# Patient Record
Sex: FEMALE | Race: WHITE | Employment: OTHER | URBAN - METROPOLITAN AREA
[De-identification: names, ages, dates, MRNs, and addresses within clinical notes are randomized per-mention and may not be internally consistent; named-entity substitution may affect disease eponyms.]

---

## 2017-01-11 ENCOUNTER — OFFICE VISIT (OUTPATIENT)
Dept: PULMONOLOGY | Facility: CLINIC | Age: 52
End: 2017-01-11
Attending: INTERNAL MEDICINE
Payer: MEDICARE

## 2017-01-11 VITALS
DIASTOLIC BLOOD PRESSURE: 99 MMHG | HEIGHT: 61 IN | RESPIRATION RATE: 16 BRPM | OXYGEN SATURATION: 96 % | HEART RATE: 92 BPM | SYSTOLIC BLOOD PRESSURE: 153 MMHG

## 2017-01-11 DIAGNOSIS — R06.02 SOB (SHORTNESS OF BREATH): ICD-10-CM

## 2017-01-11 DIAGNOSIS — R09.A2 GLOBUS SENSATION: ICD-10-CM

## 2017-01-11 DIAGNOSIS — K21.9 GASTROESOPHAGEAL REFLUX DISEASE WITHOUT ESOPHAGITIS: Primary | ICD-10-CM

## 2017-01-11 DIAGNOSIS — J45.40 MODERATE PERSISTENT ASTHMA WITHOUT COMPLICATION: Primary | ICD-10-CM

## 2017-01-11 DIAGNOSIS — K44.9 HIATAL HERNIA: ICD-10-CM

## 2017-01-11 DIAGNOSIS — R05.3 CHRONIC COUGH: ICD-10-CM

## 2017-01-11 PROCEDURE — 99212 OFFICE O/P EST SF 10 MIN: CPT | Mod: ZF

## 2017-01-11 ASSESSMENT — ENCOUNTER SYMPTOMS
WEAKNESS: 1
HYPOTENSION: 0
PALPITATIONS: 0
HEADACHES: 0
LOSS OF CONSCIOUSNESS: 0
HYPERTENSION: 0
MUSCLE CRAMPS: 1
BACK PAIN: 1
DIZZINESS: 1
TACHYCARDIA: 0
EXERCISE INTOLERANCE: 0
MEMORY LOSS: 0
NUMBNESS: 1
SLEEP DISTURBANCES DUE TO BREATHING: 0
JOINT SWELLING: 0
ARTHRALGIAS: 1
LIGHT-HEADEDNESS: 0
ORTHOPNEA: 0
LEG SWELLING: 1
MUSCLE WEAKNESS: 0
POOR WOUND HEALING: 0
TREMORS: 0
DISTURBANCES IN COORDINATION: 1
TINGLING: 1
SEIZURES: 0
CLAUDICATION: 0
PARALYSIS: 0
SYNCOPE: 0
STIFFNESS: 1
SPEECH CHANGE: 0
NAIL CHANGES: 0
SKIN CHANGES: 0
NECK PAIN: 1
MYALGIAS: 1
LEG PAIN: 1

## 2017-01-11 ASSESSMENT — PAIN SCALES - GENERAL: PAINLEVEL: NO PAIN (0)

## 2017-01-11 NOTE — PROGRESS NOTES
Pulmonary Clinic    We have been asked by Dr. Castillo to evaluate this patient in regards to   Chief Complaint   Patient presents with     Consult     Patient is being seen for consultation of Shortness of breath.  Pt previously seen in Nodule Clinic         HPI:     This is a 51-year-old female who was previously evaluated in the pulmonary nodule clinic secondary to small incidental pulmonary nodules and nonspecific lymphadenopathy.  On last visit we discussed that she does not need any further follow-up for the nonspecific lymphadenopathy which is thought to be reactive or the pulmonary nodules.  She did have ongoing symptoms of chronic cough which we felt was most likely multifactorial related to postnasal drip, GERD and possible asthma.  It was recommended she continue her Advair and p.r.n. Albuterol.  On last visit we did increase her omeprazole to twice a day and recommended a swallow evaluation.  Her swallow evaluation did demonstrate flash penetration without aspiration.  There was also a sliding hiatal hernia.  Speech pathology did recommend consideration of a GI referral for reflux management.    The patient reports that her cough is improved but there is still some mild intermittent cough.  No other new complaints.  She continues to have very significant and frequent reflux despite twice a treatment with PPI.         Review of Systems:   10 point ROS performed with pertinent +/- noted in the HPI.  The remainder of the ROS was otherwise negative.        Pertinent Medications     Current Outpatient Prescriptions   Medication     albuterol (PROAIR HFA/PROVENTIL HFA/VENTOLIN HFA) 108 (90 BASE) MCG/ACT Inhaler     multivitamin, therapeutic with minerals (MULTI-VITAMIN) TABS     omeprazole (PRILOSEC) 20 MG capsule     fluticasone (FLONASE) 50 MCG/ACT nasal spray     cetirizine (ZYRTEC) 10 MG tablet     fluticasone-salmeterol (ADVAIR) 250-50 MCG/DOSE diskus inhaler     No current facility-administered medications  "for this visit.          Allergies:      Allergies   Allergen Reactions     Amoxicillin      Lip swelling      Aspirin      GI upset     Zithromax [Azithromycin]      RASH        Past Medical Hx:       Abnormal CT of the chest        Hypothyroidism      History of herniated intervertebral disc      Hypothyroidism, unspecified hypothyroidism type      CARDIOVASCULAR SCREENING; LDL GOAL LESS THAN 160      Scoliosis and arthritis      No tobacco use history       Family Hx:     Family History   Problem Relation Age of Onset     HEART DISEASE Father      triple bypass     Unknown/Adopted Father      Unknown/Adopted Mother      DIABETES Maternal Grandfather         Social Hx:   The patient has a 0 pk yr tobacco hx.  She has no active use.  Alcohol use is 0 alcoholic drinks per week.  She denies use of recreational drugs.    She is currently on disability for scoliosis.      The patient is .  Has 2 children.         Objective   Vitals:  /99 mmHg  Pulse 92  Resp 16  Ht 1.549 m (5' 1\")  SpO2 96%  LMP 12/06/2016 (Exact Date)    General:  Adult female;appears stated age; mildly distressed; the patient is a good historian  HEENT:  NCAT; EOMI; No icterus; no injection; MMM  Neck: Supple, full range of motion, no lymphadenopathy  Pulm: No wheezes   CV: RRR, no murmurs, rubs or gallops  Extremities:  No cyanosis or clubbing, no edema  Neuro: Alert and oriented x 3, moves all extremities no obvious weakness        Labs / Imaging/Studies     Pulmonary Function Tests:  Nonspecific pattern, did not meet ATS criteria, no total lung capacity.         Assessment and Plan:   This is a 51-year-old female  With nonspecific lymphadenopathy and pulmonary nodules.  The pulmonary nodules do not need any further follow-up. Today she is being evaluated for her chronic cough which is improved with twice a day antireflux medication..  We discussed avoiding foods that cause worsening of her reflux.  In regards to her underlying " possible asthma we would recommend continuation of Advair and albuterol. She should continue to treat postnasal drip with Flonase.  Given the persistence of her reflux as well as a hiatal hernia noted on the swallow evaluation I would recommend the patient be further evaluated by gastroenterology.    1. Possible Moderate persistent asthma: On advair and albuterol PRN.  No change in treatment.     2. Chronic cough: Likely multifactorial but would control her known gerd before further interventions. Plan to continue flonase and ppi.  Referral to GI for persistent GERD and hiatal hernia.   -cont omeprazole BID  - GI referral    3. Pulmonary nodules: No further recommended follow up    I spent 25 minutes with direct face to face interaction with this patient and provided at least 50% of this time counseling and coordinating care for chronic cough as noted above in the assessment and plan.        Kehinde Boyer MD  Pulmonary and Critical Care  [facility]  Pager:  [phone]

## 2017-01-11 NOTE — PATIENT INSTRUCTIONS
1.  Continue taking twice a day reflux medications  2.  Avoid any foods that worsen your reflux  3.  Continue advair an albuterol  4.  Continue flonase  5.  Referral to talk with GI about reflux and sliding hernia.

## 2017-01-11 NOTE — MR AVS SNAPSHOT
After Visit Summary   1/11/2017    Brooke John    MRN: [id number]           Patient Information     Date Of Birth          1965        Visit Information        Provider Department      1/11/2017 3:40 PM Kehinde Boyer MD M [facility]        Today's Diagnoses     Gastroesophageal reflux disease without esophagitis    -  1     Chronic cough         Hiatal hernia         Globus sensation           Care Instructions    1.  Continue taking twice a day reflux medications  2.  Avoid any foods that worsen your reflux  3.  Continue advair an albuterol  4.  Continue flonase  5.  Referral to talk with GI about reflux and sliding hernia.           Follow-ups after your visit        Additional Services     GASTROENTEROLOGY ADULT REFERRAL +/- PROCEDURE       Your provider has referred you to Gastroenterology Services.    English    Procedure/Referral: REFERRAL ONLY - [facility]: Gastroenterology Clinic [facility] [phone]   [email or web address]    Please be aware that coverage of these services is subject to the terms and limitations of your health insurance plan.  Call member services at your health plan with any benefit or coverage questions.  Any procedures must be performed at a [facility] facility OR coordinated by your clinic's referral office.    Please bring the following with you to your appointment:    (1) Any X-Rays, CTs or MRIs which have been performed.  Contact the facility where they were done to arrange for  prior to your scheduled appointment.    (2) List of current medications   (3) This referral request   (4) Any documents/labs given to you for this referral                  Follow-up notes from your care team     Return in about 3 months (around 4/11/2017).      Your next 10 appointments already scheduled     Apr 05, 2017  2:40 PM   (Arrive by 2:25 PM)   Return Visit with Kehinde Boyer MD   M  "[facility] ([facility])    [address]  3rd Floor  [place] [facility] [address] [phone]            May 12, 2017  2:00 PM   (Arrive by 1:45 PM)   New Patient Visit with BRIAN Petit CNP   Gastroenterology and IBD ([facility])    [address]  4th Floor  [facility] [address] [phone]              Who to contact     If you have questions or need follow up information about today's clinic visit or your schedule please contact [facility] directly at [phone].  Normal or non-critical lab and imaging results will be communicated to you by NoRedIn[place]art, letter or phone within 4 business days after the clinic has received the results. If you do not hear from us within 7 days, please contact the clinic through NoRedIn[place]art or phone. If you have a critical or abnormal lab result, we will notify you by phone as soon as possible.  Submit refill requests through Moven or call your pharmacy and they will forward the refill request to us. Please allow 3 business days for your refill to be completed.          Additional Information About Your Visit        MyChart Information     Moven lets you send messages to your doctor, view your test results, renew your prescriptions, schedule appointments and more. To sign up, go to [email or web address] . Click on \"Log in\" on the left side of the screen, which will take you to the Welcome page. Then click on \"Sign up Now\" on the right side of the page.     You will be asked to enter the access code listed below, as well as some personal information. Please follow the directions to create your username and password.     Your access code is: [email or web address]  Expires: 3/12/2017 10:05 AM     Your access code will  in 90 days. If you need help or a new code, please call your [place] clinic or [phone].        Care EveryWhere ID     This " "is your Care EveryWhere ID. This could be used by other organizations to access your [facility] medical records  [id number]        Your Vitals Were     Pulse Respirations Height Pulse Oximetry Last Period       92 16 1.549 m (5' 1\") 96% 12/06/2016 (Exact Date)        Blood Pressure from Last 3 Encounters:   01/11/17 153/99   12/12/16 118/80   11/08/16 140/85    Weight from Last 3 Encounters:   12/12/16 95.255 kg (210 lb)   11/08/16 96.344 kg (212 lb 6.4 oz)   10/21/16 94.802 kg (209 lb)              We Performed the Following     GASTROENTEROLOGY ADULT REFERRAL +/- PROCEDURE        Primary Care Provider    None Specified       No primary provider on file.        Thank you!     Thank you for choosing [facility]  for your care. Our goal is always to provide you with excellent care. Hearing back from our patients is one way we can continue to improve our services. Please take a few minutes to complete the written survey that you may receive in the mail after your visit with us. Thank you!             Your Updated Medication List - Protect others around you: Learn how to safely use, store and throw away your medicines at www.disposemymeds.org.          This list is accurate as of: 1/11/17  4:24 PM.  Always use your most recent med list.                   Brand Name Dispense Instructions for use    albuterol 108 (90 BASE) MCG/ACT Inhaler    PROAIR HFA/PROVENTIL HFA/VENTOLIN HFA    1 Inhaler    Inhale 2 puffs into the lungs every 4 hours as needed for shortness of breath / dyspnea or wheezing       cetirizine 10 MG tablet    zyrTEC    30 tablet    Take 1 tablet (10 mg) by mouth every morning       fluticasone 50 MCG/ACT spray    FLONASE    16 g    USE TWO SPRAYS IN EACH NOSTRIL EVERY DAY       fluticasone-salmeterol 250-50 MCG/DOSE diskus inhaler    ADVAIR    1 Inhaler    Inhale 1 puff into the lungs 2 times daily       Multi-vitamin Tabs tablet      Take 1 tablet by mouth daily       " omeprazole 20 MG CR capsule    priLOSEC    180 capsule    Take 1 capsule (20 mg) by mouth 2 times daily

## 2017-01-11 NOTE — Clinical Note
Date:January 20, 2017      Patient was self referred, no letter generated. Do not send.        [facility] Information

## 2017-01-11 NOTE — Clinical Note
1/11/2017       RE: Brooke John  [address]    [facility] [address]     Dear Colleague,    Thank you for referring your patient, Brooke John, to the [facility] at [facility]. Please see a copy of my visit note below.    Pulmonary Clinic    We have been asked by Dr. Castillo to evaluate this patient in regards to   Chief Complaint   Patient presents with     Consult     Patient is being seen for consultation of Shortness of breath.  Pt previously seen in Nodule Clinic         HPI:     This is a 51-year-old female who was previously evaluated in the pulmonary nodule clinic secondary to small incidental pulmonary nodules and nonspecific lymphadenopathy.  On last visit we discussed that she does not need any further follow-up for the nonspecific lymphadenopathy which is thought to be reactive or the pulmonary nodules.  She did have ongoing symptoms of chronic cough which we felt was most likely multifactorial related to postnasal drip, GERD and possible asthma.  It was recommended she continue her Advair and p.r.n. Albuterol.  On last visit we did increase her omeprazole to twice a day and recommended a swallow evaluation.  Her swallow evaluation did demonstrate flash penetration without aspiration.  There was also a sliding hiatal hernia.  Speech pathology did recommend consideration of a GI referral for reflux management.    ***         Review of Systems:   10 point ROS performed with pertinent +/- noted in the HPI.  The remainder of the ROS was otherwise negative.        Pertinent Medications     Current Outpatient Prescriptions   Medication     albuterol (PROAIR HFA/PROVENTIL HFA/VENTOLIN HFA) 108 (90 BASE) MCG/ACT Inhaler     multivitamin, therapeutic with minerals (MULTI-VITAMIN) TABS     omeprazole (PRILOSEC) 20 MG capsule     fluticasone (FLONASE) 50 MCG/ACT nasal spray     cetirizine (ZYRTEC) 10 MG tablet      "fluticasone-salmeterol (ADVAIR) 250-50 MCG/DOSE diskus inhaler     No current facility-administered medications for this visit.          Allergies:      Allergies   Allergen Reactions     Amoxicillin      Lip swelling      Aspirin      GI upset     Zithromax [Azithromycin]      RASH        Past Medical Hx:       Abnormal CT of the chest        Hypothyroidism      History of herniated intervertebral disc      Hypothyroidism, unspecified hypothyroidism type      CARDIOVASCULAR SCREENING; LDL GOAL LESS THAN 160      Scoliosis and arthritis      No tobacco use history       Family Hx:     Family History   Problem Relation Age of Onset     HEART DISEASE Father      triple bypass     Unknown/Adopted Father      Unknown/Adopted Mother      DIABETES Maternal Grandfather         Social Hx:   The patient has a 0 pk yr tobacco hx.  She has no active use.  Alcohol use is 0 alcoholic drinks per week.  She denies use of recreational drugs.    She is currently on disability for scoliosis.      The patient is .  Has 2 children.         Objective   Vitals:  /99 mmHg  Pulse 92  Resp 16  Ht 1.549 m (5' 1\")  SpO2 96%  LMP 12/06/2016 (Exact Date)    General:  Adult female;appears stated age; mildly distressed; the patient is a good historian  HEENT:  NCAT; EOMI; No icterus; no injection; MMM  Neck: Supple, full range of motion, no lymphadenopathy  Pulm: No wheezes   CV: RRR, no murmurs, rubs or gallops  Extremities:  No cyanosis or clubbing, no edema  Neuro: Alert and oriented x 3, moves all extremities no obvious weakness        Labs / Imaging/Studies     Imaging:   CT chest: Done 6/2016  1. Decreased size of numerous mediastinal lymph nodes since the prior  exam. Redemonstration of several bilateral benign-appearing enlarged  axillary lymph nodes.  2. Moderate sliding hiatal hernia.  3. Stable bilateral 4 mm pulmonary nodules. If patient is considered  low risk for lung cancer, no further followup is needed. If " patient is  considered high risk, recommend followup CT in 12 months to ensure  stability.  4. Subacute right-sided rib fractures.  5. Diffuse hepatic steatosis.    Pulmonary Function Tests:  Nonspecific pattern, did not meet ATS criteria, no total lung capacity.         Assessment and Plan:         This is a 51-year-old female with improving nonspecific lymphadenopathy and 4mm pulmonary nodules which were stable and thought to be related to a recent illness in the setting of a possible asthma exacerbation.  Her PFT's were poor technique and appeared more consistent with restriction if anything.  She is on therapy for post nasal drip which seems well controlled, however her GERD does not seem well controlled.   Will start on BID PPI, also will get swallow study.      1. Possible Moderate persistent asthma: On advair and albuterol PRN, has had pneumovax and flu shot.  Unable to perform PFT's satisfactorily in the past.  No change in therapy at this point    2. Chronic cough: Likely multifactorial but main complaint today seems to be GERD related, with symptoms while laying flat, trouble with pop/soda and many other foods and sensation of food getting stuck while swallowing.   -Increase omeprazole to BID  -Swallow study  -F/U in 2months in general pulmonary clinic    3. Pulmonary nodules: No further recommended follow up      Herb Lynch  Pulmonary/Critical Care Fellow      Seen and discussed with Dr Boyer    The patient was independently seen by me.  I have reviewed the patient's presentation, pertinent labs, pertinent imaging, and history.  I have discussed the case in detail with the resident / fellow and agree with the assessment and plan as documented.  Any necessary changes to the above documentation have been made prior to signing this clinic note.     Kehinde Boyer MD  Pulmonary and Critical Care  [facility]  Pager:  [phone]        Again, thank you for allowing me to participate in  the care of your patient.      Sincerely,    Kehinde Boyer MD

## 2017-01-24 LAB
DLCOUNC-%PRED-PRE: 80 %
DLCOUNC-PRE: 16.92 ML/MIN/MMHG
DLCOUNC-PRED: 21.14 ML/MIN/MMHG
ERV-%PRED-PRE: 81 %
ERV-PRE: 0.22 L
ERV-PRED: 0.27 L
EXPTIME-PRE: 6.46 SEC
FEF2575-%PRED-PRE: 68 %
FEF2575-PRE: 1.65 L/SEC
FEF2575-PRED: 2.4 L/SEC
FEFMAX-%PRED-PRE: 61 %
FEFMAX-PRE: 3.79 L/SEC
FEFMAX-PRED: 6.16 L/SEC
FEV1-%PRED-PRE: 60 %
FEV1-PRE: 1.47 L
FEV1FEV6-PRE: 85 %
FEV1FEV6-PRED: 82 %
FEV1FVC-PRE: 85 %
FEV1FVC-PRED: 81 %
FEV1SVC-PRE: 78 %
FEV1SVC-PRED: 80 %
FIFMAX-PRE: 3.07 L/SEC
FRCPLETH-%PRED-PRE: 67 %
FRCPLETH-PRE: 1.69 L
FRCPLETH-PRED: 2.52 L
FVC-%PRED-PRE: 57 %
FVC-PRE: 1.73 L
FVC-PRED: 3.02 L
IC-%PRED-PRE: 60 %
IC-PRE: 1.67 L
IC-PRED: 2.76 L
RVPLETH-%PRED-PRE: 88 %
RVPLETH-PRE: 1.47 L
RVPLETH-PRED: 1.66 L
TLCPLETH-%PRED-PRE: 75 %
TLCPLETH-PRE: 3.36 L
TLCPLETH-PRED: 4.44 L
VA-%PRED-PRE: 61 %
VA-PRE: 2.8 L
VC-%PRED-PRE: 62 %
VC-PRE: 1.89 L
VC-PRED: 3.03 L

## 2017-02-17 ENCOUNTER — PRE VISIT (OUTPATIENT)
Dept: GASTROENTEROLOGY | Facility: CLINIC | Age: 52
End: 2017-02-17

## 2017-02-17 NOTE — TELEPHONE ENCOUNTER
1.  Date/reason for appt: 5/12/17 2PM dx Gastroesophageal reflux disease without esophagitis Chronic cough Hiatal hernia  2.  Referring provider: Dr. Boyer   3.  Call to patient (Yes / No - short description): Yes, LM to see if there are any outside recs.   4.  Previous care at / records requested from:  Ov notes w/ Merlin 1/11/17   PACS-  CT Chest 6/24/16  & XR Swallow Video

## 2017-03-01 NOTE — TELEPHONE ENCOUNTER
Called and spoke w/ pt, per pt there are no outside recs only recs are with Dr. Boyer. Pt has not had an upper EGD as of yet but she is not sure.

## 2017-03-06 DIAGNOSIS — J45.40 MODERATE PERSISTENT ASTHMA WITHOUT COMPLICATION: ICD-10-CM

## 2017-03-09 RX ORDER — FLUTICASONE PROPIONATE 50 MCG
SPRAY, SUSPENSION (ML) NASAL
Qty: 16 G | Refills: 3 | Status: SHIPPED | OUTPATIENT
Start: 2017-03-09 | End: 2018-06-11

## 2017-03-09 NOTE — TELEPHONE ENCOUNTER
fluticasone (FLONASE) 50 MCG/ACT nasal spray 16 g 1 10/26/2016  No   Sig: USE TWO SPRAYS IN EACH NOSTRIL EVERY DAY         Last Written Prescription Date: 10/26/16  Last Fill Quantity: 16,  # refills: 1   Last Office Visit with G, [facility] or [facility] prescribing provider: 12/12/16         Prescription approved per [facility] Refill Protocol.    Debbie Bingham RN, BSN   [facility]

## 2017-04-05 ENCOUNTER — OFFICE VISIT (OUTPATIENT)
Dept: PULMONOLOGY | Facility: CLINIC | Age: 52
End: 2017-04-05
Attending: INTERNAL MEDICINE
Payer: MEDICARE

## 2017-04-05 VITALS
RESPIRATION RATE: 16 BRPM | DIASTOLIC BLOOD PRESSURE: 90 MMHG | OXYGEN SATURATION: 97 % | HEART RATE: 91 BPM | SYSTOLIC BLOOD PRESSURE: 140 MMHG

## 2017-04-05 DIAGNOSIS — J45.40 MODERATE PERSISTENT ASTHMA WITHOUT COMPLICATION: Primary | ICD-10-CM

## 2017-04-05 DIAGNOSIS — K21.00 GASTROESOPHAGEAL REFLUX DISEASE WITH ESOPHAGITIS: ICD-10-CM

## 2017-04-05 DIAGNOSIS — R91.8 PULMONARY NODULES: ICD-10-CM

## 2017-04-05 PROCEDURE — 99212 OFFICE O/P EST SF 10 MIN: CPT | Mod: ZF

## 2017-04-05 ASSESSMENT — PAIN SCALES - GENERAL: PAINLEVEL: MODERATE PAIN (5)

## 2017-04-05 NOTE — NURSING NOTE
Chief Complaint   Patient presents with     Breathing Problem     Patient is being seen for follow up of breathing issues      Funmilayo Jaramillo CMA at 2:21 PM on 4/5/2017

## 2017-04-05 NOTE — LETTER
4/5/2017       RE: Brooke John  [address]    [place] [facility] [address]     Dear Colleague,    Thank you for referring your patient, Brooke John, to the [facility] Please see a copy of my visit note below.    Pulmonary Clinic    We have been asked by Dr. Castillo to evaluate this patient in regards to   Chief Complaint   Patient presents with     Breathing Problem     Patient is being seen for follow up of breathing issues         HPI:     This is a 51-year-old female who I had previously seen secondary to incidental pulmonary nodules and nonspecific lymphadenopathy.  She was last seen in January 2017.  It was no further recommended follow-up for the incidental pulmonary nodules which continue to have chronic cough.  The cough had improved after initiation of a proton pump inhibitor and some nonpharmacologic interventions including reduction of food prior to bedtime.  There was some concern for possible underlying asthma and she was continued on Advair and p.r.n. albuterol.  She was also to have postnasal drip for which she's been treated with Flonase.  Again she notes that her cough has been improved after initiation of a proton pump inhibitor and she continues her Advair.  Her triggers for dyspnea and cough tend to be smoke exposure.  She does have an upcoming appointment with gastroenterology on May 12, 2017.       Review of Systems:   10 point ROS performed with pertinent +/- noted in the HPI.  The remainder of the ROS was otherwise negative.        Pertinent Medications     Current Outpatient Prescriptions   Medication     fluticasone (FLONASE) 50 MCG/ACT spray     albuterol (PROAIR HFA/PROVENTIL HFA/VENTOLIN HFA) 108 (90 BASE) MCG/ACT Inhaler     multivitamin, therapeutic with minerals (MULTI-VITAMIN) TABS     omeprazole (PRILOSEC) 20 MG capsule     cetirizine (ZYRTEC) 10 MG tablet      fluticasone-salmeterol (ADVAIR) 250-50 MCG/DOSE diskus inhaler     No current facility-administered medications for this visit.           Allergies:      Allergies   Allergen Reactions     Amoxicillin      Lip swelling      Aspirin      GI upset     Zithromax [Azithromycin]      RASH        Past Medical Hx:       Abnormal CT of the chest - pulmonary nodules, no further follow up required        Hypothyroidism      History of herniated intervertebral disc      Hypothyroidism, unspecified hypothyroidism type      CARDIOVASCULAR SCREENING; LDL GOAL LESS THAN 160      Scoliosis and arthritis      No tobacco use history      Chronic cough       Family Hx:     Family History   Problem Relation Age of Onset     HEART DISEASE Father      triple bypass     Unknown/Adopted Father      Unknown/Adopted Mother      DIABETES Maternal Grandfather         Social Hx:   The patient has a 0 pk yr tobacco hx.  She has no active use.  Alcohol use is 0 alcoholic drinks per week.  She denies use of recreational drugs.    She is currently on disability for scoliosis.      The patient is .  Has 2 children.         Objective   Vitals:  /90 (BP Location: Right arm, Patient Position: Chair, Cuff Size: Adult Large)  Pulse 91  Resp 16  SpO2 97%    General:  Adult female;appears stated age; no distress, good historian  HEENT:  NCAT; EOMI; No icterus; no injection; MMM  Neck: Supple, full range of motion, no lymphadenopathy  Pulm: No wheezes, CTA  CV: RRR, no murmurs, rubs or gallops  Extremities:  No cyanosis or clubbing, no edema  Neuro: Alert and oriented x 3, moves all extremities no obvious weakness        Labs / Imaging/Studies     None         Assessment and Plan:   This is a 71-year-old female with nonspecific lymphadenopathy and no further required follow-up for incidental pulmonary nodules.  Her chronic cough is likely multifactorial including postnasal drip, reflux, asthma.  She does have a sliding hiatal hernia and  significant GERD therefore she will follow-up with gastroenterology.  We will continue her current medications including Advair, PPI, Flonase, p.r.n. albuterol and she will follow-up in six months at [facility].    1. Moderate persistent asthma without complication  Continue Advair and p.r.n. albuterol.  Continue to control aggravating factors including postnasal drip and reflux.    2. Pulmonary nodules  No further recommended follow-up.    3. Gastroesophageal reflux disease with esophagitis  Continue PPI and follow up with gastroenterology    I spent 25 minutes with direct face to face interaction with this patient and provided at least 50% of this time counseling and coordinating care for chronic cough, post nasal drip, and refulx as noted above in the assessment and plan.        Kehinde Boyer MD  Pulmonary and Critical Care  [facility]  Pager:  [phone]

## 2017-04-05 NOTE — MR AVS SNAPSHOT
"              After Visit Summary   4/5/2017    Brooke John    MRN: [id number]           Patient Information     Date Of Birth          1965        Visit Information        Provider Department      4/5/2017 2:40 PM Kehinde Boyer MD [facility]        Care Instructions    1.  [phone] call after you see GI.   2.  Follow up in 6 months ([place])        Follow-ups after your visit        Follow-up notes from your care team     Return in about 6 months (around 10/5/2017).      Your next 10 appointments already scheduled     May 12, 2017  2:00 PM CDT   (Arrive by 1:45 PM)   New Patient Visit with BRIAN Petit [facility] ([facility])    [address] [place] [id number] [phone]              Who to contact     If you have questions or need follow up information about today's clinic visit or your schedule please contact [facility] directly at [phone].  Normal or non-critical lab and imaging results will be communicated to you by Synt[employer]ityhart, letter or phone within 4 business days after the clinic has received the results. If you do not hear from us within 7 days, please contact the clinic through Syntricityhart or phone. If you have a critical or abnormal lab result, we will notify you by phone as soon as possible.  Submit refill requests through [employer] or call your pharmacy and they will forward the refill request to us. Please allow 3 business days for your refill to be completed.          Additional Information About Your Visit        Syntricityhart Information     [employer] lets you send messages to your doctor, view your test results, renew your prescriptions, schedule appointments and more. To sign up, go to [email or web address] . Click on \"Log in\" on the left side of the screen, which will take you to the Welcome page. Then click on \"Sign up " "Now\" on the right side of the page.     You will be asked to enter the access code listed below, as well as some personal information. Please follow the directions to create your username and password.     Your access code is: [email or web address]  Expires: 2017  6:30 AM     Your access code will  in 90 days. If you need help or a new code, please call your [facility] or [phone].        Care EveryWhere ID     This is your Care EveryWhere ID. This could be used by other organizations to access your [place] medical records  [id number]        Your Vitals Were     Pulse Respirations Pulse Oximetry             91 16 97%          Blood Pressure from Last 3 Encounters:   17 140/90   17 (!) 153/99   16 118/80    Weight from Last 3 Encounters:   16 95.3 kg (210 lb)   16 96.3 kg (212 lb 6.4 oz)   10/21/16 94.8 kg (209 lb)              Today, you had the following     No orders found for display       Primary Care Provider    None Specified       No primary provider on file.        Thank you!     Thank you for choosing [facility]  for your care. Our goal is always to provide you with excellent care. Hearing back from our patients is one way we can continue to improve our services. Please take a few minutes to complete the written survey that you may receive in the mail after your visit with us. Thank you!             Your Updated Medication List - Protect others around you: Learn how to safely use, store and throw away your medicines at www.disposemymeds.org.          This list is accurate as of: 17  3:41 PM.  Always use your most recent med list.                   Brand Name Dispense Instructions for use    albuterol 108 (90 BASE) MCG/ACT Inhaler    PROAIR HFA/PROVENTIL HFA/VENTOLIN HFA    1 Inhaler    Inhale 2 puffs into the lungs every 4 hours as needed for shortness of breath / dyspnea or wheezing       cetirizine 10 MG tablet    zyrTEC    30 " tablet    Take 1 tablet (10 mg) by mouth every morning       fluticasone 50 MCG/ACT spray    FLONASE    16 g    USE TWO SPRAYS IN EACH NOSTRIL EVERY DAY       fluticasone-salmeterol 250-50 MCG/DOSE diskus inhaler    ADVAIR    1 Inhaler    Inhale 1 puff into the lungs 2 times daily       Multi-vitamin Tabs tablet      Take 1 tablet by mouth daily       omeprazole 20 MG CR capsule    priLOSEC    180 capsule    Take 1 capsule (20 mg) by mouth 2 times daily

## 2017-04-05 NOTE — PROGRESS NOTES
Pulmonary Clinic    We have been asked by Dr. Castillo to evaluate this patient in regards to   Chief Complaint   Patient presents with     Breathing Problem     Patient is being seen for follow up of breathing issues         HPI:     This is a 51-year-old female who I had previously seen secondary to incidental pulmonary nodules and nonspecific lymphadenopathy.  She was last seen in January 2017.  It was no further recommended follow-up for the incidental pulmonary nodules which continue to have chronic cough.  The cough had improved after initiation of a proton pump inhibitor and some nonpharmacologic interventions including reduction of food prior to bedtime.  There was some concern for possible underlying asthma and she was continued on Advair and p.r.n. albuterol.  She was also to have postnasal drip for which she's been treated with Flonase.  Again she notes that her cough has been improved after initiation of a proton pump inhibitor and she continues her Advair.  Her triggers for dyspnea and cough tend to be smoke exposure.  She does have an upcoming appointment with gastroenterology on May 12, 2017.       Review of Systems:   10 point ROS performed with pertinent +/- noted in the HPI.  The remainder of the ROS was otherwise negative.        Pertinent Medications     Current Outpatient Prescriptions   Medication     fluticasone (FLONASE) 50 MCG/ACT spray     albuterol (PROAIR HFA/PROVENTIL HFA/VENTOLIN HFA) 108 (90 BASE) MCG/ACT Inhaler     multivitamin, therapeutic with minerals (MULTI-VITAMIN) TABS     omeprazole (PRILOSEC) 20 MG capsule     cetirizine (ZYRTEC) 10 MG tablet     fluticasone-salmeterol (ADVAIR) 250-50 MCG/DOSE diskus inhaler     No current facility-administered medications for this visit.           Allergies:      Allergies   Allergen Reactions     Amoxicillin      Lip swelling      Aspirin      GI upset     Zithromax [Azithromycin]      RASH        Past Medical Hx:       Abnormal CT of the  chest - pulmonary nodules, no further follow up required        Hypothyroidism      History of herniated intervertebral disc      Hypothyroidism, unspecified hypothyroidism type      CARDIOVASCULAR SCREENING; LDL GOAL LESS THAN 160      Scoliosis and arthritis      No tobacco use history      Chronic cough       Family Hx:     Family History   Problem Relation Age of Onset     HEART DISEASE Father      triple bypass     Unknown/Adopted Father      Unknown/Adopted Mother      DIABETES Maternal Grandfather         Social Hx:   The patient has a 0 pk yr tobacco hx.  She has no active use.  Alcohol use is 0 alcoholic drinks per week.  She denies use of recreational drugs.    She is currently on disability for scoliosis.      The patient is .  Has 2 children.         Objective   Vitals:  /90 (BP Location: Right arm, Patient Position: Chair, Cuff Size: Adult Large)  Pulse 91  Resp 16  SpO2 97%    General:  Adult female;appears stated age; no distress, good historian  HEENT:  NCAT; EOMI; No icterus; no injection; MMM  Neck: Supple, full range of motion, no lymphadenopathy  Pulm: No wheezes, CTA  CV: RRR, no murmurs, rubs or gallops  Extremities:  No cyanosis or clubbing, no edema  Neuro: Alert and oriented x 3, moves all extremities no obvious weakness        Labs / Imaging/Studies     None         Assessment and Plan:   This is a 71-year-old female with nonspecific lymphadenopathy and no further required follow-up for incidental pulmonary nodules.  Her chronic cough is likely multifactorial including postnasal drip, reflux, asthma.  She does have a sliding hiatal hernia and significant GERD therefore she will follow-up with gastroenterology.  We will continue her current medications including Advair, PPI, Flonase, p.r.n. albuterol and she will follow-up in six months at [facility].    1. Moderate persistent asthma without complication  Continue Advair and p.r.n. albuterol.  Continue to control  aggravating factors including postnasal drip and reflux.    2. Pulmonary nodules  No further recommended follow-up.    3. Gastroesophageal reflux disease with esophagitis  Continue PPI and follow up with gastroenterology    I spent 25 minutes with direct face to face interaction with this patient and provided at least 50% of this time counseling and coordinating care for chronic cough, post nasal drip, and refulx as noted above in the assessment and plan.        Kehinde Boyer MD  Pulmonary and Critical Care  [facility]  Pager:  [phone]

## 2017-05-12 ENCOUNTER — OFFICE VISIT (OUTPATIENT)
Dept: GASTROENTEROLOGY | Facility: CLINIC | Age: 52
End: 2017-05-12

## 2017-05-12 VITALS
HEIGHT: 61 IN | OXYGEN SATURATION: 95 % | WEIGHT: 214 LBS | DIASTOLIC BLOOD PRESSURE: 82 MMHG | BODY MASS INDEX: 40.4 KG/M2 | HEART RATE: 79 BPM | SYSTOLIC BLOOD PRESSURE: 128 MMHG

## 2017-05-12 DIAGNOSIS — K44.9 HIATAL HERNIA: ICD-10-CM

## 2017-05-12 DIAGNOSIS — E73.9 LACTOSE INTOLERANCE: ICD-10-CM

## 2017-05-12 DIAGNOSIS — K59.00 CONSTIPATION, UNSPECIFIED CONSTIPATION TYPE: ICD-10-CM

## 2017-05-12 DIAGNOSIS — K21.9 GASTROESOPHAGEAL REFLUX DISEASE, ESOPHAGITIS PRESENCE NOT SPECIFIED: ICD-10-CM

## 2017-05-12 DIAGNOSIS — R13.19 ESOPHAGEAL DYSPHAGIA: Primary | ICD-10-CM

## 2017-05-12 RX ORDER — POLYETHYLENE GLYCOL 3350 17 G/17G
1 POWDER, FOR SOLUTION ORAL DAILY PRN
Qty: 510 G | Refills: 11 | Status: SHIPPED | OUTPATIENT
Start: 2017-05-12 | End: 2019-10-17

## 2017-05-12 ASSESSMENT — ENCOUNTER SYMPTOMS
SHORTNESS OF BREATH: 0
HEMOPTYSIS: 0
RESPIRATORY PAIN: 0
LEG SWELLING: 1
DIZZINESS: 1
DISTURBANCES IN COORDINATION: 0
SNORES LOUDLY: 1
TACHYCARDIA: 0
DIFFICULTY URINATING: 0
HYPOTENSION: 0
MEMORY LOSS: 0
ARTHRALGIAS: 1
MUSCLE CRAMPS: 1
PARALYSIS: 0
POOR WOUND HEALING: 1
CLAUDICATION: 1
WEAKNESS: 1
SYNCOPE: 0
POSTURAL DYSPNEA: 1
EXERCISE INTOLERANCE: 0
DYSPNEA ON EXERTION: 0
BACK PAIN: 1
LOSS OF CONSCIOUSNESS: 0
SPEECH CHANGE: 1
SLEEP DISTURBANCES DUE TO BREATHING: 0
DYSURIA: 0
HYPERTENSION: 0
FLANK PAIN: 0
COUGH: 0
TREMORS: 0
LIGHT-HEADEDNESS: 1
WHEEZING: 1
NECK PAIN: 1
ORTHOPNEA: 1
HEMATURIA: 0
SKIN CHANGES: 0
COUGH DISTURBING SLEEP: 0
SEIZURES: 0
NAIL CHANGES: 0
JOINT SWELLING: 1
LEG PAIN: 1
HEADACHES: 0
SPUTUM PRODUCTION: 0
NUMBNESS: 1
STIFFNESS: 1
MYALGIAS: 1
TINGLING: 1
PALPITATIONS: 0
MUSCLE WEAKNESS: 1

## 2017-05-12 ASSESSMENT — PAIN SCALES - GENERAL: PAINLEVEL: NO PAIN (0)

## 2017-05-12 NOTE — PATIENT INSTRUCTIONS
"You have learned how to swallow slowly   Swallowing precautions for esophageal dysmotility and for after anti-reflux surgery.    (poor muscle action in the esophagus)   Take small bites.   Chew well   Moisten solid or dry food with applesauce, yogurt or other sauces.   Take sips of water between bites.   \"Swallow\" twice for each bite of food.   Wait for each bite to go down before taking the next bite.   Drink liquids slowly, a swallow at a time, rather than in continuous gulps.      Continue the omeprazole (Prilosec) two times daily as you are using it.  Best is to take it 30-60 minutes before a meal.    Follow lifestyle precautions against acid reflux (GERD)    Do not overeat.   Avoid meals and snacks within three or four hours of lying down.   Avoid alcohol and mint. Decrease or quit smoking.   Do not drink carbonation - it IS acid. Decrease or quit caffeine.   If you have nighttime symptoms, elevate the head of the bed    first 3 inches, then 6 to 8 inches.    A foam wedge from the hip may be helpful, if a person lies on their back.    Pillows do NOT work. The entire back must be straight.   Lose weight if you are overweight.    Even ten pounds weight loss can make a difference.  Some people also have specific triggers: tomato, spice, chocolate, citrus/orange juice.      Against constipation, use polyethylene glycol (Miralax) . You may mix this for four days and chill it.  Try using one dose daily. Then adjust dose. ?1/2 dose daily.  This is a softener that does not stimulate a BM.    Blood tests were ordered by Dr. Bhatia to check on your thyroid.  You may do these now and see her soon.    You will need a pre-op physical for the upper GI endoscopy (EGD) to be with monitored anesthesia care (MAC).    UPPER ENDOSCOPY (also known as EGD- esophageogastroduodenoscopy)    To schedule, please call [phone], or you can schedule at the  after your appointment.    You will need a  to bring you " home from the exam.  You may not take a cab home unless you have an adult with you.    If you have diabetes or are on blood thinners: talk with your doctor at least one week before the exam.  They may want you to change your medicine before the test.    This exam looks at the lining of your esophagus, stomach and duodenum (first part of the small intestine).    Do not eat of drink anything for 6-8 hours before your exam.    During this procedure, the doctor will help you to swallow a flexible tube called an endoscope. This endoscope has a small camera that lets the doctor see inside your body. The exam last from 10-20 minutes.    A small IV will be placed in your hand or arm, and medicine will be given to you through this IV to help you relax and reduce pain. They will spray your throat with numbing medicine and ask you to swallow to assist the doctor in passing the tube into your stomach. Also, biopsies may be taken to test in the lab and pictures may be taken of your esophagus, stomach, and duodenum.    You will rest in the recovery room for 30-60 minutes following the exam. Your throat may be numb for a short while and may be sore for a few days.     If surgery is at all considered   we will also order an esophageal motility study to check on the muscle actions  And stomach emptying study.    The gastric emptying study can be done in [facility].  The esophagus motility is only done at the [facility].    Sometime, if you consider colonoscopy, ask for monitored anesthesia care (MAC).   If you do not do that, then the stool test for hidden blood is done every year.    Return to GI Clinic 3 months      CANDIDA Petit [facility]   For appointments call Lis, [phone]  Coordinator  [phone] [place] or call -  GI Nurse Triage  [phone] for Medical Questions, # 3  Fax results to

## 2017-05-12 NOTE — LETTER
5/12/2017       RE: Brooke John  [address]    [place] [address]     Dear Colleague,    Thank you for referring your patient, Brooke John, to the [facility] at [facility]. Please see a copy of my visit note below.    CHIEF COMPLAINT:  GERD.      REFERRING PHYSICIAN:  Kehinde Boyer MD, Pulmonology.      HISTORY OF PRESENT ILLNESS:  Brooke is a 51-year-old woman with breathing difficulty much decreased over the past year during which time she has had increase PPI to twice daily at 20 mg, pulmonary rehabilitation, inhalers and nebulizers.  She no longer senses shortness of breath nearly so frequently.  She has significantly less cough also, primarily since increase of omeprazole to twice daily last November.      Brooke has esophageal dysphagia for as long as she can remember, though she believes that it worse over the last year or 2.  She always had to eat slowly in childhood where she had 5 brothers and a sister and would not get seconds.  Her dysphagia has never been primarily oropharyngeal with cough but only if she continued to drink too rapidly or drink too much before a solid bit of food went down.  She has learned how to take small bites, swallow frequently, take sips of water between.  Her sense of food stalling is primarily with solid dry foods as is classic.      Brooke has also has had significant acid reflux and cannot drink pop due to increased sense of GERD.  She will have a sense of liquid coming up into the esophagus when she lies down, even now at times.  She is propping herself upright, and on worst nights she has slept sitting up.  Many years ago, she had an adjustable hospital type bed, but it was ruined over the years.  She is not able to get insurance coverage for it at this time.  She believes that would help her sleep tremendously.   Brooke has scoliosis which was treated with first 1 brace and then a  more advanced brace in her youth.  She is able to walk nowadays fairly well.  She does not sleep well due to discomfort on either side and also canceled her colonoscopy for that reason a couple years ago.      Brooke has not had upper GI endoscopy for at least 10, more likely 20 or more years.  She had not had any other esophageal studies for 20 years or more.      MEDICAL HISTORY, MEDICATIONS, ADVERSE DRUG REACTIONS:  Reviewed.      SOCIAL HISTORY:  Fourth child of 7, not in contact with her family.  She has 2 children.  The first of which is well and independent.  The second of which has a learning disability and lives in a shelter and also had scoliosis with first a brace and then surgery.      Brooke is a never smoker with rare alcohol at holiday.  She attends an [employer] because they are supportive and have community activities she can participate in.  She was raised in a different Judaism which did not give her  tremendous financial support when she was in urgent need years ago.  She was born in [place] and all 7 siblings' names start with W.      REVIEW OF SYSTEMS:  Denies current symptoms of acute illness or localized infection.  She does not have odynophagia, nausea or outright vomiting.  She has constipation more often than not.  Drinking water alone does not seem to fix that.  She can resolve it with large portions of watermelon, though acknowledges she cannot always purchase it.  Regarding treatments, she has too many tablets that she has difficulty with already.      Brooke has never had visible bloody stool or black stool.  FIT test was negative a year ago.      She believes her energy is acceptable considering poor sleep.  She has not had fluid collections, chills, unexpected weight changes.  She is disappointed to be about 200 pounds, though her weight changed gradually.      OBJECTIVE:   VITAL SIGNS:  Reviewed.  Blood pressure and heart rate normal.  Weight 214 pounds for a height of 61 inches.    GENERAL:  Clean, casually dressed and groomed woman who sits calmly and breathes calmly.  Her speech is fluent and logical.   HEENT:  Eyes are clear.     RESPIRATIONS:  Breathing is grossly normal.   Not otherwise examined.      RESULTS:     1.  Overall normal blood counts and basic metabolic panel.   2.  Video swallow 12/01/2016 with relatively normal oropharyngeal swallowing.  There is passage easily down the esophagus demonstrating sliding hiatal hernia.  On direct review, it appears that there is a paraesophageal component to her hernia.  Ideal images would have lasted a few more seconds, perhaps.      ASSESSMENT:  A 51-year-old woman with longstanding dysphagia requiring her to swallow slowly who did not have grossly abnormal esophagus on swallow study who did have sliding hiatal hernia with a change in the swallow configuration significantly more than expected, suggesting a paraesophageal component to her hernia.  It appears smaller on the video swallow done on CT chest, perhaps.  She has significant benefit from the omeprazole 20 mg twice daily prescribed at last November as increased above once daily dose.      PLAN:   1.  Continue the omeprazole twice daily.   2.  Follow GERD precautions.   3.  Upper GI endoscopy with monitored anesthesia care in the Endoscopy unit.   4.  I did discuss with her the possibility of a surgical consultation for which we will also have her have esophageal manometry and gastric emptying study.   5.  Continue to follow the swallowing precautions.   6.  Return to GI Clinic in about 3 months.   7.  We discussed the options of upper GI endoscopy, and she does not know whether she can tolerate being on the left side for 15-minutes or in any particular position.  She readily accepts monitored anesthesia care understanding that she will need preop physical.  She believes she will have no difficulty finding a  who is familiar with her to accompany her home   8.  MiraLax 1/2-1  dose daily.   9.  Continue good water intake, fiber in the diet as tolerated.   10.  Return to GI clinic in a couple months.        We discussed the assessment and plan.  We reviewed her past results, viewed the images of her swallowing study directly, used a diagram of the normal esophagus and also vlad what we believed to see at her hiatal hernia.  We reviewed GERD precautions and swallowing precautions and provided written notes.      I note that Darci did not follow through with additional thyroid testing last year, and I advised her to do so soon and to see her new primary provider whom she has met a couple of times.  She has chosen to stay with this new woman, as she has felt put down by men far more often than with women.      There were no barriers found to learning.      Total visit 35 minutes with counseling time 20 minutes.        BRIAN FROST CNP       D: 2017 21:41   T: 05/15/2017 11:35   MT: JACQUELIN      Name:     DARCI DUPONT   MRN:      [id number]        Account:      [id number]   :      1965           Service Date: 2017   Document: [id number]      BRIAN Petit CNP

## 2017-05-12 NOTE — NURSING NOTE
"Chief Complaint   Patient presents with     RECHECK     gerd       Vitals:    05/12/17 1244   Pulse: 79   SpO2: 95%   Weight: 97.1 kg (214 lb)   Height: 1.549 m (5' 1\")       Body mass index is 40.43 kg/(m^2).                            "

## 2017-05-12 NOTE — MR AVS SNAPSHOT
"              After Visit Summary   5/12/2017    Brooke John    MRN: [id number]           Patient Information     Date Of Birth          1965        Visit Information        Provider Department      5/12/2017 2:00 PM Baylee Vergara, APRN CNP [facility]        Today's Diagnoses     Esophageal dysphagia    -  1    Hiatal hernia        Lactose intolerance        Constipation, unspecified constipation type        Gastroesophageal reflux disease, esophagitis presence not specified          Care Instructions    You have learned how to swallow slowly   Swallowing precautions for esophageal dysmotility and for after anti-reflux surgery.    (poor muscle action in the esophagus)   Take small bites.   Chew well   Moisten solid or dry food with applesauce, yogurt or other sauces.   Take sips of water between bites.   \"Swallow\" twice for each bite of food.   Wait for each bite to go down before taking the next bite.   Drink liquids slowly, a swallow at a time, rather than in continuous gulps.      Continue the omeprazole (Prilosec) two times daily as you are using it.  Best is to take it 30-60 minutes before a meal.    Follow lifestyle precautions against acid reflux (GERD)    Do not overeat.   Avoid meals and snacks within three or four hours of lying down.   Avoid alcohol and mint. Decrease or quit smoking.   Do not drink carbonation - it IS acid. Decrease or quit caffeine.   If you have nighttime symptoms, elevate the head of the bed    first 3 inches, then 6 to 8 inches.    A foam wedge from the hip may be helpful, if a person lies on their back.    Pillows do NOT work. The entire back must be straight.   Lose weight if you are overweight.    Even ten pounds weight loss can make a difference.  Some people also have specific triggers: tomato, spice, chocolate, citrus/orange juice.      Against constipation, use polyethylene glycol (Miralax) . You may mix this for four days and chill " it.  Try using one dose daily. Then adjust dose. ?1/2 dose daily.  This is a softener that does not stimulate a BM.    Blood tests were ordered by Dr. Bhatia to check on your thyroid.  You may do these now and see her soon.    You will need a pre-op physical for the upper GI endoscopy (EGD) to be with monitored anesthesia care (MAC).    UPPER ENDOSCOPY (also known as EGD- esophageogastroduodenoscopy)    To schedule, please call [phone], or you can schedule at the  after your appointment.    You will need a  to bring you home from the exam.  You may not take a cab home unless you have an adult with you.    If you have diabetes or are on blood thinners: talk with your doctor at least one week before the exam.  They may want you to change your medicine before the test.    This exam looks at the lining of your esophagus, stomach and duodenum (first part of the small intestine).    Do not eat of drink anything for 6-8 hours before your exam.    During this procedure, the doctor will help you to swallow a flexible tube called an endoscope. This endoscope has a small camera that lets the doctor see inside your body. The exam last from 10-20 minutes.    A small IV will be placed in your hand or arm, and medicine will be given to you through this IV to help you relax and reduce pain. They will spray your throat with numbing medicine and ask you to swallow to assist the doctor in passing the tube into your stomach. Also, biopsies may be taken to test in the lab and pictures may be taken of your esophagus, stomach, and duodenum.    You will rest in the recovery room for 30-60 minutes following the exam. Your throat may be numb for a short while and may be sore for a few days.     If surgery is at all considered   we will also order an esophageal motility study to check on the muscle actions  And stomach emptying study.    The gastric emptying study can be done in [place].  The esophagus motility is  only done at the [facility].    Sometime, if you consider colonoscopy, ask for monitored anesthesia care (MAC).   If you do not do that, then the stool test for hidden blood is done every year.    Return to GI Clinic 3 months      [facility]   For appointments call Lis, [phone]  Coordinator  [phone] Lolita or call -  GI Nurse Triage  [phone] for Medical Questions, # 3  Fax results to         Follow-ups after your visit        Additional Services     GASTROENTEROLOGY ADULT REF PROCEDURE ONLY       Last Lab Result: Creatinine (mg/dL)       Date                     Value                 03/14/2016               0.71             ----------  Body mass index is 40.43 kg/(m^2).     Needed:  No  Language:  English    Patient will be contacted to schedule procedure.     Please be aware that coverage of these services is subject to the terms and limitations of your health insurance plan.  Call member services at your health plan with any benefit or coverage questions.  Any procedures must be performed at a [facility] facility OR coordinated by your clinic's referral office.    Please bring the following with you to your appointment:    (1) Any X-Rays, CTs or MRIs which have been performed.  Contact the facility where they were done to arrange for  prior to your scheduled appointment.    (2) List of current medications   (3) This referral request   (4) Any documents/labs given to you for this referral                  Follow-up notes from your care team     Return in about 3 months (around 8/12/2017).      Your next 10 appointments already scheduled     May 15, 2017  5:10 PM CDT   MR CERVICAL SPINE W/O CONTRAST with [id number]   [facility] ([facility])    [address]  [facility] [address] [phone]           Take your medicines as usual, unless your doctor tells you not to.  Bring a list of your current medicines to your exam (including vitamins, minerals and over-the-counter drugs). Also bring the results of similar scans you may have had.  Please remove any body piercings and hair extensions before you arrive.  Follow your doctor s orders. If you do not, we may have to postpone your exam.  You will not have contrast for this exam. You do not need to do anything special to prepare.  The MRI machine uses a strong magnet. Please wear clothes without metal (snaps, zippers). A sweatsuit works well, or we may give you a hospital gown.   **IMPORTANT** THE INSTRUCTIONS BELOW ARE ONLY FOR THOSE PATIENTS WHO HAVE BEEN TOLD THEY WILL RECEIVE SEDATION OR GENERAL ANESTHESIA DURING THEIR MRI PROCEDURE:  IF YOU WILL RECEIVE SEDATION (take medicine to help you relax during your exam):   You must get the medicine from your doctor before you arrive. Bring the medicine to the exam. Do not take it at home.   Arrive one hour early. Bring someone who can take you home after the test. Your medicine will make you sleepy. After the exam, you may not drive, take a bus or take a taxi by yourself.   No eating 8 hours before your exam. You may have clear liquids up until 4 hours before your exam. (Clear liquids include water, clear tea, black coffee and fruit juice without pulp.)  IF YOU WILL RECEIVE ANESTHESIA (be asleep for your exam):   Arrive 1 1/2 hours early. Bring someone who can take you home after the test. You may not drive, take a bus or take a taxi by yourself.   No eating 8 hours before your exam. You may have clear liquids up until 4 hours before your exam. (Clear liquids include water, clear tea, black coffee and fruit juice without pulp.)   You will spend four to five hours in the recovery room.  Please call the Imaging Department at your exam site with any questions.            May 15, 2017  6:00 PM CDT   MR LUMBAR SPINE W/O CONTRAST with RSCCMR1   [facility] ([facility])    [address]  [facility] [address] [phone]           Take your medicines as usual, unless your doctor tells you not to. Bring a list of your current medicines to your exam (including vitamins, minerals and over-the-counter drugs). Also bring the results of similar scans you may have had.  Please remove any body piercings and hair extensions before you arrive.  Follow your doctor s orders. If you do not, we may have to postpone your exam.  You will not have contrast for this exam. You do not need to do anything special to prepare.  The MRI machine uses a strong magnet. Please wear clothes without metal (snaps, zippers). A sweatsuit works well, or we may give you a hospital gown.   **IMPORTANT** THE INSTRUCTIONS BELOW ARE ONLY FOR THOSE PATIENTS WHO HAVE BEEN TOLD THEY WILL RECEIVE SEDATION OR GENERAL ANESTHESIA DURING THEIR MRI PROCEDURE:  IF YOU WILL RECEIVE SEDATION (take medicine to help you relax during your exam):   You must get the medicine from your doctor before you arrive. Bring the medicine to the exam. Do not take it at home.   Arrive one hour early. Bring someone who can take you home after the test. Your medicine will make you sleepy. After the exam, you may not drive, take a bus or take a taxi by yourself.   No eating 8 hours before your exam. You may have clear liquids up until 4 hours before your exam. (Clear liquids include water, clear tea, black coffee and fruit juice without pulp.)  IF YOU WILL RECEIVE ANESTHESIA (be asleep for your exam):   Arrive 1 1/2 hours early. Bring someone who can take you home after the test. You may not drive, take a bus or take a taxi by yourself.   No eating 8 hours before your exam. You may have clear liquids up until 4 hours before your exam. (Clear liquids include water, clear tea, black coffee and fruit juice without pulp.)   You will spend four to five hours in the recovery room.  Please call the Imaging  "Department at your exam site with any questions.            Aug 09, 2017  1:00 PM CDT   (Arrive by 12:45 PM)   Return Visit with BRIAN Petit Replaced by [facility] ([facility] [place])    [address] [id number] [phone]              Who to contact     Please call your clinic at [phone] to:    Ask questions about your health    Make or cancel appointments    Discuss your medicines    Learn about your test results    Speak to your doctor   If you have compliments or concerns about an experience at your clinic, or if you wish to file a complaint, please contact [facility] Patient Relations at [phone] or email us at [email or web address]         Additional Information About Your Visit        [employer] Information     [employer] is an electronic gateway that provides easy, online access to your medical records. With [employer], you can request a clinic appointment, read your test results, renew a prescription or communicate with your care team.     To sign up for [employer] visit the website at [email or web address]   You will be asked to enter the access code listed below, as well as some personal information. Please follow the directions to create your username and password.     Your access code is: [email or web address]  Expires: 2017  6:30 AM     Your access code will  in 90 days. If you need help or a new code, please contact your [facility] or call [phone] for assistance.        Care EveryWhere ID     This is your Care EveryWhere ID. This could be used by other organizations to access your [facility] medical records  [id number]        Your Vitals Were     Pulse Height Pulse Oximetry BMI (Body Mass Index)          79 1.549 m (5' 1\") 95% 40.43 kg/m2         Blood Pressure from Last 3 Encounters:   17 128/82   17 140/90   17 (!) 153/99 "    Weight from Last 3 Encounters:   05/12/17 97.1 kg (214 lb)   12/12/16 95.3 kg (210 lb)   11/08/16 96.3 kg (212 lb 6.4 oz)              We Performed the Following     GASTROENTEROLOGY ADULT REF PROCEDURE ONLY          Today's Medication Changes          These changes are accurate as of: 5/12/17  3:06 PM.  If you have any questions, ask your nurse or doctor.               Start taking these medicines.        Dose/Directions    polyethylene glycol powder   Commonly known as:  MIRALAX   Used for:  Constipation, unspecified constipation type   Started by:  Baylee Vergara APRN CNP        Dose:  1 capful   Take 17 g (1 capful) by mouth daily as needed for constipation   Quantity:  510 g   Refills:  11            Where to get your medicines      These medications were sent to [facility] [place] [facility] [place] [address]     Phone:  [phone]     polyethylene glycol powder                Primary Care Provider Office Phone # Fax #    Mandy Bhatia [phone]       [address] [address]        Thank you!     Thank you for choosing [facility]  for your care. Our goal is always to provide you with excellent care. Hearing back from our patients is one way we can continue to improve our services. Please take a few minutes to complete the written survey that you may receive in the mail after your visit with us. Thank you!             Your Updated Medication List - Protect others around you: Learn how to safely use, store and throw away your medicines at www.disposemymeds.org.          This list is accurate as of: 5/12/17  3:06 PM.  Always use your most recent med list.                   Brand Name Dispense Instructions for use    albuterol 108 (90 BASE) MCG/ACT Inhaler    PROAIR HFA/PROVENTIL HFA/VENTOLIN HFA    1 Inhaler    Inhale 2 puffs into the  lungs every 4 hours as needed for shortness of breath / dyspnea or wheezing       cetirizine 10 MG tablet    zyrTEC    30 tablet    Take 1 tablet (10 mg) by mouth every morning       fluticasone 50 MCG/ACT spray    FLONASE    16 g    USE TWO SPRAYS IN EACH NOSTRIL EVERY DAY       fluticasone-salmeterol 250-50 MCG/DOSE diskus inhaler    ADVAIR    1 Inhaler    Inhale 1 puff into the lungs 2 times daily       Multi-vitamin Tabs tablet      Take 1 tablet by mouth daily       omeprazole 20 MG CR capsule    priLOSEC    180 capsule    Take 1 capsule (20 mg) by mouth 2 times daily       polyethylene glycol powder    MIRALAX    510 g    Take 17 g (1 capful) by mouth daily as needed for constipation

## 2017-05-15 ENCOUNTER — HOSPITAL ENCOUNTER (OUTPATIENT)
Dept: MRI IMAGING | Facility: CLINIC | Age: 52
End: 2017-05-15
Attending: PHYSICIAN ASSISTANT
Payer: MEDICARE

## 2017-05-15 ENCOUNTER — HOSPITAL ENCOUNTER (OUTPATIENT)
Dept: MRI IMAGING | Facility: CLINIC | Age: 52
Discharge: HOME | End: 2017-05-15
Attending: PHYSICIAN ASSISTANT | Admitting: PHYSICIAN ASSISTANT
Payer: MEDICARE

## 2017-05-15 DIAGNOSIS — M48.02 SPINAL STENOSIS OF CERVICAL REGION: ICD-10-CM

## 2017-05-15 DIAGNOSIS — M41.9 ACQUIRED SCOLIOSIS: ICD-10-CM

## 2017-05-15 PROCEDURE — 72141 MRI NECK SPINE W/O DYE: CPT

## 2017-05-15 PROCEDURE — 72148 MRI LUMBAR SPINE W/O DYE: CPT

## 2017-05-15 NOTE — PROGRESS NOTES
CHIEF COMPLAINT:  GERD.      REFERRING PHYSICIAN:  Kehinde Boyer MD, Pulmonology.      HISTORY OF PRESENT ILLNESS:  Brooke is a 51-year-old woman with breathing difficulty much decreased over the past year during which time she has had increase PPI to twice daily at 20 mg, pulmonary rehabilitation, inhalers and nebulizers.  She no longer senses shortness of breath nearly so frequently.  She has significantly less cough also, primarily since increase of omeprazole to twice daily last November.      Brooke has esophageal dysphagia for as long as she can remember, though she believes that it worse over the last year or 2.  She always had to eat slowly in childhood where she had 5 brothers and a sister and would not get seconds.  Her dysphagia has never been primarily oropharyngeal with cough but only if she continued to drink too rapidly or drink too much before a solid bit of food went down.  She has learned how to take small bites, swallow frequently, take sips of water between.  Her sense of food stalling is primarily with solid dry foods as is classic.      Brooke has also has had significant acid reflux and cannot drink pop due to increased sense of GERD.  She will have a sense of liquid coming up into the esophagus when she lies down, even now at times.  She is propping herself upright, and on worst nights she has slept sitting up.  Many years ago, she had an adjustable hospital type bed, but it was ruined over the years.  She is not able to get insurance coverage for it at this time.  She believes that would help her sleep tremendously.      Brooke has scoliosis which was treated with first 1 brace and then a more advanced brace in her youth.  She is able to walk nowadays fairly well.  She does not sleep well due to discomfort on either side and also canceled her colonoscopy for that reason a couple years ago.      Brooke has not had upper GI endoscopy for at least 10, more likely 20 or more years.  She had not  had any other esophageal studies for 20 years or more.      MEDICAL HISTORY, MEDICATIONS, ADVERSE DRUG REACTIONS:  Reviewed.      SOCIAL HISTORY:  Fourth child of 7, not in contact with her family.  She has 2 children.  The first of which is well and independent.  The second of which has a learning disability and lives in a shelter and also had scoliosis with first a brace and then surgery.      Brooke is a never smoker with rare alcohol at holiday.  She attends an [employer] because they are supportive and have community activities she can participate in.  She was raised in a different [employer] which did not give her  tremendous financial support when she was in urgent need years ago.  She was born in [place] and all 7 siblings' names start with W.      REVIEW OF SYSTEMS:  Denies current symptoms of acute illness or localized infection.  She does not have odynophagia, nausea or outright vomiting.  She has constipation more often than not.  Drinking water alone does not seem to fix that.  She can resolve it with large portions of watermelon, though acknowledges she cannot always purchase it.  Regarding treatments, she has too many tablets that she has difficulty with already.      Brooke has never had visible bloody stool or black stool.  FIT test was negative a year ago.      She believes her energy is acceptable considering poor sleep.  She has not had fluid collections, chills, unexpected weight changes.  She is disappointed to be about 200 pounds, though her weight changed gradually.      OBJECTIVE:   VITAL SIGNS:  Reviewed.  Blood pressure and heart rate normal.  Weight 214 pounds for a height of 61 inches.   GENERAL:  Clean, casually dressed and groomed woman who sits calmly and breathes calmly.  Her speech is fluent and logical.   HEENT:  Eyes are clear.     RESPIRATIONS:  Breathing is grossly normal.   Not otherwise examined.      RESULTS:     1.  Overall normal blood counts and basic metabolic panel.   2.   Video swallow 12/01/2016 with relatively normal oropharyngeal swallowing.  There is passage easily down the esophagus demonstrating sliding hiatal hernia.  On direct review, it appears that there is a paraesophageal component to her hernia.  Ideal images would have lasted a few more seconds, perhaps.      ASSESSMENT:  A 51-year-old woman with longstanding dysphagia requiring her to swallow slowly who did not have grossly abnormal esophagus on swallow study who did have sliding hiatal hernia with a change in the swallow configuration significantly more than expected, suggesting a paraesophageal component to her hernia.  It appears smaller on the video swallow done on CT chest, perhaps.  She has significant benefit from the omeprazole 20 mg twice daily prescribed at last November as increased above once daily dose.      PLAN:   1.  Continue the omeprazole twice daily.   2.  Follow GERD precautions.   3.  Upper GI endoscopy with monitored anesthesia care in the Endoscopy unit.   4.  I did discuss with her the possibility of a surgical consultation for which we will also have her have esophageal manometry and gastric emptying study.   5.  Continue to follow the swallowing precautions.   6.  Return to GI Clinic in about 3 months.   7.  We discussed the options of upper GI endoscopy, and she does not know whether she can tolerate being on the left side for 15-minutes or in any particular position.  She readily accepts monitored anesthesia care understanding that she will need preop physical.  She believes she will have no difficulty finding a  who is familiar with her to accompany her home   8.  MiraLax 1/2-1 dose daily.   9.  Continue good water intake, fiber in the diet as tolerated.   10.  Return to GI clinic in a couple months.        We discussed the assessment and plan.  We reviewed her past results, viewed the images of her swallowing study directly, used a diagram of the normal esophagus and also vlad what  we believed to see at her hiatal hernia.  We reviewed GERD precautions and swallowing precautions and provided written notes.      I note that Darci did not follow through with additional thyroid testing last year, and I advised her to do so soon and to see her new primary provider whom she has met a couple of times.  She has chosen to stay with this new woman, as she has felt put down by men far more often than with women.      There were no barriers found to learning.      Total visit 35 minutes with counseling time 20 minutes.         BRIAN FROST CNP             D: 2017 21:41   T: 05/15/2017 11:35   MT: JACQUELIN      Name:     DARCI DUPONT   MRN:      [id number]        Account:      [id number]   :      1965           Service Date: 2017      Document: [id number]

## 2017-05-16 DIAGNOSIS — E03.9 HYPOTHYROIDISM, UNSPECIFIED TYPE: ICD-10-CM

## 2017-05-16 LAB
T3 SERPL-MCNC: 108 NG/DL (ref 60–181)
T4 FREE SERPL-MCNC: 0.83 NG/DL (ref 0.76–1.46)
TSH SERPL DL<=0.05 MIU/L-ACNC: 4.99 MU/L (ref 0.4–4)

## 2017-05-16 PROCEDURE — 84480 ASSAY TRIIODOTHYRONINE (T3): CPT | Performed by: FAMILY MEDICINE

## 2017-05-16 PROCEDURE — 36415 COLL VENOUS BLD VENIPUNCTURE: CPT | Performed by: FAMILY MEDICINE

## 2017-05-16 PROCEDURE — 84439 ASSAY OF FREE THYROXINE: CPT | Performed by: FAMILY MEDICINE

## 2017-05-16 PROCEDURE — 84443 ASSAY THYROID STIM HORMONE: CPT | Performed by: FAMILY MEDICINE

## 2017-05-22 DIAGNOSIS — R26.89 BALANCE PROBLEMS: ICD-10-CM

## 2017-05-22 DIAGNOSIS — Z91.81 RISK FOR FALLS: Primary | ICD-10-CM

## 2017-05-22 DIAGNOSIS — N39.46 MIXED INCONTINENCE: ICD-10-CM

## 2017-05-22 NOTE — TELEPHONE ENCOUNTER
Care Coordinator Masoud Myra [phone], ext [phone]    DME-     1. walking cane to help with mobility.  With assessment, concerns with balance, and increased risk of falls.      2. Incontinence supplies.  Unsure of what [employer] covers.  Disposable underwear, liners, absorbency pads.      [employer] would be fine.     Pended orders, please add to and print/sign and fax as appropriate.    Routing to PCP for further review/recommendations/orders.  Debbie Diaz RN

## 2017-05-24 NOTE — PROGRESS NOTES
Please call pt with results below:     Still a bit hypothyroid, but improved from previous.  I don't see that pt is on any thyroid medication.  With this being subclinical hypothyroidism, if pt is asymptomatic - not losing hair, good energy level, sleeping well, not gaining weight unexplained, then ok to continue to monitor.  If pt desires to start medication, then recommend office visit to discuss.

## 2017-05-25 ENCOUNTER — TELEPHONE (OUTPATIENT)
Dept: FAMILY MEDICINE | Facility: CLINIC | Age: 52
End: 2017-05-25

## 2017-05-25 NOTE — TELEPHONE ENCOUNTER
Date Forms was received: May 25, 2017    Forms received by: Fax    Last office visit: 05/2017    Purpose of Form:  [employer] supply for Cane, underwear    When the form is due:  ASAP    How the form needs to be returned for patient:  Fax [phone]    Form currently placed  [place]

## 2017-05-25 NOTE — TELEPHONE ENCOUNTER
Completed forms faxed back to [facility] at [phone].   Originals sent to be scanned.     Claudia Parker

## 2017-05-30 ENCOUNTER — OFFICE VISIT (OUTPATIENT)
Dept: FAMILY MEDICINE | Facility: CLINIC | Age: 52
End: 2017-05-30
Payer: MEDICARE

## 2017-05-30 VITALS
OXYGEN SATURATION: 98 % | TEMPERATURE: 98.3 F | HEART RATE: 82 BPM | WEIGHT: 210 LBS | SYSTOLIC BLOOD PRESSURE: 124 MMHG | BODY MASS INDEX: 39.65 KG/M2 | DIASTOLIC BLOOD PRESSURE: 76 MMHG | HEIGHT: 61 IN

## 2017-05-30 DIAGNOSIS — E03.8 OTHER SPECIFIED HYPOTHYROIDISM: ICD-10-CM

## 2017-05-30 DIAGNOSIS — M41.9 SCOLIOSIS, UNSPECIFIED SCOLIOSIS TYPE, UNSPECIFIED SPINAL REGION: ICD-10-CM

## 2017-05-30 DIAGNOSIS — Z01.818 PREOP GENERAL PHYSICAL EXAM: Primary | ICD-10-CM

## 2017-05-30 DIAGNOSIS — K21.9 GASTROESOPHAGEAL REFLUX DISEASE, ESOPHAGITIS PRESENCE NOT SPECIFIED: ICD-10-CM

## 2017-05-30 DIAGNOSIS — R91.8 PULMONARY NODULES: ICD-10-CM

## 2017-05-30 DIAGNOSIS — J45.40 MODERATE PERSISTENT ASTHMA WITHOUT COMPLICATION: ICD-10-CM

## 2017-05-30 DIAGNOSIS — R06.83 SNORING: ICD-10-CM

## 2017-05-30 LAB
ALBUMIN SERPL-MCNC: 3.4 G/DL (ref 3.4–5)
ALP SERPL-CCNC: 96 U/L (ref 40–150)
ALT SERPL W P-5'-P-CCNC: 25 U/L (ref 0–50)
ANION GAP SERPL CALCULATED.3IONS-SCNC: 11 MMOL/L (ref 3–14)
AST SERPL W P-5'-P-CCNC: 24 U/L (ref 0–45)
BASOPHILS # BLD AUTO: 0 10E9/L (ref 0–0.2)
BASOPHILS NFR BLD AUTO: 0.5 %
BILIRUB SERPL-MCNC: 0.5 MG/DL (ref 0.2–1.3)
BUN SERPL-MCNC: 8 MG/DL (ref 7–30)
CALCIUM SERPL-MCNC: 8.5 MG/DL (ref 8.5–10.1)
CHLORIDE SERPL-SCNC: 109 MMOL/L (ref 94–109)
CO2 SERPL-SCNC: 23 MMOL/L (ref 20–32)
CREAT SERPL-MCNC: 0.77 MG/DL (ref 0.52–1.04)
DIFFERENTIAL METHOD BLD: NORMAL
EOSINOPHIL # BLD AUTO: 0.3 10E9/L (ref 0–0.7)
EOSINOPHIL NFR BLD AUTO: 4.1 %
ERYTHROCYTE [DISTWIDTH] IN BLOOD BY AUTOMATED COUNT: 14.1 % (ref 10–15)
GFR SERPL CREATININE-BSD FRML MDRD: 78 ML/MIN/1.7M2
GLUCOSE SERPL-MCNC: 105 MG/DL (ref 70–99)
HCT VFR BLD AUTO: 37.8 % (ref 35–47)
HGB BLD-MCNC: 12.8 G/DL (ref 11.7–15.7)
LYMPHOCYTES # BLD AUTO: 2.3 10E9/L (ref 0.8–5.3)
LYMPHOCYTES NFR BLD AUTO: 37.8 %
MCH RBC QN AUTO: 29.2 PG (ref 26.5–33)
MCHC RBC AUTO-ENTMCNC: 33.9 G/DL (ref 31.5–36.5)
MCV RBC AUTO: 86 FL (ref 78–100)
MONOCYTES # BLD AUTO: 0.7 10E9/L (ref 0–1.3)
MONOCYTES NFR BLD AUTO: 10.7 %
NEUTROPHILS # BLD AUTO: 2.9 10E9/L (ref 1.6–8.3)
NEUTROPHILS NFR BLD AUTO: 46.9 %
PLATELET # BLD AUTO: 223 10E9/L (ref 150–450)
POTASSIUM SERPL-SCNC: 3.9 MMOL/L (ref 3.4–5.3)
PROT SERPL-MCNC: 7.4 G/DL (ref 6.8–8.8)
RBC # BLD AUTO: 4.38 10E12/L (ref 3.8–5.2)
SODIUM SERPL-SCNC: 143 MMOL/L (ref 133–144)
WBC # BLD AUTO: 6.2 10E9/L (ref 4–11)

## 2017-05-30 PROCEDURE — 36415 COLL VENOUS BLD VENIPUNCTURE: CPT | Performed by: PHYSICIAN ASSISTANT

## 2017-05-30 PROCEDURE — 80053 COMPREHEN METABOLIC PANEL: CPT | Performed by: PHYSICIAN ASSISTANT

## 2017-05-30 PROCEDURE — 99215 OFFICE O/P EST HI 40 MIN: CPT | Performed by: PHYSICIAN ASSISTANT

## 2017-05-30 PROCEDURE — 85025 COMPLETE CBC W/AUTO DIFF WBC: CPT | Performed by: PHYSICIAN ASSISTANT

## 2017-05-30 PROCEDURE — 93000 ELECTROCARDIOGRAM COMPLETE: CPT | Performed by: PHYSICIAN ASSISTANT

## 2017-05-30 RX ORDER — LEVOTHYROXINE SODIUM 25 UG/1
25 TABLET ORAL DAILY
Qty: 30 TABLET | Refills: 1 | Status: SHIPPED | OUTPATIENT
Start: 2017-05-30 | End: 2017-07-07

## 2017-05-30 NOTE — NURSING NOTE
"Chief Complaint   Patient presents with     Pre-Op Exam       Initial /76 (BP Location: Left arm, Patient Position: Chair, Cuff Size: Adult Large)  Pulse 82  Temp 98.3  F (36.8  C) (Oral)  Ht 5' 1\" (1.549 m)  Wt 210 lb (95.3 kg)  LMP 05/20/2017 (Exact Date)  SpO2 98%  Breastfeeding? No  BMI 39.68 kg/m2 Estimated body mass index is 39.68 kg/(m^2) as calculated from the following:    Height as of this encounter: 5' 1\" (1.549 m).    Weight as of this encounter: 210 lb (95.3 kg).  Medication Reconciliation: complete   Csaba Mlnarik CMA    "

## 2017-05-30 NOTE — PATIENT INSTRUCTIONS
- Please start the Levothyroxine for the hypothyroid.  You will need to have follow-up labs done in 6 weeks to re-check the thyroid.  Please be sure to schedule a lab only appointment to have this done.     - Sleep medicine referral has been ordered for the sleep symptoms.  Please have this done when you are able.      - Please follow-up as needed.       Before Your Surgery      Call your surgeon if there is any change in your health. This includes signs of a cold or flu (such as a sore throat, runny nose, cough, rash or fever).    Do not smoke, drink alcohol or take over the counter medicine (unless your surgeon or primary care doctor tells you to) for the 24 hours before and after surgery.    If you take prescribed drugs: Follow your doctor s orders about which medicines to take and which to stop until after surgery.    Eating and drinking prior to surgery: follow the instructions from your surgeon    Take a shower or bath the night before surgery. Use the soap your surgeon gave you to gently clean your skin. If you do not have soap from your surgeon, use your regular soap. Do not shave or scrub the surgery site.  Wear clean pajamas and have clean sheets on your bed.

## 2017-05-30 NOTE — LETTER
[facility]  [address] [place] [id number]                  [phone]   May 31, 2017    Brooke John  [address]    [facility] [address]      Dear Brooke,    Here is a summary of your recent test results:    The results from your recent lab work are within normal limits.     -Liver and gallbladder tests (ALT,AST, Alk phos,bilirubin) are normal.   -Kidney function (GFR) is normal.   -Sodium is normal.   -Potassium is normal.   -Glucose is mildly elevated, but you were not fasting.  Therefore this may be within normal limits.     -Normal red blood cell (hgb) levels, normal white blood cell count and normal platelet levels    Your test results are enclosed.      Please contact me if you have any questions.    In addition, here is a list of due or overdue Health Maintenance reminders.    Health Maintenance Due   Topic Date Due     Asthma Action Plan - yearly  08/21/1968     Colonoscopy - ever 10 years  08/21/1973     MIGRAINE ACTION PLAN  08/21/1981     Hepatitis C Screening  08/21/1981     Asthma Control Test - every 6 months  04/21/2017     Wellness Visit with your Primary Provider - yearly  04/25/2017       Please call us at [phone] (or use [employer]) to address the above recommendations.            Thank you very much for trusting [facility]..     Healthy regards,      Alondra Michele PA-C        Results for orders placed or performed in visit on 05/30/17   CBC with platelets and differential   Result Value Ref Range    WBC 6.2 4.0 - 11.0 10e9/L    RBC Count 4.38 3.8 - 5.2 10e12/L    Hemoglobin 12.8 11.7 - 15.7 g/dL    Hematocrit 37.8 35.0 - 47.0 %    MCV 86 78 - 100 fl    MCH 29.2 26.5 - 33.0 pg    MCHC 33.9 31.5 - 36.5 g/dL    RDW 14.1 10.0 - 15.0 %    Platelet Count 223 150 - 450 10e9/L    Diff Method Automated Method     % Neutrophils 46.9 %    % Lymphocytes 37.8 %    % Monocytes 10.7 %    % Eosinophils 4.1 %    %  Basophils 0.5 %    Absolute Neutrophil 2.9 1.6 - 8.3 10e9/L    Absolute Lymphocytes 2.3 0.8 - 5.3 10e9/L    Absolute Monocytes 0.7 0.0 - 1.3 10e9/L    Absolute Eosinophils 0.3 0.0 - 0.7 10e9/L    Absolute Basophils 0.0 0.0 - 0.2 10e9/L   Comprehensive metabolic panel (BMP + Alb, Alk Phos, ALT, AST, Total. Bili, TP)   Result Value Ref Range    Sodium 143 133 - 144 mmol/L    Potassium 3.9 3.4 - 5.3 mmol/L    Chloride 109 94 - 109 mmol/L    Carbon Dioxide 23 20 - 32 mmol/L    Anion Gap 11 3 - 14 mmol/L    Glucose 105 (H) 70 - 99 mg/dL    Urea Nitrogen 8 7 - 30 mg/dL    Creatinine 0.77 0.52 - 1.04 mg/dL    GFR Estimate 78 >60 mL/min/1.7m2    GFR Estimate If Black >90   GFR Calc   >60 mL/min/1.7m2    Calcium 8.5 8.5 - 10.1 mg/dL    Bilirubin Total 0.5 0.2 - 1.3 mg/dL    Albumin 3.4 3.4 - 5.0 g/dL    Protein Total 7.4 6.8 - 8.8 g/dL    Alkaline Phosphatase 96 40 - 150 U/L    ALT 25 0 - 50 U/L    AST 24 0 - 45 U/L

## 2017-05-30 NOTE — MR AVS SNAPSHOT
After Visit Summary   5/30/2017    Brooke John    MRN: [id number]           Patient Information     Date Of Birth          1965        Visit Information        Provider Department      5/30/2017 7:40 AM Alondra Michele PA-C [facility] [place]        Today's Diagnoses     Preop general physical exam    -  1    Gastroesophageal reflux disease, esophagitis presence not specified        Moderate persistent asthma without complication        Pulmonary nodules        Snoring        Scoliosis, unspecified scoliosis type, unspecified spinal region        Other specified hypothyroidism          Care Instructions        - Please start the Levothyroxine for the hypothyroid.  You will need to have follow-up labs done in 6 weeks to re-check the thyroid.  Please be sure to schedule a lab only appointment to have this done.     - Sleep medicine referral has been ordered for the sleep symptoms.  Please have this done when you are able.      - Please follow-up as needed.       Before Your Surgery      Call your surgeon if there is any change in your health. This includes signs of a cold or flu (such as a sore throat, runny nose, cough, rash or fever).    Do not smoke, drink alcohol or take over the counter medicine (unless your surgeon or primary care doctor tells you to) for the 24 hours before and after surgery.    If you take prescribed drugs: Follow your doctor s orders about which medicines to take and which to stop until after surgery.    Eating and drinking prior to surgery: follow the instructions from your surgeon    Take a shower or bath the night before surgery. Use the soap your surgeon gave you to gently clean your skin. If you do not have soap from your surgeon, use your regular soap. Do not shave or scrub the surgery site.  Wear clean pajamas and have clean sheets on your bed.           Follow-ups after your visit        Additional Services     SLEEP EVALUATION & MANAGEMENT  REFERRAL - ADULT       Please be aware that coverage of these services is subject to the terms and limitations of your health insurance plan.  Call member services at your health plan with any benefit or coverage questions.      Please bring the following to your appointment:    >>   List of current medications   >>   This referral request   >>   Any documents/labs given to you for this referral    [facility] [phone] (Age 18 and up)                  Your next 10 appointments already scheduled     Jun 08, 2017   Procedure with Moshe Ortiz MD   [facility] ([facility])    [address]  [facility] [address] [phone]           The [facility] is located on the corner of [facility] [address] and [address] on the [facility]. It is easily accessible from virtually any point [place], via [address] and [address].            Aug 09, 2017  1:00 PM CDT   (Arrive by 12:45 PM)   Return Visit with BRIAN Petit [facility] ([facility])    [address]  [address] [facility] [id number] [phone]              Future tests that were ordered for you today     Open Future Orders        Priority Expected Expires Ordered    TSH Routine  5/30/2018 5/30/2017    T4, free Routine  5/30/2018 5/30/2017    SLEEP EVALUATION & MANAGEMENT REFERRAL - ADULT Routine  5/30/2018 5/30/2017            Who to contact     If you have questions or need follow up information about today's clinic visit or your schedule please contact [facility] directly at [phone].  Normal or non-critical lab and imaging results will be communicated to you by MyChart, letter or phone within 4 business days after the clinic has received the results. If you do not hear from us within 7 days, please contact the clinic through  "[email or web address] or phone. If you have a critical or abnormal lab result, we will notify you by phone as soon as possible.  Submit refill requests through "[employer] or call your pharmacy and they will forward the refill request to us. Please allow 3 business days for your refill to be completed.          Additional Information About Your Visit        [employer] Mapping Information     "[employer] lets you send messages to your doctor, view your test results, renew your prescriptions, schedule appointments and more. To sign up, go to [email or web address] . Click on \"Log in\" on the left side of the screen, which will take you to the Welcome page. Then click on \"Sign up Now\" on the right side of the page.     You will be asked to enter the access code listed below, as well as some personal information. Please follow the directions to create your username and password.     Your access code is: [email or web address]  Expires: 2017  6:30 AM     Your access code will  in 90 days. If you need help or a new code, please call your [facility] clinic or [phone].        Care EveryWhere ID     This is your Care EveryWhere ID. This could be used by other organizations to access your [facility] medical records  [id number]        Your Vitals Were     Pulse Temperature Height Last Period Pulse Oximetry Breastfeeding?    82 98.3  F (36.8  C) (Oral) 5' 1\" (1.549 m) 2017 (Exact Date) 98% No    BMI (Body Mass Index)                   39.68 kg/m2            Blood Pressure from Last 3 Encounters:   17 124/76   17 128/82   17 140/90    Weight from Last 3 Encounters:   17 210 lb (95.3 kg)   17 214 lb (97.1 kg)   16 210 lb (95.3 kg)              We Performed the Following     CBC with platelets and differential     Comprehensive metabolic panel (BMP + Alb, Alk Phos, ALT, AST, Total. Bili, TP)     EKG 12-lead complete w/read - Clinics          Today's Medication Changes          These changes are accurate as of: 17 "  8:28 AM.  If you have any questions, ask your nurse or doctor.               Start taking these medicines.        Dose/Directions    levothyroxine 25 MCG tablet   Commonly known as:  SYNTHROID/LEVOTHROID   Used for:  Other specified hypothyroidism   Started by:  Alondra Michele PA-C        Dose:  25 mcg   Take 1 tablet (25 mcg) by mouth daily   Quantity:  30 tablet   Refills:  1            Where to get your medicines      These medications were sent to [facility] [place] - [address] [address]     Phone:  [phone]     levothyroxine 25 MCG tablet                Primary Care Provider Office Phone # Fax #    Mandy Bhatia [phone]       [address] [address]        Thank you!     Thank you for choosing [facility]  for your care. Our goal is always to provide you with excellent care. Hearing back from our patients is one way we can continue to improve our services. Please take a few minutes to complete the written survey that you may receive in the mail after your visit with us. Thank you!             Your Updated Medication List - Protect others around you: Learn how to safely use, store and throw away your medicines at www.disposemymeds.org.          This list is accurate as of: 5/30/17  8:28 AM.  Always use your most recent med list.                   Brand Name Dispense Instructions for use    albuterol 108 (90 BASE) MCG/ACT Inhaler    PROAIR HFA/PROVENTIL HFA/VENTOLIN HFA    1 Inhaler    Inhale 2 puffs into the lungs every 4 hours as needed for shortness of breath / dyspnea or wheezing       cetirizine 10 MG tablet    zyrTEC    30 tablet    Take 1 tablet (10 mg) by mouth every morning       fluticasone 50 MCG/ACT spray    FLONASE    16 g    USE TWO SPRAYS IN EACH NOSTRIL EVERY DAY       fluticasone-salmeterol 250-50 MCG/DOSE diskus  inhaler    ADVAIR    1 Inhaler    Inhale 1 puff into the lungs 2 times daily       levothyroxine 25 MCG tablet    SYNTHROID/LEVOTHROID    30 tablet    Take 1 tablet (25 mcg) by mouth daily       Multi-vitamin Tabs tablet      Take 1 tablet by mouth daily       omeprazole 20 MG CR capsule    priLOSEC    180 capsule    Take 1 capsule (20 mg) by mouth 2 times daily       * order for DME     1 Device    Walking cane for balance and mobility       * order for DME     80 Units    Incontinence items, disposable underwear, absorbency pads, liners.       polyethylene glycol powder    MIRALAX    510 g    Take 17 g (1 capful) by mouth daily as needed for constipation       * Notice:  This list has 2 medication(s) that are the same as other medications prescribed for you. Read the directions carefully, and ask your doctor or other care provider to review them with you.

## 2017-05-30 NOTE — PROGRESS NOTES
[address] [facility] [address]  [phone]  Dept: [phone]    PRE-OP EVALUATION:  Today's date: 2017    Brooke John (: 1965) presents for pre-operative evaluation assessment as requested by Dr. Moshe Ortiz.  She requires evaluation and anesthesia risk assessment prior to undergoing surgery/procedure for treatment of Hernia repair .  Proposed procedure: EGD w MAC:DX:Esophageal dysphagia,Hiatal hernia    Date of Surgery/ Procedure: 2017  Time of Surgery/ Procedure: 9:40am  Hospital/Surgical Facility: [facility]  Primary Physician: Mandy Bhatia  Type of Anesthesia Anticipated: MAC    Patient has a Health Care Directive or Living Will:  NO    1. NO - Do you have a history of heart attack, stroke, stent, bypass or surgery on an artery in the head, neck, heart or legs?  2. NO - Do you ever have any pain or discomfort in your chest?  3. NO - Do you have a history of  Heart Failure?  4. YES - ARE YOUR TROUBLED BY SHORTNESS OF BREATH WHEN WALKING ON THE LEVEL, UP A SLIGHT HILL OR AT NIGHT? Patient gets some shortness of breath with walking up hills.    5. NO - Do you currently have a cold, bronchitis or other respiratory infection?  6. NO - Do you have a cough, shortness of breath or wheezing?  7. NO - Do you sometimes get pains in the calves of your legs when you walk?  8. NO - Do you or anyone in your family have previous history of blood clots?  9. NO - Do you or does anyone in your family have a serious bleeding problem such as prolonged bleeding following surgeries or cuts?  10. NO - Have you ever had problems with anemia or been told to take iron pills?  11. NO - Have you had any abnormal blood loss such as black, tarry or bloody stools, or abnormal vaginal bleeding?  12. NO - Have you ever had a blood transfusion?  13. NO - Have you or any of your relatives ever had problems with anesthesia?  14. YES - DO YOU  HAVE SLEEP APNEA, EXCESSIVE SNORING OR DAYTIME DROWSINESS? Patient reports to snoring at night and does have some drowsiness  15. NO - Do you have any prosthetic heart valves?  16. NO - Do you have prosthetic joints?  17. NO - Is there any chance that you may be pregnant?      HPI:                                                      Brief HPI related to upcoming procedure: Patient having procedure done due to symptoms of acid reflux and dysphagia.        See problem list for active medical problems.  Problems all longstanding and stable, except as noted/documented.  See ROS for pertinent symptoms related to these conditions.                                                                                                  .    MEDICAL HISTORY:                                                      Patient Active Problem List    Diagnosis Date Noted     Allergic reaction to drug - amoxicillin - Hives - proven 8/27/2016 08/29/2016     Priority: Medium     Moderate persistent asthma      Priority: Medium     Lung nodule      Priority: Medium     w/u neg       Pulmonary nodules 06/01/2016     Priority: Medium     Seen by Kehinde Boyer MD       Abnormal CT of the chest - needs repeat in 3 to 6 months.  04/17/2016     Priority: Medium     IMPRESSION:  1. Moderate hiatal hernia.  2. No acute airspace disease.  3. A few indeterminant and prominent mediastinal, bilateral hilar, and  axillary lymph nodes. Any older CT examinations would be useful for  comparison purposes. A neoplastic cause is not excluded. Consider  further workup. Recommend CT chest surveillance in 3-6 months.  4. Bilateral prominent renal parapelvic cysts versus hydronephrosis.  Correlate with renal function testing.  5. A few indeterminant small groundglass pulmonary nodules. These are  indeterminate       Other specified hypothyroidism 03/14/2016     Priority: Medium     CARDIOVASCULAR SCREENING; LDL GOAL LESS THAN 130      Priority: Medium      History of herniated intervertebral disc 2014     Priority: Medium     Hypothyroidism, unspecified hypothyroidism type 2014     Priority: Medium     Scoliosis      Priority: Medium     Arthritis      Priority: Medium      Past Medical History:   Diagnosis Date     Arthritis      CARDIOVASCULAR SCREENING; LDL GOAL LESS THAN 130      Depressive disorder      Lung nodule     w/u neg - Dr Boyer     Moderate persistent asthma      Other specified hypothyroidism 3/14/2016     Scoliosis      Past Surgical History:   Procedure Laterality Date     ARTHROSCOPY KNEE  2007    left      BACK SURGERY        SECTION       COSMETIC SURGERY       EYE SURGERY       ORTHOPEDIC SURGERY  2003    lumbar discectomy     Current Outpatient Prescriptions   Medication Sig Dispense Refill     order for DME Walking cane for balance and mobility 1 Device 0     order for DME Incontinence items, disposable underwear, absorbency pads, liners. 80 Units 11     polyethylene glycol (MIRALAX) powder Take 17 g (1 capful) by mouth daily as needed for constipation 510 g 11     fluticasone (FLONASE) 50 MCG/ACT spray USE TWO SPRAYS IN EACH NOSTRIL EVERY DAY 16 g 3     albuterol (PROAIR HFA/PROVENTIL HFA/VENTOLIN HFA) 108 (90 BASE) MCG/ACT Inhaler Inhale 2 puffs into the lungs every 4 hours as needed for shortness of breath / dyspnea or wheezing 1 Inhaler 3     multivitamin, therapeutic with minerals (MULTI-VITAMIN) TABS Take 1 tablet by mouth daily       omeprazole (PRILOSEC) 20 MG capsule Take 1 capsule (20 mg) by mouth 2 times daily 180 capsule 3     cetirizine (ZYRTEC) 10 MG tablet Take 1 tablet (10 mg) by mouth every morning 30 tablet 3     fluticasone-salmeterol (ADVAIR) 250-50 MCG/DOSE diskus inhaler Inhale 1 puff into the lungs 2 times daily 1 Inhaler 11     OTC products: and Melatonin PRN    Allergies   Allergen Reactions     Amoxicillin      Lip swelling      Aspirin      GI upset     Zithromax [Azithromycin]      RASH     "  Latex Allergy: NO    Social History   Substance Use Topics     Smoking status: Never Smoker     Smokeless tobacco: Never Used     Alcohol use 0.0 oz/week     0 Standard drinks or equivalent per week      Comment: rare     History   Drug Use No       REVIEW OF SYSTEMS:                                                    Constitutional, neuro, ENT, endocrine, pulmonary, cardiac, gastrointestinal, genitourinary, musculoskeletal, integument and psychiatric systems are negative, except as otherwise noted.    EXAM:                                                    /76 (BP Location: Left arm, Patient Position: Chair, Cuff Size: Adult Large)  Pulse 82  Temp 98.3  F (36.8  C) (Oral)  Ht 5' 1\" (1.549 m)  Wt 210 lb (95.3 kg)  LMP 05/20/2017 (Exact Date)  SpO2 98%  Breastfeeding? No  BMI 39.68 kg/m2    GENERAL APPEARANCE: healthy, alert and no distress     EYES: EOMI, PERRL     HENT: ear canals and TM's normal and nose and mouth without ulcers or lesions     NECK: no adenopathy, no asymmetry, masses, or scars and thyroid normal to palpation     RESP: lungs clear to auscultation - no rales, rhonchi or wheezes     CV: regular rates and rhythm, normal S1 S2, no S3 or S4 and no murmur, click or rub     ABDOMEN:  soft, nontender, no HSM or masses and bowel sounds normal     MS: extremities normal- no gross deformities noted, no evidence of inflammation in joints, FROM in all extremities.     SKIN: no suspicious lesions or rashes     NEURO: Normal strength and tone, sensory exam grossly normal, mentation intact and speech normal     PSYCH: mentation appears normal. and affect normal/bright     LYMPHATICS: No axillary, cervical, or supraclavicular nodes    DIAGNOSTICS:                                                      EKG: appears normal, NSR, normal axis, normal intervals, no acute ST/T changes c/w ischemia, no LVH by voltage criteria, Right Bundle Branch Block, there are no prior tracings available  Labs Drawn and " in Process:   Unresulted Labs Ordered in the Past 30 Days of this Admission     Date and Time Order Name Status Description    5/30/2017 0800 COMPREHENSIVE METABOLIC PANEL In process           Recent Labs   Lab Test  03/14/16   0822  09/27/13   1133   HGB  13.9  16.1*   PLT  210  187   NA  140  136   POTASSIUM  4.1  3.9   CR  0.71  0.64        IMPRESSION:                                                    Reason for surgery/procedure: Symptoms if Acid Reflux and Dysphagia  Diagnosis/reason for consult: Pre-op for EGD    The proposed surgical procedure is considered LOW risk.    REVISED CARDIAC RISK INDEX  The patient has the following serious cardiovascular risks for perioperative complications such as (MI, PE, VFib and 3  AV Block):  No serious cardiac risks  INTERPRETATION: 0 risks: Class I (very low risk - 0.4% complication rate)    The patient has the following additional risks for perioperative complications:  - Patient has symptoms of sleep apnea:  Snoring, day time fatigue.        ICD-10-CM    1. Preop general physical exam Z01.818 CBC with platelets and differential     Comprehensive metabolic panel (BMP + Alb, Alk Phos, ALT, AST, Total. Bili, TP)     EKG 12-lead complete w/read - Clinics   2. Gastroesophageal reflux disease, esophagitis presence not specified K21.9    3. Moderate persistent asthma without complication J45.40    4. Pulmonary nodules R91.8    5. Snoring R06.83 SLEEP EVALUATION & MANAGEMENT REFERRAL - ADULT   6. Scoliosis, unspecified scoliosis type, unspecified spinal region M41.9    7. Other specified hypothyroidism E03.8 levothyroxine (SYNTHROID/LEVOTHROID) 25 MCG tablet     TSH     T4, free       RECOMMENDATIONS:                                                        Obstructive Sleep Apnea (or suspected sleep apnea)  Hospital staff are advised to monitor for sleep related oxygen desaturations due to suspicion of HEVER      --Patient is to take all scheduled medications on the day of surgery  EXCEPT for modifications listed below.    APPROVAL GIVEN to proceed with proposed procedure, without further diagnostic evaluation       Signed Electronically by: Alondra Michele PA-C    Copy of this evaluation report is provided to requesting physician.    [place] Preop Guidelines    Note:  Levothyroxine started for patient's mildly elevated thyroid.  She was on levothyroxine in the past and tolerated fine.    She was instructed to follow-up in 6 weeks for lab appointment to have the TSH and T4 checked after starting the medication.    She should be seen sooner if needed.      Patient plan:  - Please start the Levothyroxine for the hypothyroid.  You will need to have follow-up labs done in 6 weeks to re-check the thyroid.  Please be sure to schedule a lab only appointment to have this done.     - Sleep medicine referral has been ordered for the sleep symptoms.  Please have this done when you are able.      - Please follow-up as needed.     - Patient understood the plan today.      I have reviewed this H&P and any associated studies and agree with Alondra Michele PA-C's assessment and plan.        Glenn Ludwig MD

## 2017-05-31 NOTE — PROGRESS NOTES
Note to staff: Please send a result letter    The results from your recent lab work are within normal limits.    -Liver and gallbladder tests (ALT,AST, Alk phos,bilirubin) are normal.  -Kidney function (GFR) is normal.  -Sodium is normal.  -Potassium is normal.  -Glucose is mildly elevated, but you were not fasting.  Therefore this may be within normal limits.    -Normal red blood cell (hgb) levels, normal white blood cell count and normal platelet levels.      Thank you for choosing [facility] for your health care needs,      Alondra Michele PA-C

## 2017-06-01 ENCOUNTER — TELEPHONE (OUTPATIENT)
Dept: FAMILY MEDICINE | Facility: CLINIC | Age: 52
End: 2017-06-01

## 2017-06-01 ENCOUNTER — TELEPHONE (OUTPATIENT)
Dept: GASTROENTEROLOGY | Facility: CLINIC | Age: 52
End: 2017-06-01

## 2017-06-01 NOTE — TELEPHONE ENCOUNTER
Patient scheduled for EGD    Indication for procedure. Hiatal hernia , Esophageal dysphagia    Referring Provider. Baylee Vergara APRN CNP    ? Not Needed    Arrival time verified? Yes, 0810    Facility location verified? Yes, [address]    Instructions given regarding prep and procedure    Prep Type NPO    Are you taking any anticoagulants or blood thinners? No    Instructions given? N/A    Electronic implanted devices? No    Pre procedure teaching completed? Yes    Transportation from procedure? Friend    H&P / Pre op physical completed? Completed 05/30/17

## 2017-06-08 ENCOUNTER — ANESTHESIA EVENT (OUTPATIENT)
Dept: GASTROENTEROLOGY | Facility: CLINIC | Age: 52
End: 2017-06-08
Payer: MEDICARE

## 2017-06-08 ENCOUNTER — ANESTHESIA (OUTPATIENT)
Dept: GASTROENTEROLOGY | Facility: CLINIC | Age: 52
End: 2017-06-08
Payer: MEDICARE

## 2017-06-08 ENCOUNTER — SURGERY (OUTPATIENT)
Age: 52
End: 2017-06-08

## 2017-06-08 ENCOUNTER — HOSPITAL ENCOUNTER (OUTPATIENT)
Facility: CLINIC | Age: 52
Discharge: HOME | End: 2017-06-08
Attending: INTERNAL MEDICINE | Admitting: INTERNAL MEDICINE
Payer: MEDICARE

## 2017-06-08 VITALS
RESPIRATION RATE: 27 BRPM | WEIGHT: 210 LBS | OXYGEN SATURATION: 93 % | SYSTOLIC BLOOD PRESSURE: 131 MMHG | BODY MASS INDEX: 39.65 KG/M2 | DIASTOLIC BLOOD PRESSURE: 86 MMHG | HEIGHT: 61 IN | TEMPERATURE: 97.6 F

## 2017-06-08 LAB — UPPER GI ENDOSCOPY: NORMAL

## 2017-06-08 PROCEDURE — 37000008 ZZH ANESTHESIA TECHNICAL FEE, 1ST 30 MIN: Performed by: INTERNAL MEDICINE

## 2017-06-08 PROCEDURE — 25000128 H RX IP 250 OP 636: Performed by: NURSE ANESTHETIST, CERTIFIED REGISTERED

## 2017-06-08 PROCEDURE — 25000125 ZZHC RX 250: Performed by: NURSE ANESTHETIST, CERTIFIED REGISTERED

## 2017-06-08 PROCEDURE — 43235 EGD DIAGNOSTIC BRUSH WASH: CPT | Performed by: INTERNAL MEDICINE

## 2017-06-08 RX ORDER — SODIUM CHLORIDE, SODIUM LACTATE, POTASSIUM CHLORIDE, CALCIUM CHLORIDE 600; 310; 30; 20 MG/100ML; MG/100ML; MG/100ML; MG/100ML
INJECTION, SOLUTION INTRAVENOUS CONTINUOUS
Status: CANCELLED | OUTPATIENT
Start: 2017-06-08

## 2017-06-08 RX ORDER — LIDOCAINE 40 MG/G
CREAM TOPICAL
Status: DISCONTINUED | OUTPATIENT
Start: 2017-06-08 | End: 2017-06-08 | Stop reason: HOSPADM

## 2017-06-08 RX ORDER — ONDANSETRON 4 MG/1
4 TABLET, ORALLY DISINTEGRATING ORAL EVERY 30 MIN PRN
Status: CANCELLED | OUTPATIENT
Start: 2017-06-08

## 2017-06-08 RX ORDER — SODIUM CHLORIDE, SODIUM LACTATE, POTASSIUM CHLORIDE, CALCIUM CHLORIDE 600; 310; 30; 20 MG/100ML; MG/100ML; MG/100ML; MG/100ML
INJECTION, SOLUTION INTRAVENOUS CONTINUOUS PRN
Status: DISCONTINUED | OUTPATIENT
Start: 2017-06-08 | End: 2017-06-08

## 2017-06-08 RX ORDER — PROPOFOL 10 MG/ML
INJECTION, EMULSION INTRAVENOUS CONTINUOUS PRN
Status: DISCONTINUED | OUTPATIENT
Start: 2017-06-08 | End: 2017-06-08

## 2017-06-08 RX ORDER — ONDANSETRON 2 MG/ML
4 INJECTION INTRAMUSCULAR; INTRAVENOUS
Status: DISCONTINUED | OUTPATIENT
Start: 2017-06-08 | End: 2017-06-08 | Stop reason: HOSPADM

## 2017-06-08 RX ORDER — ONDANSETRON 2 MG/ML
4 INJECTION INTRAMUSCULAR; INTRAVENOUS EVERY 30 MIN PRN
Status: CANCELLED | OUTPATIENT
Start: 2017-06-08

## 2017-06-08 RX ORDER — LIDOCAINE HYDROCHLORIDE 20 MG/ML
INJECTION, SOLUTION INFILTRATION; PERINEURAL PRN
Status: DISCONTINUED | OUTPATIENT
Start: 2017-06-08 | End: 2017-06-08

## 2017-06-08 RX ORDER — PROPOFOL 10 MG/ML
INJECTION, EMULSION INTRAVENOUS PRN
Status: DISCONTINUED | OUTPATIENT
Start: 2017-06-08 | End: 2017-06-08

## 2017-06-08 RX ORDER — NALOXONE HYDROCHLORIDE 0.4 MG/ML
.1-.4 INJECTION, SOLUTION INTRAMUSCULAR; INTRAVENOUS; SUBCUTANEOUS
Status: CANCELLED | OUTPATIENT
Start: 2017-06-08 | End: 2017-06-09

## 2017-06-08 RX ADMIN — PROPOFOL 100 MCG/KG/MIN: 10 INJECTION, EMULSION INTRAVENOUS at 09:31

## 2017-06-08 RX ADMIN — PROPOFOL 20 MG: 10 INJECTION, EMULSION INTRAVENOUS at 09:33

## 2017-06-08 RX ADMIN — PROPOFOL 50 MG: 10 INJECTION, EMULSION INTRAVENOUS at 09:31

## 2017-06-08 RX ADMIN — SODIUM CHLORIDE, POTASSIUM CHLORIDE, SODIUM LACTATE AND CALCIUM CHLORIDE: 600; 310; 30; 20 INJECTION, SOLUTION INTRAVENOUS at 09:29

## 2017-06-08 RX ADMIN — LIDOCAINE HYDROCHLORIDE 100 MG: 20 INJECTION, SOLUTION INFILTRATION; PERINEURAL at 09:31

## 2017-06-08 NOTE — ANESTHESIA POSTPROCEDURE EVALUATION
Patient: Brooke John    Procedure(s):  EGD w MAC:DX:Esophageal dysphagia,Hiatal hernia/prep & pre-op info mailed - Wound Class: II-Clean Contaminated    Diagnosis:EGD w MAC:DX:Esophageal dysphagia,Hiatal hernia/prep & pre-op info mailed  Diagnosis Additional Information: No value filed.    Anesthesia Type:  MAC    Note:  Anesthesia Post Evaluation    Patient location during evaluation: Endoscopy Recovery  Patient participation: Able to fully participate in evaluation  Level of consciousness: awake  Pain management: adequate  Airway patency: patent  Cardiovascular status: acceptable  Respiratory status: acceptable  Hydration status: acceptable  PONV: none     Anesthetic complications: None          Last vitals:  Vitals:    06/08/17 0940 06/08/17 0955 06/08/17 1000   BP: 131/82 129/78 131/86   Resp: 15 19 27   Temp: 36.4  C (97.6  F)     SpO2: (P) 94% 94% 93%         Electronically Signed By: Mery Martinez MD  June 8, 2017  11:17 AM

## 2017-06-08 NOTE — ANESTHESIA PREPROCEDURE EVALUATION
Anesthesia Evaluation     . Pt has had prior anesthetic. Type: MAC    No history of anesthetic complications          ROS/MED HX    ENT/Pulmonary:     (+)HEVER risk factors snores loudly, obese, Intermittent asthma Last exacerbation: patient can't recall,Treatment: Inhaler prn,  , . .    Neurologic:  - neg neurologic ROS     Cardiovascular:  - neg cardiovascular ROS       METS/Exercise Tolerance: Comment: Limited by back pain/scoliosis 3 - Able to walk 1-2 blocks without stopping   Hematologic:  - neg hematologic  ROS       Musculoskeletal:   (+) , , other musculoskeletal (scoliosis; walks with cane)-       GI/Hepatic: Comment: Persistent GERD sx; for EGD. No recent N/V    (+) GERD hiatal hernia,       Renal/Genitourinary:         Endo: Comment: Started thyroid replacement ~ 1 week ago    (+) thyroid problem hypothyroidism, Obesity, .      Psychiatric:         Infectious Disease:  - neg infectious disease ROS       Malignancy:         Other:                     Physical Exam  Normal systems: dental    Airway   Mallampati: II  TM distance: >3 FB  Neck ROM: full    Dental     Cardiovascular   Rhythm and rate: regular and normal      Pulmonary    breath sounds clear to auscultation    Other findings: LABS:    BMP:  Lab Test        05/30/17                       0759          NA           143           POTASSIUM    3.9           CHLORIDE     109           CO2          23            BUN          8             CR           0.77          GLC          105*          LUIS          8.5             CBC:   Lab Test        05/30/17                       0759          WBC          6.2           RBC          4.38          HGB          12.8          HCT          37.8          MCV          86            MCH          29.2          MCHC         33.9          RDW          14.1          PLT          223                           Anesthesia Plan      History & Physical Review  History and physical reviewed and following examination; no  interval change.    ASA Status:  3 .    NPO Status:  > 8 hours    Plan for MAC with Intravenous and Propofol induction. Maintenance will be TIVA.  Reason for MAC:  Other - see comments (Obese)  PONV prophylaxis:  Ondansetron (or other 5HT-3)  Mery Martinez MD  6/8/2017        Postoperative Care  Postoperative pain management:  Oral pain medications.      Consents  Anesthetic plan, risks, benefits and alternatives discussed with:  Patient.  Use of blood products discussed: No .   .

## 2017-06-08 NOTE — ANESTHESIA CARE TRANSFER NOTE
Patient: Brooke John    Procedure(s):  EGD w MAC:DX:Esophageal dysphagia,Hiatal hernia/prep & pre-op info mailed - Wound Class: II-Clean Contaminated    Diagnosis: EGD w MAC:DX:Esophageal dysphagia,Hiatal hernia/prep & pre-op info mailed  Diagnosis Additional Information: No value filed.    Anesthesia Type:   MAC     Note:  Airway :Nasal Cannula  Patient transferred to:Phase II  Comments: Pt. Pink and breathing spontaneously.  Vitals stable.  Report given to oncoming nurse.  Transfer of care occurred.      Vitals: (Last set prior to Anesthesia Care Transfer)    CRNA VITALS  6/8/2017 0916 - 6/8/2017 0956      6/8/2017             Pulse: 97    Ht Rate: 97    SpO2: 92 %    Resp Rate (set): 10                Electronically Signed By: BRIAN Johnson CRNA  June 8, 2017  9:56 AM

## 2017-06-08 NOTE — OR NURSING
Pt had egd. Trying to take scope out for much of procedure. No interventions. VSS. Pt done under MAC.

## 2017-06-23 DIAGNOSIS — R05.9 COUGH: ICD-10-CM

## 2017-06-23 RX ORDER — CETIRIZINE HYDROCHLORIDE 10 MG/1
TABLET ORAL
Qty: 30 TABLET | Refills: 10 | Status: SHIPPED | OUTPATIENT
Start: 2017-06-23 | End: 2018-06-04

## 2017-06-23 NOTE — TELEPHONE ENCOUNTER
Zyrtec      Last Written Prescription Date: 10/21/2106  Last Fill Quantity: 30,  # refills: 3   Last Office Visit with G, P or [facility] prescribing provider: 05/30/207

## 2017-06-23 NOTE — TELEPHONE ENCOUNTER
Prescription approved per [facility] Refill Protocol.    Elaina King, CRUZITO, RN, PHN  [facility]

## 2017-07-07 DIAGNOSIS — E03.8 OTHER SPECIFIED HYPOTHYROIDISM: ICD-10-CM

## 2017-07-07 RX ORDER — LEVOTHYROXINE SODIUM 25 UG/1
TABLET ORAL
Qty: 30 TABLET | Refills: 0 | Status: SHIPPED | OUTPATIENT
Start: 2017-07-07 | End: 2017-08-02 | Stop reason: DRUGHIGH

## 2017-07-07 NOTE — TELEPHONE ENCOUNTER
Medication is being filled for 1 time refill only due to:  Due for thyroid lab recheck.   Pharmacy advised.    Elaina King, CRUZITO, RN, N  [facility]) [phone]

## 2017-07-07 NOTE — TELEPHONE ENCOUNTER
levothyroxine (SYNTHROID/LEVOTHROID) 25 MCG tablet     Last Written Prescription Date: 5/30/2017  Last Quantity: 30 tablet, # refills: 1  Last Office Visit with [facility], [facility] or [facility] prescribing provider: 5/30/2017        TSH   Date Value Ref Range Status   05/16/2017 4.99 (H) 0.40 - 4.00 mU/L Final

## 2017-07-19 ENCOUNTER — OFFICE VISIT (OUTPATIENT)
Dept: SLEEP MEDICINE | Facility: CLINIC | Age: 52
End: 2017-07-19
Attending: INTERNAL MEDICINE
Payer: MEDICARE

## 2017-07-19 VITALS
HEIGHT: 61 IN | RESPIRATION RATE: 18 BRPM | BODY MASS INDEX: 39.65 KG/M2 | HEART RATE: 76 BPM | WEIGHT: 210 LBS | DIASTOLIC BLOOD PRESSURE: 73 MMHG | SYSTOLIC BLOOD PRESSURE: 141 MMHG

## 2017-07-19 DIAGNOSIS — G25.81 RESTLESS LEGS SYNDROME (RLS): ICD-10-CM

## 2017-07-19 DIAGNOSIS — R53.82 CHRONIC FATIGUE: ICD-10-CM

## 2017-07-19 DIAGNOSIS — R06.83 SNORING: ICD-10-CM

## 2017-07-19 PROCEDURE — 99211 OFF/OP EST MAY X REQ PHY/QHP: CPT | Mod: ZF

## 2017-07-19 NOTE — PATIENT INSTRUCTIONS
Your BMI is Body mass index is 39.68 kg/(m^2).  Weight management is a personal decision.  If you are interested in exploring weight loss strategies, the following discussion covers the approaches that may be successful. Body mass index (BMI) is one way to tell whether you are at a healthy weight, overweight, or obese. It measures your weight in relation to your height.  A BMI of 18.5 to 24.9 is in the healthy range. A person with a BMI of 25 to 29.9 is considered overweight, and someone with a BMI of 30 or greater is considered obese. More than two-thirds of American adults are considered overweight or obese.  Being overweight or obese increases the risk for further weight gain. Excess weight may lead to heart disease and diabetes.  Creating and following plans for healthy eating and physical activity may help you improve your health.  Weight control is part of healthy lifestyle and includes exercise, emotional health, and healthy eating habits. Careful eating habits lifelong are the mainstay of weight control. Though there are significant health benefits from weight loss, long-term weight loss with diet alone may be very difficult to achieve- studies show long-term success with dietary management in less than 10% of people. Attaining a healthy weight may be especially difficult to achieve in those with severe obesity. In some cases, medications, devices and surgical management might be considered.  What can you do?  If you are overweight or obese and are interested in methods for weight loss, you should discuss this with your provider.     Consider reducing daily calorie intake by 500 calories.     Keep a food journal.     Avoiding skipping meals, consider cutting portions instead.    Diet combined with exercise helps maintain muscle while optimizing fat loss. Strength training is particularly important for building and maintaining muscle mass. Exercise helps reduce stress, increase energy, and improves fitness.  Increasing exercise without diet control, however, may not burn enough calories to loose weight.       Start walking three days a week 10-20 minutes at a time    Work towards walking thirty minutes five days a week     Eventually, increase the speed of your walking for 1-2 minutes at time    In addition, we recommend that you review healthy lifestyles and methods for weight loss available through the National Institutes of Health patient information sites:  http://win.niddk.nih.gov/publications/index.htm    And look into health and wellness programs that may be available through your health insurance provider, employer, local community center, or loren club.    Weight management plan: Patient was referred to their PCP to discuss a diet and exercise plan.     Your blood pressure was checked while you were in clinic today.  Please read the guidelines below about what these numbers mean and what you should do about them.  Your systolic blood pressure is the top number.  This is the pressure when the heart is pumping.  Your diastolic blood pressure is the bottom number.  This is the pressure in between beats.  If your systolic blood pressure is less than 120 and your diastolic blood pressure is less than 80, then your blood pressure is normal. There is nothing more that you need to do about it  If your systolic blood pressure is 120-139 or your diastolic blood pressure is 80-89, your blood pressure may be higher than it should be.  You should have your blood pressure re-checked within a year by a primary care provider.  If your systolic blood pressure is 140 or greater or your diastolic blood pressure is 90 or greater, you may have high blood pressure.  High blood pressure is treatable, but if left untreated over time it can put you at risk for heart attack, stroke, or kidney failure.  You should have your blood pressure re-checked by a primary care provider within the next four weeks.      MY TREATMENT INFORMATION FOR  "SLEEP APNEA-  Brooke John    DOCTOR : Doris Byrne [facility]  SLEEP CENTER :      MY CONTACT NUMBER:     Am I having a sleep study at a sleep center?  Make sure you have an appointment for the study before you leave!    Am I having a home sleep study?  Watch this video:  [email or web address].com/watch?v=[email or web address]_[email or web address]    Frequently asked questions:  1. What is Obstructive Sleep Apnea (HEVER)? HEVER is the most common type of sleep apnea. Apnea means, \"without breath.\"  Apnea is most often caused by narrowing or collapse of the upper airway as muscles relax during sleep.   Almost everyone has occasional apneas. Most people with sleep apnea have had brief interruptions at night frequently for many years.  The severity of sleep apnea is related to how frequent and severe the events are.   2. What are the consequences of HEVER? Symptoms include: feeling sleepy during the day, snoring loudly, gasping or stopping of breathing, trouble sleeping, and occasionally morning headaches or heartburn at night.  Sleepiness can be serious and even increase the risk of falling asleep while driving. Other health consequences may include development of high blood pressure and other cardiovascular disease in persons who are susceptible. Untreated HEVER  can contribute to heart disease, stroke and diabetes.   3. What are the treatment options? In most situations, sleep apnea is a lifelong disease that must be managed with daily therapy. Medications are not effective for sleep apnea and surgery is generally not considered until other therapies have been tried. Your treatment is your choice . Continuous Positive Airway (CPAP) works right away and is the therapy that is effective in nearly everyone. An oral device to hold your jaw forward is usually the next most reliable option. Other options include postioning devices (to keep you off your back), weight loss, and surgery including a tongue " pacing device. There is more detail about some of these options below.    Important tips for using CPAP and similar devices   Know your equipment:  CPAP is continuous positive airway pressure that prevents obstructive sleep apnea by keeping the throat from collapsing while you are sleeping. In most cases, the device is  smart  and can slowly self-adjusts if your throat collapses and keeps a record every day of how well you are treated-this information is available to you and your care team.  BPAP is bilevel positive airway pressure that keeps your throat open and also assists each breath with a pressure boost to maintain adequate breathing.  Special kinds of BPAP are used in patients who have inadequate breathing from lung or heart disease. In most cases, the device is  smart  and can slowly self-adjusts to assist breathing. Like CPAP, the device keeps a record of how well you are treated.  Your mask is your connection to the device. You get to choose what feels most comfortable and the staff will help to make sure if fits. Here: are some examples of the different masks that are available:       Key points to remember on your journey with sleep apnea:  1. Sleep study.  PAP devices often need to be adjusted during a sleep study to show that they are effective and adjusted right.  2. Good tips to remember: Try wearing just the mask during a quiet time during the day so your body adapts to wearing it. A humidifier is recommended for comfort in most cases to prevent drying of your nose and throat. Allergy medication from your provider may help you if you are having nasal congestion.  3. Getting settled-in. It takes more than one night for most of us to get used to wearing a mask. Try wearing just the mask during a quiet time during the day so your body adapts to wearing it. A humidifier is recommended for comfort in most cases. Our team will work with you carefully on the first day and will be in contact within 4 days  and again at 2 and 4 weeks for advice and remote device adjustments. Your therapy is evaluated by the device each day.   4. Use it every night. The more you are able to sleep naturally for 7-8 hours, the more likely you will have good sleep and to prevent health risks or symptoms from sleep apnea. Even if you use it 4 hours it helps. Occasionally all of us are unable to use a medical therapy, in sleep apnea, it is not dangerous to miss one night.   5. Communicate. Call our skilled team on the number provided on the first day if your visit for problems that make it difficult to wear the device. Over 2 out of 3 patients can learn to wear the device long-term with help from our team. Remember to call our team or your sleep providers if you are unable to wear the device as we may have other solutions for those who cannot adapt to mask CPAP therapy. It is recommended that you sleep your sleep provider within the first 3 months and yearly after that if you are not having problems.   Take care of your equipment. Make sure you clean your mask and tubing using directions every day and that your filter and mask are replaced as recommended or if they are not working.     BESIDES CPAP, WHAT OTHER THERAPIES ARE THERE?    Positioning Device  Positioning devices are generally used when sleep apnea is mild and only occurs on your back.This example shows a pillow that straps around the waist. It may be appropriate for those whose sleep study shows milder sleep apnea that occurs primarily when lying flat on one's back. Preliminary studies have shown benefit but effectiveness at home may need to be verified by a home sleep test. These devices are generally not covered by medical insurance.    Oral Appliance  What is oral appliance therapy?  An oral appliance device fits on your teeth at night like a retainer used after having braces. The device is made by a specialized dentist and requires several visits over 1-2 months before a  manufactured device is made to fit your teeth and is adjusted to prevent your sleep apnea. Once an oral device is working properly, snoring should be improved. A home sleep test may be recommended at that time if to determine whether the sleep apnea is adequately treated.       Some things to remember:  -Oral devices are often, but not always, covered by your medical insurance. Be sure to check with your insurance provider.   -If you are referred for oral therapy, you will be given a list of specialized dentists to consider or you may choose to visit the Web site of the American Academy of Dental Sleep Medicine  -Oral devices are less likely to work if you have severe sleep apnea or are extremely overweight.     More detailed information  An oral appliance is a small acrylic device that fits over the upper and lower teeth  (similar to a retainer or a mouth guard). This device slightly moves jaw forward, which moves the base of the tongue forward, opens the airway, improves breathing for effective treat snoring and obstructive sleep apnea in perhaps 7 out of 10 people .  The best working devices are custom-made by a dental device  after a mold is made of the teeth 1, 2, 3.  When is an oral appliance indicated?  Oral appliance therapy is recommended as a first-line treatment for patients with primary snoring, mild sleep apnea, and for patients with moderate sleep apnea who prefer appliance therapy to use of CPAP4, 5. Severity of sleep apnea is determined by sleep testing and is based on the number of respiratory events per hour of sleep.   How successful is oral appliance therapy?  The success rate of oral appliance therapy in patients with mild sleep apnea is 75-80% while in patients with moderate sleep apnea it is 50-70%. The chance of success in patients with severe sleep apnea is 40-50%. The research also shows that oral appliances have a beneficial effect on the cardiovascular health of HEVER patients  at the same magnitude as CPAP therapy7.  Oral appliances should be a second-line treatment in cases of severe sleep apnea, but if not completely successful then a combination therapy utilizing CPAP plus oral appliance therapy may be effective. Oral appliances tend to be effective in a broad range of patients although studies show that the patients who have the highest success are females, younger patients, those with milder disease, and less severe obesity. 3, 6.   Finding a dentist that practices dental sleep medicine  Specific training is available through the American Academy of Dental Sleep Medicine for dentists interested in working in the field of sleep. To find a dentist who is educated in the field of sleep and the use of oral appliances, near you, visit the Web site of the American Academy of Dental Sleep Medicine.    References  1. Jaime et al. Objectively measured vs self-reported compliance during oral appliance therapy for sleep-disordered breathing. Chest 2013; 144(5): 6106-7458.  2. Michael et al. Objective measurement of compliance during oral appliance therapy for sleep-disordered breathing. Thorax 2013; 68(1): 91-96.  3. Rabia, et al. Mandibular advancement devices in 620 men and women with HEVER and snoring: tolerability and predictors of treatment success. Chest 2004; 125: 1678-7308.  4. Keys, et al. Oral appliances for snoring and HEVER: a review. Sleep 2006; 29: 244-262.  5. Dolores et al. Oral appliance treatment for HEVER: an update. J Clin Sleep Med 2014; 10(2): 215-227.  6. Ghanshyam et al. Predictors of OSAH treatment outcome. J Dent Res 2007; 86: 4142-1497.      Weight Loss:    Weight loss is a long-term strategy that may improve sleep apnea in some patients.    Weight management is a personal decision.  If you are interested in exploring weight loss strategies, the following discussion covers the impact on weight loss on sleep apnea and the approaches that may be  successful.    Weight loss decreases severity of sleep apnea in most people with obesity. For those with mild obesity who have developed snoring with weight gain, even 15-30 pound weight loss can improve and occasionally eliminate sleep apnea.  Structured and life-long dietary and health habits are necessary to lose weight and keep healthier weight levels.     Though there may be significant health benefits from weight loss, long-term weight loss is very difficult to achieve- studies show success with dietary management in less than 10% of people. In addition, substantial weight loss may require years of dietary control and may be difficult if patients have severe obesity. In these cases, surgical management may be considered.  Finally, older individuals who have tolerated obesity without health complications may be less likely to benefit from weight loss strategies.        Your BMI is Body mass index is 39.68 kg/(m^2).  Weight management is a personal decision.  If you are interested in exploring weight loss strategies, the following discussion covers the approaches that may be successful. Body mass index (BMI) is one way to tell whether you are at a healthy weight, overweight, or obese. It measures your weight in relation to your height.  A BMI of 18.5 to 24.9 is in the healthy range. A person with a BMI of 25 to 29.9 is considered overweight, and someone with a BMI of 30 or greater is considered obese. More than two-thirds of American adults are considered overweight or obese.  Being overweight or obese increases the risk for further weight gain. Excess weight may lead to heart disease and diabetes.  Creating and following plans for healthy eating and physical activity may help you improve your health.  Weight control is part of healthy lifestyle and includes exercise, emotional health, and healthy eating habits. Careful eating habits lifelong are the mainstay of weight control. Though there are significant  health benefits from weight loss, long-term weight loss with diet alone may be very difficult to achieve- studies show long-term success with dietary management in less than 10% of people. Attaining a healthy weight may be especially difficult to achieve in those with severe obesity. In some cases, medications, devices and surgical management might be considered.  What can you do?  If you are overweight or obese and are interested in methods for weight loss, you should discuss this with your provider.     Consider reducing daily calorie intake by 500 calories.     Keep a food journal.     Avoiding skipping meals, consider cutting portions instead.    Diet combined with exercise helps maintain muscle while optimizing fat loss. Strength training is particularly important for building and maintaining muscle mass. Exercise helps reduce stress, increase energy, and improves fitness. Increasing exercise without diet control, however, may not burn enough calories to loose weight.       Start walking three days a week 10-20 minutes at a time    Work towards walking thirty minutes five days a week     Eventually, increase the speed of your walking for 1-2 minutes at time    In addition, we recommend that you review healthy lifestyles and methods for weight loss available through the National Institutes of Health patient information sites:  http://win.niddk.nih.gov/publications/index.htm    And look into health and wellness programs that may be available through your health insurance provider, employer, local community center, or loren club.          Surgery:    Surgery for obstructive sleep apnea is considered generally only when other therapies fail to work. Surgery may be discussed with you if you are having a difficult time tolerating CPAP and or when there is an abnormal structure that requires surgical correction.  Nose and throat surgeries often enlarge the airway to prevent collapse.  Most of these surgeries create  pain for 1-2 weeks and up to half of the most common surgeries are not effective throughout life.  You should carefully discuss the benefits and drawbacks to surgery with your sleep provider and surgeon to determine if it is the best solution for you.   More information  Surgery for HEVER is directed at areas that are responsible for narrowing or complete obstruction of the airway during sleep.  There are a wide range of procedures available to enlarge and/or stabilize the airway to prevent blockage of breathing in the three major areas where it can occur: the palate, tongue, and nasal regions.  Successful surgical treatment depends on the accurate identification of the factors responsible for obstructive sleep apnea in each person.  A personalized approach is required because there is no single treatment that works well for everyone.  Because of anatomic variation, consultation with an examination by a sleep surgeon is a critical first step in determining what surgical options are best for each patient.  In some cases, examination during sedation may be recommended in order to guide the selection of procedures.  Patients will be counseled about risks and benefits as well as the typical recovery course after surgery. Surgery is typically not a cure for a person s HEVER.  However, surgery will often significantly improve one s HEVER severity (termed  success rate ).  Even in the absence of a cure, surgery will decrease the cardiovascular risk associated with OSA7; improve overall quality of life8 (sleepiness, functionality, sleep quality, etc).      Palate Procedures:  Patients with HEVER often have narrowing of their airway in the region of their tonsils and uvula.  The goals of palate procedures are to widen the airway in this region as well as to help the tissues resist collapse.  Modern palate procedure techniques focus on tissue conservation and soft tissue rearrangement, rather than tissue removal.  Often the uvula is  preserved in this procedure. Residual sleep apnea is common in patient after pharyngoplasty with an average reduction in sleep apnea events of 33%2.      Tongue Procedures:  ExamWhile patients are awake, the muscles that surround the throat are active and keep this region open for breathing. These muscles relax during sleep, allowing the tongue and other structures to collapse and block breathing.  There are several different tongue procedures available.  Selection of a tongue base procedure depends on characteristics seen on physical exam.  Generally, procedures are aimed at removing bulky tissues in this area or preventing the back of the tongue from falling back during sleep.  Success rates for tongue surgery range from 50-62%3.    Hypoglossal Nerve Stimulation:  Hypoglossal nerve stimulation has recently received approval from the [place] Food and Drug Administration for the treatment of obstructive sleep apnea.  This is based on research showing that the system was safe and effective in treating sleep apnea6.  Results showed that the median AHI score decreased 68%, from 29.3 to 9.0. This therapy uses an implant system that senses breathing patterns and delivers mild stimulation to airway muscles, which keeps the airway open during sleep.  The system consists of three fully implanted components: a small generator (similar in size to a pacemaker), a breathing sensor, and a stimulation lead.  Using a small handheld remote, a patient turns the therapy on before bed and off upon awakening.    Candidates for this device must be greater than 22 years of age, have moderate to severe HEVER (AHI between 20-65), BMI less than 32, have tried CPAP/oral appliance without success, and have appropriate upper airway anatomy (determined by a sleep endoscopy performed by Dr. Boyd).    Hypoglossal Nerve Stimulation Pathway:    The sleep surgeon s office will work with the patient through the insurance prior-authorization  process (including communications and appeals).    Nasal Procedures:  Nasal obstruction can interfere with nasal breathing during the day and night.  Studies have shown that relief of nasal obstruction can improve the ability of some patients to tolerate positive airway pressure therapy for obstructive sleep apnea1.  Treatment options include medications such as nasal saline, topical corticosteroid and antihistamine sprays, and oral medications such as antihistamines or decongestants. Non-surgical treatments can include external nasal dilators for selected patients. If these are not successful by themselves, surgery can improve the nasal airway either alone or in combination with these other options.      Combination Procedures:  Combination of surgical procedures and other treatments may be recommended, particularly if patients have more than one area of narrowing or persistent positional disease.  The success rate of combination surgery ranges from 66-80%2,3.    References  1. Elvia SAENZ. The Role of the Nose in Snoring and Obstructive Sleep Apnoea: An Update.  Eur Arch Otorhinolaryngol. 2011; 268: 1365-73.  2.  Nick SM; Camille JA; Harrison JR; Pallanch JF; Kevyn MB; Ninfa SG; Sandra SHAW. Surgical modifications of the upper airway for obstructive sleep apnea in adults: a systematic review and meta-analysis. SLEEP 2010;33(10):4642-9479. Garret WILKINS. Hypopharyngeal surgery in obstructive sleep apnea: an evidence-based medicine review.  Arch Otolaryngol Head Neck Surg. 2006 Feb;132(2):206-13.  3. Salvador YH1, Alexia Y, Ross LIZZETH. The efficacy of anatomically based multilevel surgery for obstructive sleep apnea. Otolaryngol Head Neck Surg. 2003 Oct;129(4):327-35.  4. Kezirian E, Goldberg A. Hypopharyngeal Surgery in Obstructive Sleep Apnea: An Evidence-Based Medicine Review. Arch Otolaryngol Head Neck Surg. 2006 Feb;132(2):206-13.  5. Magdaleno VILLEGAS et al. Upper-Airway Stimulation for Obstructive Sleep Apnea.  N Engl J Med.  2014 Jan 9;370(2):139-49.  6. Sharee Y et al. Increased Incidence of Cardiovascular Disease in Middle-aged Men with Obstructive Sleep Apnea. Am J Respir Crit Care Med; 2002 166: 159-165  7. Patricio SIDHU et al. Studying Life Effects and Effectiveness of Palatopharyngoplasty (SLEEP) study: Subjective Outcomes of Isolated Uvulopalatopharyngoplasty. Otolaryngol Head Neck Surg. 2011; 144: 623-631.    Please do not drive if drowsy or sleepy;  pull over if drowsy.

## 2017-07-19 NOTE — NURSING NOTE
"Chief Complaint   Patient presents with     Consult     Discuss only getting 2-3 hour of sleep and will be up for 1 hour before being able to fall back to sleep       Initial /73  Pulse 76  Resp 18  Ht 1.549 m (5' 1\")  Wt 95.3 kg (210 lb)  BMI 39.68 kg/m2 Estimated body mass index is 39.68 kg/(m^2) as calculated from the following:    Height as of this encounter: 1.549 m (5' 1\").    Weight as of this encounter: 95.3 kg (210 lb).  Medication Reconciliation: complete     TIMOTHY Gardner        "

## 2017-07-19 NOTE — MR AVS SNAPSHOT
After Visit Summary   7/19/2017    Brooke John    MRN: [id number]           Patient Information     Date Of Birth          1965        Visit Information        Provider Department      7/19/2017 8:00 AM Doris Rodrigez MD [facility], [place], Sleep Study        Today's Diagnoses     Snoring        Restless legs syndrome (RLS)        Chronic fatigue          Care Instructions      Your BMI is Body mass index is 39.68 kg/(m^2).  Weight management is a personal decision.  If you are interested in exploring weight loss strategies, the following discussion covers the approaches that may be successful. Body mass index (BMI) is one way to tell whether you are at a healthy weight, overweight, or obese. It measures your weight in relation to your height.  A BMI of 18.5 to 24.9 is in the healthy range. A person with a BMI of 25 to 29.9 is considered overweight, and someone with a BMI of 30 or greater is considered obese. More than two-thirds of American adults are considered overweight or obese.  Being overweight or obese increases the risk for further weight gain. Excess weight may lead to heart disease and diabetes.  Creating and following plans for healthy eating and physical activity may help you improve your health.  Weight control is part of healthy lifestyle and includes exercise, emotional health, and healthy eating habits. Careful eating habits lifelong are the mainstay of weight control. Though there are significant health benefits from weight loss, long-term weight loss with diet alone may be very difficult to achieve- studies show long-term success with dietary management in less than 10% of people. Attaining a healthy weight may be especially difficult to achieve in those with severe obesity. In some cases, medications, devices and surgical management might be considered.  What can you do?  If you are overweight or obese and are interested in methods for weight loss,  you should discuss this with your provider.     Consider reducing daily calorie intake by 500 calories.     Keep a food journal.     Avoiding skipping meals, consider cutting portions instead.    Diet combined with exercise helps maintain muscle while optimizing fat loss. Strength training is particularly important for building and maintaining muscle mass. Exercise helps reduce stress, increase energy, and improves fitness. Increasing exercise without diet control, however, may not burn enough calories to loose weight.       Start walking three days a week 10-20 minutes at a time    Work towards walking thirty minutes five days a week     Eventually, increase the speed of your walking for 1-2 minutes at time    In addition, we recommend that you review healthy lifestyles and methods for weight loss available through the National Institutes of Health patient information sites:  http://win.niddk.nih.gov/publications/index.htm    And look into health and wellness programs that may be available through your health insurance provider, employer, local community center, or [employer].    Weight management plan: Patient was referred to their PCP to discuss a diet and exercise plan.     Your blood pressure was checked while you were in clinic today.  Please read the guidelines below about what these numbers mean and what you should do about them.  Your systolic blood pressure is the top number.  This is the pressure when the heart is pumping.  Your diastolic blood pressure is the bottom number.  This is the pressure in between beats.  If your systolic blood pressure is less than 120 and your diastolic blood pressure is less than 80, then your blood pressure is normal. There is nothing more that you need to do about it  If your systolic blood pressure is 120-139 or your diastolic blood pressure is 80-89, your blood pressure may be higher than it should be.  You should have your blood pressure re-checked within a year by a  "primary care provider.  If your systolic blood pressure is 140 or greater or your diastolic blood pressure is 90 or greater, you may have high blood pressure.  High blood pressure is treatable, but if left untreated over time it can put you at risk for heart attack, stroke, or kidney failure.  You should have your blood pressure re-checked by a primary care provider within the next four weeks.      MY TREATMENT INFORMATION FOR SLEEP APNEA-  Brooke John    [facility]  SLEEP CENTER :      MY CONTACT NUMBER:     Am I having a sleep study at a sleep center?  Make sure you have an appointment for the study before you leave!    Am I having a home sleep study?  Watch this video:  https://www.Triporati.com/watch?v=CteI_GhyP9g&[email or web address]    Frequently asked questions:  1. What is Obstructive Sleep Apnea (HEVER)? HEVER is the most common type of sleep apnea. Apnea means, \"without breath.\"  Apnea is most often caused by narrowing or collapse of the upper airway as muscles relax during sleep.   Almost everyone has occasional apneas. Most people with sleep apnea have had brief interruptions at night frequently for many years.  The severity of sleep apnea is related to how frequent and severe the events are.   2. What are the consequences of HEVER? Symptoms include: feeling sleepy during the day, snoring loudly, gasping or stopping of breathing, trouble sleeping, and occasionally morning headaches or heartburn at night.  Sleepiness can be serious and even increase the risk of falling asleep while driving. Other health consequences may include development of high blood pressure and other cardiovascular disease in persons who are susceptible. Untreated HEVER  can contribute to heart disease, stroke and diabetes.   3. What are the treatment options? In most situations, sleep apnea is a lifelong disease that must be managed with daily therapy. Medications are not effective " for sleep apnea and surgery is generally not considered until other therapies have been tried. Your treatment is your choice . Continuous Positive Airway (CPAP) works right away and is the therapy that is effective in nearly everyone. An oral device to hold your jaw forward is usually the next most reliable option. Other options include postioning devices (to keep you off your back), weight loss, and surgery including a tongue pacing device. There is more detail about some of these options below.    Important tips for using CPAP and similar devices   Know your equipment:  CPAP is continuous positive airway pressure that prevents obstructive sleep apnea by keeping the throat from collapsing while you are sleeping. In most cases, the device is  smart  and can slowly self-adjusts if your throat collapses and keeps a record every day of how well you are treated-this information is available to you and your care team.  BPAP is bilevel positive airway pressure that keeps your throat open and also assists each breath with a pressure boost to maintain adequate breathing.  Special kinds of BPAP are used in patients who have inadequate breathing from lung or heart disease. In most cases, the device is  smart  and can slowly self-adjusts to assist breathing. Like CPAP, the device keeps a record of how well you are treated.  Your mask is your connection to the device. You get to choose what feels most comfortable and the staff will help to make sure if fits. Here: are some examples of the different masks that are available:       Key points to remember on your journey with sleep apnea:  1. Sleep study.  PAP devices often need to be adjusted during a sleep study to show that they are effective and adjusted right.  2. Good tips to remember: Try wearing just the mask during a quiet time during the day so your body adapts to wearing it. A humidifier is recommended for comfort in most cases to prevent drying of your nose and throat.  Allergy medication from your provider may help you if you are having nasal congestion.  3. Getting settled-in. It takes more than one night for most of us to get used to wearing a mask. Try wearing just the mask during a quiet time during the day so your body adapts to wearing it. A humidifier is recommended for comfort in most cases. Our team will work with you carefully on the first day and will be in contact within 4 days and again at 2 and 4 weeks for advice and remote device adjustments. Your therapy is evaluated by the device each day.   4. Use it every night. The more you are able to sleep naturally for 7-8 hours, the more likely you will have good sleep and to prevent health risks or symptoms from sleep apnea. Even if you use it 4 hours it helps. Occasionally all of us are unable to use a medical therapy, in sleep apnea, it is not dangerous to miss one night.   5. Communicate. Call our skilled team on the number provided on the first day if your visit for problems that make it difficult to wear the device. Over 2 out of 3 patients can learn to wear the device long-term with help from our team. Remember to call our team or your sleep providers if you are unable to wear the device as we may have other solutions for those who cannot adapt to mask CPAP therapy. It is recommended that you sleep your sleep provider within the first 3 months and yearly after that if you are not having problems.   Take care of your equipment. Make sure you clean your mask and tubing using directions every day and that your filter and mask are replaced as recommended or if they are not working.     BESIDES CPAP, WHAT OTHER THERAPIES ARE THERE?    Positioning Device  Positioning devices are generally used when sleep apnea is mild and only occurs on your back.This example shows a pillow that straps around the waist. It may be appropriate for those whose sleep study shows milder sleep apnea that occurs primarily when lying flat on one's  back. Preliminary studies have shown benefit but effectiveness at home may need to be verified by a home sleep test. These devices are generally not covered by medical insurance.    Oral Appliance  What is oral appliance therapy?  An oral appliance device fits on your teeth at night like a retainer used after having braces. The device is made by a specialized dentist and requires several visits over 1-2 months before a manufactured device is made to fit your teeth and is adjusted to prevent your sleep apnea. Once an oral device is working properly, snoring should be improved. A home sleep test may be recommended at that time if to determine whether the sleep apnea is adequately treated.       Some things to remember:  -Oral devices are often, but not always, covered by your medical insurance. Be sure to check with your insurance provider.   -If you are referred for oral therapy, you will be given a list of specialized dentists to consider or you may choose to visit the Web site of the American Academy of Dental Sleep Medicine  -Oral devices are less likely to work if you have severe sleep apnea or are extremely overweight.     More detailed information  An oral appliance is a small acrylic device that fits over the upper and lower teeth  (similar to a retainer or a mouth guard). This device slightly moves jaw forward, which moves the base of the tongue forward, opens the airway, improves breathing for effective treat snoring and obstructive sleep apnea in perhaps 7 out of 10 people .  The best working devices are custom-made by a dental device  after a mold is made of the teeth 1, 2, 3.  When is an oral appliance indicated?  Oral appliance therapy is recommended as a first-line treatment for patients with primary snoring, mild sleep apnea, and for patients with moderate sleep apnea who prefer appliance therapy to use of CPAP4, 5. Severity of sleep apnea is determined by sleep testing and is based on the  number of respiratory events per hour of sleep.   How successful is oral appliance therapy?  The success rate of oral appliance therapy in patients with mild sleep apnea is 75-80% while in patients with moderate sleep apnea it is 50-70%. The chance of success in patients with severe sleep apnea is 40-50%. The research also shows that oral appliances have a beneficial effect on the cardiovascular health of HEVER patients at the same magnitude as CPAP therapy7.  Oral appliances should be a second-line treatment in cases of severe sleep apnea, but if not completely successful then a combination therapy utilizing CPAP plus oral appliance therapy may be effective. Oral appliances tend to be effective in a broad range of patients although studies show that the patients who have the highest success are females, younger patients, those with milder disease, and less severe obesity. 3, 6.   Finding a dentist that practices dental sleep medicine  Specific training is available through the American Academy of Dental Sleep Medicine for dentists interested in working in the field of sleep. To find a dentist who is educated in the field of sleep and the use of oral appliances, near you, visit the Web site of the American Academy of Dental Sleep Medicine.    References  1. Jaime et al. Objectively measured vs self-reported compliance during oral appliance therapy for sleep-disordered breathing. Chest 2013; 144(5): 0387-0719.  2. Michael et al. Objective measurement of compliance during oral appliance therapy for sleep-disordered breathing. Thorax 2013; 68(1): 91-96.  3. Rabia, et al. Mandibular advancement devices in 620 men and women with HEVER and snoring: tolerability and predictors of treatment success. Chest 2004; 125: 5996-0502.  4. Massimo, et al. Oral appliances for snoring and HEVER: a review. Sleep 2006; 29: 244-262.  5. Dolores et al. Oral appliance treatment for HEVER: an update. J Clin Sleep Med 2014; 10(2):  215-227.  6. khris Morrissey al. Predictors of OSAH treatment outcome. J Dent Res 2007; 86: 6050-5149.      Weight Loss:    Weight loss is a long-term strategy that may improve sleep apnea in some patients.    Weight management is a personal decision.  If you are interested in exploring weight loss strategies, the following discussion covers the impact on weight loss on sleep apnea and the approaches that may be successful.    Weight loss decreases severity of sleep apnea in most people with obesity. For those with mild obesity who have developed snoring with weight gain, even 15-30 pound weight loss can improve and occasionally eliminate sleep apnea.  Structured and life-long dietary and health habits are necessary to lose weight and keep healthier weight levels.     Though there may be significant health benefits from weight loss, long-term weight loss is very difficult to achieve- studies show success with dietary management in less than 10% of people. In addition, substantial weight loss may require years of dietary control and may be difficult if patients have severe obesity. In these cases, surgical management may be considered.  Finally, older individuals who have tolerated obesity without health complications may be less likely to benefit from weight loss strategies.        Your BMI is Body mass index is 39.68 kg/(m^2).  Weight management is a personal decision.  If you are interested in exploring weight loss strategies, the following discussion covers the approaches that may be successful. Body mass index (BMI) is one way to tell whether you are at a healthy weight, overweight, or obese. It measures your weight in relation to your height.  A BMI of 18.5 to 24.9 is in the healthy range. A person with a BMI of 25 to 29.9 is considered overweight, and someone with a BMI of 30 or greater is considered obese. More than two-thirds of American adults are considered overweight or obese.  Being overweight or obese  increases the risk for further weight gain. Excess weight may lead to heart disease and diabetes.  Creating and following plans for healthy eating and physical activity may help you improve your health.  Weight control is part of healthy lifestyle and includes exercise, emotional health, and healthy eating habits. Careful eating habits lifelong are the mainstay of weight control. Though there are significant health benefits from weight loss, long-term weight loss with diet alone may be very difficult to achieve- studies show long-term success with dietary management in less than 10% of people. Attaining a healthy weight may be especially difficult to achieve in those with severe obesity. In some cases, medications, devices and surgical management might be considered.  What can you do?  If you are overweight or obese and are interested in methods for weight loss, you should discuss this with your provider.     Consider reducing daily calorie intake by 500 calories.     Keep a food journal.     Avoiding skipping meals, consider cutting portions instead.    Diet combined with exercise helps maintain muscle while optimizing fat loss. Strength training is particularly important for building and maintaining muscle mass. Exercise helps reduce stress, increase energy, and improves fitness. Increasing exercise without diet control, however, may not burn enough calories to loose weight.       Start walking three days a week 10-20 minutes at a time    Work towards walking thirty minutes five days a week     Eventually, increase the speed of your walking for 1-2 minutes at time    In addition, we recommend that you review healthy lifestyles and methods for weight loss available through the National Institutes of Health patient information sites:  http://win.niddk.nih.gov/publications/index.htm    And look into health and wellness programs that may be available through your health insurance provider, employer, local community  [place], or [facility].          Surgery:    Surgery for obstructive sleep apnea is considered generally only when other therapies fail to work. Surgery may be discussed with you if you are having a difficult time tolerating CPAP and or when there is an abnormal structure that requires surgical correction.  Nose and throat surgeries often enlarge the airway to prevent collapse.  Most of these surgeries create pain for 1-2 weeks and up to half of the most common surgeries are not effective throughout life.  You should carefully discuss the benefits and drawbacks to surgery with your sleep provider and surgeon to determine if it is the best solution for you.   More information  Surgery for HEVER is directed at areas that are responsible for narrowing or complete obstruction of the airway during sleep.  There are a wide range of procedures available to enlarge and/or stabilize the airway to prevent blockage of breathing in the three major areas where it can occur: the palate, tongue, and nasal regions.  Successful surgical treatment depends on the accurate identification of the factors responsible for obstructive sleep apnea in each person.  A personalized approach is required because there is no single treatment that works well for everyone.  Because of anatomic variation, consultation with an examination by a sleep surgeon is a critical first step in determining what surgical options are best for each patient.  In some cases, examination during sedation may be recommended in order to guide the selection of procedures.  Patients will be counseled about risks and benefits as well as the typical recovery course after surgery. Surgery is typically not a cure for a person s HEVER.  However, surgery will often significantly improve one s HEVER severity (termed  success rate ).  Even in the absence of a cure, surgery will decrease the cardiovascular risk associated with OSA7; improve overall quality of life8 (sleepiness,  functionality, sleep quality, etc).      Palate Procedures:  Patients with HEVER often have narrowing of their airway in the region of their tonsils and uvula.  The goals of palate procedures are to widen the airway in this region as well as to help the tissues resist collapse.  Modern palate procedure techniques focus on tissue conservation and soft tissue rearrangement, rather than tissue removal.  Often the uvula is preserved in this procedure. Residual sleep apnea is common in patient after pharyngoplasty with an average reduction in sleep apnea events of 33%2.      Tongue Procedures:  ExamWhile patients are awake, the muscles that surround the throat are active and keep this region open for breathing. These muscles relax during sleep, allowing the tongue and other structures to collapse and block breathing.  There are several different tongue procedures available.  Selection of a tongue base procedure depends on characteristics seen on physical exam.  Generally, procedures are aimed at removing bulky tissues in this area or preventing the back of the tongue from falling back during sleep.  Success rates for tongue surgery range from 50-62%3.    Hypoglossal Nerve Stimulation:  Hypoglossal nerve stimulation has recently received approval from the [place] Food and Drug Administration for the treatment of obstructive sleep apnea.  This is based on research showing that the system was safe and effective in treating sleep apnea6.  Results showed that the median AHI score decreased 68%, from 29.3 to 9.0. This therapy uses an implant system that senses breathing patterns and delivers mild stimulation to airway muscles, which keeps the airway open during sleep.  The system consists of three fully implanted components: a small generator (similar in size to a pacemaker), a breathing sensor, and a stimulation lead.  Using a small handheld remote, a patient turns the therapy on before bed and off upon awakening.     Candidates for this device must be greater than 22 years of age, have moderate to severe HEVER (AHI between 20-65), BMI less than 32, have tried CPAP/oral appliance without success, and have appropriate upper airway anatomy (determined by a sleep endoscopy performed by Dr. Boyd).    Hypoglossal Nerve Stimulation Pathway:    The sleep surgeon s office will work with the patient through the insurance prior-authorization process (including communications and appeals).    Nasal Procedures:  Nasal obstruction can interfere with nasal breathing during the day and night.  Studies have shown that relief of nasal obstruction can improve the ability of some patients to tolerate positive airway pressure therapy for obstructive sleep apnea1.  Treatment options include medications such as nasal saline, topical corticosteroid and antihistamine sprays, and oral medications such as antihistamines or decongestants. Non-surgical treatments can include external nasal dilators for selected patients. If these are not successful by themselves, surgery can improve the nasal airway either alone or in combination with these other options.      Combination Procedures:  Combination of surgical procedures and other treatments may be recommended, particularly if patients have more than one area of narrowing or persistent positional disease.  The success rate of combination surgery ranges from 66-80%2,3.    References  1. Elvia SAENZ. The Role of the Nose in Snoring and Obstructive Sleep Apnoea: An Update.  Eur Arch Otorhinolaryngol. 2011; 268: 1365-73.  2.  Nick SM; Camille JA; Harrison JR; Pallanch JF; Kevyn MB; Ninfa SG; Sandra SHAW. Surgical modifications of the upper airway for obstructive sleep apnea in adults: a systematic review and meta-analysis. SLEEP 2010;33(10):0601-6635. Garret WILKINS. Hypopharyngeal surgery in obstructive sleep apnea: an evidence-based medicine review.  Arch Otolaryngol Head Neck Surg. 2006 Feb;132(2):206-13.  3. Salvador  YH1, Alexia Y, Ross LIZZETH. The efficacy of anatomically based multilevel surgery for obstructive sleep apnea. Otolaryngol Head Neck Surg. 2003 Oct;129(4):327-35.  4. Garret WILKINS, Goldberg A. Hypopharyngeal Surgery in Obstructive Sleep Apnea: An Evidence-Based Medicine Review. Arch Otolaryngol Head Neck Surg. 2006 Feb;132(2):206-13.  5. Magdaleno PJ et al. Upper-Airway Stimulation for Obstructive Sleep Apnea.  N Engl J Med. 2014 Jan 9;370(2):139-49.  6. Sharee Y et al. Increased Incidence of Cardiovascular Disease in Middle-aged Men with Obstructive Sleep Apnea. Am J Respir Crit Care Med; 2002 166: 159-165  7. Patricio SIDHU et al. Studying Life Effects and Effectiveness of Palatopharyngoplasty (SLEEP) study: Subjective Outcomes of Isolated Uvulopalatopharyngoplasty. Otolaryngol Head Neck Surg. 2011; 144: 623-631.    Please do not drive if drowsy or sleepy;  pull over if drowsy.                  Follow-ups after your visit        Follow-up notes from your care team     Return in about 2 weeks (around 8/2/2017).      Your next 10 appointments already scheduled     Aug 09, 2017  1:00 PM CDT   (Arrive by 12:45 PM)   Return Visit with BRIAN Petit [facility] ([facility])    [address] [address]   [phone]            Aug 13, 2017  8:00 PM CDT   Psg Split W/Tcm with SLEEP STUDY  4   [facility] [place], Sleep Study ([facility])    [address] [place] [id number] [phone]            Aug 28, 2017  1:30 PM CDT   Return Sleep Patient with Doris Rodrigez MD   [facility] [place], Sleep Study ([facility])    [address] [place] [id number] [phone]              Future tests that were ordered for you today     Open Future Orders        Priority Expected Expires Ordered    Blood gas venous Routine   "2018    Comprehensive Sleep Study Routine  1/15/2018 2017            Who to contact     If you have questions or need follow up information about today's clinic visit or your schedule please [facility] directly at [phone].  Normal or non-critical lab and imaging results will be communicated to you by MyChart, letter or phone within 4 business days after the clinic has received the results. If you do not hear from us within 7 days, please contact the clinic through YouFetchhart or phone. If you have a critical or abnormal lab result, we will notify you by phone as soon as possible.  Submit refill requests through GITR or call your pharmacy and they will forward the refill request to us. Please allow 3 business days for your refill to be completed.          Additional Information About Your Visit        MyChart Information     GITR lets you send messages to your doctor, view your test results, renew your prescriptions, schedule appointments and more. To sign up, go to [email or web address] . Click on \"Log in\" on the left side of the screen, which will take you to the Welcome page. Then click on \"Sign up Now\" on the right side of the page.     You will be asked to enter the access code listed below, as well as some personal information. Please follow the directions to create your username and password.     Your access code is: [email or web address]  Expires: 10/17/2017  4:47 PM     Your access code will  in 90 days. If you need help or a new code, please call your [facility] clinic or [phone].        Care EveryWhere ID     This is your Care EveryWhere ID. This could be used by other organizations to access your [facility] medical records  [id number]        Your Vitals Were     Pulse Respirations Height BMI (Body Mass Index)          76 18 1.549 m (5' 1\") 39.68 kg/m2         Blood Pressure from Last 3 Encounters:   17 141/73   17 131/86   17 124/76 "    Weight from Last 3 Encounters:   07/19/17 95.3 kg (210 lb)   06/08/17 95.3 kg (210 lb)   05/30/17 95.3 kg (210 lb)              We Performed the Following     SLEEP EVALUATION & MANAGEMENT REFERRAL - ADULT        Primary Care Provider Office Phone # Fax #    Mandy Bhatia [phone]       [facility] [address] [place] [id number]        Equal Access to Services     RICO CARLOS : Hadii aad ku hadasho Soomaali, waaxda luqadaha, qaybta kaalmada adeegyada, waxay idiin hayaan adeeg kharash la'cassi . So [facility] [phone].    ATENCIÓN: Si habla español, tiene a elizabeth disposición servicios gratuitos de asistencia lingüística. [facility] [phone].    We comply with applicable federal civil rights laws and [place] laws. We do not discriminate on the basis of race, color, national origin, age, disability sex, sexual orientation or gender identity.            Thank you!     Thank you for choosing [facility], SLEEP STUDY  for your care. Our goal is always to provide you with excellent care. Hearing back from our patients is one way we can continue to improve our services. Please take a few minutes to complete the written survey that you may receive in the mail after your visit with us. Thank you!             Your Updated Medication List - Protect others around you: Learn how to safely use, store and throw away your medicines at www.disposemymeds.org.          This list is accurate as of: 7/19/17  4:47 PM.  Always use your most recent med list.                   Brand Name Dispense Instructions for use Diagnosis    albuterol 108 (90 BASE) MCG/ACT Inhaler    PROAIR HFA/PROVENTIL HFA/VENTOLIN HFA    1 Inhaler    Inhale 2 puffs into the lungs every 4 hours as needed for shortness of breath / dyspnea or wheezing    Shortness of breath       cetirizine 10 MG tablet    zyrTEC    30 tablet    TAKE ONE TABLET BY MOUTH EVERY MORNING    Cough       fluticasone 50 MCG/ACT spray     FLONASE    16 g    USE TWO SPRAYS IN EACH NOSTRIL EVERY DAY    Moderate persistent asthma without complication       fluticasone-salmeterol 250-50 MCG/DOSE diskus inhaler    ADVAIR    1 Inhaler    Inhale 1 puff into the lungs 2 times daily    Moderate persistent asthma without complication       levothyroxine 25 MCG tablet    SYNTHROID/LEVOTHROID    30 tablet    TAKE ONE TABLET BY MOUTH EVERY DAY    Other specified hypothyroidism       MELATONIN PO           Multi-vitamin Tabs tablet      Take 1 tablet by mouth daily        omeprazole 20 MG CR capsule    priLOSEC    180 capsule    Take 1 capsule (20 mg) by mouth 2 times daily    Cough       * order for DME     1 Device    Walking cane for balance and mobility    Risk for falls, Balance problems       * order for DME     80 Units    Incontinence items, disposable underwear, absorbency pads, liners.    Risk for falls, Balance problems, Mixed incontinence       polyethylene glycol powder    MIRALAX    510 g    Take 17 g (1 capful) by mouth daily as needed for constipation    Constipation, unspecified constipation type       * Notice:  This list has 2 medication(s) that are the same as other medications prescribed for you. Read the directions carefully, and ask your doctor or other care provider to review them with you.

## 2017-07-19 NOTE — PROGRESS NOTES
Sleep Consultation Note:    Date on this visit: 7/19/2017    Brooke John is sent by Alondra Michele for a sleep consultation regarding sleep apnea.    Primary Physician: Mandy Bhatia     CC:  Daytime sleepiness, fatigue, snoring    Brooke John 51 year old with PMH of hypothyroidism, asthma, GERD, and disk hermniation,  who complains of day time sleepiness, fatigue and snoring. The patient is unable to recall when her symptoms started but was referred to sleep medicine after a pre-op visit prior to endoscopy.  She has not had a previous sleep study.    Sleep Disordered Breathing  Brooke does complains of snoring with episodes of choking and gasping for air which cause her to awake in the middle of the night. She endorses waking up foggy and non-refreshed with a dry mouth and headache. She has daytime sleepiness that frequently melanie her from daily activities.    Sleep Schedule/Sleep Complaints  She does complain of difficulty with falling asleep. Brooke goes to bed at 10 PM, and it usually takes 30 minutes to a few hours to fall asleep. If she is unable to fall asleep within 30 minutes she will usually leave her room to watch TV and return to bed later when she is feeling sleepy.    Brooke does complain of numbness and tingling sensation in the legs. She does have a previous history of lumbar disk herniation for which she has undergone surgery. Her sensation occurs while both standing and sitting and does not  worsen with rest or at night.    Patient does ruminating thoughts, stress/anxiety which affects the onset of sleep and also daytime nap if longer in duration. She denies depression.     Patient does not use electronics in bed.  Patient does not have a regular bed partner.  Patient sleeps upright at approximately 30 degree angle with multiple pillow.  Patient has a cat in the bedroom at night during sleep which periodically bites her feet .  She does not use a sleep aid.    She does  complain of difficulty with staying asleep.   She wakes up 2-3 times throughout the night.  Night time awakenings occurs due to shortness of breath or having to go to the bathroom.    She would naturally to go to sleep at 10 PM and wake up at 6 AM.    Sleep Behaviors  She denies any cataplexy, sleep paralysis, sleep hallucinations.    She reports rare nightmares but denies sleep walking, sleep talking, sleep eating.    She does not complain acting out dreams. She reports bruxism and does not currently use a mouthguard.    Daytime Functioning  Brooke naps 1-3 times per day for 15 minutes to 3 hours, feels refreshed after naps.    She does doze off during the day during resting activities.    She does not drive.    Patient's Magness Sleepiness score 13/24 consistent with moderate daytime sleepiness.      Social History  Brooke does not currently work but volunteers daily at the [employer] or her local [employer].   She does not drink caffeine.  She does not drinks alcohol..  Patient is a never smoker.  Denies any secondhand exposure.  Patient does not use drugs.  No future surgeries planned.    Allergies:    Allergies   Allergen Reactions     Amoxicillin      Lip swelling      Aspirin      GI upset     Zithromax [Azithromycin]      RASH       Medications:    Current Outpatient Prescriptions   Medication Sig Dispense Refill     MELATONIN PO        levothyroxine (SYNTHROID/LEVOTHROID) 25 MCG tablet TAKE ONE TABLET BY MOUTH EVERY DAY 30 tablet 0     cetirizine (ZYRTEC) 10 MG tablet TAKE ONE TABLET BY MOUTH EVERY MORNING 30 tablet 10     order for DME Walking cane for balance and mobility 1 Device 0     order for DME Incontinence items, disposable underwear, absorbency pads, liners. 80 Units 11     polyethylene glycol (MIRALAX) powder Take 17 g (1 capful) by mouth daily as needed for constipation 510 g 11     fluticasone (FLONASE) 50 MCG/ACT spray USE TWO SPRAYS IN EACH NOSTRIL EVERY DAY 16 g 3     albuterol (PROAIR  HFA/PROVENTIL HFA/VENTOLIN HFA) 108 (90 BASE) MCG/ACT Inhaler Inhale 2 puffs into the lungs every 4 hours as needed for shortness of breath / dyspnea or wheezing 1 Inhaler 3     multivitamin, therapeutic with minerals (MULTI-VITAMIN) TABS Take 1 tablet by mouth daily       omeprazole (PRILOSEC) 20 MG capsule Take 1 capsule (20 mg) by mouth 2 times daily 180 capsule 3     fluticasone-salmeterol (ADVAIR) 250-50 MCG/DOSE diskus inhaler Inhale 1 puff into the lungs 2 times daily 1 Inhaler 11       Problem List:  Patient Active Problem List    Diagnosis Date Noted     Allergic reaction to drug - amoxicillin - Hives - proven 8/27/2016 08/29/2016     Priority: Medium     Moderate persistent asthma      Priority: Medium     Lung nodule      Priority: Medium     w/u neg       Pulmonary nodules 06/01/2016     Priority: Medium     Seen by Kehinde Boyer MD       Abnormal CT of the chest - needs repeat in 3 to 6 months.  04/17/2016     Priority: Medium     IMPRESSION:  1. Moderate hiatal hernia.  2. No acute airspace disease.  3. A few indeterminant and prominent mediastinal, bilateral hilar, and  axillary lymph nodes. Any older CT examinations would be useful for  comparison purposes. A neoplastic cause is not excluded. Consider  further workup. Recommend CT chest surveillance in 3-6 months.  4. Bilateral prominent renal parapelvic cysts versus hydronephrosis.  Correlate with renal function testing.  5. A few indeterminant small groundglass pulmonary nodules. These are  indeterminate       Other specified hypothyroidism 03/14/2016     Priority: Medium     CARDIOVASCULAR SCREENING; LDL GOAL LESS THAN 130      Priority: Medium     History of herniated intervertebral disc 09/18/2014     Priority: Medium     Hypothyroidism, unspecified hypothyroidism type 01/28/2014     Priority: Medium     Scoliosis      Priority: Medium     Arthritis      Priority: Medium        Past Medical/Surgical History:  Past Medical History:    Diagnosis Date     Arthritis      CARDIOVASCULAR SCREENING; LDL GOAL LESS THAN 130      Depressive disorder      Lung nodule     w/u neg - Dr Boyer     Moderate persistent asthma      Other specified hypothyroidism 3/14/2016     Scoliosis      Past Surgical History:   Procedure Laterality Date     ARTHROSCOPY KNEE  2007    left      BACK SURGERY        SECTION       COSMETIC SURGERY       ESOPHAGOSCOPY, GASTROSCOPY, DUODENOSCOPY (EGD), COMBINED N/A 2017    Procedure: COMBINED ESOPHAGOSCOPY, GASTROSCOPY, DUODENOSCOPY (EGD);  EGD w MAC:DX:Esophageal dysphagia,Hiatal hernia/prep & pre-op info mailed;  Surgeon: Moshe Caro MD;  Location:  GI     EYE SURGERY       ORTHOPEDIC SURGERY      lumbar discectomy       Social History:  Social History     Social History     Marital status: Single     Spouse name: N/A     Number of children: N/A     Years of education: N/A     Occupational History     disabled secondary to scoliosis and arthritis Unemployed     Social History Main Topics     Smoking status: Never Smoker     Smokeless tobacco: Never Used     Alcohol use No     Drug use: No     Sexual activity: No     Other Topics Concern     Parent/Sibling W/ Cabg, Mi Or Angioplasty Before 65f 55m? No     Caffeine Concern No     Sleep Concern No     -Cough keeping up at night, before cough was sleeping okay.     Self-Exams Yes     Social History Narrative       Family History:  Family History   Problem Relation Age of Onset     Scoliosis Son      Learning Disorder Son      lives in a protected place     HEART DISEASE Father      triple bypass     Unknown/Adopted Father      Unknown/Adopted Mother      Type 2 Diabetes Maternal Grandfather      Other - See Comments Brother      5 brothers, 1 sister.      Colon Cancer No family hx of        Review of Systems:  A complete review of systems reviewed by me is negative with the exeption of what has been mentioned in the history of present  "illness.  CONSTITUTIONAL: POSITIVE weight gain/loss NEGATIVE for  fever, chills, sweats or night sweats, drug allergies.  EYES: NEGATIVE for changes in vision, blind spots, double vision.  ENT:  POSITIVE for sore throat, sinus painNEGATIVE for ear pain, , post-nasal drip, runny nose, bloody nose  CARDIAC: POSITIVE for swollen legs or swollen feet. NEGATIVE for fast heartbeats or fluttering in chest, chest pain or pressure, breathlessness when lying flat,   NEUROLOGIC:   POSITIVE for headaches, weakness or numbness in the arms or legs.  DERMATOLOGIC: NEGATIVE for rashes, new moles or change in mole(s)  PULMONARY:  POSITIVE wheezing when breathing , SOB with activity, dry cough, productive cough. NEGATIVE SOB at rest, coughing up blood,   GASTROINTESTINAL:  POSITIVE for nausea or vomiting. NEGATIVE for  loose or watery stools, fat or grease in stools, constipation, abdominal pain, bowel movements black in color or blood noted.  GENITOURINARY:  POSITIVE for urinating more frequently than usual. NEGATIVE for pain during urination, blood in urine,   MUSCULOSKELETAL: NEGATIVE for muscle pain, bone or joint pain, swollen joints.  ENDOCRINE: NEGATIVE for increased thirst or urination, diabetes.  LYMPHATIC: NEGATIVE for swollen lymph nodes, lumps or bumps in the breasts or nipple discharge.  PSYCHE: NEGATIVE for depression, anxiety    Physical Examination:  Vitals: /73  Pulse 76  Resp 18  Ht 1.549 m (5' 1\")  Wt 95.3 kg (210 lb)  BMI 39.68 kg/m2  BMI= Body mass index is 39.68 kg/(m^2).    Neck Cir (cm): 37 cm    [place]ress Total Score 7/19/2017   Total score - [place] 13       General: No apparent distress, appropriately groomed  Head: Normocephalic, atraumatic  Eyes: Xanthelasmas are noted bilaterally on eye lids. no icterus, PERRL  Nose: Nares patent. No exudate/erythema. No septal deviation noted.  Mouth: op pink and moist, teeth: most teeth show wearing suggestive of bruxism  Orophraynx: Opening is narrowed, " uvula: thick with low draping soft palate.   Mallampati Class: 4.   Tonsillar Stage: 1.  Neck: Supple, Circumference: 15 inches  Cardiac: Regular rate and rhythm, loud P2 heart sound  Chest: Symmetric air movement, lungs clear to auscultation bilaterally  GI: Abdomen soft, non-tender, non-distended  Musculoskeletal: mild leg edema noted  Skin: Warm, dry, intact  Psych: Mood pleasant, affect congruent  Neuro: No focal neurolgic deficit  Mental status: Awake, alert, attentive, oriented.    Impression/Plan:  Snoring, episodes of choking and gasping for air with fatigue and EDS. Possible Obstructive sleep apnea  STOP BANG score is 5. Patient likely has sleep apnea based on clinical history, body habitus and  physical exam significant for crowded oropharynx.    Recommend in-lab sleep study , with TCM and pre study VBG to validate TCM measurement since patient does not want ABG. due to possible coexistent hypoventilation based on the BMI and history of asthma.If insurance will not cover an in-lab study, home study testing will be performed. Patient has agreed to this plan.    Diagnosis and treatment for HEVER have been discussed. Complications of untreated HEVER have also been discussed.    Bruxism. She was instructed to discuss with her dentist if she would be a candidate for mouth guard. We also discussed the option of oral appliance to address sleep apnea and bruxism if the sleep study is suggestive of mild to moderate HEVER.    Obesity:Encourage weight loss, healthy diet, and exercise.    Patient was strongly advised to avoid driving, operating any heavy machinery or other hazardous situations while drowsy or sleepy.  Patient was counseled on the importance of driving while alert, to pull over if drowsy, or nap before getting into the vehicle if sleepy.      She will follow up with me in approximately two weeks after her sleep study has been competed to review the results and discuss plan of care.      Seen and examined  "with Dr. Chavez Porter  Medical Student, MS3    Scribe Disclosure:   I, Moshe Porter, Medical Student, MS3 am serving as a scribe; to document services personally performed by Doris Rodrigez,-based on data collection and the provider's statements to me.     Provider Disclosure:  I Doris Rodrigez, agree with above History, Review of Systems, Physical exam and Plan.  I have reviewed the content of the documentation and have edited it as needed. I have personally performed the services documented here and the documentation accurately represents those services and the decisions I have made.      \"I spent a total of 60 minutes  face to face with Brooke John during today's office visit. Over 50% of this time was spent counseling the patient and  coordinating care regarding sleep apnea.        Electronically signed by:  Doris Rodrigez MD   of Medicine,  Division of Pulmonary/Sleep Medicine  [facility].          "

## 2017-07-28 ENCOUNTER — OFFICE VISIT (OUTPATIENT)
Dept: FAMILY MEDICINE | Facility: CLINIC | Age: 52
End: 2017-07-28
Payer: MEDICARE

## 2017-07-28 VITALS
WEIGHT: 210 LBS | OXYGEN SATURATION: 96 % | SYSTOLIC BLOOD PRESSURE: 132 MMHG | HEART RATE: 87 BPM | BODY MASS INDEX: 39.65 KG/M2 | HEIGHT: 61 IN | DIASTOLIC BLOOD PRESSURE: 78 MMHG | TEMPERATURE: 98.1 F

## 2017-07-28 DIAGNOSIS — J45.40 MODERATE PERSISTENT ASTHMA WITHOUT COMPLICATION: ICD-10-CM

## 2017-07-28 DIAGNOSIS — E03.8 OTHER SPECIFIED HYPOTHYROIDISM: Primary | ICD-10-CM

## 2017-07-28 LAB
T4 FREE SERPL-MCNC: 0.85 NG/DL (ref 0.76–1.46)
TSH SERPL DL<=0.05 MIU/L-ACNC: 4.19 MU/L (ref 0.4–4)

## 2017-07-28 PROCEDURE — 99213 OFFICE O/P EST LOW 20 MIN: CPT | Performed by: PHYSICIAN ASSISTANT

## 2017-07-28 PROCEDURE — 36415 COLL VENOUS BLD VENIPUNCTURE: CPT | Performed by: PHYSICIAN ASSISTANT

## 2017-07-28 PROCEDURE — 84443 ASSAY THYROID STIM HORMONE: CPT | Performed by: PHYSICIAN ASSISTANT

## 2017-07-28 PROCEDURE — 84439 ASSAY OF FREE THYROXINE: CPT | Performed by: PHYSICIAN ASSISTANT

## 2017-07-28 ASSESSMENT — ANXIETY QUESTIONNAIRES
5. BEING SO RESTLESS THAT IT IS HARD TO SIT STILL: SEVERAL DAYS
2. NOT BEING ABLE TO STOP OR CONTROL WORRYING: NOT AT ALL
IF YOU CHECKED OFF ANY PROBLEMS ON THIS QUESTIONNAIRE, HOW DIFFICULT HAVE THESE PROBLEMS MADE IT FOR YOU TO DO YOUR WORK, TAKE CARE OF THINGS AT HOME, OR GET ALONG WITH OTHER PEOPLE: SOMEWHAT DIFFICULT
6. BECOMING EASILY ANNOYED OR IRRITABLE: NOT AT ALL
GAD7 TOTAL SCORE: 1
3. WORRYING TOO MUCH ABOUT DIFFERENT THINGS: NOT AT ALL
7. FEELING AFRAID AS IF SOMETHING AWFUL MIGHT HAPPEN: NOT AT ALL
1. FEELING NERVOUS, ANXIOUS, OR ON EDGE: NOT AT ALL

## 2017-07-28 ASSESSMENT — PATIENT HEALTH QUESTIONNAIRE - PHQ9: 5. POOR APPETITE OR OVEREATING: NOT AT ALL

## 2017-07-28 NOTE — PATIENT INSTRUCTIONS
Please stop at the pharmacy and see if your Rx is there.  It looks like the Rx was sent to your pharmacy on 07.07.2017.  This means you should be good for at least another 1.5 weeks.      We will not send another month Rx through until you have labs done incase the dose needs to be adjusted.    Please stop at the lab today to have the blood work done.

## 2017-07-28 NOTE — NURSING NOTE
"Chief Complaint   Patient presents with     Recheck Medication       Initial /78 (BP Location: Left arm, Patient Position: Chair, Cuff Size: Adult Regular)  Pulse 87  Temp 98.1  F (36.7  C) (Oral)  Ht 5' 1\" (1.549 m)  Wt 210 lb (95.3 kg)  SpO2 96%  Breastfeeding? No  BMI 39.68 kg/m2 Estimated body mass index is 39.68 kg/(m^2) as calculated from the following:    Height as of this encounter: 5' 1\" (1.549 m).    Weight as of this encounter: 210 lb (95.3 kg).  Medication Reconciliation: complete   Csaba Mlnarik CMA    "

## 2017-07-28 NOTE — MR AVS SNAPSHOT
After Visit Summary   7/28/2017    Brooke John    MRN: [id number]           Patient Information     Date Of Birth          1965        Visit Information        Provider Department      7/28/2017 8:40 AM Alondra Michele PA-C [facility]        Today's Diagnoses     Hypothyroidism    -  1    Other specified hypothyroidism          Care Instructions    Please stop at the pharmacy and see if your Rx is there.  It looks like the Rx was sent to your pharmacy on 07.07.2017.  This means you should be good for at least another 1.5 weeks.      We will not send another month Rx through until you have labs done incase the dose needs to be adjusted.    Please stop at the lab today to have the blood work done.              Follow-ups after your visit        Your next 10 appointments already scheduled     Aug 09, 2017  1:00 PM CDT   (Arrive by 12:45 PM)   Return Visit with BRIAN Petit [facility]ology [facility] IBD ([facility])    [address] [place] [id number] [phone]            Aug 13, 2017  8:00 PM CDT   Psg Split W/Tcm with SLEEP STUDY  4   [facility], Sleep Study ([facility])    [address] [place] [id number] [phone]            Aug 28, 2017  1:30 PM CDT   Return Sleep Patient with Doris Rodrigez MD   [facility] Sleep Study ([facility])    [address] [place] [address] [id number] [phone]              Who to contact     If you have questions or need follow up information about today's clinic visit or your schedule please contact [facility] directly at [phone].  Normal or non-critical lab and imaging results will be communicated to you by MyChart, letter or phone within 4 business days after the clinic has received the results. If you  "do not hear from us within 7 days, please contact the clinic through Loogla or phone. If you have a critical or abnormal lab result, we will notify you by phone as soon as possible.  Submit refill requests through Loogla or call your pharmacy and they will forward the refill request to us. Please allow 3 business days for your refill to be completed.          Additional Information About Your Visit        UbersnapharGigSocial Information     Loogla lets you send messages to your doctor, view your test results, renew your prescriptions, schedule appointments and more. To sign up, go to [email or web address] . Click on \"Log in\" on the left side of the screen, which will take you to the Welcome page. Then click on \"Sign up Now\" on the right side of the page.     You will be asked to enter the access code listed below, as well as some personal information. Please follow the directions to create your username and password.     Your access code is: [email or web address]  Expires: 10/17/2017  4:47 PM     Your access code will  in 90 days. If you need help or a new code, please call your [place] clinic or [phone].        Care EveryWhere ID     This is your Care EveryWhere ID. This could be used by other organizations to access your [place] medical records  [id number]        Your Vitals Were     Pulse Temperature Height Pulse Oximetry Breastfeeding? BMI (Body Mass Index)    87 98.1  F (36.7  C) (Oral) 5' 1\" (1.549 m) 96% No 39.68 kg/m2       Blood Pressure from Last 3 Encounters:   17 132/78   17 141/73   17 131/86    Weight from Last 3 Encounters:   17 210 lb (95.3 kg)   17 210 lb (95.3 kg)   17 210 lb (95.3 kg)              We Performed the Following     T4, free     TSH        Primary Care Provider Office Phone # Fax #    Mandy Bhatia [phone]       [address] [address]        Equal Access to Services     [facility] " GAAR : Hadii aad ku haddee Leesali, waaxda luqadaha, qaybta kaalmada adecorey, antony shantain hayaamarisa barberchelsie henderson luis . So [facility] [place] [facility] [phone].    ATENCIÓN: Si habla español, tiene a elizabeth disposición servicios gratuitos de asistencia lingüística. Llame al [phone].    We comply with applicable federal civil rights laws and [place] laws. We do not discriminate on the basis of race, color, national origin, age, disability sex, sexual orientation or gender identity.            Thank you!     Thank you for choosing [facility]  for your care. Our goal is always to provide you with excellent care. Hearing back from our patients is one way we can continue to improve our services. Please take a few minutes to complete the written survey that you may receive in the mail after your visit with us. Thank you!             Your Updated Medication List - Protect others around you: Learn how to safely use, store and throw away your medicines at www.disposemymeds.org.          This list is accurate as of: 7/28/17  9:16 AM.  Always use your most recent med list.                   Brand Name Dispense Instructions for use Diagnosis    albuterol 108 (90 BASE) MCG/ACT Inhaler    PROAIR HFA/PROVENTIL HFA/VENTOLIN HFA    1 Inhaler    Inhale 2 puffs into the lungs every 4 hours as needed for shortness of breath / dyspnea or wheezing    Shortness of breath       cetirizine 10 MG tablet    zyrTEC    30 tablet    TAKE ONE TABLET BY MOUTH EVERY MORNING    Cough       fluticasone 50 MCG/ACT spray    FLONASE    16 g    USE TWO SPRAYS IN EACH NOSTRIL EVERY DAY    Moderate persistent asthma without complication       fluticasone-salmeterol 250-50 MCG/DOSE diskus inhaler    ADVAIR    1 Inhaler    Inhale 1 puff into the lungs 2 times daily    Moderate persistent asthma without complication       levothyroxine 25 MCG tablet    SYNTHROID/LEVOTHROID    30 tablet    TAKE ONE TABLET BY MOUTH EVERY DAY    Other specified  hypothyroidism       MELATONIN PO           Multi-vitamin Tabs tablet      Take 1 tablet by mouth daily        omeprazole 20 MG CR capsule    priLOSEC    180 capsule    Take 1 capsule (20 mg) by mouth 2 times daily    Cough       * order for DME     1 Device    Walking cane for balance and mobility    Risk for falls, Balance problems       * order for DME     80 Units    Incontinence items, disposable underwear, absorbency pads, liners.    Risk for falls, Balance problems, Mixed incontinence       polyethylene glycol powder    MIRALAX    510 g    Take 17 g (1 capful) by mouth daily as needed for constipation    Constipation, unspecified constipation type       * Notice:  This list has 2 medication(s) that are the same as other medications prescribed for you. Read the directions carefully, and ask your doctor or other care provider to review them with you.

## 2017-07-28 NOTE — PROGRESS NOTES
"  SUBJECTIVE:                                                    Brooke John is a 51 year old female who presents to clinic today for the following health issues:      Hypothyroidism Follow-up      Since last visit, patient describes the following symptoms: Weight stable, no hair loss, no skin changes, no constipation, no loose stools      Amount of exercise or physical activity: Stretching    Problems taking medications regularly: No    Medication side effects: none    Diet: regular (no restrictions)      Patient does not have concerning symptoms today.  She is taking the medication and tolerating it well.    Patient does have a sleep study scheduled for August 13th.      Problem list and histories reviewed & adjusted, as indicated.  Additional history: as documented      ROS:  Constitutional, HEENT, cardiovascular, pulmonary, GI, , musculoskeletal, neuro, skin, endocrine and psych systems are negative, except as otherwise noted.    OBJECTIVE:                                                    /78 (BP Location: Left arm, Patient Position: Chair, Cuff Size: Adult Regular)  Pulse 87  Temp 98.1  F (36.7  C) (Oral)  Ht 5' 1\" (1.549 m)  Wt 210 lb (95.3 kg)  SpO2 96%  Breastfeeding? No  BMI 39.68 kg/m2  Body mass index is 39.68 kg/(m^2).  GENERAL: healthy, alert and no distress  EYES: Eyes grossly normal to inspection, PERRL and conjunctivae and sclerae normal  RESP: lungs clear to auscultation - no rales, rhonchi or wheezes  CV: regular rate and rhythm, normal S1 S2, no S3 or S4, no murmur, click or rub, no peripheral edema and peripheral pulses strong  MS: no gross musculoskeletal defects noted, no edema  NEURO: Normal strength and tone, mentation intact and speech normal  PSYCH: mentation appears normal, affect normal/bright    Diagnostic Test Results:  Thyroid labs - pending     ASSESSMENT/PLAN:                                                      Brooke was seen today for recheck " medication.    Diagnoses and all orders for this visit:    Other specified hypothyroidism  -     TSH  -     T4, free    Moderate persistent asthma without complication    Other orders  -     Cancel: TSH  -     Cancel: T4 FREE      - Patients ACT is fine today.  Patient reports to doing well with asthma control and does not have concerns.  No change in asthma medications or treatment plan today.      - Patient should have enough medication for the next 1.5 weeks.  Will renew the medication after labs are done and back.  Need followup labs to verify she is on effective dose.      See Patient Instructions:  Please stop at the pharmacy and see if your Rx is there.  It looks like the Rx was sent to your pharmacy on 07.07.2017.  This means you should be good for at least another 1.5 weeks.      We will not send another month Rx through until you have labs done incase the dose needs to be adjusted.    Please stop at the lab today to have the blood work done.        -- Patient agreed with and understood the plan today.          Alondra Michele PA-C    [facility]

## 2017-07-28 NOTE — LETTER
My Asthma Action Plan  Name: Brooke John   YOB: 1965  Date: 7/28/2017   My doctor: Alondra Michele PA-C   My clinic: [facility]        My Control Medicine: Fluticasone + salmeterol (Advair) -  Diskus 250/50 mcg daily  My Rescue Medicine: Albuterol (Proair/Ventolin/Proventil) inhaler PRN   My Asthma Severity: moderate persistent  Avoid your asthma triggers: Pt aware of symptoms               GREEN ZONE   Good Control    I feel good    No cough or wheeze    Can work, sleep and play without asthma symptoms       Take your asthma control medicine every day.     1. If exercise triggers your asthma, take your rescue medication    15 minutes before exercise or sports, and    During exercise if you have asthma symptoms  2. Spacer to use with inhaler: If you have a spacer, make sure to use it with your inhaler             YELLOW ZONE Getting Worse  I have ANY of these:    I do not feel good    Cough or wheeze    Chest feels tight    Wake up at night   1. Keep taking your Green Zone medications  2. Start taking your rescue medicine:    every 20 minutes for up to 1 hour. Then every 4 hours for 24-48 hours.  3. If you stay in the Yellow Zone for more than 12-24 hours, contact your doctor.  4. If you do not return to the Green Zone in 12-24 hours or you get worse, start taking your oral steroid medicine if prescribed by your provider.           RED ZONE Medical Alert - Get Help  I have ANY of these:    I feel awful    Medicine is not helping    Breathing getting harder    Trouble walking or talking    Nose opens wide to breathe       1. Take your rescue medicine NOW  2. If your provider has prescribed an oral steroid medicine, start taking it NOW  3. Call your doctor NOW  4. If you are still in the Red Zone after 20 minutes and you have not reached your doctor:    Take your rescue medicine again and    Call 911 or go to the emergency room right away    See your regular doctor within 2  weeks of an Emergency Room or Urgent Care visit for follow-up treatment.        Electronically signed by: Kusum Skaggs, July 28, 2017    Annual Reminders:  Meet with Asthma Educator,  Flu Shot in the Fall, consider Pneumonia Vaccination for patients with asthma (aged 19 and older).    Pharmacy: [facility] - [place], [place] - [address]                    Asthma Triggers  How To Control Things That Make Your Asthma Worse    Triggers are things that make your asthma worse.  Look at the list below to help you find your triggers and what you can do about them.  You can help prevent asthma flare-ups by staying away from your triggers.      Trigger                                                          What you can do   Cigarette Smoke  Tobacco smoke can make asthma worse. Do not allow smoking in your home, car or around you.  Be sure no one smokes at a child s day care or school.  If you smoke, ask your health care provider for ways to help you quit.  Ask family members to quit too.  Ask your health care provider for a referral to Quit Plan to help you quit smoking, or call 3-153-723-PLAN.     Colds, Flu, Bronchitis  These are common triggers of asthma. Wash your hands often.  Don t touch your eyes, nose or mouth.  Get a flu shot every year.     Dust Mites  These are tiny bugs that live in cloth or carpet. They are too small to see. Wash sheets and blankets in hot water every week.   Encase pillows and mattress in dust mite proof covers.  Avoid having carpet if you can. If you have carpet, vacuum weekly.   Use a dust mask and HEPA vacuum.   Pollen and Outdoor Mold  Some people are allergic to trees, grass, or weed pollen, or molds. Try to keep your windows closed.  Limit time out doors when pollen count is high.   Ask you health care provider about taking medicine during allergy season.     Animal Dander  Some people are allergic to skin flakes, urine or saliva from pets with fur or  feathers. Keep pets with fur or feathers out of your home.    If you can t keep the pet outdoors, then keep the pet out of your bedroom.  Keep the bedroom door closed.  Keep pets off cloth furniture and away from stuffed toys.     Mice, Rats, and Cockroaches  Some people are allergic to the waste from these pests.   Cover food and garbage.  Clean up spills and food crumbs.  Store grease in the refrigerator.   Keep food out of the bedroom.   Indoor Mold  This can be a trigger if your home has high moisture. Fix leaking faucets, pipes, or other sources of water.   Clean moldy surfaces.  Dehumidify basement if it is damp and smelly.   Smoke, Strong Odors, and Sprays  These can reduce air quality. Stay away from strong odors and sprays, such as perfume, powder, hair spray, paints, smoke incense, paint, cleaning products, candles and new carpet.   Exercise or Sports  Some people with asthma have this trigger. Be active!  Ask your doctor about taking medicine before sports or exercise to prevent symptoms.    Warm up for 5-10 minutes before and after sports or exercise.     Other Triggers of Asthma  Cold air:  Cover your nose and mouth with a scarf.  Sometimes laughing or crying can be a trigger.  Some medicines and food can trigger asthma.

## 2017-07-29 ASSESSMENT — PATIENT HEALTH QUESTIONNAIRE - PHQ9: SUM OF ALL RESPONSES TO PHQ QUESTIONS 1-9: 4

## 2017-07-29 ASSESSMENT — ANXIETY QUESTIONNAIRES: GAD7 TOTAL SCORE: 1

## 2017-07-29 ASSESSMENT — ASTHMA QUESTIONNAIRES: ACT_TOTALSCORE: 24

## 2017-08-02 RX ORDER — LEVOTHYROXINE SODIUM 50 UG/1
50 TABLET ORAL DAILY
Qty: 30 TABLET | Refills: 1 | Status: SHIPPED | OUTPATIENT
Start: 2017-08-02 | End: 2017-10-06 | Stop reason: DRUGHIGH

## 2017-08-02 NOTE — PROGRESS NOTES
Note to staff: Please call the patient to explain results.  RN:  We will be increasing patient levothyroxine.  Please send Levothyroxine 50 mcg, take one tab daily, #30 tabs, refills 1, to patient's pharmacy of choice.  Also, please order the TSH and T4 lab tests and future them to be done in about 6 weeks.  Thank you.        The results from your recent lab work show that the TSH is still mildly elevated.  Therefore I advise that we increase the dose of the levothyroxine to 50 mcg and have you followup in about 6 weeks for a lab only appointment to have this level re-checked.      Please let us know if you have any questions.        Thank you for choosing [facility] for your health care needs,      Alondra Michele PA-C

## 2017-08-07 DIAGNOSIS — J45.40 MODERATE PERSISTENT ASTHMA WITHOUT COMPLICATION: ICD-10-CM

## 2017-08-09 ENCOUNTER — OFFICE VISIT (OUTPATIENT)
Dept: GASTROENTEROLOGY | Facility: CLINIC | Age: 52
End: 2017-08-09

## 2017-08-09 DIAGNOSIS — K21.9 GASTROESOPHAGEAL REFLUX DISEASE, ESOPHAGITIS PRESENCE NOT SPECIFIED: Primary | ICD-10-CM

## 2017-08-09 NOTE — LETTER
"8/9/2017       RE: Brooke John  [address]    [facility] [place] [address]     Dear Colleague,    Thank you for referring your patient, Brooke John, to the [facility]STROENTEROLOGY [facility] IBD CLINIC at [facility]. Please see a copy of my visit note below.    appointment now says \"patient called to reschedule\" but left on schedule.  Did not come.  Not seen.    Again, thank you for allowing me to participate in the care of your patient.      Sincerely,    BRIAN Petit CNP      "

## 2017-08-09 NOTE — MR AVS SNAPSHOT
After Visit Summary   8/9/2017    Brooke John    MRN: [id number]           Patient Information     Date Of Birth          1965        Visit Information        Provider Department      8/9/2017 1:00 PM Baylee Vergara, APRN CNP [facility]        Today's Diagnoses     Gastroesophageal reflux disease, esophagitis presence not specified    -  1       Follow-ups after your visit        Your next 10 appointments already scheduled     Mar 07, 2018 10:45 AM CST   [facility], [facility] ([facility])    [address]  [place] [address] [phone]           For nerve root injection, please send or bring copies of any MRIs or other scans you have had.  Bring a list of your current medicines to your exam. (Include vitamins, minerals and over-the-counter medicines.) Leave your valuables at home.  Plan to have someone drive you home afterward.  Stop taking the following medicines (but talk to your doctor first):   If you take blood thinners, you may need to stop taking them a few days before treatment. Talk to your doctor before stopping these medicines.Stop taking Coumadin (warfarin) 3 days before treatment. Restart the day after treatment.   If you take Plavix, Ticlid, Pletal or Persantine, please ask your doctor if you should stop these medicines. You may need extra tests on the morning of your scan.   If you take Xarelto (Rivaroxaban), you may need to stop taking it 24 hours before treatment. Talk to your doctor before stopping this medicine. Restart the day after treatment.  You may take your other medicines as normal.  Stop all food and drink (including water) 3 hours before your test or treatment.  Please tell the doctor:   If you are allergic to X-ray dye (contrast fluid).   If you may be pregnant.  Injections take about 30 to  45 minutes. Most people spend up to 2 hours in the clinic or hospital.  Please call the Imaging Department at your exam site with any questions.              Who to contact     Please call your clinic at [phone] to:    Ask questions about your health    Make or cancel appointments    Discuss your medicines    Learn about your test results    Speak to your doctor            Additional Information About Your Visit        MyChart Information     Mobibeamt is an electronic gateway that provides easy, online access to your medical records. With Neighbor.ly, you can request a clinic appointment, read your test results, renew a prescription or communicate with your care team.     To sign up for Mobibeamt visit the website at [email or web address]   You will be asked to enter the access code listed below, as well as some personal information. Please follow the directions to create your username and password.     Your access code is: [email or web address]  Expires: 2018  1:26 PM     Your access code will  in 90 days. If you need help or a new code, please contact your [facility] or call [phone] for assistance.        Care EveryWhere ID     This is your Care EveryWhere ID. This could be used by other organizations to access your [place] medical records  [id number]        Your Vitals Were     Last Period                   2017            Blood Pressure from Last 3 Encounters:   18 153/85   17 146/81   17 151/90    Weight from Last 3 Encounters:   18 95.3 kg (210 lb)   17 94.9 kg (209 lb 4.8 oz)   17 94.3 kg (208 lb)              Today, you had the following     No orders found for display       Primary Care Provider Office Phone # Fax #    Mandy Bhatia [phone]       [address] [place] [address]        Equal Access to Services     RICO CARLOS : yovana Whiting qaybta  antony jarachelsie smith ahSue De Los Santos [facility] [phone].    ATENCIÓN: Si giovanny lugo, tiene a elizabeth disposición servicios gratuitos de asistencia lingüística. Jimmy al [phone].    We comply with applicable federal civil rights laws and [place] laws. We do not discriminate on the basis of race, color, national origin, age, disability, sex, sexual orientation, or gender identity.            Thank you!     Thank you for choosing [facility]STROENTEROLOG[facility] CLINIC  for your care. Our goal is always to provide you with excellent care. Hearing back from our patients is one way we can continue to improve our services. Please take a few minutes to complete the written survey that you may receive in the mail after your visit with us. Thank you!             Your Updated Medication List - Protect others around you: Learn how to safely use, store and throw away your medicines at www.disposemymeds.org.          This list is accurate as of 8/9/17 11:59 PM.  Always use your most recent med list.                   Brand Name Dispense Instructions for use Diagnosis    albuterol 108 (90 BASE) MCG/ACT Inhaler    PROAIR HFA/PROVENTIL HFA/VENTOLIN HFA    1 Inhaler    Inhale 2 puffs into the lungs every 4 hours as needed for shortness of breath / dyspnea or wheezing    Shortness of breath       cetirizine 10 MG tablet    zyrTEC    30 tablet    TAKE ONE TABLET BY MOUTH EVERY MORNING    Cough       fluticasone 50 MCG/ACT spray    FLONASE    16 g    USE TWO SPRAYS IN EACH NOSTRIL EVERY DAY    Moderate persistent asthma without complication       fluticasone-salmeterol 250-50 MCG/DOSE diskus inhaler    ADVAIR    1 Inhaler    Inhale 1 puff into the lungs 2 times daily    Moderate persistent asthma without complication       levothyroxine 50 MCG tablet    SYNTHROID/LEVOTHROID    30 tablet    Take 1 tablet (50 mcg) by mouth daily    Other specified hypothyroidism       MELATONIN PO           Multi-vitamin  Tabs tablet      Take 1 tablet by mouth daily        * order for DME     1 Device    Walking cane for balance and mobility    Risk for falls, Balance problems       * order for DME     80 Units    Incontinence items, disposable underwear, absorbency pads, liners.    Risk for falls, Balance problems, Mixed incontinence       polyethylene glycol powder    MIRALAX    510 g    Take 17 g (1 capful) by mouth daily as needed for constipation    Constipation, unspecified constipation type       * Notice:  This list has 2 medication(s) that are the same as other medications prescribed for you. Read the directions carefully, and ask your doctor or other care provider to review them with you.

## 2017-08-13 ENCOUNTER — THERAPY VISIT (OUTPATIENT)
Dept: SLEEP MEDICINE | Facility: CLINIC | Age: 52
End: 2017-08-13
Attending: INTERNAL MEDICINE
Payer: MEDICARE

## 2017-08-13 DIAGNOSIS — R06.83 SNORING: ICD-10-CM

## 2017-08-13 PROCEDURE — 95810 POLYSOM 6/> YRS 4/> PARAM: CPT | Mod: ZF | Performed by: INTERNAL MEDICINE

## 2017-08-13 PROCEDURE — 82803 BLOOD GASES ANY COMBINATION: CPT | Performed by: INTERNAL MEDICINE

## 2017-08-13 PROCEDURE — 36415 COLL VENOUS BLD VENIPUNCTURE: CPT | Performed by: INTERNAL MEDICINE

## 2017-08-13 NOTE — MR AVS SNAPSHOT
After Visit Summary   8/13/2017    Brooke John    MRN: [id number]           Patient Information     Date Of Birth          1965        Visit Information        Provider Department      8/13/2017 8:00 PM SLEEP STUDY RM [facility]        Today's Diagnoses     Snoring          Care Instructions    [place] [facility]    1. Your sleep study will be reviewed by a sleep physician within the next few days.     2. Please follow up in the sleep clinic as scheduled, or, make an appointment with your sleep provider to be seen within two weeks to discuss the results of the sleep study.    3. If you have any questions or problems with your treatment plan, please contact your sleep clinic provider at [phone] to further manage your condition.    4. Please review your attached medication list, and, at your follow-up appointment advise your sleep clinic provider about any changes.    5. Go to [email or web address] for more information about your sleep problems.    ARIEL Leyva  August 14, 2017                Follow-ups after your visit        Your next 10 appointments already scheduled     Aug 28, 2017  1:30 PM CDT   Return Sleep Patient with Doris Rodrigez MD   [facility], Sleep Study ([facility])    [address] [place] [address] [phone]            Sep 28, 2017 11:30 AM CDT   (Arrive by 11:15 AM)   Return Visit with BRIAN Petit [facility] Gastroenterology and IBD ([facility])    [address] [address] [phone]              Who to contact     If you have questions or need follow up information about today's clinic visit or your schedule please contact [facility] directly at [phone].  Normal or non-critical lab and imaging results will be communicated to you by  "MyChart, letter or phone within 4 business days after the clinic has received the results. If you do not hear from us within 7 days, please contact the clinic through [facility] or phone. If you have a critical or abnormal lab result, we will notify you by phone as soon as possible.  Submit refill requests through [employer] or call your pharmacy and they will forward the refill request to us. Please allow 3 business days for your refill to be completed.          Additional Information About Your Visit        [facility] Information     [employer] lets you send messages to your doctor, view your test results, renew your prescriptions, schedule appointments and more. To sign up, go to [email or web address] . Click on \"Log in\" on the left side of the screen, which will take you to the Welcome page. Then click on \"Sign up Now\" on the right side of the page.     You will be asked to enter the access code listed below, as well as some personal information. Please follow the directions to create your username and password.     Your access code is: [email or web address]  Expires: 10/17/2017  4:47 PM     Your access code will  in 90 days. If you need help or a new code, please call your [place] clinic or [phone].        Care EveryWhere ID     This is your Care EveryWhere ID. This could be used by other organizations to access your [place] medical records  [id number]         Blood Pressure from Last 3 Encounters:   17 132/78   17 141/73   17 131/86    Weight from Last 3 Encounters:   17 95.3 kg (210 lb)   17 95.3 kg (210 lb)   17 95.3 kg (210 lb)              We Performed the Following     Blood gas venous     Comprehensive Sleep Study        Primary Care Provider Office Phone # Fax #    Mandy Bhatia [phone]       [address] [place] [id number]        Equal Access to Services     RICO CARLOS AH: Hadii yovana Galloway, qaybta " antony jara bijanmarisa barberchelsie De Los Santos [facility] [phone].    ATENCIÓN: Si giovanny lugo, tiene a elizabeth disposición servicios gratuitos de asistencia lingüística. Jimmy al [phone].    We comply with applicable federal civil rights laws and [place] laws. We do not discriminate on the basis of race, color, national origin, age, disability sex, sexual orientation or gender identity.            Thank you!     Thank you for choosing [facility] [place], SLEEP STUDY  for your care. Our goal is always to provide you with excellent care. Hearing back from our patients is one way we can continue to improve our services. Please take a few minutes to complete the written survey that you may receive in the mail after your visit with us. Thank you!             Your Updated Medication List - Protect others around you: Learn how to safely use, store and throw away your medicines at www.disposemymeds.org.          This list is accurate as of: 8/13/17 11:59 PM.  Always use your most recent med list.                   Brand Name Dispense Instructions for use Diagnosis    albuterol 108 (90 BASE) MCG/ACT Inhaler    PROAIR HFA/PROVENTIL HFA/VENTOLIN HFA    1 Inhaler    Inhale 2 puffs into the lungs every 4 hours as needed for shortness of breath / dyspnea or wheezing    Shortness of breath       cetirizine 10 MG tablet    zyrTEC    30 tablet    TAKE ONE TABLET BY MOUTH EVERY MORNING    Cough       fluticasone 50 MCG/ACT spray    FLONASE    16 g    USE TWO SPRAYS IN EACH NOSTRIL EVERY DAY    Moderate persistent asthma without complication       fluticasone-salmeterol 250-50 MCG/DOSE diskus inhaler    ADVAIR    1 Inhaler    Inhale 1 puff into the lungs 2 times daily    Moderate persistent asthma without complication       levothyroxine 50 MCG tablet    SYNTHROID/LEVOTHROID    30 tablet    Take 1 tablet (50 mcg) by mouth daily    Other specified hypothyroidism       MELATONIN PO           Multi-vitamin Tabs tablet       Take 1 tablet by mouth daily        omeprazole 20 MG CR capsule    priLOSEC    180 capsule    Take 1 capsule (20 mg) by mouth 2 times daily    Cough       * order for DME     1 Device    Walking cane for balance and mobility    Risk for falls, Balance problems       * order for DME     80 Units    Incontinence items, disposable underwear, absorbency pads, liners.    Risk for falls, Balance problems, Mixed incontinence       polyethylene glycol powder    MIRALAX    510 g    Take 17 g (1 capful) by mouth daily as needed for constipation    Constipation, unspecified constipation type       * Notice:  This list has 2 medication(s) that are the same as other medications prescribed for you. Read the directions carefully, and ask your doctor or other care provider to review them with you.

## 2017-08-14 LAB
BASE DEFICIT BLDV-SCNC: 1.2 MMOL/L
HCO3 BLDV-SCNC: 25 MMOL/L (ref 21–28)
O2/TOTAL GAS SETTING VFR VENT: 42.2 %
PCO2 BLDV: 47 MM HG (ref 40–50)
PH BLDV: 7.33 PH (ref 7.32–7.43)
PO2 BLDV: 26 MM HG (ref 25–47)

## 2017-08-14 NOTE — PATIENT INSTRUCTIONS
[place] SLE[facility]    1. Your sleep study will be reviewed by a sleep physician within the next few days.     2. Please follow up in the sleep clinic as scheduled, or, make an appointment with your sleep provider to be seen within two weeks to discuss the results of the sleep study.    3. If you have any questions or problems with your treatment plan, please contact your sleep clinic provider at [phone] to further manage your condition.    4. Please review your attached medication list, and, at your follow-up appointment advise your sleep clinic provider about any changes.    5. Go to [email or web address] for more information about your sleep problems.    Sandi Zapata, RPSGT  August 14, 2017

## 2017-08-27 NOTE — PROGRESS NOTES
The evaluation for seizures should not be based upon EEG findings from a PSG alone.  This study was not a full 32 lead seizure montage and thus it is not appropriate to attempt to determine whether a particular waveform is epileptic or not.    Saying yes or no that a particular waveform is epileptic can be misleading as PSG EEG leads are set up to identify sleep stage not indentify or localize seizure activity.  If there is any question about possible seizures they should be referred to the [facility] epilepsy program for video EEG monitoring.

## 2017-08-28 ENCOUNTER — OFFICE VISIT (OUTPATIENT)
Dept: SLEEP MEDICINE | Facility: CLINIC | Age: 52
End: 2017-08-28
Attending: INTERNAL MEDICINE
Payer: MEDICARE

## 2017-08-28 VITALS
SYSTOLIC BLOOD PRESSURE: 122 MMHG | OXYGEN SATURATION: 98 % | RESPIRATION RATE: 16 BRPM | WEIGHT: 209 LBS | DIASTOLIC BLOOD PRESSURE: 73 MMHG | BODY MASS INDEX: 39.46 KG/M2 | HEART RATE: 77 BPM | HEIGHT: 61 IN

## 2017-08-28 DIAGNOSIS — G47.33 OSA (OBSTRUCTIVE SLEEP APNEA): Primary | ICD-10-CM

## 2017-08-28 PROCEDURE — 99211 OFF/OP EST MAY X REQ PHY/QHP: CPT | Mod: ZF

## 2017-08-28 NOTE — NURSING NOTE
"Chief Complaint   Patient presents with     Results     Follow up to discuss PSG results       Initial /73  Pulse 77  Resp 16  Ht 1.549 m (5' 1\")  Wt 94.8 kg (209 lb)  SpO2 98%  BMI 39.49 kg/m2 Estimated body mass index is 39.49 kg/(m^2) as calculated from the following:    Height as of this encounter: 1.549 m (5' 1\").    Weight as of this encounter: 94.8 kg (209 lb).  Medication Reconciliation: complete   Honey Hinkle CMA       "

## 2017-08-28 NOTE — PATIENT INSTRUCTIONS
Your BMI is Body mass index is 39.49 kg/(m^2).    What is BMI?  Body mass index (BMI) is one way to tell whether you are at a healthy weight, overweight, or obese. It measures your weight in relation to your height.  A BMI of 18.5 to 24.9 is in the healthy range. A person with a BMI of 25 to 29.9 is considered overweight, and someone with a BMI of 30 or greater is considered obese.  Another way to find out if you are at risk for health problems caused by overweight and obesity is to measure your waist. If you are a woman and your waist is more than 35 inches, or if you are a man and your waist is more than 40 inches, your risk of disease may be higher.  More than two-thirds of American adults are considered overweight or obese. Being overweight or obese increases the risk for further weight gain.  Excess weight may lead to heart disease and diabetes. Creating and following plans for healthy eating and physical activity may help you improve your health.    Methods for maintaining or losing weight.  Weight control is part of healthy lifestyle and includes exercise, emotional health, and healthy eating habits.  Careful eating habits lifelong is the mainstay of weight control.  Though there are significant health benefits from weight loss, long-term weight loss with diet alone may be very difficult to achieve- studies show long-term success with dietary management in less than 10% of people. Attaining a healthy weight may be especially difficult to achieve in those with severe obesity. In some cases, medications, devices and surgical management might be considered.    What can you do?  If you are overweight or obese and are interested in methods for weight loss, you should discuss this with your provider. In addition, we recommend that you review healthy life styles and methods for weight loss available through the National Institutes of Health patient information sites:    http://win.niddk.nih.gov/publications/index.htm

## 2017-08-28 NOTE — MR AVS SNAPSHOT
After Visit Summary   8/28/2017    Brooke John    MRN: [id number]           Patient Information     Date Of Birth          1965        Visit Information        Provider Department      8/28/2017 1:30 PM Doris Rodrigez MD [facility] [place], Sleep Study        Today's Diagnoses     HEVER (obstructive sleep apnea)    -  1      Care Instructions    Your BMI is Body mass index is 39.49 kg/(m^2).    What is BMI?  Body mass index (BMI) is one way to tell whether you are at a healthy weight, overweight, or obese. It measures your weight in relation to your height.  A BMI of 18.5 to 24.9 is in the healthy range. A person with a BMI of 25 to 29.9 is considered overweight, and someone with a BMI of 30 or greater is considered obese.  Another way to find out if you are at risk for health problems caused by overweight and obesity is to measure your waist. If you are a woman and your waist is more than 35 inches, or if you are a man and your waist is more than 40 inches, your risk of disease may be higher.  More than two-thirds of American adults are considered overweight or obese. Being overweight or obese increases the risk for further weight gain.  Excess weight may lead to heart disease and diabetes. Creating and following plans for healthy eating and physical activity may help you improve your health.    Methods for maintaining or losing weight.  Weight control is part of healthy lifestyle and includes exercise, emotional health, and healthy eating habits.  Careful eating habits lifelong is the mainstay of weight control.  Though there are significant health benefits from weight loss, long-term weight loss with diet alone may be very difficult to achieve- studies show long-term success with dietary management in less than 10% of people. Attaining a healthy weight may be especially difficult to achieve in those with severe obesity. In some cases, medications, devices and surgical  management might be considered.    What can you do?  If you are overweight or obese and are interested in methods for weight loss, you should discuss this with your provider. In addition, we recommend that you review healthy life styles and methods for weight loss available through the National Institutes of Health patient information sites:   http://win.niddk.nih.gov/publications/index.htm                  Follow-ups after your visit        Follow-up notes from your care team     Return in about 6 weeks (around 10/9/2017).      Your next 10 appointments already scheduled     Sep 05, 2017 10:00 AM CDT   PAP SETUP with DME SCHEDULE   [facility]    [address] [place] [id number] [phone]            Sep 28, 2017 11:30 AM CDT   (Arrive by 11:15 AM)   Return Visit with BRIAN Petit [facility] ([facility])    [address] [place] [address]  [facility] [address] [phone]            Oct 10, 2017  9:20 AM CDT   Return Sleep Patient with Doris Rodrigez MD   [facility])    [address] [place] [id number] [phone]              Who to contact     If you have questions or need follow up information about today's clinic visit or your schedule please contact [facility] directly at [phone].  Normal or non-critical lab and imaging results will be communicated to you by MyChart, letter or phone within 4 business days after the clinic has received the results. If you do not hear from us within 7 days, please contact the clinic through MyChart or phone. If you have a critical or abnormal lab result, we will notify you by phone as soon as possible.  Submit refill requests through [employer] or call your pharmacy and they will forward  "the refill request to us. Please allow 3 business days for your refill to be completed.          Additional Information About Your Visit        [facility] [employer] Information     [employer] lets you send messages to your doctor, view your test results, renew your prescriptions, schedule appointments and more. To sign up, go to [email or web address] . Click on \"Log in\" on the left side of the screen, which will take you to the Welcome page. Then click on \"Sign up Now\" on the right side of the page.     You will be asked to enter the access code listed below, as well as some personal information. Please follow the directions to create your username and password.     Your access code is: [email or web address]  Expires: 10/17/2017  4:47 PM     Your access code will  in 90 days. If you need help or a new code, please call your [place] clinic or [phone].        Care EveryWhere ID     This is your Care EveryWhere ID. This could be used by other organizations to access your [place] medical records  [id number]        Your Vitals Were     Pulse Respirations Height Pulse Oximetry BMI (Body Mass Index)       77 16 1.549 m (5' 1\") 98% 39.49 kg/m2        Blood Pressure from Last 3 Encounters:   17 122/73   17 132/78   17 141/73    Weight from Last 3 Encounters:   17 94.8 kg (209 lb)   17 95.3 kg (210 lb)   17 95.3 kg (210 lb)              We Performed the Following     Comprehensive DME        Primary Care Provider Office Phone # Fax #    Mandy Bhatia [phone] [address] [facility] [address]        Equal Access to Services     KESHA CARLOS : Luis Antonio Hardin, yovana chino, antony blas. So [facility] [phone].    ATENCIÓN: Si habla español, tiene a elizabeth disposición servicios gratuitos de asistencia lingüística. Llpao al [phone].    We comply with applicable federal civil rights laws " and [place] laws. We do not discriminate on the basis of race, color, national origin, age, disability sex, sexual orientation or gender identity.            Thank you!     [facility]  for your care. Our goal is always to provide you with excellent care. Hearing back from our patients is one way we can continue to improve our services. Please take a few minutes to complete the written survey that you may receive in the mail after your visit with us. Thank you!             Your Updated Medication List - Protect others around you: Learn how to safely use, store and throw away your medicines at www.disposemymeds.org.          This list is accurate as of: 8/28/17 10:38 PM.  Always use your most recent med list.                   Brand Name Dispense Instructions for use Diagnosis    albuterol 108 (90 BASE) MCG/ACT Inhaler    PROAIR HFA/PROVENTIL HFA/VENTOLIN HFA    1 Inhaler    Inhale 2 puffs into the lungs every 4 hours as needed for shortness of breath / dyspnea or wheezing    Shortness of breath       cetirizine 10 MG tablet    zyrTEC    30 tablet    TAKE ONE TABLET BY MOUTH EVERY MORNING    Cough       fluticasone 50 MCG/ACT spray    FLONASE    16 g    USE TWO SPRAYS IN EACH NOSTRIL EVERY DAY    Moderate persistent asthma without complication       fluticasone-salmeterol 250-50 MCG/DOSE diskus inhaler    ADVAIR    1 Inhaler    Inhale 1 puff into the lungs 2 times daily    Moderate persistent asthma without complication       levothyroxine 50 MCG tablet    SYNTHROID/LEVOTHROID    30 tablet    Take 1 tablet (50 mcg) by mouth daily    Other specified hypothyroidism       MELATONIN PO           Multi-vitamin Tabs tablet      Take 1 tablet by mouth daily        omeprazole 20 MG CR capsule    priLOSEC    180 capsule    Take 1 capsule (20 mg) by mouth 2 times daily    Cough       * order for DME     1 Device    Walking cane for balance and mobility    Risk for falls, Balance  problems       * order for DME     80 Units    Incontinence items, disposable underwear, absorbency pads, liners.    Risk for falls, Balance problems, Mixed incontinence       polyethylene glycol powder    MIRALAX    510 g    Take 17 g (1 capful) by mouth daily as needed for constipation    Constipation, unspecified constipation type       * Notice:  This list has 2 medication(s) that are the same as other medications prescribed for you. Read the directions carefully, and ask your doctor or other care provider to review them with you.

## 2017-08-29 NOTE — PROGRESS NOTES
"SLEEP MEDICINE F/U VISIT NOTE    DATE OF VISIT:8/28/17    PURPOSE OF VISIT: review results of polysomnography.    HPI: Patient  is a 52 yr old female who underwent diagnostic polysomnography for evaluation of sleep apnea on 8/13/17. She presents to sleep clinic to review the results of polysomnography.    Diagnostic PSG:  Total recording time: 420 minutes ; Total sleep time: 350.5 minutes  Sleep onset: normal at 11.8 minutes  ;  REM latency: normal at 112 minutes.  Sleep efficiency: reduced at 83.4%   Arousal index was increased at 29.4 per hour  All stages of sleep were observed. REM sleep was slightly reduced.  Total AHI:10.3 per hour; RDI: 23.8 per hour.  REM AHI:32.9per hour   Snoring was reported.  Sleep associated hypoventilation was not present.  Mild sleep associated hypoxemia was present. Time spent with O2 saturations at or below 89% was 5.9 minutes.  Frequent periodic limb movements were seen with PLM index of 25.5 per hr, out of which 10.4 per hr were associated with arousals.  There were no arrhythmias or abnormal sleep related behaviors.    The results were discussed with pt in detail.  We discussed the various treatment options for HEVER. Pt reports bruxism . She reports jaw discomfort and that it gets locked at times. She had to wait for  a long time to see the Dentist to get a custom made appliance, so she obtained an over the counter mouth guard which did not feel comfortable.  She denies symptoms of RLS.  Current meds, Past medical history, Past surgical history, Allergies: reviewed, per EMR  Physical exam:  /73  Pulse 77  Resp 16  Ht 1.549 m (5' 1\")  Wt 94.8 kg (209 lb)  SpO2 98%  BMI 39.49 kg/m2  General appearance: obese female,  in no apparent distress  Pt is dressed casually, cooperative with good eye contact.   Speech is spontaneous with regular rate and volume.   Mood: euthymic; affect congruent with full range and intensity.   Sensorium: awake, alert and oriented to person, " "place, time, and situation.    ASSESSMENT/PLAN:  1. Mild HEVER, pronounced during  REM sleep with mild sleep associated hypoxemia.  We discussed the various treatment options for HEVER and pt was interested in CPAP treatment. Prescription was provided for Auto CPAP device min pressure=5  cms of water and max pressure =15 cms of water. Pt was instructed to schedule DME visit, to obtain the device. Recommended her to start using the device regularly during sleep and asked to get back to us if she encounters any interface problems. We discussed weight management. Pt will be followed through Sleep therapy management program and f/u visit in 6 weeks after initiation of the CPAP treatment.     Chart documentation done in part with Dragon Voice recognition Software. Although reviewed after completion, some word and grammatical error may remain.    \"I spent a total of 25 minutes face to face with Brooke John during today's office visit. Over 50% of this time was spent counseling the patient and  coordinating care regarding sleep apnea treatment.\"     Doris Rodrigez MD   of Medicine,  Division of Pulmonary/Sleep Medicine  [facility].      "

## 2017-09-14 ENCOUNTER — DOCUMENTATION ONLY (OUTPATIENT)
Dept: SLEEP MEDICINE | Facility: CLINIC | Age: 52
End: 2017-09-14
Attending: INTERNAL MEDICINE
Payer: MEDICARE

## 2017-09-14 NOTE — PROGRESS NOTES
Patient was offered choice of [facility]  Patient Brooke John was set up at [place] on September 14, 2017. Patient received a Resmed Airsense Auto. Pressures were set at 5-15 cm H2O.   Patient s ramp is 5 cm H2O for Auto and FLEX/EPR is EPR, 2.  Patient received a Resmed Mask name: airfit N20  Nasal mask Size For Her, Small, heated tubing and heated humidifier.  Patient is enrolled in the STM Program and does need to meet compliance. Patient has a follow up on tbd with Dr. Rodrigez.    Abi Burroughs

## 2017-09-18 ENCOUNTER — DOCUMENTATION ONLY (OUTPATIENT)
Dept: SLEEP MEDICINE | Facility: CLINIC | Age: 52
End: 2017-09-18

## 2017-09-18 NOTE — PROGRESS NOTES
Patient returned call.     Subjective measures: Pt states things are going well and has no issues or complaints.  Pt is benefiting from therapy. Patient meeting subjective benchmarks.     Action plan:pt to have 14 day [facility] visit.

## 2017-09-18 NOTE — PROGRESS NOTES
3 DAY STM VISIT    Patient contacted for 3 day STM visit  Message left for patient to return call     Device type: Auto-CPAP  PAP settings: CPAP min 5 cm  H20     CPAP max 15 cm  H20  Assessment: Nightly usage over four hours.  Action plan: Pt to have f/u 14 day STM visit.  Diagnostic AHI: 8.7

## 2017-09-28 ENCOUNTER — OFFICE VISIT (OUTPATIENT)
Dept: GASTROENTEROLOGY | Facility: CLINIC | Age: 52
End: 2017-09-28

## 2017-09-28 VITALS
OXYGEN SATURATION: 96 % | DIASTOLIC BLOOD PRESSURE: 76 MMHG | SYSTOLIC BLOOD PRESSURE: 147 MMHG | WEIGHT: 209 LBS | HEART RATE: 70 BPM | HEIGHT: 61 IN | BODY MASS INDEX: 39.46 KG/M2

## 2017-09-28 DIAGNOSIS — K21.9 GASTROESOPHAGEAL REFLUX DISEASE, ESOPHAGITIS PRESENCE NOT SPECIFIED: ICD-10-CM

## 2017-09-28 DIAGNOSIS — R13.19 ESOPHAGEAL DYSPHAGIA: Primary | ICD-10-CM

## 2017-09-28 ASSESSMENT — ENCOUNTER SYMPTOMS
BACK PAIN: 1
NECK PAIN: 1
WEAKNESS: 1
SNORES LOUDLY: 0
MYALGIAS: 1
MEMORY LOSS: 0
SMELL DISTURBANCE: 0
POSTURAL DYSPNEA: 1
HOARSE VOICE: 1
TREMORS: 0
COUGH: 0
HEADACHES: 1
DISTURBANCES IN COORDINATION: 1
TASTE DISTURBANCE: 0
TROUBLE SWALLOWING: 1
TINGLING: 1
LOSS OF CONSCIOUSNESS: 0
WHEEZING: 1
SPUTUM PRODUCTION: 0
SEIZURES: 0
SPEECH CHANGE: 1
DYSPNEA ON EXERTION: 0
MUSCLE WEAKNESS: 1
RESPIRATORY PAIN: 0
COUGH DISTURBING SLEEP: 0
NUMBNESS: 1
DIZZINESS: 1
SINUS PAIN: 0
SINUS CONGESTION: 1
SORE THROAT: 0
HEMOPTYSIS: 1
JOINT SWELLING: 0
MUSCLE CRAMPS: 0
PARALYSIS: 0
SHORTNESS OF BREATH: 0
NECK MASS: 0
STIFFNESS: 1
ARTHRALGIAS: 1

## 2017-09-28 ASSESSMENT — PAIN SCALES - GENERAL: PAINLEVEL: NO PAIN (0)

## 2017-09-28 NOTE — NURSING NOTE
"Chief Complaint   Patient presents with     RECHECK     Gerd , hiatle hernia       Vitals:    09/28/17 1109   BP: 147/76   Pulse: 70   SpO2: 96%   Weight: 94.8 kg (209 lb)   Height: 1.549 m (5' 1\")       Body mass index is 39.49 kg/(m^2).    Patient Active Problem List   Diagnosis     Scoliosis     Arthritis     Hypothyroidism     History of herniated intervertebral disc     CARDIOVASCULAR SCREENING; LDL GOAL LESS THAN 130     Other specified hypothyroidism     Abnormal CT of the chest - needs repeat in 3 to 6 months.      Pulmonary nodules     Moderate persistent asthma     Lung nodule     Allergic reaction to drug - amoxicillin - Hives - proven 8/27/2016      Current Outpatient Prescriptions   Medication Sig Dispense Refill     fluticasone-salmeterol (ADVAIR) 250-50 MCG/DOSE diskus inhaler Inhale 1 puff into the lungs 2 times daily 1 Inhaler 11     levothyroxine (SYNTHROID/LEVOTHROID) 50 MCG tablet Take 1 tablet (50 mcg) by mouth daily 30 tablet 1     MELATONIN PO        cetirizine (ZYRTEC) 10 MG tablet TAKE ONE TABLET BY MOUTH EVERY MORNING 30 tablet 10     order for DME Walking cane for balance and mobility 1 Device 0     order for DME Incontinence items, disposable underwear, absorbency pads, liners. 80 Units 11     polyethylene glycol (MIRALAX) powder Take 17 g (1 capful) by mouth daily as needed for constipation 510 g 11     fluticasone (FLONASE) 50 MCG/ACT spray USE TWO SPRAYS IN EACH NOSTRIL EVERY DAY 16 g 3     albuterol (PROAIR HFA/PROVENTIL HFA/VENTOLIN HFA) 108 (90 BASE) MCG/ACT Inhaler Inhale 2 puffs into the lungs every 4 hours as needed for shortness of breath / dyspnea or wheezing 1 Inhaler 3     multivitamin, therapeutic with minerals (MULTI-VITAMIN) TABS Take 1 tablet by mouth daily       omeprazole (PRILOSEC) 20 MG capsule Take 1 capsule (20 mg) by mouth 2 times daily 180 capsule 3       Pain Eval:  No Pain (0)      History   Smoking Status     Never Smoker   Smokeless Tobacco     Never Used "     Signed By:  Petra Woodward; September 28, 2017; 11:11 AM

## 2017-09-28 NOTE — MR AVS SNAPSHOT
"              After Visit Summary   9/28/2017    Brooke John    MRN: [id number]           Patient Information     Date Of Birth          1965        Visit Information        Provider Department      9/28/2017 11:30 AM Baylee Vergara APRN CNP [facility]        Care Instructions    You have learned to swallow carefully. This is necessary.  Your esophagus has no bad disease but it is not a simple straight esophagus.   In this case, it does not have normal motility.    Also,   Your hiatal hernia is not large or dangerous, but it may contribute to the acid reflux (GERD) somewhat.    Always  Swallowing precautions for esophageal dysmotility and for after anti-reflux surgery.    (poor muscle action in the esophagus)   Take small bites.   Chew well   Moisten solid or dry food with applesauce, yogurt or other sauces.   Take sips of water between bites.   \"Swallow\" twice for each bite of food.   Wait for each bite to go down before taking the next bite.   Drink liquids slowly, a swallow at a time, rather than in continuous gulps.      Continue the omeprazole (Prilosec) .  With this, you have no visible damage in the esophagus  You have tried to decrease this to once daily but are not able to sleep with the acid reflux (GERD) then.   So, two times daily is what works for you.    Follow lifestyle precautions against acid reflux (GERD)    Do not overeat.   Avoid meals and snacks within three or four hours of lying down.   Avoid alcohol and mint. Decrease or quit smoking.   Do not drink carbonation - it IS acid. Decrease or quit caffeine.   If you have nighttime symptoms, elevate the head of the bed    first 3 inches, then 6 to 8 inches.    A foam wedge from the hip may be helpful, if a person lies on their back.    Pillows do NOT work. The entire back must be straight.   Lose weight if you are overweight.    Even ten pounds weight loss can make a difference.  Some people also have " specific triggers: tomato, spice, chocolate, citrus/orange juice.    Continue the polyethylene glycol (Miralax) against constipation.   Do you need 1/2 dose daily or a dose every other day.    Do not let your stool get hard.  If you have hard stool  1. You are not drinking enough water  2. You are not eating enough fiber, whole grain, fiber cereals, vegetable.   3. You are not using enough polyethylene glycol (Miralax)     Learn and do Kegel exercises:    slowly tighten muscles inside the bottom as if to hold urine or gas,    then relax those muscles.  Do this exercise a few times, a few times each day.  AND when you sit on the toilet,   do a Kegel and then relax those muscles to have a BM.    Urgent stool may be from lactose; you know to avoid all dairy except the hard cheeses.  Urgent stool is also from when you are constipated and then it moves.  Sometimes urgent stool is from too much of  - food at all, or greasy food, or carbonated beverages.    Return to GI Clinic as needed.    [facility]   For appointments call Lis [phone]  Coordinator  [phone] [place] or call -  GI Nurse Triage  [phone] for Medical Questions, # 3  Fax results to           Follow-ups after your visit        Follow-up notes from your care team     Return if symptoms worsen or fail to improve.      Your next 10 appointments already scheduled     Oct 25, 2017  9:30 AM CDT   Return Sleep Patient with Doris Rodrigez MD   [facility], [place], Sleep Study ([facility])    [address] [place] [id number] [phone]              Who to contact     Please call your clinic at [phone] to:    Ask questions about your health    Make or cancel appointments    Discuss your medicines    Learn about your test results    Speak to your doctor   If you have compliments or concerns about an experience at your clinic, or if you  "wish to file a complaint, please contact [facility] Patient Relations at [phone] or email us at [email or web address]         Additional Information About Your Visit        55social Information     55social is an electronic gateway that provides easy, online access to your medical records. With 55social, you can request a clinic appointment, read your test results, renew a prescription or communicate with your care team.     To sign up for 55social visit the website at [email or web address]   You will be asked to enter the access code listed below, as well as some personal information. Please follow the directions to create your username and password.     Your access code is: [email or web address]  Expires: 10/17/2017  4:47 PM     Your access code will  in 90 days. If you need help or a new code, please contact your [facility] Clinic or call [phone] for assistance.        Care EveryWhere ID     This is your Care EveryWhere ID. This could be used by other organizations to access your [place] medical records  [id number]        Your Vitals Were     Pulse Height Last Period Pulse Oximetry BMI (Body Mass Index)       70 1.549 m (5' 1\") 2017 96% 39.49 kg/m2        Blood Pressure from Last 3 Encounters:   17 147/76   17 122/73   17 132/78    Weight from Last 3 Encounters:   17 94.8 kg (209 lb)   17 94.8 kg (209 lb)   17 95.3 kg (210 lb)              Today, you had the following     No orders found for display       Primary Care Provider Office Phone # Fax #    Mandy Bhatia [phone]       [address] [place] [address]        Equal Access to Services     RICO CARLOS : Luis Antonio Hardin, wagreg luqadaha, qaybta kaalmada jessica, antony fernandez. [facility] [phone].    ATENCIÓN: Si habla español, tiene a elizabeth disposición servicios " chaitanya de asistencia lingüística. Jimmy castelan [phone].    We comply with applicable federal civil rights laws and [place] laws. We do not discriminate on the basis of race, color, national origin, age, disability sex, sexual orientation or gender identity.            Thank you!     Thank you for choosing [facility]  for your care. Our goal is always to provide you with excellent care. Hearing back from our patients is one way we can continue to improve our services. Please take a few minutes to complete the written survey that you may receive in the mail after your visit with us. Thank you!             Your Updated Medication List - Protect others around you: Learn how to safely use, store and throw away your medicines at www.disposemymeds.org.          This list is accurate as of: 9/28/17 12:40 PM.  Always use your most recent med list.                   Brand Name Dispense Instructions for use Diagnosis    albuterol 108 (90 BASE) MCG/ACT Inhaler    PROAIR HFA/PROVENTIL HFA/VENTOLIN HFA    1 Inhaler    Inhale 2 puffs into the lungs every 4 hours as needed for shortness of breath / dyspnea or wheezing    Shortness of breath       cetirizine 10 MG tablet    zyrTEC    30 tablet    TAKE ONE TABLET BY MOUTH EVERY MORNING    Cough       fluticasone 50 MCG/ACT spray    FLONASE    16 g    USE TWO SPRAYS IN EACH NOSTRIL EVERY DAY    Moderate persistent asthma without complication       fluticasone-salmeterol 250-50 MCG/DOSE diskus inhaler    ADVAIR    1 Inhaler    Inhale 1 puff into the lungs 2 times daily    Moderate persistent asthma without complication       levothyroxine 50 MCG tablet    SYNTHROID/LEVOTHROID    30 tablet    Take 1 tablet (50 mcg) by mouth daily    Other specified hypothyroidism       MELATONIN PO           Multi-vitamin Tabs tablet      Take 1 tablet by mouth daily        omeprazole 20 MG CR capsule    priLOSEC    180 capsule    Take 1 capsule (20 mg) by mouth 2  times daily    Cough       * order for DME     1 Device    Walking cane for balance and mobility    Risk for falls, Balance problems       * order for DME     80 Units    Incontinence items, disposable underwear, absorbency pads, liners.    Risk for falls, Balance problems, Mixed incontinence       polyethylene glycol powder    MIRALAX    510 g    Take 17 g (1 capful) by mouth daily as needed for constipation    Constipation, unspecified constipation type       * Notice:  This list has 2 medication(s) that are the same as other medications prescribed for you. Read the directions carefully, and ask your doctor or other care provider to review them with you.

## 2017-09-28 NOTE — LETTER
9/28/2017       RE: Brooke John  [address]    [place] [address]     Dear Colleague,    Thank you for referring your patient, Brooke John, to the [facility] at [facility]. Please see a copy of my visit note below.    CHIEF COMPLAINT:  Acid reflux symptoms, followup.      HISTORY OF PRESENT ILLNESS:  Brooke was seen 05/12/2017 regarding the above complaints.  She had difficulty swallowing and sensation of food not moving well down the esophagus.  She was using omeprazole twice daily and had previously tried to decrease it to once daily at which time she could not sleep due to acid reflux.  Regarding constipation, she was using it occasionally, up to once daily.      Brooke continues to follow swallowing precautions.  She must eat very slowly and let each swallow go down.  She has not had pneumonias and does not have the sensation of food getting into the lungs but indeed has a sensation of food staying in her throat and needing to cough to clear the throat.      Brooke has had severe scoliosis and lives with a brace, rigid brace for 5 years through high school.  If she ever has colonoscopy, this must be done with monitored anesthesia care or possibly general anesthesia.      MEDICAL HISTORY, SURGICAL HISTORY, MEDICATIONS, ADVERSE DRUG REACTIONS:  Reviewed and updated.      REVIEW OF SYSTEMS:  Denies current symptoms of acute illness or localized infection.  She has ongoing congestion, some cough, she believes within her previous range.  No new symptoms with red flags, recurrence of multiple symptoms and ongoing symptoms are present.      OBJECTIVE:   VITAL SIGNS:  Reviewed.  BMI 39.49, no pain.  Hypertensive at 147/76.  This is within her previous range this year.  Weight 209 pounds is stable.   GENERAL:  Clean, neatly groomed, short woman who is heavyset, remains seated.   HEENT:  Clear.   RESPIRATIONS:  Breathing is  calm and grossly normal.   ABDOMEN:  She gestures, nontender abdomen.        Not otherwise examined.      RESULTS:     1.  Upper GI endoscopy with tortuous esophagus, sliding hiatal hernia, no sign of erosions or Cloud's esophagus.  No biopsies were obtained.   2.  Recall video swallow study of 12/01/2016 with a normal swallowing in the oropharynx.  There are a few episodes of transient penetration without substantial residual during sequential swallows of thin, liquid barium.  There was no evidence or aspiration with pudding consistency or barium coated cracker.  The barium pill passed easily.  There was a sliding type of hiatal hernia, stable.      ASSESSMENT AND PLAN:  A 52-year-old woman who feels things catch in her throat a bit and is able to clear them, who has not had aspiration related to her swallowing difficulty, who has true acid reflux with some esophageal tortuosity and a medium sliding hiatal hernia.  The configuration of the hiatal hernia on direct review and endoscopy was not a dangerous type at this point.  She is strongly advised to follow GERD precautions, not overeat and not eat before bedtime in order to decrease any further pressure for increase in hiatal hernia.  She is advised to follow swallowing precautions, which she is largely doing, and she is advised to follow GERD precautions and has learned that she cannot eat before bedtime unless she wishes to sleep sitting up.      We discussed the possibility of colonoscopy with monitored anesthesia care, and Brooke does not engage that discussion.  She has had negative FIT test in the past, but she is not doing it annually, which would be recommended.  She had a member from [employer] bring her to upper GI endoscopy and would need to repeat that for any colonoscopy.      Brooke will return to Gastroenterology Clinic as needed.  At this time, refills are provided to last her through 1 year.  This is for omeprazole 20 mg twice daily.  She is using  the MiraLax on a somewhat intermittent schedule as needed, and we discussed criteria for need and to prevent hard stool.  She had no further questions about that.      Total visit 25 minutes with counseling time 15 minutes.         BRIAN FROST CNP             D: 2017 17:30   T: 2017 11:57   MT: JACQUELIN      Name:     DARCI DUPONT   MRN:      [id number]        Account:      [id number]   :      1965           Service Date: 2017      Document: [id number]        Again, thank you for allowing me to participate in the care of your patient.      Sincerely,    BRIAN Petit CNP

## 2017-09-28 NOTE — PATIENT INSTRUCTIONS
"You have learned to swallow carefully. This is necessary.  Your esophagus has no bad disease but it is not a simple straight esophagus.   In this case, it does not have normal motility.    Also,   Your hiatal hernia is not large or dangerous, but it may contribute to the acid reflux (GERD) somewhat.    Always  Swallowing precautions for esophageal dysmotility and for after anti-reflux surgery.    (poor muscle action in the esophagus)   Take small bites.   Chew well   Moisten solid or dry food with applesauce, yogurt or other sauces.   Take sips of water between bites.   \"Swallow\" twice for each bite of food.   Wait for each bite to go down before taking the next bite.   Drink liquids slowly, a swallow at a time, rather than in continuous gulps.      Continue the omeprazole (Prilosec) .  With this, you have no visible damage in the esophagus  You have tried to decrease this to once daily but are not able to sleep with the acid reflux (GERD) then.   So, two times daily is what works for you.    Follow lifestyle precautions against acid reflux (GERD)    Do not overeat.   Avoid meals and snacks within three or four hours of lying down.   Avoid alcohol and mint. Decrease or quit smoking.   Do not drink carbonation - it IS acid. Decrease or quit caffeine.   If you have nighttime symptoms, elevate the head of the bed    first 3 inches, then 6 to 8 inches.    A foam wedge from the hip may be helpful, if a person lies on their back.    Pillows do NOT work. The entire back must be straight.   Lose weight if you are overweight.    Even ten pounds weight loss can make a difference.  Some people also have specific triggers: tomato, spice, chocolate, citrus/orange juice.    Continue the polyethylene glycol (Miralax) against constipation.   Do you need 1/2 dose daily or a dose every other day.    Do not let your stool get hard.  If you have hard stool  1. You are not drinking enough water  2. You are not eating enough fiber, whole " grain, fiber cereals, vegetable.   3. You are not using enough polyethylene glycol (Miralax)     Learn and do Kegel exercises:    slowly tighten muscles inside the bottom as if to hold urine or gas,    then relax those muscles.  Do this exercise a few times, a few times each day.  AND when you sit on the toilet,   do a Kegel and then relax those muscles to have a BM.    Urgent stool may be from lactose; you know to avoid all dairy except the hard cheeses.  Urgent stool is also from when you are constipated and then it moves.  Sometimes urgent stool is from too much of  - food at all, or greasy food, or carbonated beverages.    Return to GI Clinic as needed.    [facility]   For appointments call Lis, [phone]  Coordinator  [phone] [place] or call -  GI Nurse Triage  [phone] for Medical Questions, # 3  Fax results to

## 2017-09-29 ENCOUNTER — DOCUMENTATION ONLY (OUTPATIENT)
Dept: SLEEP MEDICINE | Facility: CLINIC | Age: 52
End: 2017-09-29

## 2017-09-29 NOTE — PROGRESS NOTES
14 DAY STM VISIT    Subjective measures:   Pt states things are going well and has no issues or complaints.  Pt is benefiting from therapy.    Assessment: Pt meeting objective benchmarks.  Patient meeting subjective benchmarks.   Action plan: pt to have 30 day STM visit.    Device type: Auto-CPAP  PAP settings: CPAP min 5 cm  H20     CPAP max 15 cm  H20    95th% pressure 11.8 cm  H20   Objective measures: 14 day rolling measures      Compliance  92 %      Leak  10.72 lpm  last  upload      AHI 2.13   last  upload      Average number of minutes 421    Diagnostic AHI: 8.7     Objective measure goal  Compliance   Goal >70%  Leak   Goal < 24 lpm  AHI  Goal < 5  Usage  Goal >240

## 2017-09-30 NOTE — PROGRESS NOTES
CHIEF COMPLAINT:  Acid reflux symptoms, followup.      HISTORY OF PRESENT ILLNESS:  Brooke was seen 05/12/2017 regarding the above complaints.  She had difficulty swallowing and sensation of food not moving well down the esophagus.  She was using omeprazole twice daily and had previously tried to decrease it to once daily at which time she could not sleep due to acid reflux.  Regarding constipation, she was using it occasionally, up to once daily.      Brooke continues to follow swallowing precautions.  She must eat very slowly and let each swallow go down.  She has not had pneumonias and does not have the sensation of food getting into the lungs but indeed has a sensation of food staying in her throat and needing to cough to clear the throat.      Brooke has had severe scoliosis and lives with a brace, rigid brace for 5 years through high school.  If she ever has colonoscopy, this must be done with monitored anesthesia care or possibly general anesthesia.      MEDICAL HISTORY, SURGICAL HISTORY, MEDICATIONS, ADVERSE DRUG REACTIONS:  Reviewed and updated.      REVIEW OF SYSTEMS:  Denies current symptoms of acute illness or localized infection.  She has ongoing congestion, some cough, she believes within her previous range.  No new symptoms with red flags, recurrence of multiple symptoms and ongoing symptoms are present.      OBJECTIVE:   VITAL SIGNS:  Reviewed.  BMI 39.49, no pain.  Hypertensive at 147/76.  This is within her previous range this year.  Weight 209 pounds is stable.   GENERAL:  Clean, neatly groomed, short woman who is heavyset, remains seated.   HEENT:  Clear.   RESPIRATIONS:  Breathing is calm and grossly normal.   ABDOMEN:  She gestures, nontender abdomen.        Not otherwise examined.      RESULTS:     1.  Upper GI endoscopy with tortuous esophagus, sliding hiatal hernia, no sign of erosions or Cloud's esophagus.  No biopsies were obtained.   2.  Recall video swallow study of 12/01/2016 with a  normal swallowing in the oropharynx.  There are a few episodes of transient penetration without substantial residual during sequential swallows of thin, liquid barium.  There was no evidence or aspiration with pudding consistency or barium coated cracker.  The barium pill passed easily.  There was a sliding type of hiatal hernia, stable.      ASSESSMENT AND PLAN:  A 52-year-old woman who feels things catch in her throat a bit and is able to clear them, who has not had aspiration related to her swallowing difficulty, who has true acid reflux with some esophageal tortuosity and a medium sliding hiatal hernia.  The configuration of the hiatal hernia on direct review and endoscopy was not a dangerous type at this point.  She is strongly advised to follow GERD precautions, not overeat and not eat before bedtime in order to decrease any further pressure for increase in hiatal hernia.  She is advised to follow swallowing precautions, which she is largely doing, and she is advised to follow GERD precautions and has learned that she cannot eat before bedtime unless she wishes to sleep sitting up.      We discussed the possibility of colonoscopy with monitored anesthesia care, and Brooke does not engage that discussion.  She has had negative FIT test in the past, but she is not doing it annually, which would be recommended.  She had a member from [employer] bring her to upper GI endoscopy and would need to repeat that for any colonoscopy.      Brooke will return to Gastroenterology Clinic as needed.  At this time, refills are provided to last her through 1 year.  This is for omeprazole 20 mg twice daily.  She is using the MiraLax on a somewhat intermittent schedule as needed, and we discussed criteria for need and to prevent hard stool.  She had no further questions about that.      Total visit 25 minutes with counseling time 15 minutes.         BRIAN FROST CNP             D: 09/28/2017 17:30   T: 09/30/2017 11:57    MT: JACQUELIN      Name:     DARCI DUPONT   MRN:      [id number]        Account:      [id number]   :      1965           Service Date: 2017      Document: [id number]

## 2017-10-05 ENCOUNTER — OFFICE VISIT (OUTPATIENT)
Dept: FAMILY MEDICINE | Facility: CLINIC | Age: 52
End: 2017-10-05
Payer: MEDICARE

## 2017-10-05 VITALS
HEIGHT: 61 IN | DIASTOLIC BLOOD PRESSURE: 84 MMHG | WEIGHT: 207.13 LBS | BODY MASS INDEX: 39.11 KG/M2 | TEMPERATURE: 97.5 F | SYSTOLIC BLOOD PRESSURE: 124 MMHG | HEART RATE: 94 BPM | OXYGEN SATURATION: 97 %

## 2017-10-05 DIAGNOSIS — Z12.11 SCREEN FOR COLON CANCER: ICD-10-CM

## 2017-10-05 DIAGNOSIS — K44.9 HIATAL HERNIA: ICD-10-CM

## 2017-10-05 DIAGNOSIS — E03.9 HYPOTHYROIDISM, UNSPECIFIED TYPE: Primary | ICD-10-CM

## 2017-10-05 DIAGNOSIS — R91.8 PULMONARY NODULES: ICD-10-CM

## 2017-10-05 DIAGNOSIS — E66.01 MORBID OBESITY (H): ICD-10-CM

## 2017-10-05 DIAGNOSIS — R05.9 COUGH: ICD-10-CM

## 2017-10-05 DIAGNOSIS — Z11.59 NEED FOR HEPATITIS C SCREENING TEST: ICD-10-CM

## 2017-10-05 DIAGNOSIS — J45.40 MODERATE PERSISTENT ASTHMA WITHOUT COMPLICATION: ICD-10-CM

## 2017-10-05 DIAGNOSIS — Z23 NEED FOR PROPHYLACTIC VACCINATION AND INOCULATION AGAINST INFLUENZA: ICD-10-CM

## 2017-10-05 DIAGNOSIS — Z12.31 VISIT FOR SCREENING MAMMOGRAM: ICD-10-CM

## 2017-10-05 DIAGNOSIS — K21.9 GASTROESOPHAGEAL REFLUX DISEASE, ESOPHAGITIS PRESENCE NOT SPECIFIED: ICD-10-CM

## 2017-10-05 LAB
T4 FREE SERPL-MCNC: 0.94 NG/DL (ref 0.76–1.46)
TSH SERPL DL<=0.005 MIU/L-ACNC: 4.64 MU/L (ref 0.4–4)

## 2017-10-05 PROCEDURE — 84439 ASSAY OF FREE THYROXINE: CPT | Performed by: PHYSICIAN ASSISTANT

## 2017-10-05 PROCEDURE — 84443 ASSAY THYROID STIM HORMONE: CPT | Performed by: PHYSICIAN ASSISTANT

## 2017-10-05 PROCEDURE — 36415 COLL VENOUS BLD VENIPUNCTURE: CPT | Performed by: PHYSICIAN ASSISTANT

## 2017-10-05 PROCEDURE — 99214 OFFICE O/P EST MOD 30 MIN: CPT | Mod: 25 | Performed by: PHYSICIAN ASSISTANT

## 2017-10-05 PROCEDURE — G0008 ADMIN INFLUENZA VIRUS VAC: HCPCS | Performed by: PHYSICIAN ASSISTANT

## 2017-10-05 PROCEDURE — G0472 HEP C SCREEN HIGH RISK/OTHER: HCPCS | Performed by: PHYSICIAN ASSISTANT

## 2017-10-05 PROCEDURE — 90686 IIV4 VACC NO PRSV 0.5 ML IM: CPT | Performed by: PHYSICIAN ASSISTANT

## 2017-10-05 NOTE — MR AVS SNAPSHOT
After Visit Summary   10/5/2017    Brooke John    MRN: [id number]           Patient Information     Date Of Birth          1965        Visit Information        Provider Department      10/5/2017 2:20 PM Salome Hollis PA-C [facility]        Today's Diagnoses     Hypothyroidism, unspecified type    -  1    Moderate persistent asthma without complication        Pulmonary nodules        Gastroesophageal reflux disease, esophagitis presence not specified        Cough        Hiatal hernia        Need for prophylactic vaccination and inoculation against influenza        Screen for colon cancer        Need for hepatitis C screening test        Visit for screening mammogram           Follow-ups after your visit        Additional Services     GASTROENTEROLOGY ADULT REF PROCEDURE ONLY       Last Lab Result: Creatinine (mg/dL)       Date                     Value                 05/30/2017               0.77             ----------  Body mass index is 39.14 kg/(m^2).      Patient will be contacted to schedule procedure.     Please be aware that coverage of these services is subject to the terms and limitations of your health insurance plan.  Call member services at your health plan with any benefit or coverage questions.  Any procedures must be performed at a [facility] facility OR coordinated by your clinic's referral office.    Please bring the following with you to your appointment:    (1) Any X-Rays, CTs or MRIs which have been performed.  Contact the facility where they were done to arrange for  prior to your scheduled appointment.    (2) List of current medications   (3) This referral request   (4) Any documents/labs given to you for this referral                  Your next 10 appointments already scheduled     Oct 25, 2017  9:20 AM CDT   Return Sleep Patient with Doris Rodrigez MD   [facility] [place], Sleep Study ([facility]  "[address] [id number] [phone]              Future tests that were ordered for you today     Open Future Orders        Priority Expected Expires Ordered    *MA Screening Digital Bilateral Routine  10/5/2018 10/5/2017    Fecal colorectal cancer screen (FIT) Routine 10/26/2017 2017 10/5/2017            Who to contact     If you have questions or need follow up information about today's clinic visit or your schedule please contact [facility] directly at [phone].  Normal or non-critical lab and imaging results will be communicated to you by [employer], letter or phone within 4 business days after the clinic has received the results. If you do not hear from us within 7 days, please contact the clinic through [employer] or phone. If you have a critical or abnormal lab result, we will notify you by phone as soon as possible.  Submit refill requests through [employer] or call your pharmacy and they will forward the refill request to us. Please allow 3 business days for your refill to be completed.          Additional Information About Your Visit        MyCharWikiWand Information     [employer] lets you send messages to your doctor, view your test results, renew your prescriptions, schedule appointments and more. To sign up, go to [email or web address] . Click on \"Log in\" on the left side of the screen, which will take you to the Welcome page. Then click on \"Sign up Now\" on the right side of the page.     You will be asked to enter the access code listed below, as well as some personal information. Please follow the directions to create your username and password.     Your access code is: [email or web address]  Expires: 10/17/2017  4:47 PM     Your access code will  in 90 days. If you need help or a new code, please call your [facility] or [phone].        Care EveryWhere ID     This is your Care EveryWhere ID. This could be used by other organizations " "to access your [place] medical records  [id number]        Your Vitals Were     Pulse Temperature Height Last Period Pulse Oximetry BMI (Body Mass Index)    94 97.5  F (36.4  C) (Tympanic) 5' 1\" (1.549 m) 09/15/2017 97% 39.14 kg/m2       Blood Pressure from Last 3 Encounters:   10/05/17 124/84   09/28/17 147/76   08/28/17 122/73    Weight from Last 3 Encounters:   10/05/17 207 lb 2 oz (94 kg)   09/28/17 209 lb (94.8 kg)   08/28/17 209 lb (94.8 kg)              We Performed the Following     FLU VAC, SPLIT VIRUS IM > 3 YO (QUADRIVALENT) [48811]     GASTROENTEROLOGY ADULT REF PROCEDURE ONLY     Hepatitis C Screen Reflex to HCV RNA Quant and Genotype     TSH with free T4 reflex     Vaccine Administration, Initial [53423]          Where to get your medicines      These medications were sent to [facility] - [address] [address]     Phone:  [phone]     omeprazole 20 MG CR capsule          Primary Care Provider Office Phone # Fax #    Mandy Bhatia [phone]       [address] [place] [address]        Equal Access to Services     RICO CARLOS AH: Hadii elodia ku hadasho Soomaali, waaxda luqadaha, qaybta kaalmada adeegyada, waxay candelario haycassi fernandez. So [facility] [phone].    ATENCIÓN: Si habla español, tiene a elizabeth disposición servicios gratuitos de asistencia lingüística. Llame al [phone].    We comply with applicable federal civil rights laws and [place] laws. We do not discriminate on the basis of race, color, national origin, age, disability, sex, sexual orientation, or gender identity.            Thank you!     Thank you for choosing [facility]  for your care. Our goal is always to provide you with excellent care. Hearing back from our patients is one way we can continue to improve our services. Please take a few minutes to complete the written survey " that you may receive in the mail after your visit with us. Thank you!             Your Updated Medication List - Protect others around you: Learn how to safely use, store and throw away your medicines at www.disposemymeds.org.          This list is accurate as of: 10/5/17  2:42 PM.  Always use your most recent med list.                   Brand Name Dispense Instructions for use Diagnosis    albuterol 108 (90 BASE) MCG/ACT Inhaler    PROAIR HFA/PROVENTIL HFA/VENTOLIN HFA    1 Inhaler    Inhale 2 puffs into the lungs every 4 hours as needed for shortness of breath / dyspnea or wheezing    Shortness of breath       cetirizine 10 MG tablet    zyrTEC    30 tablet    TAKE ONE TABLET BY MOUTH EVERY MORNING    Cough       fluticasone 50 MCG/ACT spray    FLONASE    16 g    USE TWO SPRAYS IN EACH NOSTRIL EVERY DAY    Moderate persistent asthma without complication       fluticasone-salmeterol 250-50 MCG/DOSE diskus inhaler    ADVAIR    1 Inhaler    Inhale 1 puff into the lungs 2 times daily    Moderate persistent asthma without complication       levothyroxine 50 MCG tablet    SYNTHROID/LEVOTHROID    30 tablet    Take 1 tablet (50 mcg) by mouth daily    Other specified hypothyroidism       MELATONIN PO           Multi-vitamin Tabs tablet      Take 1 tablet by mouth daily        omeprazole 20 MG CR capsule    priLOSEC    180 capsule    Take 1 capsule (20 mg) by mouth 2 times daily    Cough, Gastroesophageal reflux disease, esophagitis presence not specified, Hiatal hernia       * order for DME     1 Device    Walking cane for balance and mobility    Risk for falls, Balance problems       * order for DME     80 Units    Incontinence items, disposable underwear, absorbency pads, liners.    Risk for falls, Balance problems, Mixed incontinence       polyethylene glycol powder    MIRALAX    510 g    Take 17 g (1 capful) by mouth daily as needed for constipation    Constipation, unspecified constipation type       * Notice:  This  list has 2 medication(s) that are the same as other medications prescribed for you. Read the directions carefully, and ask your doctor or other care provider to review them with you.

## 2017-10-05 NOTE — PROGRESS NOTES
SUBJECTIVE:   Brooke John is a 52 year old female who presents to clinic today for the following health issues:    Hypothyroidism  Due to abnormal labs Brooke's levothyroxine was increased on 17 from 25 mcg to 50 mcg. She reports no apparent change in symptoms (she was essentially asymtomatic upon starting this RX) and also reports no medication side effects.     TSH   Date Value Ref Range Status   10/05/2017 4.64 (H) 0.40 - 4.00 mU/L Final   2017 4.19 (H) 0.40 - 4.00 mU/L Final   2017 4.99 (H) 0.40 - 4.00 mU/L Final   2016 7.11 (H) 0.40 - 4.00 mU/L Final   2014 5.74 (H) 0.4 - 5.0 mU/L Final     Asthma -   The patient is doing well on her controller inhaler, Advair.  She rarely uses her rescue inhaler and is unsure of what triggers her asthma symptoms.    Hiatal Hernia/Reflux  Brooke is taking omeprazole 20 mg BID for GERD symptoms due to her hiatal hernia that manifests as a cough.  She also has swallowing issues due to a tortuous esophagus.  She would like a refill of this RX today.  Her last EGD was 17.    CT - Chest.    Last year she was found to have a lung nodule that required f/u 3 months later.  Her repeat scan showed stability and due to her status of being a never-smoker, no further follow up was recommended.    Colonoscopy  She has never had a colonoscopy but would like to have one ordered.        Problem list and histories reviewed & adjusted, as indicated.  Additional history: as documented    Patient Active Problem List   Diagnosis     Scoliosis     Arthritis     Hypothyroidism     History of herniated intervertebral disc     Pulmonary nodules     Moderate persistent asthma     Lung nodule     Allergic reaction to drug - amoxicillin - Hives - proven 2016      Past Surgical History:   Procedure Laterality Date     ARTHROSCOPY KNEE      left      BACK SURGERY        SECTION       COSMETIC SURGERY       ESOPHAGOSCOPY, GASTROSCOPY, DUODENOSCOPY  (EGD), COMBINED N/A 6/8/2017    Procedure: COMBINED ESOPHAGOSCOPY, GASTROSCOPY, DUODENOSCOPY (EGD);  EGD w MAC:DX:Esophageal dysphagia,Hiatal hernia/prep & pre-op info mailed;  Surgeon: Moshe Caro MD;  Location:  GI     EYE SURGERY       ORTHOPEDIC SURGERY  2003    lumbar discectomy       Social History   Substance Use Topics     Smoking status: Never Smoker     Smokeless tobacco: Never Used     Alcohol use No     Family History   Problem Relation Age of Onset     Scoliosis Son      Learning Disorder Son      lives in a protected place     HEART DISEASE Father      triple bypass     Unknown/Adopted Father      Unknown/Adopted Mother      Type 2 Diabetes Maternal Grandfather      Other - See Comments Brother      5 brothers, 1 sister.      Colon Cancer No family hx of          Current Outpatient Prescriptions   Medication Sig Dispense Refill     omeprazole (PRILOSEC) 20 MG CR capsule Take 1 capsule (20 mg) by mouth 2 times daily 180 capsule 3     fluticasone-salmeterol (ADVAIR) 250-50 MCG/DOSE diskus inhaler Inhale 1 puff into the lungs 2 times daily 1 Inhaler 11     levothyroxine (SYNTHROID/LEVOTHROID) 50 MCG tablet Take 1 tablet (50 mcg) by mouth daily 30 tablet 1     MELATONIN PO        cetirizine (ZYRTEC) 10 MG tablet TAKE ONE TABLET BY MOUTH EVERY MORNING 30 tablet 10     order for DME Walking cane for balance and mobility 1 Device 0     order for DME Incontinence items, disposable underwear, absorbency pads, liners. 80 Units 11     polyethylene glycol (MIRALAX) powder Take 17 g (1 capful) by mouth daily as needed for constipation 510 g 11     fluticasone (FLONASE) 50 MCG/ACT spray USE TWO SPRAYS IN EACH NOSTRIL EVERY DAY 16 g 3     albuterol (PROAIR HFA/PROVENTIL HFA/VENTOLIN HFA) 108 (90 BASE) MCG/ACT Inhaler Inhale 2 puffs into the lungs every 4 hours as needed for shortness of breath / dyspnea or wheezing 1 Inhaler 3     multivitamin, therapeutic with minerals (MULTI-VITAMIN) TABS Take 1  "tablet by mouth daily       Allergies   Allergen Reactions     Amoxicillin      Lip swelling      Aspirin      GI upset     Zithromax [Azithromycin]      RASH         Reviewed and updated as needed this visit by clinical staffTobacco  Allergies  Meds  Med Hx  Surg Hx  Fam Hx  Soc Hx      Reviewed and updated as needed this visit by Provider  Tobacco  Allergies  Med Hx  Surg Hx  Fam Hx  Soc Hx        ROS:  Constitutional, HEENT, cardiovascular, pulmonary, GI, , musculoskeletal, neuro, skin, endocrine and psych systems are negative, except as otherwise noted.    This document serves as a record of the services and decisions personally performed and made by Salome Hollis PA-C. It was created on her behalf by Bri Edward, a trained medical scribe. The creation of this document is based on the provider's statements to the medical scribe.  Bri Edward 2:35 PM October 5, 2017      OBJECTIVE:   /84  Pulse 94  Temp 97.5  F (36.4  C) (Tympanic)  Ht 5' 1\" (1.549 m)  Wt 207 lb 2 oz (94 kg)  LMP 09/15/2017  SpO2 97%  BMI 39.14 kg/m2  Body mass index is 39.14 kg/(m^2).  GENERAL: healthy, alert and no distress  NECK: no adenopathy, no asymmetry, masses, or scars and thyroid normal to palpation  RESP: lungs clear to auscultation - no rales, rhonchi or wheezes  CV: regular rate and rhythm, normal S1 S2, no S3 or S4, no murmur, click or rub, no peripheral edema and peripheral pulses strong  MS: no gross musculoskeletal defects noted, no edema  SKIN: no suspicious lesions or rashes  PSYCH: mentation appears normal, affect normal/bright    Diagnostic Test Results:  Results for orders placed or performed in visit on 10/05/17 (from the past 24 hour(s))   TSH with free T4 reflex   Result Value Ref Range    TSH 4.64 (H) 0.40 - 4.00 mU/L   T4 free   Result Value Ref Range    T4 Free 0.94 0.76 - 1.46 ng/dL       ASSESSMENT/PLAN:     Brooke was seen today for recheck medication.    Diagnoses and all orders for this " visit:    Hypothyroidism, unspecified type  Recheck today to see if current dose is adequate.  -     TSH with free T4 reflex  -     T4 free    Moderate persistent asthma without complication, Pulmonary nodules  Asthma is well-controlled on Advair and Albuterol.  No follow up needed for nodules.    Gastroesophageal reflux disease, esophagitis presence not specified, Hiatal hernia, Cough  - Well controlled on current therapy.  Continue  -     omeprazole (PRILOSEC) 20 MG CR capsule; Take 1 capsule (20 mg) by mouth 2 times daily    Need for prophylactic vaccination and inoculation against influenza  -     FLU VAC, SPLIT VIRUS IM > 3 YO (QUADRIVALENT) [82925]  -     Vaccine Administration, Initial [03373]    Screen for colon cancer, Need for hepatitis C screening test, Visit for screening mammogram  Provided patient with order for screenings.  -     Fecal colorectal cancer screen (FIT); Future  -     GASTROENTEROLOGY ADULT REF PROCEDURE ONLY  -     Hepatitis C Screen Reflex to HCV RNA Quant and Genotype  -     *MA Screening Digital Bilateral; Future          The information in this document, created by the medical scribe for me, accurately reflects the services I personally performed and the decisions made by me. I have reviewed and approved this document for accuracy prior to leaving the patient care area.  October 5, 2017 2:43 PM    Salome Hollis PA-C  [facility]

## 2017-10-05 NOTE — PROGRESS NOTES
Injectable Influenza Immunization Documentation    1.  Is the person to be vaccinated sick today?   No    2. Does the person to be vaccinated have an allergy to a component   of the vaccine?   No    3. Has the person to be vaccinated ever had a serious reaction   to influenza vaccine in the past?   No    4. Has the person to be vaccinated ever had Guillain-Barré syndrome?   No    Form completed by Zhane Contreras [facility]

## 2017-10-05 NOTE — LETTER
[facility]  [address] [place] [address]                              [phone]   October 6, 2017    Brooke John  [address]    [place] [address]      Dear Brooke,    Here is a summary of your recent test results:  -TSH (thyroid stimulating hormone) is abnormal suggesting you are currently underreplaced.  ADVISE: changing your medication dose to 75 micrograms/day and recheck your TSH with a lab appointment in 6 weeks. (TSH w/reflex T4, DX: hypothyroidism)  -Hepatitis C antibody screen test shows no signs of a previous hepatitis C infection.    Your tests results are enclosed.    In addition, here is a list of due or overdue Health Maintenance reminders.    Health Maintenance Due   Topic Date Due     MIGRAINE ACTION PLAN  08/21/1981     Colonoscopy - ever 10 years  08/21/1983     Colon Cancer Screening - FIT Test - yearly  02/04/2015     Wellness Visit with your Primary Provider - yearly  04/25/2017     Mammogram - yearly  08/01/2017     Please call us at [phone] (or use [place]the [employer]) to address the above recommendations.            Thank you very much for choosing [facility].     Best regards,      Salome Hollis PA-C    Results for orders placed or performed in visit on 10/05/17   TSH with free T4 reflex   Result Value Ref Range    TSH 4.64 (H) 0.40 - 4.00 mU/L   Hepatitis C Screen Reflex to HCV RNA Quant and Genotype   Result Value Ref Range    Hepatitis C Antibody Nonreactive NR^Nonreactive   T4 free   Result Value Ref Range    T4 Free 0.94 0.76 - 1.46 ng/dL

## 2017-10-06 PROBLEM — E66.01 MORBID OBESITY (H): Status: ACTIVE | Noted: 2017-10-06

## 2017-10-06 LAB — HCV AB SERPL QL IA: NONREACTIVE

## 2017-10-06 RX ORDER — LEVOTHYROXINE SODIUM 75 UG/1
75 TABLET ORAL DAILY
Qty: 90 TABLET | Refills: 0 | Status: SHIPPED | OUTPATIENT
Start: 2017-10-06 | End: 2018-01-09

## 2017-10-16 ENCOUNTER — DOCUMENTATION ONLY (OUTPATIENT)
Dept: SLEEP MEDICINE | Facility: CLINIC | Age: 52
End: 2017-10-16

## 2017-10-16 NOTE — PROGRESS NOTES
30 DAY STM VISIT    Subjective measures:   Pt states that she's been struggling with her mask.  She has an appointment for a mask fitting on Thursday.     Assessment: Pt not meeting objective benchmarks for compliance  Patient meeting subjective benchmarks. mask discomfort    Action plan: 2 week STM recheck appt scheduled  Patient has a follow up visit with Dr. Rodrigez on 10/25/17.   Device type: Auto-CPAP  PAP settings: CPAP min 5 cm  H20     CPAP max 15 cm  H20    95th% pressure 11.1 cm  H20   Objective measures: 14 day rolling measures      Compliance  28 %      Leak  14.4 lpm  last  upload      AHI 2.45   last  upload      Average number of minutes 164    Diagnostic AHI: 8.7         Objective measure goal  Compliance   Goal >70%  Leak   Goal < 24 lpm  AHI  Goal < 5  Usage  Goal >240

## 2017-10-18 PROCEDURE — 82274 ASSAY TEST FOR BLOOD FECAL: CPT | Performed by: PHYSICIAN ASSISTANT

## 2017-10-19 ENCOUNTER — DOCUMENTATION ONLY (OUTPATIENT)
Dept: SLEEP MEDICINE | Facility: CLINIC | Age: 52
End: 2017-10-19
Attending: INTERNAL MEDICINE
Payer: MEDICARE

## 2017-10-20 DIAGNOSIS — Z12.11 SCREEN FOR COLON CANCER: ICD-10-CM

## 2017-10-20 LAB — HEMOCCULT STL QL IA: NEGATIVE

## 2017-10-20 NOTE — LETTER
[facility]  [address] [place] [address]                              [phone]   October 20, 2017    Brooke John  [address] [address] [address]      Dear Brooke,    Here is a summary of your recent test results:  -FIT test (screening test for colon cancer) was normal. ADVISE - rechecking in 1 year.    Your tests results are enclosed.    Please call us at [phone] (or use miiCard) to address the above recommendations.            Thank you very much for choosing [facility].     Best regards,      Salome Hollis PA-C    Results for orders placed or performed in visit on 10/20/17   Fecal colorectal cancer screen (FIT)   Result Value Ref Range    Occult Blood Scn FIT Negative NEG^Negative

## 2017-10-25 ENCOUNTER — OFFICE VISIT (OUTPATIENT)
Dept: SLEEP MEDICINE | Facility: CLINIC | Age: 52
End: 2017-10-25
Attending: INTERNAL MEDICINE
Payer: MEDICARE

## 2017-10-25 VITALS
RESPIRATION RATE: 18 BRPM | DIASTOLIC BLOOD PRESSURE: 83 MMHG | WEIGHT: 204 LBS | HEIGHT: 61 IN | OXYGEN SATURATION: 95 % | SYSTOLIC BLOOD PRESSURE: 138 MMHG | HEART RATE: 72 BPM | BODY MASS INDEX: 38.51 KG/M2

## 2017-10-25 DIAGNOSIS — G47.33 OSA ON CPAP: Primary | ICD-10-CM

## 2017-10-25 PROCEDURE — 99211 OFF/OP EST MAY X REQ PHY/QHP: CPT | Mod: ZF

## 2017-10-25 NOTE — MR AVS SNAPSHOT
After Visit Summary   10/25/2017    Brooke John    MRN: [id number]           Patient Information     Date Of Birth          1965        Visit Information        Provider Department      10/25/2017 9:20 AM Doris Rodrigez MD [facility] [place], Sleep Study        Today's Diagnoses     HEVER on CPAP    -  1      Care Instructions      Your BMI is Body mass index is 39.07 kg/(m^2).  Weight management is a personal decision.  If you are interested in exploring weight loss strategies, the following discussion covers the approaches that may be successful. Body mass index (BMI) is one way to tell whether you are at a healthy weight, overweight, or obese. It measures your weight in relation to your height.  A BMI of 18.5 to 24.9 is in the healthy range. A person with a BMI of 25 to 29.9 is considered overweight, and someone with a BMI of 30 or greater is considered obese. More than two-thirds of American adults are considered overweight or obese.  Being overweight or obese increases the risk for further weight gain. Excess weight may lead to heart disease and diabetes.  Creating and following plans for healthy eating and physical activity may help you improve your health.  Weight control is part of healthy lifestyle and includes exercise, emotional health, and healthy eating habits. Careful eating habits lifelong are the mainstay of weight control. Though there are significant health benefits from weight loss, long-term weight loss with diet alone may be very difficult to achieve- studies show long-term success with dietary management in less than 10% of people. Attaining a healthy weight may be especially difficult to achieve in those with severe obesity. In some cases, medications, devices and surgical management might be considered.  What can you do?  If you are overweight or obese and are interested in methods for weight loss, you should discuss this with your provider.      Consider reducing daily calorie intake by 500 calories.     Keep a food journal.     Avoiding skipping meals, consider cutting portions instead.    Diet combined with exercise helps maintain muscle while optimizing fat loss. Strength training is particularly important for building and maintaining muscle mass. Exercise helps reduce stress, increase energy, and improves fitness. Increasing exercise without diet control, however, may not burn enough calories to loose weight.       Start walking three days a week 10-20 minutes at a time    Work towards walking thirty minutes five days a week     Eventually, increase the speed of your walking for 1-2 minutes at time    In addition, we recommend that you review healthy lifestyles and methods for weight loss available through the National Institutes of Health patient information sites:  http://win.niddk.nih.gov/publications/index.htm    And look into health and wellness programs that may be available through your health insurance provider, employer, local community center, or loren club.    Weight management plan: Patient was referred to their PCP to discuss a diet and exercise plan.     Your blood pressure was checked while you were in clinic today.  Please read the guidelines below about what these numbers mean and what you should do about them.  Your systolic blood pressure is the top number.  This is the pressure when the heart is pumping.  Your diastolic blood pressure is the bottom number.  This is the pressure in between beats.  If your systolic blood pressure is less than 120 and your diastolic blood pressure is less than 80, then your blood pressure is normal. There is nothing more that you need to do about it  If your systolic blood pressure is 120-139 or your diastolic blood pressure is 80-89, your blood pressure may be higher than it should be.  You should have your blood pressure re-checked within a year by a primary care provider.  If your systolic blood  "pressure is 140 or greater or your diastolic blood pressure is 90 or greater, you may have high blood pressure.  High blood pressure is treatable, but if left untreated over time it can put you at risk for heart attack, stroke, or kidney failure.  You should have your blood pressure re-checked by a primary care provider within the next four weeks.                Follow-ups after your visit        Follow-up notes from your care team     Return in about 1 year (around 10/25/2018).      Your next 10 appointments already scheduled     Nov 01, 2017   Procedure with Andre Martinez MD   [facility] ([facility])    [address] [place] [address]   [phone]           [facility] is located at [address] [place]              Who to contact     If you have questions or need follow up information about today's clinic visit or your schedule please contact [facility] directly at [phone].  Normal or non-critical lab and imaging results will be communicated to you by FlexElhart, letter or phone within 4 business days after the clinic has received the results. If you do not hear from us within 7 days, please contact the clinic through [employer] or phone. If you have a critical or abnormal lab result, we will notify you by phone as soon as possible.  Submit refill requests through [employer] or call your pharmacy and they will forward the refill request to us. Please allow 3 business days for your refill to be completed.          Additional Information About Your Visit        Qoostar Information     Qoostar lets you send messages to your doctor, view your test results, renew your prescriptions, schedule appointments and more. To sign up, go to [email or web address] . Click on \"Log in\" on the left side of the screen, which will take you to the Welcome page. Then click on \"Sign up Now\" on the right side of the page.     You will be asked to " "enter the access code listed below, as well as some personal information. Please follow the directions to create your username and password.     Your access code is: [email or web address]  Expires: 2018  9:47 AM     Your access code will  in 90 days. If you need help or a new code, please call your [place] clinic or [phone].        Care EveryWhere ID     This is your Care EveryWhere ID. This could be used by other organizations to access your [place] medical records  [id number]        Your Vitals Were     Pulse Respirations Height Last Period Pulse Oximetry BMI (Body Mass Index)    70 18 1.539 m (5' 0.59\") 09/15/2017 95% 39.07 kg/m2       Blood Pressure from Last 3 Encounters:   10/25/17 144/83   10/05/17 124/84   17 147/76    Weight from Last 3 Encounters:   10/25/17 92.5 kg (204 lb)   10/05/17 94 kg (207 lb 2 oz)   17 94.8 kg (209 lb)              We Performed the Following     Comprehensive DME        Primary Care Provider Office Phone # Fax #    Mandy Bhatia [phone]       [address] [place] [id number]        Equal Access to Services     RICO CARLOS AH: Hadii elodia quiroz hadasho Soomaali, waaxda luqadaha, qaybta kaalmada adeegyada, antony smith . So [facility] [phone].    ATENCIÓN: Si habla español, tiene a elizabeth disposición servicios gratuitos de asistencia lingüística. [facility] [phone].    We comply with applicable federal civil rights laws and [place] laws. We do not discriminate on the basis of race, color, national origin, age, disability, sex, sexual orientation, or gender identity.            Thank you!     Thank you for choosing [facility]  for your care. Our goal is always to provide you with excellent care. Hearing back from our patients is one way we can continue to improve our services. Please take a few minutes to complete the written survey that you may receive in the mail after your visit " with us. Thank you!             Your Updated Medication List - Protect others around you: Learn how to safely use, store and throw away your medicines at www.disposemymeds.org.          This list is accurate as of: 10/25/17  9:47 AM.  Always use your most recent med list.                   Brand Name Dispense Instructions for use Diagnosis    albuterol 108 (90 BASE) MCG/ACT Inhaler    PROAIR HFA/PROVENTIL HFA/VENTOLIN HFA    1 Inhaler    Inhale 2 puffs into the lungs every 4 hours as needed for shortness of breath / dyspnea or wheezing    Shortness of breath       cetirizine 10 MG tablet    zyrTEC    30 tablet    TAKE ONE TABLET BY MOUTH EVERY MORNING    Cough       fluticasone 50 MCG/ACT spray    FLONASE    16 g    USE TWO SPRAYS IN EACH NOSTRIL EVERY DAY    Moderate persistent asthma without complication       fluticasone-salmeterol 250-50 MCG/DOSE diskus inhaler    ADVAIR    1 Inhaler    Inhale 1 puff into the lungs 2 times daily    Moderate persistent asthma without complication       levothyroxine 75 MCG tablet    SYNTHROID/LEVOTHROID    90 tablet    Take 1 tablet (75 mcg) by mouth daily    Hypothyroidism, unspecified type       MELATONIN PO           Multi-vitamin Tabs tablet      Take 1 tablet by mouth daily        omeprazole 20 MG CR capsule    priLOSEC    180 capsule    Take 1 capsule (20 mg) by mouth 2 times daily    Cough, Gastroesophageal reflux disease, esophagitis presence not specified, Hiatal hernia       * order for DME     1 Device    Walking cane for balance and mobility    Risk for falls, Balance problems       * order for DME     80 Units    Incontinence items, disposable underwear, absorbency pads, liners.    Risk for falls, Balance problems, Mixed incontinence       polyethylene glycol powder    MIRALAX    510 g    Take 17 g (1 capful) by mouth daily as needed for constipation    Constipation, unspecified constipation type       * Notice:  This list has 2 medication(s) that are the same as  other medications prescribed for you. Read the directions carefully, and ask your doctor or other care provider to review them with you.

## 2017-10-25 NOTE — PATIENT INSTRUCTIONS
Your BMI is Body mass index is 39.07 kg/(m^2).  Weight management is a personal decision.  If you are interested in exploring weight loss strategies, the following discussion covers the approaches that may be successful. Body mass index (BMI) is one way to tell whether you are at a healthy weight, overweight, or obese. It measures your weight in relation to your height.  A BMI of 18.5 to 24.9 is in the healthy range. A person with a BMI of 25 to 29.9 is considered overweight, and someone with a BMI of 30 or greater is considered obese. More than two-thirds of American adults are considered overweight or obese.  Being overweight or obese increases the risk for further weight gain. Excess weight may lead to heart disease and diabetes.  Creating and following plans for healthy eating and physical activity may help you improve your health.  Weight control is part of healthy lifestyle and includes exercise, emotional health, and healthy eating habits. Careful eating habits lifelong are the mainstay of weight control. Though there are significant health benefits from weight loss, long-term weight loss with diet alone may be very difficult to achieve- studies show long-term success with dietary management in less than 10% of people. Attaining a healthy weight may be especially difficult to achieve in those with severe obesity. In some cases, medications, devices and surgical management might be considered.  What can you do?  If you are overweight or obese and are interested in methods for weight loss, you should discuss this with your provider.     Consider reducing daily calorie intake by 500 calories.     Keep a food journal.     Avoiding skipping meals, consider cutting portions instead.    Diet combined with exercise helps maintain muscle while optimizing fat loss. Strength training is particularly important for building and maintaining muscle mass. Exercise helps reduce stress, increase energy, and improves fitness.  Increasing exercise without diet control, however, may not burn enough calories to loose weight.       Start walking three days a week 10-20 minutes at a time    Work towards walking thirty minutes five days a week     Eventually, increase the speed of your walking for 1-2 minutes at time    In addition, we recommend that you review healthy lifestyles and methods for weight loss available through the National Institutes of Health patient information sites:  http://win.niddk.nih.gov/publications/index.htm    And look into health and wellness programs that may be available through your health insurance provider, employer, local community center, or loren club.    Weight management plan: Patient was referred to their PCP to discuss a diet and exercise plan.     Your blood pressure was checked while you were in clinic today.  Please read the guidelines below about what these numbers mean and what you should do about them.  Your systolic blood pressure is the top number.  This is the pressure when the heart is pumping.  Your diastolic blood pressure is the bottom number.  This is the pressure in between beats.  If your systolic blood pressure is less than 120 and your diastolic blood pressure is less than 80, then your blood pressure is normal. There is nothing more that you need to do about it  If your systolic blood pressure is 120-139 or your diastolic blood pressure is 80-89, your blood pressure may be higher than it should be.  You should have your blood pressure re-checked within a year by a primary care provider.  If your systolic blood pressure is 140 or greater or your diastolic blood pressure is 90 or greater, you may have high blood pressure.  High blood pressure is treatable, but if left untreated over time it can put you at risk for heart attack, stroke, or kidney failure.  You should have your blood pressure re-checked by a primary care provider within the next four weeks.

## 2017-10-25 NOTE — PROGRESS NOTES
DICTATED THE SLEEP CLINIC VISIT NOTE FOR TODAY.  Doris Rodrigez MD   of Medicine,  Division of Pulmonary/Sleep Medicine  [facility].

## 2017-10-25 NOTE — NURSING NOTE
"Chief Complaint   Patient presents with     CPAP Follow Up       Initial /83  Pulse 70  Resp 18  Ht 1.539 m (5' 0.59\")  Wt 92.5 kg (204 lb)  LMP 09/15/2017  SpO2 95%  BMI 39.07 kg/m2 Estimated body mass index is 39.07 kg/(m^2) as calculated from the following:    Height as of this encounter: 1.539 m (5' 0.59\").    Weight as of this encounter: 92.5 kg (204 lb).  Medication Reconciliation: complete   Honey Hinkle CMA       "

## 2017-10-25 NOTE — Clinical Note
James Chairez and Ivonne,  Please make a f/u STM check on the pt. Her compliance has definitely improved since she got the mask fit optimized last week. She will need to meet compliance requirements by December 14. (as of now she is only at 43% with device usage of greater than or equal to 4 hours) Please provide me with the updated compliance download in the next 4 weeks.  Thank you, Cheri

## 2017-10-26 NOTE — PROGRESS NOTES
SLEEP MEDICINE FOLLOWUP VISIT      DATE OF SLEEP CLINIC VISIT:   10/25/2017.      CHIEF COMPLAINT AND PURPOSE OF VISIT:  Followup of HEVER, auto CPAP return visit.      HISTORY OF PRESENT ILLNESS:  Ms. Brooke Wong a 52-year-old female who was recently diagnosed with obstructive sleep apnea and is here for a followup of obstructive sleep apnea/CPAP return visit.  Date of polysomnography evaluation on 08/13/2017 when she was diagnosed with mild obstructive sleep apnea with an AHI of 10.3 per hour, RDI was 23.8 per hour and the REM AHI was 32.9 per hour.  During the sleep study, she also was noted to have mild sleep-associated hypoxemia.  The patient was subsequently started with the auto CPAP device 5-15 cm of water.  She was experiencing difficulty, unable to tolerate her previous mask which was a nasal mask.  It was only a week ago that she got a mask fit optimized and is currently using a full face mask which she reports feels very comfortable and  her CPAPusage has improved since then.  She sleeps alone, and, hence, there is not anyone to report whether or not she snores or has any apneic episodes while she is using the CPAP.  She reports air hunger when she first puts the mask on .  However, she denies awakenings due to gasping for air or choking sensation with CPAP.  She reports some improvement in her sleep quality, has not   been waking up as frequently as she used to before, since she has been using the CPAP device, particularly in the last week and she also notices some improvement in the overall energy levels as well.      She still reports excessive daytime sleepiness endorsing an Kirby Sleepiness score of 15/24, which is even slightly higher than the Kirby Sleepiness Score that she endorsed prior to CPAP treatment , which was 13/24. This does not correlate with her subjective report  that she has not been as sleepy as she used to be before starting the CPAP treatment.     Based on the compliance  "download in the last month, she has used the device for 23/30 days with only 43% of nights with greater than or equal to 4 hours of usage.  The average usage in the days used was 4 hours and 43 minutes.  Median pressure was 7.1, 95th percentile at 10.9, max pressure at 12.1 cm of water.  The 95th percentile air leak was 13.5 liters per minute, residual AHI was 2.3 per hour.      The patient does not drive.      She goes to bed at 10:00 p.m., usually takes 30 minutes to a few hours to fall asleep.  Sometimes she does not sleep at night quickly if she is watching a favorite television show and sometimes she also has dinner later at night and because of the heartburn and indigestion she may have difficulty falling asleep.       Recently checked TSH.  It was elevated at 4.64 as dated 10/5/2017 for which she has been following up with a primary care provider and reports that the dosage of the Synthroid was adjusted.      Current meds, Past medical history, Past surgical history, Allergies, Social history, Family history: reviewed, per EMR    PHYSICAL EXAMINATION:   VITAL SIGNS: /83  Pulse 72  Resp 18  Ht 1.539 m (5' 0.59\")  Wt 92.5 kg (204 lb)  LMP 09/15/2017  SpO2 95%  BMI 39.07 kg/m2  General appearance:  in no apparent distress  Pt is dressed casually, cooperative with good eye contact.   Speech is spontaneous with regular rate and volume.   Mood: euthymic; affect congruent with full range and intensity.   Sensorium: awake, alert and oriented to person, place, time, and situation.    IMPRESSION/ REPORT/PLAN:   1.  Mild obstructive sleep apnea, pronounced during REM sleep.  The patient reports positive benefits from the continued use of the CPAP device.  Her compliance initially was affected due to the interface concerns, with improvement  ever since mask fit optimization a week ago.  Based on the compliance measures, the obstructive sleep apnea appears to be adequately controlled.      Since she reports " air hunger when she first puts the mask on and also based on the pressure settings per the compliance download, I recommended increasing the minimum pressure setting on the auto CPAP to 7 cm of water while the max pressure will remain the same at 12 cm of water.  She was recommended to use the device regularly during sleep and use it during the entire sleep period to derive maximum benefit.    She needs to meet the compliance requirement by 12/14. She is being followed through  Sleep Therapy Management program. Plan is to review the compliance download in the next month.  If she meets the compliance requirements, the plan is for her to continue using the device regularly during sleep and getting the supplies for the device regularly replaced based on the insurance coverage with followup in 1 year or sooner if there are any concerns.    We discussed weight management with diet and exercise.      2. We also discussed that chronic insufficient sleep could also be another potential factor that is contributing to her symptoms of excessive daytime sleepiness.  We discussed optimizing sleep hygiene measures and regularizing the sleep-wake schedule, aiming at obtaining at least 7-8 hours of sleep per night, including avoiding naps during the day.        3.  Hypothyroidism.  Recently checked TSH was increased at 4.64, for which she has been following up with her primary care provider and had the Synthroid dose adjusted.  She will follow up with her primary care provider to get the TSH levels rechecked.  We discussed the association of hypothyroidism with symptoms of fatigue and excessive daytime sleepiness.     4.Her systolic blood pressure was elevated today; however, there were some times where her blood pressure was within normal limits.  She is not a known hypertensive.  We discussed monitoring the blood pressures and maintaining logs and following up with a primary care provider for further evaluation and management.     "  The above note was dictated using voice recognition software. Although reviewed after completion, some word and grammatical error may remain . Please contact the author for any clarifications.    \"I spent a total of 25 minutes face to face with Darci John during today's office visit. Over 50% of this time was spent counseling the patient and  coordinating care regarding sleep apnea, CPAP treatment, compliance, optimizing sleep hygiene measures and regularizing the sleep-wake schedule.\"        WOODY DONALDSON MD             D: 10/25/2017 16:44   T: 10/25/2017 21:11   MT: TOR      Name:     LUISITO JOHNITA   MRN:      [id number]        Account:      [id number]   :      1965           Visit Date:   10/25/2017      Document: [id number]      "

## 2017-10-31 ENCOUNTER — DOCUMENTATION ONLY (OUTPATIENT)
Dept: SLEEP MEDICINE | Facility: CLINIC | Age: 52
End: 2017-10-31

## 2017-10-31 NOTE — PROGRESS NOTES
STM Recheck Visit     Data only recheck    Assessment: Pt not meeting objective benchmarks. compliance     Action plan: 2 week STM recheck appt scheduled   Device settings:    EPAP Min Auto CPAP: 7.0 (The minimum allowable pressure in cmH2O)    EPAP Max Auto CPAP: 15.0 (The maximum allowable pressure in cmH2O)    Target IPAP (95% of Target) 14 day average (Resmed): 11.2cm H20      Objective measures: 14 day rolling measure      Compliance   (Goal >70%)  --% compliance greater than four hours rolling average 14 days: 71 %      Leak  (Goal < 24 lpm)  --95% OF Leak in litres Rolling Average 14 Days: 12 lpm last data upload         AHI  (Goal < 5)  --AHI Rolling Average 14 Day: 2.47  last data upload         Usage  (Goal >240)  --Time mask on face 14 day average: 317 min

## 2017-11-01 ENCOUNTER — HOSPITAL ENCOUNTER (OUTPATIENT)
Facility: CLINIC | Age: 52
Discharge: HOME | End: 2017-11-01
Attending: INTERNAL MEDICINE | Admitting: INTERNAL MEDICINE
Payer: MEDICARE

## 2017-11-01 VITALS
SYSTOLIC BLOOD PRESSURE: 118 MMHG | DIASTOLIC BLOOD PRESSURE: 86 MMHG | RESPIRATION RATE: 14 BRPM | OXYGEN SATURATION: 99 %

## 2017-11-01 LAB — COLONOSCOPY: NORMAL

## 2017-11-01 PROCEDURE — G0500 MOD SEDAT ENDO SERVICE >5YRS: HCPCS | Performed by: INTERNAL MEDICINE

## 2017-11-01 PROCEDURE — 45378 DIAGNOSTIC COLONOSCOPY: CPT | Performed by: INTERNAL MEDICINE

## 2017-11-01 PROCEDURE — G0121 COLON CA SCRN NOT HI RSK IND: HCPCS | Performed by: INTERNAL MEDICINE

## 2017-11-01 PROCEDURE — 25000128 H RX IP 250 OP 636: Performed by: INTERNAL MEDICINE

## 2017-11-01 RX ORDER — ONDANSETRON 2 MG/ML
4 INJECTION INTRAMUSCULAR; INTRAVENOUS EVERY 6 HOURS PRN
Status: DISCONTINUED | OUTPATIENT
Start: 2017-11-01 | End: 2017-11-01 | Stop reason: HOSPADM

## 2017-11-01 RX ORDER — NALOXONE HYDROCHLORIDE 0.4 MG/ML
.1-.4 INJECTION, SOLUTION INTRAMUSCULAR; INTRAVENOUS; SUBCUTANEOUS
Status: DISCONTINUED | OUTPATIENT
Start: 2017-11-01 | End: 2017-11-01 | Stop reason: HOSPADM

## 2017-11-01 RX ORDER — ONDANSETRON 2 MG/ML
4 INJECTION INTRAMUSCULAR; INTRAVENOUS
Status: DISCONTINUED | OUTPATIENT
Start: 2017-11-01 | End: 2017-11-01 | Stop reason: HOSPADM

## 2017-11-01 RX ORDER — FLUMAZENIL 0.1 MG/ML
0.2 INJECTION, SOLUTION INTRAVENOUS
Status: DISCONTINUED | OUTPATIENT
Start: 2017-11-01 | End: 2017-11-01 | Stop reason: HOSPADM

## 2017-11-01 RX ORDER — LIDOCAINE 40 MG/G
CREAM TOPICAL
Status: DISCONTINUED | OUTPATIENT
Start: 2017-11-01 | End: 2017-11-01 | Stop reason: HOSPADM

## 2017-11-01 RX ORDER — FENTANYL CITRATE 50 UG/ML
INJECTION, SOLUTION INTRAMUSCULAR; INTRAVENOUS PRN
Status: DISCONTINUED | OUTPATIENT
Start: 2017-11-01 | End: 2017-11-01 | Stop reason: HOSPADM

## 2017-11-01 RX ORDER — ONDANSETRON 4 MG/1
4 TABLET, ORALLY DISINTEGRATING ORAL EVERY 6 HOURS PRN
Status: DISCONTINUED | OUTPATIENT
Start: 2017-11-01 | End: 2017-11-01 | Stop reason: HOSPADM

## 2017-11-01 NOTE — H&P
Pre-Endoscopy History and Physical     Brooke John MRN# [id number]   YOB: 1965 Age: 52 year old     Date of Procedure: 2017  Primary care provider: Mandy Bhatia  Type of Endoscopy: Colonoscopy with possible biopsy, possible polypectomy  Reason for Procedure: screen  Type of Anesthesia Anticipated: Conscious Sedation    HPI:    Brooke is a 52 year old female who will be undergoing the above procedure.      A history and physical has been performed. The patient's medications and allergies have been reviewed. The risks and benefits of the procedure and the sedation options and risks were discussed with the patient.  All questions were answered and informed consent was obtained.      She denies a personal or family history of anesthesia complications or bleeding disorders.     Patient Active Problem List   Diagnosis     Scoliosis     Arthritis     Hypothyroidism     History of herniated intervertebral disc     Pulmonary nodules     Moderate persistent asthma     Lung nodule     Allergic reaction to drug - amoxicillin - Hives - proven 2016      Morbid obesity (H)        Past Medical History:   Diagnosis Date     Arthritis      CARDIOVASCULAR SCREENING; LDL GOAL LESS THAN 130      Depressive disorder      Lung nodule     w/u neg - Dr Boyer     Moderate persistent asthma      Other specified hypothyroidism 3/14/2016     Scoliosis         Past Surgical History:   Procedure Laterality Date     ARTHROSCOPY KNEE      left      BACK SURGERY        SECTION       COSMETIC SURGERY       ESOPHAGOSCOPY, GASTROSCOPY, DUODENOSCOPY (EGD), COMBINED N/A 2017    Procedure: COMBINED ESOPHAGOSCOPY, GASTROSCOPY, DUODENOSCOPY (EGD);  EGD w MAC:DX:Esophageal dysphagia,Hiatal hernia/prep & pre-op info mailed;  Surgeon: Moshe Caro MD;  Location:  GI     EYE SURGERY       ORTHOPEDIC SURGERY      lumbar discectomy       Social History   Substance Use Topics     Smoking  status: Never Smoker     Smokeless tobacco: Never Used     Alcohol use No       Family History   Problem Relation Age of Onset     Scoliosis Son      Learning Disorder Son      lives in a protected place     HEART DISEASE Father      triple bypass     Unknown/Adopted Father      Unknown/Adopted Mother      Type 2 Diabetes Maternal Grandfather      Other - See Comments Brother      5 brothers, 1 sister.      Colon Cancer No family hx of        Prior to Admission medications    Medication Sig Start Date End Date Taking? Authorizing Provider   levothyroxine (SYNTHROID/LEVOTHROID) 75 MCG tablet Take 1 tablet (75 mcg) by mouth daily 10/6/17   Salome Hollis PA-C   omeprazole (PRILOSEC) 20 MG CR capsule Take 1 capsule (20 mg) by mouth 2 times daily 10/5/17   Salome Hollis PA-C   fluticasone-salmeterol (ADVAIR) 250-50 MCG/DOSE diskus inhaler Inhale 1 puff into the lungs 2 times daily 8/7/17   Kehinde Boyer MD   MELATONIN PO     Reported, Patient   cetirizine (ZYRTEC) 10 MG tablet TAKE ONE TABLET BY MOUTH EVERY MORNING 6/23/17   Alondra Michele PA-C   order for DME Walking cane for balance and mobility 5/24/17   Mandy Bhatia MD   order for DME Incontinence items, disposable underwear, absorbency pads, liners. 5/24/17   Mandy Bhatia MD   polyethylene glycol (MIRALAX) powder Take 17 g (1 capful) by mouth daily as needed for constipation 5/12/17   Baylee Vergara APRN CNP   fluticasone (FLONASE) 50 MCG/ACT spray USE TWO SPRAYS IN EACH NOSTRIL EVERY DAY 3/9/17   Kemar Leary MD   albuterol (PROAIR HFA/PROVENTIL HFA/VENTOLIN HFA) 108 (90 BASE) MCG/ACT Inhaler Inhale 2 puffs into the lungs every 4 hours as needed for shortness of breath / dyspnea or wheezing 12/12/16   Kristy Griffith APRN CNS   multivitamin, therapeutic with minerals (MULTI-VITAMIN) TABS Take 1 tablet by mouth daily    Reported, Patient       Allergies   Allergen Reactions     Amoxicillin      Lip swelling   "    Aspirin      GI upset     Zithromax [Azithromycin]      RASH        REVIEW OF SYSTEMS:   5 point ROS negative except as noted above in HPI, including Gen., Resp., CV, GI &  system review.    PHYSICAL EXAM:   [facility] 09/15/2017 Estimated body mass index is 39.07 kg/(m^2) as calculated from the following:    Height as of 10/25/17: 1.539 m (5' 0.59\").    Weight as of 10/25/17: 92.5 kg (204 lb).   GENERAL APPEARANCE: alert, and oriented  MENTAL STATUS: alert  AIRWAY EXAM: Mallampatti Class I (visualization of the soft palate, fauces, uvula, anterior and posterior pillars)  RESP: lungs clear to auscultation - no rales, rhonchi or wheezes  CV: regular rates and rhythm  DIAGNOSTICS:    Not indicated    IMPRESSION   ASA Class 1 - Healthy patient, no medical problems    PLAN:   Plan for Colonoscopy with possible biopsy, possible polypectomy. We discussed the risks, benefits and alternatives and the patient wished to proceed.    The above has been forwarded to the consulting provider.      Signed Electronically by: Andre Martinez  November 1, 2017          "

## 2017-11-01 NOTE — LETTER
October 16, 2017      Brooke FRANKY Dora  [id number] [address] [address]        Thank you for choosing [facility]. You are scheduled for the following service.     Date:  11/01/2017 Wednesday             Procedure:  COLONOSCOPY  Doctor:        Dr. Andre Martinez   Arrival Time:  8:30 AM  *check in at Emergency/Endoscopy desk*  Procedure Time:  9:00 AM    Location:   [facility]        Endoscopy Department, First Floor (Enter through ER Doors) *        [address] [place], [place] [address]      [phone] or [phone] () to reschedule      MIRALAX -GATORADE  PREP  Colonoscopy is the most accurate test to detect colon polyps and colon cancer; and the only test where polyps can be removed. During this procedure, a doctor examines the lining of your large intestine and rectum through a flexible tube.     Transportation  Arrange for a ride for the day of your procedure with a responsible adult.  A taxi ride is not an option unless you are accompanied by a responsible adult. If you fail to arrange transportation with a responsible adult, your procedure will be cancelled and rescheduled.    Prescription enclosed for the  following supplies to be picked up at your local pharmacy:  - 2 (two) bisacodyl tablets: each tablet contains 5 mg.  (Dulcolax  laxative NOT Dulcolax  stool softener)   - 1 (one) 8.3 oz bottle of Polyethylene Glycol (PEG) 3350 Powder   (MiraLAX , Smooth LAX , ClearLAX  or equivalent)  - 64 oz Gatorade    Regular Gatorade, Gatorade G2 , Powerade , Powerade Zero  or Pedialyte  is acceptable. Red colored flavors are not allowed; all other colors (yellow, green, orange, purple and blue) are okay. It is also okay to buy two 2.12 oz packets of powdered Gatorade that can be mixed with water to a total volume of 64 oz of liquid.  - 1 (one) 10 oz bottle of Magnesium Citrate (Red colored flavors are not allowed)  It is also okay  for you to use a 0.5 oz package of powdered magnesium citrate (17 g) mixed with 10 oz of water.      PREPARATION FOR COLONOSCOPY    7 days before:    Discontinue fiber supplements and medications containing iron. This includes Metamucil  and Fibercon ; and multivitamins with iron.  3 days before:    Begin a low-fiber diet. A low-fiber diet helps making the cleanout more effective.     Examples of a low-fiber diet include (but are not limited to): white bread, white rice, pasta, crackers, fish, chicken, eggs, ground beef, creamy peanut butter, cooked/steamed/boiled vegetables, canned fruit, bananas, melons, milk, plain yogurt cheese, salad dressing and other condiments.     The following are not allowed on a low-fiber diet: seeds, nuts, popcorn, bran, whole wheat, corn, quinoa, raw fruits and vegetables, berries and dried fruit, beans and lentils.    For additional details on low-fiber diet, please refer to the table on the last page.  2 days before:    Continue the low-fiber diet.     Drink at least 8 glasses of water throughout the day.     Stop eating solid foods at 11:45 pm.  1 day before:    In the morning: begin a clear liquid diet (liquids you can see through).     Examples of a clear liquid diet include: water, clear broth or bouillon, Gatorade, Pedialyte or Powerade, carbonated and non-carbonated soft drinks (Sprite , 7-Up , ginger ale), strained fruit juices without pulp (apple, white grape, white cranberry), Jell-O  and popsicles.     The following are not allowed on a clear liquid diet: red liquids, alcoholic beverages, coffee, dairy products (milk, creamer, and yogurt), protein shakes, creamy broths, juice with pulp and chewing tobacco.    At noon: take 2 (two) bisacodyl tablets     At 4 (and no later than 6pm): start drinking the Miralax-Gatorade preparation (8.3 oz of Miralax mixed with 64 oz of Gatorade in a large pitcher). Drink 1(one) 8 oz glass every 15 minutes thereafter, until the mixture is  gone.    COLON CLEANSING TIPS: drink adequate amounts of fluids before and after your colon cleansing to prevent dehydration. Stay near a toilet because you will have diarrhea. Even if you are sitting on the toilet, continue to drink the cleansing solution every 15 minutes. If you feel nauseous or vomit, rinse your mouth with water, take a 15 to 30-minute-break and then continue drinking the solution. You will be uncomfortable until the stool has flushed from your colon (in about 2 to 4 hours). You may feel chilled.    Day of your procedure  You may take all of your morning medications including blood pressure medications, blood thinners (if you have not been instructed to stop these by our office), methadone, anti-seizure medications with sips of water 3 hours prior to your procedure or earlier. Do not take insulin or vitamins prior to your procedure. Continue the clear liquid diet.   4 hours prior: drink 10 oz of magnesium citrate. It may be easier to drink it with a straw.    STOP consuming all liquids after that.     Do not take anything by mouth during this time.     Allow extra time to travel to your procedure as you may need to stop and use a restroom along the way.  You are ready for the procedure, if you followed all instructions and your stool is no longer formed, but clear or yellow liquid. If you are unsure whether your colon is clean, please call our office at [phone] before you leave for your appointment.  Bring the following to your procedure:  - Insurance Card/Photo ID.   - List of current medications including over-the-counter medications and supplements.   - Your rescue inhaler if you currently use one to control asthma.      Canceling or rescheduling your appointment:   If you must cancel or reschedule your appointment, please call [phone] as soon as possible.      COLONOSCOPY PRE-PROCEDURE CHECKLIST  If you have diabetes, ask your regular doctor for diet and medication restrictions.  If  you take an anticoagulant or anti-platelet medication (such as Coumadin , Lovenox , Pradaxa , Xarelto , Eliquis , etc.), please call your primary doctor for advice on holding this medication.  If you take aspirin you may continue to do so.  If you are or may be pregnant, please discuss the risks and benefits of this procedure with your doctor.          What happens during a colonoscopy?    Plan to spend up to two hours, starting at registration time, at the endoscopy center the day of your procedure. The colonoscopy takes an average of 15 to 30 minutes. Recovery time is about 30 minutes.    Before the exam:    You will change into a gown.    Your medical history and medication list will be reviewed with you, unless that has been done over the phone prior to the procedure.     A nurse will insert an intravenous (IV) line into your hand or arm.    The doctor will meet with you and will give you a consent form to sign.    During the exam:     Medicine will be given through the IV line to help you relax.     Your heart rate and oxygen levels will be monitored. If your blood pressure is low, you may be given fluids through the IV line.     The doctor will insert a flexible hollow tube, called a colonoscope, into your rectum. The scope will be advanced slowly through the large intestine (colon).    You may have a feeling of fullness or pressure.     If an abnormal tissue or a polyp is found, the doctor may remove it through the endoscope for closer examination, or biopsy. Tissue removal is painless    After the exam:           Any tissue samples removed during the exam will be sent to a lab for evaluation. It may take 5-7 working days for you to be notified of the results.     A nurse will provide you with complete discharge instructions before you leave the endoscopy center. Be sure to ask the nurse for specific instructions if you take blood thinners such as Aspirin, Coumadin or Plavix.     The doctor will prepare a  full report for you and for the physician who referred you for the procedure.     Your doctor will talk with you about the initial results of your exam.      Medication given during the exam will prohibit you from driving for the rest of the day.     Following the exam, you may resume your normal diet. Your first meal should be light, no greasy foods. Avoid alcohol until the next day.     You may resume your regular activities the day after the procedure.     LOW-FIBER DIET    Foods RECOMMENDED Foods to AVOID   Breads, Cereal, Rice and Pasta:   White bread, rolls, biscuits, croissant and elisha toast.   Waffles, Prydeinig toast and pancakes.   White rice, noodles, pasta, macaroni and peeled cooked potatoes.   Plain crackers and saltines.   Cooked cereals: farina, cream of rice.   Cold cereals: Puffed Rice , Rice Krispies , Corn Flakes  and Special K    Breads, Cereal, Rice and Pasta:   Breads or rolls with nuts, seeds or fruit.   Whole wheat, pumpernickel, rye breads and cornbread.   Potatoes with skin, brown or wild rice, and kasha (buckwheat).     Vegetables:   Tender cooked and canned vegetables without seeds: carrots, asparagus tips, green or wax beans, pumpkin, spinach, lima beans. Vegetables:   Raw or steamed vegetables.   Vegetables with seeds.   Sauerkraut.   Winter squash, peas, broccoli, Brussel sprouts, cabbage, onions, cauliflower, baked beans, peas and corn.   Fruits:   Strained fruit juice.   Canned fruit, except pineapple.   Ripe bananas and melon. Fruits:   Prunes and prune juice.   Raw fruits.   Dried fruits: figs, dates and raisins.   Milk/Dairy:   Milk: plain or flavored.   Yogurt, custard and ice cream.   Cheese and cottage cheese Milk/Dairy:     Meat and other proteins:   ground, well-cooked tender beef, lamb, ham, veal, pork, fish, poultry and organ meats.   Eggs.   Peanut butter without nuts. Meat and other proteins:   Tough, fibrous meats with gristle.   Dry beans, peas and lentils.   Peanut  butter with nuts.   Tofu.   Fats, Snack, Sweets, Condiments and Beverages:   Margarine, butter, oils, mayonnaise, sour cream and salad dressing, plain gravy.   Sugar, hard candy, clear jelly, honey and syrup.   Spices, cooked herbs, bouillon, broth and soups made with allowed vegetable, ketchup and mustard.   Coffee, tea and carbonated drinks.   Plain cakes, cookies and pretzels.   Gelatin, plain puddings, custard, ice cream, sherbet and popsicles. Fats, Snack, Sweets, Condiments and Beverages:   Nuts, seeds and coconut.   Jam, marmalade and preserves.   Pickles, olives, relish and horseradish.   All desserts containing nuts, seeds, dried fruit and coconut; or made from whole grains or bran.   Candy made with nuts or seeds.   Popcorn.           DIRECTIONS TO THE ENDOSCOPY DEPARTMENT     From the north ([facility])  Take [address] South, exit on [address] [address]. Get into the left hand bette, turn left (east), go one-half mile to [address] and turn left. Go north to the first stoplight, take a right on [address] and follow it to the Emergency entrance.    From the south ([facility])  Take 35N to the [address]E split and exit on [address] [address]. On [address] [address], turn left (west) to [address]. Turn right (north) on [address]. Go north to the first stoplight, take a right on [address] and follow it to the Emergency entrance.    From the east via 35E ([facility])  Take [address] south to [address] [address] exit. Turn right on [address] . Go west to [address]. Turn right (north) on [address]. Go to the first stoplight, take a right and follow on [address] to the Emergency entrance.    From the east via [address] ([facility])  Take [address] to [address]. Turn left (south) on [address] to [address]. Turn left (east) on [address] and follow it to the Emergency entrance.    From the west via [address] ([place], [place])  Take  [address] east to [address]. Turn right (south) on [address] to [address]. Turn left (east) on [address] and follow it to the Emergency entrance.

## 2017-11-08 ENCOUNTER — DOCUMENTATION ONLY (OUTPATIENT)
Dept: SLEEP MEDICINE | Facility: CLINIC | Age: 52
End: 2017-11-08
Attending: INTERNAL MEDICINE
Payer: MEDICARE

## 2017-11-26 ENCOUNTER — HEALTH MAINTENANCE LETTER (OUTPATIENT)
Age: 52
End: 2017-11-26

## 2017-11-29 ENCOUNTER — OFFICE VISIT (OUTPATIENT)
Dept: SLEEP MEDICINE | Facility: CLINIC | Age: 52
End: 2017-11-29
Attending: INTERNAL MEDICINE
Payer: MEDICARE

## 2017-11-29 ENCOUNTER — TELEPHONE (OUTPATIENT)
Dept: GASTROENTEROLOGY | Facility: CLINIC | Age: 52
End: 2017-11-29

## 2017-11-29 VITALS
WEIGHT: 208 LBS | SYSTOLIC BLOOD PRESSURE: 151 MMHG | BODY MASS INDEX: 39.27 KG/M2 | DIASTOLIC BLOOD PRESSURE: 90 MMHG | HEIGHT: 61 IN | RESPIRATION RATE: 18 BRPM | HEART RATE: 64 BPM | OXYGEN SATURATION: 99 %

## 2017-11-29 DIAGNOSIS — G47.33 OSA ON CPAP: Primary | ICD-10-CM

## 2017-11-29 PROCEDURE — 99211 OFF/OP EST MAY X REQ PHY/QHP: CPT | Mod: ZF

## 2017-11-29 NOTE — NURSING NOTE
"Chief Complaint   Patient presents with     CPAP Follow Up     Cpap doesn't seem to be strong enough at times       Initial /90  Pulse 64  Resp 18  Ht 1.549 m (5' 1\")  Wt 94.3 kg (208 lb)  SpO2 99%  BMI 39.3 kg/m2 Estimated body mass index is 39.3 kg/(m^2) as calculated from the following:    Height as of this encounter: 1.549 m (5' 1\").    Weight as of this encounter: 94.3 kg (208 lb).  Medication Reconciliation: complete     TIMOTHY Gardner        "

## 2017-11-29 NOTE — PROGRESS NOTES
DICTATED THE SLEEP CLINIC VISIT NOTE FOR TODAY.  Doris Rodrigez MD   of Medicine,  Division of Pulmonary/Sleep Medicine  [facility].'

## 2017-11-29 NOTE — PATIENT INSTRUCTIONS
Your BMI is Body mass index is 39.3 kg/(m^2).  Weight management is a personal decision.  If you are interested in exploring weight loss strategies, the following discussion covers the approaches that may be successful. Body mass index (BMI) is one way to tell whether you are at a healthy weight, overweight, or obese. It measures your weight in relation to your height.  A BMI of 18.5 to 24.9 is in the healthy range. A person with a BMI of 25 to 29.9 is considered overweight, and someone with a BMI of 30 or greater is considered obese. More than two-thirds of American adults are considered overweight or obese.  Being overweight or obese increases the risk for further weight gain. Excess weight may lead to heart disease and diabetes.  Creating and following plans for healthy eating and physical activity may help you improve your health.  Weight control is part of healthy lifestyle and includes exercise, emotional health, and healthy eating habits. Careful eating habits lifelong are the mainstay of weight control. Though there are significant health benefits from weight loss, long-term weight loss with diet alone may be very difficult to achieve- studies show long-term success with dietary management in less than 10% of people. Attaining a healthy weight may be especially difficult to achieve in those with severe obesity. In some cases, medications, devices and surgical management might be considered.  What can you do?  If you are overweight or obese and are interested in methods for weight loss, you should discuss this with your provider.     Consider reducing daily calorie intake by 500 calories.     Keep a food journal.     Avoiding skipping meals, consider cutting portions instead.    Diet combined with exercise helps maintain muscle while optimizing fat loss. Strength training is particularly important for building and maintaining muscle mass. Exercise helps reduce stress, increase energy, and improves fitness.  Increasing exercise without diet control, however, may not burn enough calories to loose weight.       Start walking three days a week 10-20 minutes at a time    Work towards walking thirty minutes five days a week     Eventually, increase the speed of your walking for 1-2 minutes at time    In addition, we recommend that you review healthy lifestyles and methods for weight loss available through the National Institutes of Health patient information sites:  http://win.niddk.nih.gov/publications/index.htm    And look into health and wellness programs that may be available through your health insurance provider, employer, local community center, or loren club.    Weight management plan: Patient was referred to their PCP to discuss a diet and exercise plan.     Your blood pressure was checked while you were in clinic today.  Please read the guidelines below about what these numbers mean and what you should do about them.  Your systolic blood pressure is the top number.  This is the pressure when the heart is pumping.  Your diastolic blood pressure is the bottom number.  This is the pressure in between beats.  If your systolic blood pressure is less than 120 and your diastolic blood pressure is less than 80, then your blood pressure is normal. There is nothing more that you need to do about it  If your systolic blood pressure is 120-139 or your diastolic blood pressure is 80-89, your blood pressure may be higher than it should be.  You should have your blood pressure re-checked within a year by a primary care provider.  If your systolic blood pressure is 140 or greater or your diastolic blood pressure is 90 or greater, you may have high blood pressure.  High blood pressure is treatable, but if left untreated over time it can put you at risk for heart attack, stroke, or kidney failure.  You should have your blood pressure re-checked by a primary care provider within the next four weeks.

## 2017-11-29 NOTE — Clinical Note
Hi, the patient was seen by me at the sleep clinic today for a follow-up of the sleep apnea. She mentioned that approximately 10 days ago she had an episode of emesis after that she started experiencing throat discomfort , feeling as if something is stuck in her esophagus , she feels the need to drink water constantly to get relieved of the irritation. She also has been reporting acid regurgitation. I have recommended her to follow up with you for further evaluation and management of her symptoms.  Thank you very much, Doris Rodrigez MD  of Medicine, Division of Pulmonary/Sleep Medicine [facility].

## 2017-11-29 NOTE — MR AVS SNAPSHOT
After Visit Summary   11/29/2017    Brooke John    MRN: [id number]           Patient Information     Date Of Birth          1965        Visit Information        Provider Department      11/29/2017 9:00 AM Doris Rodrigez MD [facility], [place], Sleep Study        Today's Diagnoses     HEVER on CPAP    -  1      Care Instructions      Your BMI is Body mass index is 39.3 kg/(m^2).  Weight management is a personal decision.  If you are interested in exploring weight loss strategies, the following discussion covers the approaches that may be successful. Body mass index (BMI) is one way to tell whether you are at a healthy weight, overweight, or obese. It measures your weight in relation to your height.  A BMI of 18.5 to 24.9 is in the healthy range. A person with a BMI of 25 to 29.9 is considered overweight, and someone with a BMI of 30 or greater is considered obese. More than two-thirds of American adults are considered overweight or obese.  Being overweight or obese increases the risk for further weight gain. Excess weight may lead to heart disease and diabetes.  Creating and following plans for healthy eating and physical activity may help you improve your health.  Weight control is part of healthy lifestyle and includes exercise, emotional health, and healthy eating habits. Careful eating habits lifelong are the mainstay of weight control. Though there are significant health benefits from weight loss, long-term weight loss with diet alone may be very difficult to achieve- studies show long-term success with dietary management in less than 10% of people. Attaining a healthy weight may be especially difficult to achieve in those with severe obesity. In some cases, medications, devices and surgical management might be considered.  What can you do?  If you are overweight or obese and are interested in methods for weight loss, you should discuss this with your provider.      Consider reducing daily calorie intake by 500 calories.     Keep a food journal.     Avoiding skipping meals, consider cutting portions instead.    Diet combined with exercise helps maintain muscle while optimizing fat loss. Strength training is particularly important for building and maintaining muscle mass. Exercise helps reduce stress, increase energy, and improves fitness. Increasing exercise without diet control, however, may not burn enough calories to loose weight.       Start walking three days a week 10-20 minutes at a time    Work towards walking thirty minutes five days a week     Eventually, increase the speed of your walking for 1-2 minutes at time    In addition, we recommend that you review healthy lifestyles and methods for weight loss available through the National Institutes of Health patient information sites:  http://win.niddk.nih.gov/publications/index.htm    And look into health and wellness programs that may be available through your health insurance provider, employer, local community center, or loren club.    Weight management plan: Patient was referred to their PCP to discuss a diet and exercise plan.     Your blood pressure was checked while you were in clinic today.  Please read the guidelines below about what these numbers mean and what you should do about them.  Your systolic blood pressure is the top number.  This is the pressure when the heart is pumping.  Your diastolic blood pressure is the bottom number.  This is the pressure in between beats.  If your systolic blood pressure is less than 120 and your diastolic blood pressure is less than 80, then your blood pressure is normal. There is nothing more that you need to do about it  If your systolic blood pressure is 120-139 or your diastolic blood pressure is 80-89, your blood pressure may be higher than it should be.  You should have your blood pressure re-checked within a year by a primary care provider.  If your systolic blood  "pressure is 140 or greater or your diastolic blood pressure is 90 or greater, you may have high blood pressure.  High blood pressure is treatable, but if left untreated over time it can put you at risk for heart attack, stroke, or kidney failure.  You should have your blood pressure re-checked by a primary care provider within the next four weeks.                Follow-ups after your visit        Follow-up notes from your care team     Return in about 1 year (around 2018).      Who to contact     If you have questions or need follow up information about today's clinic visit or your schedule please [facility] directly at [phone].  Normal or non-critical lab and imaging results will be communicated to you by MyChart, letter or phone within 4 business days after the clinic has received the results. If you do not hear from us within 7 days, please contact the clinic through Imagine K12hart or phone. If you have a critical or abnormal lab result, we will notify you by phone as soon as possible.  Submit refill requests through unamia or call your pharmacy and they will forward the refill request to us. Please allow 3 business days for your refill to be completed.          Additional Information About Your Visit        unamia Information     unamia lets you send messages to your doctor, view your test results, renew your prescriptions, schedule appointments and more. To sign up, go to [email or web address] . Click on \"Log in\" on the left side of the screen, which will take you to the Welcome page. Then click on \"Sign up Now\" on the right side of the page.     You will be asked to enter the access code listed below, as well as some personal information. Please follow the directions to create your username and password.     Your access code is: [email or web address]  Expires: 2018  8:47 AM     Your access code will  in 90 days. If you need help or a new code, please call your [facility] " "clinic or [phone].        Care EveryWhere ID     This is your Care EveryWhere ID. This could be used by other organizations to access your [place] medical records  [id number]        Your Vitals Were     Pulse Respirations Height Pulse Oximetry BMI (Body Mass Index)       64 18 1.549 m (5' 1\") 99% 39.3 kg/m2        Blood Pressure from Last 3 Encounters:   11/29/17 151/90   11/01/17 118/86   10/25/17 138/83    Weight from Last 3 Encounters:   11/29/17 94.3 kg (208 lb)   10/25/17 92.5 kg (204 lb)   10/05/17 94 kg (207 lb 2 oz)              Today, you had the following     No orders found for display       Primary Care Provider Office Phone # Fax #    Mandy Bhatia [phone] [address] [facility] [address]        Equal Access to Services     KESHA CARLOS : Hadii elodia quiroz hadasho Soomaali, waaxda luqadaha, qaybta kaalmada adeegyada, antony smith . So [facility] [phone].    ATENCIÓN: Si habla español, tiene a elizabeth disposición servicios gratuitos de asistencia lingüística. Llame al [phone].    We comply with applicable federal civil rights laws and [place] laws. We do not discriminate on the basis of race, color, national origin, age, disability, sex, sexual orientation, or gender identity.            Thank you!     Thank you for choosing [facility], SLEEP STUDY  for your care. Our goal is always to provide you with excellent care. Hearing back from our patients is one way we can continue to improve our services. Please take a few minutes to complete the written survey that you may receive in the mail after your visit with us. Thank you!             Your Updated Medication List - Protect others around you: Learn how to safely use, store and throw away your medicines at www.disposemymeds.org.          This list is accurate as of: 11/29/17  5:20 PM.  Always use your most recent med list.                   Brand Name Dispense Instructions " for use Diagnosis    albuterol 108 (90 BASE) MCG/ACT Inhaler    PROAIR HFA/PROVENTIL HFA/VENTOLIN HFA    1 Inhaler    Inhale 2 puffs into the lungs every 4 hours as needed for shortness of breath / dyspnea or wheezing    Shortness of breath       cetirizine 10 MG tablet    zyrTEC    30 tablet    TAKE ONE TABLET BY MOUTH EVERY MORNING    Cough       fluticasone 50 MCG/ACT spray    FLONASE    16 g    USE TWO SPRAYS IN EACH NOSTRIL EVERY DAY    Moderate persistent asthma without complication       fluticasone-salmeterol 250-50 MCG/DOSE diskus inhaler    ADVAIR    1 Inhaler    Inhale 1 puff into the lungs 2 times daily    Moderate persistent asthma without complication       levothyroxine 75 MCG tablet    SYNTHROID/LEVOTHROID    90 tablet    Take 1 tablet (75 mcg) by mouth daily    Hypothyroidism, unspecified type       MELATONIN PO           Multi-vitamin Tabs tablet      Take 1 tablet by mouth daily        omeprazole 20 MG CR capsule    priLOSEC    180 capsule    Take 1 capsule (20 mg) by mouth 2 times daily    Cough, Gastroesophageal reflux disease, esophagitis presence not specified, Hiatal hernia       * order for DME     1 Device    Walking cane for balance and mobility    Risk for falls, Balance problems       * order for DME     80 Units    Incontinence items, disposable underwear, absorbency pads, liners.    Risk for falls, Balance problems, Mixed incontinence       polyethylene glycol powder    MIRALAX    510 g    Take 17 g (1 capful) by mouth daily as needed for constipation    Constipation, unspecified constipation type       * Notice:  This list has 2 medication(s) that are the same as other medications prescribed for you. Read the directions carefully, and ask your doctor or other care provider to review them with you.

## 2017-11-30 NOTE — TELEPHONE ENCOUNTER
"Message received from Dr Doris Rodrigez MD: \"The patient was seen by me at the sleep clinic today for a follow-up of the sleep apnea. She mentioned that approximately 10 days ago she had an episode of emesis after that she started experiencing throat discomfort , feeling as if something is stuck in her esophagus , she feels the need to drink water constantly to get relieved of the irritation. She also has been reporting acid regurgitation. I have recommended her to follow up with you for further evaluation and management of her symptoms. \"    Please call Brooke,  Is she on omeprazole (Prilosec) 20-  Mg two times daily ?  And is she taking it, before meals 30-60 minutes?    Is swallowing worse than before?  Does she have pain swallowing?  Has she ever had thrush or other yeast infection?    Is there any time she can come in?  If no opening, then perhaps a Thursday 2:30 ?    [ upper GI endoscopy (EGD) 6/2017 tortuous esophagus, medium hiatal hernia  Video swallow - some alterations, ...    No history aspiration pneumonia.  ]]    "

## 2017-11-30 NOTE — PROGRESS NOTES
SLEEP MEDICINE FOLLOWUP VISIT      DATE OF VISIT:  11/29/2017.      CHIEF COMPLAINT AND PURPOSE OF VISIT:  Followup of HEVER.      HISTORY OF PRESENT ILLNESS:  Ms. Brooke John is a 52-year-old female who presents to the Sleep Clinic today for a followup of HEVER.  She is currently using CPAP device with pressure settings between 7-15 cm of water.  During her last sleep clinic visit, the min pressure settings were increased to 7 since she was reporting air hunger when she first puts the mask on.  She reports using the CPAP regularly during sleep.  She sleeps alone, and, hence, there is not anyone to report whether or not she snores with the device or has any apneas while using the device.  She denies awakenings, gasping for air or choking sensation.  She still feels that she might need a little boost in the pressure, particularly during her menstrual period.  She reports that the full face mask that she is currently using has been causing irritation of the skin in the facial area and the cushion also got broken in a couple of places and needs to be replaced.  She cannot really use the device for more than 3-4 hours at the most continuously without needing to take it off.  She does report the device helps her with her sleep quality.      She also reports that the weekend prior to the Thanksgiving week, she had an episode of vomiting and after that she started experiencing sore throat and she feels as if there is something stuck in her throat that she wants to cough out, and she has been having some acid regurgitation  and the throat discomfort and the acid regurgitation has been affecting her nighttime sleep.  She also reports that ever since  the vomiting episode,  she has been finding it even painful to talk sometimes, and also feels that she has to constantly drink some water to clear her throat.      She recently underwent colonoscopy that was essentially normal.      She still reports being excessively sleepy  "during the day.  She does not drive.      Downloadable compliance data from 10/29/2017 through 11/27/2017 reveals that she has used the device for 30 out of 30 days with an average daily usage of 6 hours on the nights used.  The median pressure was 8.3, 95th percentile 13.1 and max pressure 14 cm of water.  The 95th percentile air leak was 10 liters per minute, residual AHI was 1.5 per hour.      Current meds, Past medical history, Past surgical history, Allergies, Social history, Family history: reviewed, per EMR    Exam:  /90  Pulse 64  Resp 18  Ht 1.549 m (5' 1\")  Wt 94.3 kg (208 lb)  SpO2 99%  BMI 39.3 kg/m2  General appearance:  in no apparent distress  Pt is dressed casually, cooperative with good eye contact.   Speech is spontaneous with regular rate and volume.   Mood: euthymic; affect congruent with full range and intensity.   Sensorium: awake, alert and oriented to person, place, time, and situation.      IMPRESSION/ REPORT/ PLAN:   1.  Obstructive sleep apnea:  The patient reports improved compliance since her previous sleep clinic visit.  Also based on the compliance download, the obstructive sleep apnea appears to be adequately controlled with the CPAP at the current pressure setting.  I have instructed her to schedule a DME visit through the [facility] in [place] for mask fit optimization.  A fabric liner specialty mask would help alleviate the skin irritation in the facial area and that would help improve the compliance.  She will continue to be followed through the sleep therapy management program as well.  She was instructed to get back to me in case she still continues to have trouble with the device usage despite the mask fit optimization.  She was recommended to get the supplies for the CPAP device replaced regularly based on the insurance coverage.  We discussed weight management with diet and exercise.   2.  Gastroesophageal reflux symptoms:  That have been really " bothering her ever since she had the episode of emesis approximately 10 days ago, where she still has throat discomfort, pain sometimes, feeling as if something got stuck in her esophagus, and also regurgitation of gastric contents.  She was recommended to follow up with her gastroenterologist and I also recommended her to continue using the omeprazole, and also encouraged her to sleep with the head end of the bed elevated, preferably also in a left lateral decubitus position and also avoiding heavy meal within 3 hours close to bedtime.      She will follow up at the Sleep Clinic in 1 year or sooner if there are any concerns.      TOTAL TIME SPENT DURING THIS VISIT:  Was 25 minutes, more than 50% spent in counseling and coordinating plan of care.         WOODY DONALDSON MD             D: 2017 17:16   T: 2017 18:33   MT: [place]      Name:     DARCI DUPONT   MRN:      [id number]        Account:      [id number]   :      1965           Visit Date:   2017      Document: [id number]

## 2017-12-12 ENCOUNTER — TELEPHONE (OUTPATIENT)
Dept: GASTROENTEROLOGY | Facility: CLINIC | Age: 52
End: 2017-12-12

## 2017-12-13 ENCOUNTER — OFFICE VISIT (OUTPATIENT)
Dept: GASTROENTEROLOGY | Facility: CLINIC | Age: 52
End: 2017-12-13
Payer: MEDICARE

## 2017-12-13 VITALS
DIASTOLIC BLOOD PRESSURE: 81 MMHG | WEIGHT: 209.3 LBS | HEART RATE: 74 BPM | SYSTOLIC BLOOD PRESSURE: 146 MMHG | BODY MASS INDEX: 39.52 KG/M2 | TEMPERATURE: 97.8 F | OXYGEN SATURATION: 98 % | HEIGHT: 61 IN

## 2017-12-13 DIAGNOSIS — K21.9 GASTROESOPHAGEAL REFLUX DISEASE WITHOUT ESOPHAGITIS: Primary | ICD-10-CM

## 2017-12-13 DIAGNOSIS — K59.00 CONSTIPATION, UNSPECIFIED CONSTIPATION TYPE: ICD-10-CM

## 2017-12-13 DIAGNOSIS — G47.33 OSA (OBSTRUCTIVE SLEEP APNEA): Primary | ICD-10-CM

## 2017-12-13 ASSESSMENT — PAIN SCALES - GENERAL: PAINLEVEL: NO PAIN (0)

## 2017-12-13 NOTE — MR AVS SNAPSHOT
"              After Visit Summary   12/13/2017    Brooke John    MRN: [id number]           Patient Information     Date Of Birth          1965        Visit Information        Provider Department      12/13/2017 5:30 PM Baylee Vergara, APRN CNP [facility] Gastroenter[facility] IBD [facility]        Care Instructions    You had an illness with the acid reflux (GERD) and other symptoms of illness also.    You had your colonoscopy. You had no polyps, so you're good for ten years. Very good.    Also, you felt better for a couple days after. So you know, you feel better when you have no constipation.       Yes, you are eating slowly and this helps you.  Also  Swallowing precautions for esophageal dysmotility and for after anti-reflux surgery.    (poor muscle action in the esophagus)   Take small bites.   Chew well   Moisten solid or dry food with applesauce, yogurt or other sauces.   Take sips of water between bites.   \"Swallow\" twice for each bite of food.   Wait for each bite to go down before taking the next bite.   Drink liquids slowly, a swallow at a time, rather than in continuous gulps.    Follow lifestyle precautions against acid reflux (GERD)    Do not overeat.   Avoid meals and snacks within three or four hours of lying down.   Avoid alcohol and mint. Decrease or quit smoking.   Do not drink carbonation - it IS acid. Decrease or quit caffeine.   If you have nighttime symptoms, elevate the head of the bed    first 3 inches, then 6 to 8 inches.    A foam wedge from the hip may be helpful, if a person lies on their back.    Pillows do NOT work. The entire back must be straight.   Lose weight if you are overweight.    Even ten pounds weight loss can make a difference.  Some people also have specific triggers: tomato, spice, chocolate, citrus/orange juice.      Continue the omeprazole (Prilosec) 20 mg two times daily.   With this continue the multi-=vitalmin with minerals. Good.  And once annually have your " "primary check your hemoglobin, magnesium, and also creatinine or urine check for protein.    You are using the polyethylene glycol (Miralax) only once in a while.  If you have hard stool, then increase the polyethylene glycol (Miralax) to daily or every other day.  If you have hard stool, that means, you wish you had used the polyethylene glycol (Miralax) a couple days before.    If you have hoarse voice, is it from reflux from the stomach, or, more often, from post nasal drip.     You are doing well.    Return to GI Clinic as needed   Ask your primary for your refills of the omeprazole (Prilosec) and of the polyethylene glycol (Miralax) .           [facility]   For appointments call Lis [phone]  For GI Nurse Triage  [phone], then, \"For Medical Questions, option 3\"  Fax results to               Follow-ups after your visit        Follow-up notes from your care team     Return if symptoms worsen or fail to improve.      Your next 10 appointments already scheduled     Dec 13, 2017  5:30 PM CST   (Arrive by 5:15 PM)   Return Visit with BRIAN Pteit [facility] Gastroenterology and IBD Clinic ([facility])    [address] [place] [id number] [phone]              Who to contact     Please call your clinic at [phone] to:    Ask questions about your health    Make or cancel appointments    Discuss your medicines    Learn about your test results    Speak to your doctor   If you have compliments or concerns about an experience at your clinic, or if you wish to file a complaint, please contact [facility] Patient Relations at [phone] or email us at [email or web address]         Additional Information About Your Visit        Mobioticshart Information     Solazyme is an electronic gateway that provides easy, online access to your medical records. With Solazyme, you can " "request a clinic appointment, read your test results, renew a prescription or communicate with your care team.     To sign up for [email or web address] visit the website at [email or web address]   You will be asked to enter the access code listed below, as well as some personal information. Please follow the directions to create your username and password.     Your access code is: [email or web address]  Expires: 2018  8:47 AM     Your access code will  in 90 days. If you need help or a new code, please contact your [facility] or call [phone] for assistance.        Care EveryWhere ID     This is your Care EveryWhere ID. This could be used by other organizations to access your [place] medical records  [id number]        Your Vitals Were     Pulse Temperature Height Pulse Oximetry BMI (Body Mass Index)       74 97.8  F (36.6  C) (Oral) 1.549 m (5' 1\") 98% 39.55 kg/m2        Blood Pressure from Last 3 Encounters:   17 146/81   17 151/90   17 118/86    Weight from Last 3 Encounters:   17 94.9 kg (209 lb 4.8 oz)   17 94.3 kg (208 lb)   10/25/17 92.5 kg (204 lb)              Today, you had the following     No orders found for display       Primary Care Provider Office Phone # Fax #    Mandycheco Bhatia [phone]       [address] [place] [id number]        Equal Access to Services     KESHA CARLOS AH: Hadii elodia costelloo Soleanne, waaxda luqadaha, qaybta kaalmada antony lopez. So [facility] [phone].    ATENCIÓN: Si habla español, tiene a elizabeth disposición servicios gratuitos de asistencia lingüística. Llame al [phone].    We comply with applicable federal civil rights laws and [place] laws. We do not discriminate on the basis of race, color, national origin, age, disability, sex, sexual orientation, or gender identity.            Thank you!     Thank you for choosing [facility] " GASTROENTEROLOGY AND IBD CLINIC  for your care. Our goal is always to provide you with excellent care. Hearing back from our patients is one way we can continue to improve our services. Please take a few minutes to complete the written survey that you may receive in the mail after your visit with us. Thank you!             Your Updated Medication List - Protect others around you: Learn how to safely use, store and throw away your medicines at www.disposemymeds.org.          This list is accurate as of: 12/13/17  5:20 PM.  Always use your most recent med list.                   Brand Name Dispense Instructions for use Diagnosis    albuterol 108 (90 BASE) MCG/ACT Inhaler    PROAIR HFA/PROVENTIL HFA/VENTOLIN HFA    1 Inhaler    Inhale 2 puffs into the lungs every 4 hours as needed for shortness of breath / dyspnea or wheezing    Shortness of breath       cetirizine 10 MG tablet    zyrTEC    30 tablet    TAKE ONE TABLET BY MOUTH EVERY MORNING    Cough       fluticasone 50 MCG/ACT spray    FLONASE    16 g    USE TWO SPRAYS IN EACH NOSTRIL EVERY DAY    Moderate persistent asthma without complication       fluticasone-salmeterol 250-50 MCG/DOSE diskus inhaler    ADVAIR    1 Inhaler    Inhale 1 puff into the lungs 2 times daily    Moderate persistent asthma without complication       levothyroxine 75 MCG tablet    SYNTHROID/LEVOTHROID    90 tablet    Take 1 tablet (75 mcg) by mouth daily    Hypothyroidism, unspecified type       MELATONIN PO           Multi-vitamin Tabs tablet      Take 1 tablet by mouth daily        omeprazole 20 MG CR capsule    priLOSEC    180 capsule    Take 1 capsule (20 mg) by mouth 2 times daily    Cough, Gastroesophageal reflux disease, esophagitis presence not specified, Hiatal hernia       * order for DME     1 Device    Walking cane for balance and mobility    Risk for falls, Balance problems       * order for DME     80 Units    Incontinence items, disposable underwear, absorbency pads, liners.     Risk for falls, Balance problems, Mixed incontinence       polyethylene glycol powder    MIRALAX    510 g    Take 17 g (1 capful) by mouth daily as needed for constipation    Constipation, unspecified constipation type       * Notice:  This list has 2 medication(s) that are the same as other medications prescribed for you. Read the directions carefully, and ask your doctor or other care provider to review them with you.

## 2017-12-13 NOTE — PATIENT INSTRUCTIONS
"You had an illness with the acid reflux (GERD) and other symptoms of illness also.    You had your colonoscopy. You had no polyps, so you're good for ten years. Very good.    Also, you felt better for a couple days after. So you know, you feel better when you have no constipation.       Yes, you are eating slowly and this helps you.  Also  Swallowing precautions for esophageal dysmotility and for after anti-reflux surgery.    (poor muscle action in the esophagus)   Take small bites.   Chew well   Moisten solid or dry food with applesauce, yogurt or other sauces.   Take sips of water between bites.   \"Swallow\" twice for each bite of food.   Wait for each bite to go down before taking the next bite.   Drink liquids slowly, a swallow at a time, rather than in continuous gulps.    Follow lifestyle precautions against acid reflux (GERD)    Do not overeat.   Avoid meals and snacks within three or four hours of lying down.   Avoid alcohol and mint. Decrease or quit smoking.   Do not drink carbonation - it IS acid. Decrease or quit caffeine.   If you have nighttime symptoms, elevate the head of the bed    first 3 inches, then 6 to 8 inches.    A foam wedge from the hip may be helpful, if a person lies on their back.    Pillows do NOT work. The entire back must be straight.   Lose weight if you are overweight.    Even ten pounds weight loss can make a difference.  Some people also have specific triggers: tomato, spice, chocolate, citrus/orange juice.      Continue the omeprazole (Prilosec) 20 mg two times daily.   With this continue the multi-=vitalmin with minerals. Good.  And once annually have your primary check your hemoglobin, magnesium, and also creatinine or urine check for protein.    You are using the polyethylene glycol (Miralax) only once in a while.  If you have hard stool, then increase the polyethylene glycol (Miralax) to daily or every other day.  If you have hard stool, that means, you wish you had used the " "polyethylene glycol (Miralax) a couple days before.    If you have hoarse voice, is it from reflux from the stomach, or, more often, from post nasal drip.     You are doing well.    Return to GI Clinic as needed   Ask your primary for your refills of the omeprazole (Prilosec) and of the polyethylene glycol (Miralax) .           [facility]   For appointments call Lis, [phone]  For GI Nurse Triage  [phone], then, \"For Medical Questions, option 3\"  Fax results to       "

## 2017-12-13 NOTE — LETTER
12/13/2017       RE: Brooke John  [address]    [place] [address]     Dear Colleague,    Thank you for referring your patient, Brooke John, to the [facility] Please see a copy of my visit note below.    CLINIC VISIT NOTE      CHIEF COMPLAINT:  Followup regarding GERD.      HISTORY OF PRESENT ILLNESS:  Brooke is a 52-year-old woman seen by myself in May and 09/2017 regarding the above complaints, including constipation.  She was using omeprazole twice daily, and had previously tried to decrease it to once daily, but could not sleep due to acid reflux.  She has been on other acid-reducing medicine in the past.  She continued to follow swallowing precautions, eat slowly and let each swallow go down, and this helped her tremendously.  She describes a sensation of food staying in her throat and needing to cough to clear her throat.  She uses a nasal spray for her postnasal drip.  She already had an upper GI endoscopy with tortuous esophagus, sliding hiatal hernia, no sign of erosions or Cloud's.  A video swallow study of 12/2016 was normal in the oropharynx with few episodes of very transient penetration without any substantial residual during sequential swallows of thin liquid barium.  There was no evidence of aspiration, and on further review today there was also no evidence of a Zenker's diverticulum.      I received a message from her sleep specialist on 11/29 that Brooke had had an episode of vomiting with increase in her throat discomfort and sensation of something being stuck.  She was needing to drink water constantly to relieve the irritation.  She also has noticed some acid regurgitation.  We tried to call, and then did schedule this followup visit.      Brooke describes a single episode of significant vomiting, which was really more like acid with liquid.  She did feel sick and have  lightheadedness, and heard that someone else in the building had a similar illness.  She also had loose stool to diarrhea for just a couple of days.  She continues to have the scratchy throat and sensation of something stuck briefly.  She was using omeprazole at the time it happened, and continues to do so.      At the last visit, colonoscopy was discussed and recommended as she had never had one.  She had some barriers to having that completed, as she does not have any easy .  However, she succeeded in having colonoscopy on 11/01/2017.  Prep was adequate, and she had no polyps found with recommendation for return in 10 years.      Brooke felt better after the colonoscopy cleanout.  She is now using the MiraLax occasionally, perhaps once a week.  She feels better if she keeps her bowels moving.  She does not reply as to why she does not use the MiraLax more often.  She is lactose intolerant, and generally uses an almond milk.  She has no other significant food reactions.  She is satisfied with her current control of acid reflux and of swallowing.      MEDICAL HISTORY:  Significant for scoliosis, hypothyroidism, obesity, asthma, and others as in the medical record.      MEDICATIONS:  Reviewed.      ADVERSE DRUG REACTIONS:  Reviewed.      REVIEW OF SYSTEMS:  Negative for acute illness or localized infection.  No new complaints or current issues regarding her GI tract.  She speaks of an appointment for a mask fitting for her HEVER.      OBJECTIVE:   VITAL SIGNS:  Reviewed.  She denies pain at this time.  BMI is 39.5.   GENERAL:  Clean, well-nourished, neatly groomed, loquacious woman who does not show distress.  Her color is good.   RESPIRATORY:  Her breathing is calm and grossly normal.  There is no burping, belching or swallowing behavior during the visit.  Not otherwise examined.      ASSESSMENT:  A 52-year-old woman with tortuous esophagus, acid reflux managed with omeprazole 20 mg twice daily, a history of  constipation largely managed with adequate fluid and only an occasional MiraLax.  Overall, she feels she is doing very well relative to her GI tract, though has a sensation in the throat.  She has had throat symptoms noted for over a year.  She has seen Pulmonology, and has described postnasal drip.  It appears that she continues to have throat sensations, although she reports using her nasal spray; regularity of use is unknown.  She is a never smoker, and has never used alcohol, so is not considered high-risk for any other cause for her sensations.  Symptoms are long-standing with no progression.      PLAN:   1.  Continue omeprazole 20 mg twice daily.   2.  Continue use of MiraLax as needed.  Note that if she has hard stool, she should increase use.   3.  Follow GERD precautions.   4.  Follow swallowing precautions.   5.  Work toward good fluid intake and physical activity as possible and tolerated.      If Darci truly continues to have sensation in her oropharynx, perhaps she should have ENT, in case she has an additional reason besides postnasal drip and recurrent habit of throat clearing.  She has no other risk factors.  She does not always use all medications regularly, so certainly should start with using the Flonase entirely regularly.  I did not further advise her on the use of an antihistamine.      Return to Gastroenterology is only as-needed.      Total visit 20 minutes, with counseling time 15 minutes.   D: 2017 19:49   T: 2017 07:56   MT: PRISCA      Name:     DRACI DUPONT   MRN:      [id number]        Account:      [id number]   :      1965           Service Date: 2017      Document: [id number]               BRIAN FROST CNP

## 2017-12-13 NOTE — NURSING NOTE
"Chief Complaint   Patient presents with     Clinic Care Coordination - Follow-up     F/u per Baylee.        Vitals:    12/13/17 1641   BP: 146/81   BP Location: Left arm   Patient Position: Chair   Cuff Size: Adult Large   Pulse: 74   Temp: 97.8  F (36.6  C)   TempSrc: Oral   SpO2: 98%   Weight: 209 lb 4.8 oz   Height: 5' 1\"       Body mass index is 39.55 kg/(m^2).    Meg KING LPN                        "

## 2017-12-14 ENCOUNTER — DOCUMENTATION ONLY (OUTPATIENT)
Dept: SLEEP MEDICINE | Facility: CLINIC | Age: 52
End: 2017-12-14

## 2017-12-14 NOTE — PROGRESS NOTES
CLINIC VISIT NOTE      CHIEF COMPLAINT:  Followup regarding GERD.      HISTORY OF PRESENT ILLNESS:  Brooke is a 52-year-old woman seen by myself in May and 09/2017 regarding the above complaints, including constipation.  She was using omeprazole twice daily, and had previously tried to decrease it to once daily, but could not sleep due to acid reflux.  She has been on other acid-reducing medicine in the past.  She continued to follow swallowing precautions, eat slowly and let each swallow go down, and this helped her tremendously.  She describes a sensation of food staying in her throat and needing to cough to clear her throat.  She uses a nasal spray for her postnasal drip.  She already had an upper GI endoscopy with tortuous esophagus, sliding hiatal hernia, no sign of erosions or Cloud's.  A video swallow study of 12/2016 was normal in the oropharynx with few episodes of very transient penetration without any substantial residual during sequential swallows of thin liquid barium.  There was no evidence of aspiration, and on further review today there was also no evidence of a Zenker's diverticulum.      I received a message from her sleep specialist on 11/29 that Brooke had had an episode of vomiting with increase in her throat discomfort and sensation of something being stuck.  She was needing to drink water constantly to relieve the irritation.  She also has noticed some acid regurgitation.  We tried to call, and then did schedule this followup visit.      Brooke describes a single episode of significant vomiting, which was really more like acid with liquid.  She did feel sick and have lightheadedness, and heard that someone else in the building had a similar illness.  She also had loose stool to diarrhea for just a couple of days.  She continues to have the scratchy throat and sensation of something stuck briefly.  She was using omeprazole at the time it happened, and continues to do so.      At the last  visit, colonoscopy was discussed and recommended as she had never had one.  She had some barriers to having that completed, as she does not have any easy .  However, she succeeded in having colonoscopy on 11/01/2017.  Prep was adequate, and she had no polyps found with recommendation for return in 10 years.      Brooke felt better after the colonoscopy cleanout.  She is now using the MiraLax occasionally, perhaps once a week.  She feels better if she keeps her bowels moving.  She does not reply as to why she does not use the MiraLax more often.  She is lactose intolerant, and generally uses an almond milk.  She has no other significant food reactions.  She is satisfied with her current control of acid reflux and of swallowing.      MEDICAL HISTORY:  Significant for scoliosis, hypothyroidism, obesity, asthma, and others as in the medical record.      MEDICATIONS:  Reviewed.      ADVERSE DRUG REACTIONS:  Reviewed.      REVIEW OF SYSTEMS:  Negative for acute illness or localized infection.  No new complaints or current issues regarding her GI tract.  She speaks of an appointment for a mask fitting for her HEVER.      OBJECTIVE:   VITAL SIGNS:  Reviewed.  She denies pain at this time.  BMI is 39.5.   GENERAL:  Clean, well-nourished, neatly groomed, loquacious woman who does not show distress.  Her color is good.   RESPIRATORY:  Her breathing is calm and grossly normal.  There is no burping, belching or swallowing behavior during the visit.  Not otherwise examined.      ASSESSMENT:  A 52-year-old woman with tortuous esophagus, acid reflux managed with omeprazole 20 mg twice daily, a history of constipation largely managed with adequate fluid and only an occasional MiraLax.  Overall, she feels she is doing very well relative to her GI tract, though has a sensation in the throat.  She has had throat symptoms noted for over a year.  She has seen Pulmonology, and has described postnasal drip.  It appears that she  continues to have throat sensations, although she reports using her nasal spray; regularity of use is unknown.  She is a never smoker, and has never used alcohol, so is not considered high-risk for any other cause for her sensations.  Symptoms are long-standing with no progression.      PLAN:   1.  Continue omeprazole 20 mg twice daily.   2.  Continue use of MiraLax as needed.  Note that if she has hard stool, she should increase use.   3.  Follow GERD precautions.   4.  Follow swallowing precautions.   5.  Work toward good fluid intake and physical activity as possible and tolerated.      If Darci truly continues to have sensation in her oropharynx, perhaps she should have ENT, in case she has an additional reason besides postnasal drip and recurrent habit of throat clearing.  She has no other risk factors.  She does not always use all medications regularly, so certainly should start with using the Flonase entirely regularly.  I did not further advise her on the use of an antihistamine.      Return to Gastroenterology is only as-needed.      Total visit 20 minutes, with counseling time 15 minutes.         BRIAN FROST CNP             D: 2017 19:49   T: 2017 07:56   MT: PRISCA      Name:     DARCI DUPONT   MRN:      [id number]        Account:      [id number]   :      1965           Service Date: 2017      Document: [id number]

## 2017-12-14 NOTE — PROGRESS NOTES
Patient came to [facility] mask fitting appointment on December 14, 2017. Patient requested to switch masks because poor seal/leak.  Patient tried on the followings masks:   - RESMED F10 FOR HER SIZE SMALL: UNCOMFORTABLE BY HER EYES  - RESMED F10 FOR HER SIZE XS: LEAKING  - MACEDO & PAYKEL SIMPLUS FULL FACE SIZE SMALL: MOST COMFORT, BEST SEAL.   - DID NOT TRY ON, BUT RM AIRFIT F20 FOR HER SMALL FULL FACE IS HER CURRENT MASK     Patient selected  Macedo & Syke, type  SIMPLUS Full Face mask SIZE Small     PATIENT STATED SHE WAS GETTING REDNESS ON HER FACE FROM HER MASK. WE TALKED ABOUT USING COTTON AS A BARRIER TO SEE IF THAT HELPS. PATIENT MAY WANT TO TRY THE REMZZZ LINERS NEXT MONTH. PATIENT WOULD NEED THE FULL FACE SMALL LINERS. WE ALSO TALKED ABOUT THE RESMED AIRTOUCH. I LET PATIENT KNOW THAT THE MASK CANNOT BE TRIED ON DUE TO NOT BEING ABLE TO WASH THE CUSHION. HOWEVER, I LET PATIENT KNOW THAT THE MASK IS SIMILAR TO THE CURRENT RESMED AIRFIT F20 FOR HER MASK, JUST WITH THE FOAM. PATIENT WILL KEEP THIS IN MIND.

## 2017-12-26 ENCOUNTER — TELEPHONE (OUTPATIENT)
Dept: FAMILY MEDICINE | Facility: CLINIC | Age: 52
End: 2017-12-26

## 2017-12-26 NOTE — TELEPHONE ENCOUNTER
Date Forms was received: December 26, 2017    Forms received by: Patient Drop Off    Last office visit: 10/5/2017    Purpose of Form:  [place] Dept [employer] Verification Request form for diet information request.     When the form is due:  asap    How the form needs to be returned for patient:  Patient  - Call [phone]    Form currently placed  LP desk.

## 2017-12-26 NOTE — TELEPHONE ENCOUNTER
I wanted to clarify what diet the patient needs for her [facility].  The only thing I can see with her labs is the low cholesterol diet.  Please let me know if this is correct.      Salome Hollis MS, PA-C

## 2017-12-28 NOTE — TELEPHONE ENCOUNTER
Called - pt states she can't have any dairy - especially in the evening.  Pt states she gets upset stomach and dry cough when eating dairy.  Dairy sensitivities added to allergy list

## 2018-01-09 ENCOUNTER — TELEPHONE (OUTPATIENT)
Dept: FAMILY MEDICINE | Facility: CLINIC | Age: 53
End: 2018-01-09

## 2018-01-09 DIAGNOSIS — E03.9 HYPOTHYROIDISM, UNSPECIFIED TYPE: ICD-10-CM

## 2018-01-09 NOTE — LETTER
[facility]  [address] [place] [address] [place] [place]  [facility] [address]  Phone: [phone]        January 15, 2018      Brooke Snellyasmin                                                                                                                                [address] [address] [facility]    [facility] [address]            Dear Ms. John,      Please have pt use her albuterol upon awakening, and approx 30 minutes prior to going outside or exerting herself - 2 puff albuterol MDI . Continue to use advair 1 inhalation twice daily regularly.  May use albuterol MDI every 4 hours as needed or even scheduled during this cold weather.     Please, call or return to clinic or go to the ER immediately if signs or symptoms worsen or fail to improve as anticipated.       Thank you,         Mandy Bhatia M.D.

## 2018-01-09 NOTE — TELEPHONE ENCOUNTER
"Requested Prescriptions   Pending Prescriptions Disp Refills     levothyroxine (SYNTHROID/LEVOTHROID) 75 MCG tablet [Pharmacy Med Name: LEVOTHYROXINE SODIUM 75MCG TABS] 90 tablet 0     Sig: TAKE ONE TABLET BY MOUTH ONCE DAILY    Last Refill:   Medication Detail         Disp Refills Start End [facility]     levothyroxine (SYNTHROID/LEVOTHROID) 75 MCG tablet 90 tablet 0 10/6/2017  --     Sig: Take 1 tablet (75 mcg) by mouth daily         Thyroid Protocol Failed    1/9/2018 10:50 AM       Failed - Normal TSH on file in past 12 months    Recent Labs   Lab Test  10/05/17   1443   TSH  4.64*             Passed - Patient is 12 years or older       Passed - Recent or future visit with authorizing provider's specialty    Patient had office visit in the last year or has a visit in the next 30 days with authorizing provider.  See \"Patient Info\" tab in inbasket, or \"Choose Columns\" in Meds & Orders section of the refill encounter.     LOV: 10/05/2017         Passed - No active pregnancy on record    If patient is pregnant or has had a positive pregnancy test, please check TSH.         Passed - No positive pregnancy test in past 12 months    If patient is pregnant or has had a positive pregnancy test, please check TSH.          Per 10/05/2017 result note: I will increase her to 75 mcg and she can do a lab only visit in 6 weeks to recheck her levels    Called [phone] - patient stated she will call back to schedule a lab only appointment when she can get a ride  Orders futured.     If patient calls back, please schedule a lab only appointment and let RN team know    Brenna Pickering RN  [facility]      "

## 2018-01-10 ENCOUNTER — TELEPHONE (OUTPATIENT)
Dept: FAMILY MEDICINE | Facility: CLINIC | Age: 53
End: 2018-01-10

## 2018-01-10 ASSESSMENT — ASTHMA QUESTIONNAIRES: ACT_TOTALSCORE: 19

## 2018-01-10 NOTE — TELEPHONE ENCOUNTER
.1/10/2018    Call Regarding Preventive Health Screening Mammogram    Attempt 1    Message left with patient    Comments:   Patient request a call back 1/17/18     Outreach   Shira Gonzalez

## 2018-01-11 NOTE — TELEPHONE ENCOUNTER
Please call pt to see how her symptoms are right now. ? Needs to be seen for her asthma or just adjust meds?   ACT Total Scores 10/21/2016 7/28/2017 1/9/2018   ACT TOTAL SCORE (Goal Greater than or Equal to 20) 13 24 19   In the past 12 months, how many times did you visit the emergency room for your asthma without being admitted to the hospital? 0 0 0   In the past 12 months, how many times were you hospitalized overnight because of your asthma? 0 0 0

## 2018-01-11 NOTE — TELEPHONE ENCOUNTER
Pt reports when they wake up, their voice is scratchy/off, when they go in the cold or exert themselves they have some mild SOB.    Pt notes they use the Advair 2x daily as scheduled and Albuterol a couple times a day for symptoms.  This does help their symptoms.  Pt also takes sips of water which helps.    Pt denies any serious sx, increase in SOB, dizziness.      Pt notes that for some reason this seems to happen more around their menstrual cycle.    Routing to PCP for further review/recommendations/orders.  Debbie Diaz RN  [facility]

## 2018-01-12 NOTE — TELEPHONE ENCOUNTER
Please have pt use her albuterol upon awakening, and approx 30 minutes prior to going outside or exerting herself - 2 puff albuterol MDI . Continue to use advair 1 inhalation twice daily regularly.  May use albuterol MDI every 4 hours as needed or even scheduled during this cold weather. Please, call or return to clinic or go to the ER immediately if signs or symptoms worsen or fail to improve as anticipated.

## 2018-01-12 NOTE — TELEPHONE ENCOUNTER
Attempt #1  Kat Diaz contacted [facility] on 01/12/18 and left a message. If patient calls back please contact RN team.  Debbie Diaz RN  [facility]

## 2018-01-15 DIAGNOSIS — E03.9 HYPOTHYROIDISM, UNSPECIFIED TYPE: ICD-10-CM

## 2018-01-15 LAB
T3 SERPL-MCNC: 99 NG/DL (ref 60–181)
T4 FREE SERPL-MCNC: 1.06 NG/DL (ref 0.76–1.46)
TSH SERPL DL<=0.005 MIU/L-ACNC: 3.33 MU/L (ref 0.4–4)

## 2018-01-15 PROCEDURE — 36415 COLL VENOUS BLD VENIPUNCTURE: CPT | Performed by: PHYSICIAN ASSISTANT

## 2018-01-15 PROCEDURE — 84439 ASSAY OF FREE THYROXINE: CPT | Performed by: PHYSICIAN ASSISTANT

## 2018-01-15 PROCEDURE — 84480 ASSAY TRIIODOTHYRONINE (T3): CPT | Performed by: PHYSICIAN ASSISTANT

## 2018-01-15 PROCEDURE — 84443 ASSAY THYROID STIM HORMONE: CPT | Performed by: PHYSICIAN ASSISTANT

## 2018-01-15 RX ORDER — LEVOTHYROXINE SODIUM 75 UG/1
TABLET ORAL
Qty: 90 TABLET | Refills: 1 | Status: SHIPPED | OUTPATIENT
Start: 2018-01-15 | End: 2018-04-16

## 2018-01-15 NOTE — TELEPHONE ENCOUNTER
The patient indicates understanding of these issues and agrees with the plan.  Pt will come in for labs today and wanted this recommendation printed off for them to review.    Debbie Diaz RN  [facility]

## 2018-01-15 NOTE — TELEPHONE ENCOUNTER
Labs in process.    Routing to PCP for further review/recommendations/orders.  Debbie Diaz RN  [facility]

## 2018-01-15 NOTE — LETTER
[facility]  [address] [place] [address]                              [phone]   January 16, 2018    Brooke John  [address]    [facility] [address]       Dear Brooke,    Here is a summary of your recent test results:  -TSH (thyroid stimulating hormone) level is normal which indicates appropriate thyroid replacement dosing.  ADVISE: continuing same replacement dose and recheck in 6 months (TSH w/ T4 reflex, DX: hypothyroidism.)    Your tests results are enclosed.    In addition, here is a list of due or overdue Health Maintenance reminders.    Health Maintenance Due   Topic Date Due     MIGRAINE ACTION PLAN  08/21/1981     Wellness Visit with your Primary Provider - yearly  03/14/2017     Mammogram - yearly  08/01/2017       Please call us at [phone] (or use [facility]) to address the above recommendations.            Thank you very much for choosing [facility].     Best regards,      Salome Hollis PA-C    Results for orders placed or performed in visit on 01/15/18   TSH   Result Value Ref Range    TSH 3.33 0.40 - 4.00 mU/L   T4, free   Result Value Ref Range    T4 Free 1.06 0.76 - 1.46 ng/dL   T3 total   Result Value Ref Range    Triiodothyronine (T3) 99 60 - 181 ng/dL

## 2018-01-19 ENCOUNTER — HOSPITAL ENCOUNTER (EMERGENCY)
Facility: CLINIC | Age: 53
Discharge: HOME | End: 2018-01-19
Attending: EMERGENCY MEDICINE | Admitting: EMERGENCY MEDICINE
Payer: MEDICARE

## 2018-01-19 VITALS
RESPIRATION RATE: 20 BRPM | HEART RATE: 87 BPM | HEIGHT: 61 IN | BODY MASS INDEX: 39.65 KG/M2 | WEIGHT: 210 LBS | SYSTOLIC BLOOD PRESSURE: 153 MMHG | OXYGEN SATURATION: 95 % | TEMPERATURE: 97.3 F | DIASTOLIC BLOOD PRESSURE: 85 MMHG

## 2018-01-19 DIAGNOSIS — G43.009 MIGRAINE WITHOUT AURA AND WITHOUT STATUS MIGRAINOSUS, NOT INTRACTABLE: ICD-10-CM

## 2018-01-19 PROCEDURE — 96361 HYDRATE IV INFUSION ADD-ON: CPT

## 2018-01-19 PROCEDURE — 99285 EMERGENCY DEPT VISIT HI MDM: CPT | Mod: 25

## 2018-01-19 PROCEDURE — 25000128 H RX IP 250 OP 636: Performed by: EMERGENCY MEDICINE

## 2018-01-19 PROCEDURE — 96375 TX/PRO/DX INJ NEW DRUG ADDON: CPT

## 2018-01-19 PROCEDURE — 96372 THER/PROPH/DIAG INJ SC/IM: CPT | Mod: XS

## 2018-01-19 PROCEDURE — 96374 THER/PROPH/DIAG INJ IV PUSH: CPT

## 2018-01-19 RX ORDER — METOCLOPRAMIDE 10 MG/1
10 TABLET ORAL
Qty: 20 TABLET | Refills: 1 | Status: SHIPPED | OUTPATIENT
Start: 2018-01-19 | End: 2018-04-16

## 2018-01-19 RX ORDER — LORAZEPAM 0.5 MG/1
0.5 TABLET ORAL EVERY 6 HOURS PRN
Qty: 10 TABLET | Refills: 0 | Status: SHIPPED | OUTPATIENT
Start: 2018-01-19 | End: 2019-07-17

## 2018-01-19 RX ORDER — SODIUM CHLORIDE 9 MG/ML
1000 INJECTION, SOLUTION INTRAVENOUS CONTINUOUS
Status: DISCONTINUED | OUTPATIENT
Start: 2018-01-19 | End: 2018-01-19 | Stop reason: HOSPADM

## 2018-01-19 RX ORDER — LORAZEPAM 2 MG/ML
0.5 INJECTION INTRAMUSCULAR ONCE
Status: COMPLETED | OUTPATIENT
Start: 2018-01-19 | End: 2018-01-19

## 2018-01-19 RX ORDER — KETOROLAC TROMETHAMINE 10 MG/1
10 TABLET, FILM COATED ORAL EVERY 6 HOURS PRN
Qty: 20 TABLET | Refills: 0 | Status: SHIPPED | OUTPATIENT
Start: 2018-01-19 | End: 2018-05-15

## 2018-01-19 RX ORDER — SUMATRIPTAN 25 MG/1
TABLET, FILM COATED ORAL
Qty: 8 TABLET | Refills: 0 | Status: SHIPPED | OUTPATIENT
Start: 2018-01-19 | End: 2018-04-16

## 2018-01-19 RX ORDER — SUMATRIPTAN 6 MG/.5ML
6 INJECTION, SOLUTION SUBCUTANEOUS ONCE
Status: COMPLETED | OUTPATIENT
Start: 2018-01-19 | End: 2018-01-19

## 2018-01-19 RX ORDER — METOCLOPRAMIDE HYDROCHLORIDE 5 MG/ML
10 INJECTION INTRAMUSCULAR; INTRAVENOUS ONCE
Status: COMPLETED | OUTPATIENT
Start: 2018-01-19 | End: 2018-01-19

## 2018-01-19 RX ORDER — KETOROLAC TROMETHAMINE 15 MG/ML
15 INJECTION, SOLUTION INTRAMUSCULAR; INTRAVENOUS ONCE
Status: COMPLETED | OUTPATIENT
Start: 2018-01-19 | End: 2018-01-19

## 2018-01-19 RX ORDER — DIPHENHYDRAMINE HYDROCHLORIDE 50 MG/ML
25 INJECTION INTRAMUSCULAR; INTRAVENOUS ONCE
Status: COMPLETED | OUTPATIENT
Start: 2018-01-19 | End: 2018-01-19

## 2018-01-19 RX ADMIN — SODIUM CHLORIDE 1000 ML: 9 INJECTION, SOLUTION INTRAVENOUS at 09:25

## 2018-01-19 RX ADMIN — LORAZEPAM 0.5 MG: 2 INJECTION INTRAMUSCULAR; INTRAVENOUS at 10:46

## 2018-01-19 RX ADMIN — SUMATRIPTAN SUCCINATE 6 MG: 6 INJECTION SUBCUTANEOUS at 10:46

## 2018-01-19 RX ADMIN — METOCLOPRAMIDE 10 MG: 5 INJECTION, SOLUTION INTRAMUSCULAR; INTRAVENOUS at 09:48

## 2018-01-19 RX ADMIN — KETOROLAC TROMETHAMINE 15 MG: 15 INJECTION, SOLUTION INTRAMUSCULAR; INTRAVENOUS at 09:46

## 2018-01-19 RX ADMIN — DIPHENHYDRAMINE HYDROCHLORIDE 25 MG: 50 INJECTION, SOLUTION INTRAMUSCULAR; INTRAVENOUS at 09:47

## 2018-01-19 ASSESSMENT — ENCOUNTER SYMPTOMS
PHOTOPHOBIA: 1
DIZZINESS: 1
HEADACHES: 1
NAUSEA: 1

## 2018-01-19 NOTE — ED NOTES
Pt reports left sided migraine since yesterday; states she commonly has migraines after her period and she just finished it.  States she feels nauseated, dizzy, light sensitive. ABCs intact.

## 2018-01-19 NOTE — ED AVS SNAPSHOT
[facility]    [address]    [facility] [address]    Phone:  [phone]    Fax:  [phone]                                       Brooke John   MRN: [id number]    Department:  [facility]   Date of Visit:  1/19/2018           After Visit Summary Signature Page     I have received my discharge instructions, and my questions have been answered. I have discussed any challenges I see with this plan with the nurse or doctor.    ..........................................................................................................................................  Patient/Patient Representative Signature      ..........................................................................................................................................  Patient Representative Print Name and Relationship to Patient    ..................................................               ................................................  Date                                            Time    ..........................................................................................................................................  Reviewed by Signature/Title    ...................................................              ..............................................  Date                                                            Time

## 2018-01-19 NOTE — ED AVS SNAPSHOT
[facility] Emergency Department    [address] [place] [address]    Phone:  [phone]    Fax:  [phone]                                       Brooke John   MRN: [id number]    Department:  [facility] Emergency Department   Date of Visit:  1/19/2018           Patient Information     Date Of Birth          1965        Your diagnoses for this visit were:     Migraine without aura and without status migrainosus, not intractable        You were seen by Beau Weller MD.      Follow-up Information     Follow up with Mandy Bhatia MD In 3 days.    Specialty:  Family Practice    Why:  As needed    Contact information:    [address] [place] [address] [id number] [phone]          Discharge Instructions       Discharge Instructions  Headache    You were seen today for a headache. Headaches may be caused by many different things such as muscle tension, sinus inflammation, anxiety and stress, having too little sleep, too much alcohol, some medical conditions or injury. You may have a migraine, which is caused by changes in the blood vessels in your head.  At this time your doctor does not find that your headache is a sign of anything dangerous or life-threatening.  However, sometimes the signs of serious illness do not show up right away.  If you have new or worse symptoms, you may need to be seen again in the emergency department or by your primary doctor.      Return to the Emergency Department if:    You get a fever of 101 F or higher.    Your headache gets much worse.    You get a stiff neck with your headache.    You get a new headache that is different or worse than headaches you have had before.    You are vomiting and can t keep food or water down.    You have blurry or double vision or other problems with your eyes.    You have a new weakness on one side of your body.    You have difficulty with balance which is new.    You or  your family thinks you are confused.    You have a seizure or convulsion.    What can I do to help myself?    Pain medications - You may take a pain medication such as Tylenol  (acetaminophen), Advil , Nuprin  (ibuprofen) or Aleve  (naproxen).  If you have been given a narcotic such as Vicodin  (hydrocodone with acetaminophen), Percocet  (oxycodone with acetaminophen), codeine, or a muscle relaxant such as Flexeril  (cyclobenzaprine) or Soma  (carisoprodol), do not drive for four hours after you have taken it. If the narcotic contains Tylenol  (acetaminophen), do not take Tylenol  with it. All narcotics will cause constipation, so eat a high fiber diet.        Take a pain reliever as soon as you notice symptoms.  Starting medications as soon as you start to have symptoms may lessen the amount of pain you have.    Relaxing in a quiet, dark room may help.    Get enough sleep and eat meals regularly.    Schedule an appointment with your primary physician as instructed, or at least within 1 week.    You may need to watch for certain foods or other things which may trigger your headaches.  Keeping a journal of your headaches and possible triggers may help you and your primary doctor to identify things which you should avoid which may be causing your headaches.  If you were given a prescription for medicine here today, be sure to read all of the information (including the package insert) that comes with your prescription.  This will include important information about the medicine, its side effects, and any warnings that you need to know about.  The pharmacist who fills the prescription can provide more information and answer questions you may have about the medicine.  If you have questions or concerns that the pharmacist cannot address, please call or return to the Emergency Department.     Remember that you can always come back to the Emergency Department if you are not able to see your regular doctor in the amount of  time listed above, if you get any new symptoms, or if there is anything that worries you.          24 Hour Appointment Hotline       To make an appointment at any [facility], call [phone] ([phone]). If you don't have a family doctor or clinic, we will help you find one. [facility] clinics are conveniently located to serve the needs of you and your family.             Review of your medicines      START taking        Dose / Directions Last dose taken    ketorolac 10 MG tablet   Commonly known as:  TORADOL   Dose:  10 mg   Quantity:  20 tablet        Take 1 tablet (10 mg) by mouth every 6 hours as needed for moderate pain   Refills:  0        LORazepam 0.5 MG tablet   Commonly known as:  ATIVAN   Dose:  0.5 mg   Quantity:  10 tablet        Take 1 tablet (0.5 mg) by mouth every 6 hours as needed for anxiety   Refills:  0        metoclopramide 10 MG tablet   Commonly known as:  REGLAN   Dose:  10 mg   Quantity:  20 tablet        Take 1 tablet (10 mg) by mouth once as needed   Refills:  1        SUMAtriptan 25 MG tablet   Commonly known as:  IMITREX   Quantity:  8 tablet        Take 25-100mg one time. May repeat 25mg every two hours up to a maximum of 200mg in 24 hours.   Refills:  0          Our records show that you are taking the medicines listed below. If these are incorrect, please call your family doctor or clinic.        Dose / Directions Last dose taken    albuterol 108 (90 BASE) MCG/ACT Inhaler   Commonly known as:  PROAIR HFA/PROVENTIL HFA/VENTOLIN HFA   Dose:  2 puff   Quantity:  1 Inhaler        Inhale 2 puffs into the lungs every 4 hours as needed for shortness of breath / dyspnea or wheezing   Refills:  3        cetirizine 10 MG tablet   Commonly known as:  zyrTEC   Quantity:  30 tablet        TAKE ONE TABLET BY MOUTH EVERY MORNING   Refills:  10        fluticasone 50 MCG/ACT spray   Commonly known as:  FLONASE   Quantity:  16 g        USE TWO SPRAYS IN EACH NOSTRIL EVERY DAY   Refills:  3         fluticasone-salmeterol 250-50 MCG/DOSE diskus inhaler   Commonly known as:  ADVAIR   Dose:  1 puff   Quantity:  1 Inhaler        Inhale 1 puff into the lungs 2 times daily   Refills:  11        levothyroxine 75 MCG tablet   Commonly known as:  SYNTHROID/LEVOTHROID   Quantity:  90 tablet        TAKE ONE TABLET BY MOUTH ONCE DAILY   Refills:  1        MELATONIN PO        Refills:  0        Multi-vitamin Tabs tablet   Dose:  1 tablet        Take 1 tablet by mouth daily   Refills:  0        omeprazole 20 MG CR capsule   Commonly known as:  priLOSEC   Dose:  20 mg   Quantity:  180 capsule        Take 1 capsule (20 mg) by mouth 2 times daily   Refills:  3        * order for DME   Quantity:  1 Device        Walking cane for balance and mobility   Refills:  0        * order for DME   Quantity:  80 Units        Incontinence items, disposable underwear, absorbency pads, liners.   Refills:  11        polyethylene glycol powder   Commonly known as:  MIRALAX   Dose:  1 capful   Quantity:  510 g        Take 17 g (1 capful) by mouth daily as needed for constipation   Refills:  11        * Notice:  This list has 2 medication(s) that are the same as other medications prescribed for you. Read the directions carefully, and ask your doctor or other care provider to review them with you.            Prescriptions were sent or printed at these locations (4 Prescriptions)                   Other Prescriptions                Printed at Department/Unit printer (4 of 4)         metoclopramide (REGLAN) 10 MG tablet               ketorolac (TORADOL) 10 MG tablet               LORazepam (ATIVAN) 0.5 MG tablet               SUMAtriptan (IMITREX) 25 MG tablet                Procedures and tests performed during your visit     Peripheral IV: Standard      Orders Needing Specimen Collection     None      Pending Results     No orders found from 1/17/2018 to 1/20/2018.            Pending Culture Results     No orders found from 1/17/2018 to  1/20/2018.            Pending Results Instructions     If you had any lab results that were not finalized at the time of your Discharge, you can call the ED Lab Result RN at [phone]. You will be contacted by this team for any positive Lab results or changes in treatment. The nurses are available 7 days a week from 10A to 6:30P.  You can leave a message 24 hours per day and they will return your call.        Test Results From Your Hospital Stay               Clinical Quality Measure: Blood Pressure Screening     Your blood pressure was checked while you were in the emergency department today. The last reading we obtained was  BP: 153/85 . Please read the guidelines below about what these numbers mean and what you should do about them.  If your systolic blood pressure (the top number) is less than 120 and your diastolic blood pressure (the bottom number) is less than 80, then your blood pressure is normal. There is nothing more that you need to do about it.  If your systolic blood pressure (the top number) is 120-139 or your diastolic blood pressure (the bottom number) is 80-89, your blood pressure may be higher than it should be. You should have your blood pressure rechecked within a year by a primary care provider.  If your systolic blood pressure (the top number) is 140 or greater or your diastolic blood pressure (the bottom number) is 90 or greater, you may have high blood pressure. High blood pressure is treatable, but if left untreated over time it can put you at risk for heart attack, stroke, or kidney failure. You should have your blood pressure rechecked by a primary care provider within the next 4 weeks.  If your provider in the emergency department today gave you specific instructions to follow-up with your doctor or provider even sooner than that, you should follow that instruction and not wait for up to 4 weeks for your follow-up visit.        Thank you for choosing [facility]       Thank you for  "choosing [facility] for your care. Our goal is always to provide you with excellent care. Hearing back from our patients is one way we can continue to improve our services. Please take a few minutes to complete the written survey that you may receive in the mail after you visit with us. Thank you!        [facility] Information     [employer] lets you send messages to your doctor, view your test results, renew your prescriptions, schedule appointments and more. To sign up, go to [email or web address] . Click on \"Log in\" on the left side of the screen, which will take you to the Welcome page. Then click on \"Sign up Now\" on the right side of the page.     You will be asked to enter the access code listed below, as well as some personal information. Please follow the directions to create your username and password.     Your access code is: [email or web address]  Expires: 2018  8:47 AM     Your access code will  in 90 days. If you need help or a new code, please call your [facility] clinic or [phone].        Care EveryWhere ID     This is your Care EveryWhere ID. This could be used by other organizations to access your [facility] medical records  [id number]        Equal Access to Services     RICO CARLOS : Luis Antonio Hardin, yovana chino, carri lopez, antony fernandez. So [facility] [phone].    ATENCIÓN: Si habla español, tiene a elizabeth disposición servicios gratuitos de asistencia lingüística. Llame al [phone].    We comply with applicable federal civil rights laws and [place] laws. We do not discriminate on the basis of race, color, national origin, age, disability, sex, sexual orientation, or gender identity.            After Visit Summary       This is your record. Keep this with you and show to your community pharmacist(s) and doctor(s) at your next visit.                  "

## 2018-01-19 NOTE — ED PROVIDER NOTES
"  History     Chief Complaint:  Headache      HPI   Brooke John is a 52 year old female with a history of hypothyroidism who presents to the ED for evaluation of headache. The patient reports a history of migraines with her menstrual cycle, starting when she was a teenager. She developed a throbbing, left sided migraine yesterday around 1200 which has continued to today with no relief. She comes to the ED today due to a an increase in pain from previous episodes.    Here in the ED, she admits to radiating pain from her forehead and around her left eye to her occipital area. Additionally, she feels nauseated, dizzy, and having light-sensitivity with this migraine. She has taken tylenol and ibuprofen with some relief.        Allergies:  Amoxicillin  Aspirin  Milk Protein Extract  Zithromax [Azithromycin]    Medications:    Levothyroxine  Prilosec  Advair  Melatonin  Zyrtec  Miralax  Flonase  Albuterol inhaler    Past Medical History:    Arthritis   Depressive disorder   Lung nodule   Moderate persistent asthma   Scoliosis  Hypothyroidism  Sleep apnea   History of herniated intervertebral disc  Asthma    Past Surgical History:    Arthroscopy knee left  Back-lumbar discectomy   Cosmetic surgery  Eye    Family History:    Scoliosis  Learning disorder  Triple bypass    Social History:  Presents alone.  Negative for tobacco use.  Negative for alcohol use.  Marital Status:  Single [1]    Review of Systems   Eyes: Positive for photophobia.   Gastrointestinal: Positive for nausea.   Neurological: Positive for dizziness and headaches.   All other systems reviewed and are negative.    Physical Exam   First Vitals:  BP: 163/84  Pulse: 75  Heart Rate: 75  Temp: 97.3  F (36.3  C)  Resp: 20  Height: 154.9 cm (5' 1\")  Weight: 95.3 kg (210 lb)  SpO2: 96 %    Physical Exam   Constitutional: She is oriented to person, place, and time. No distress.   HENT:   Head: Atraumatic.   Mouth/Throat: Oropharynx is clear and moist. No " oropharyngeal exudate.   Eyes: Pupils are equal, round, and reactive to light. No scleral icterus.   Cardiovascular: Normal heart sounds and intact distal pulses.    Pulmonary/Chest: Breath sounds normal. No respiratory distress.   Abdominal: Soft. Bowel sounds are normal. There is no tenderness.   Musculoskeletal: She exhibits no edema or tenderness.   Neurological: She is alert and oriented to person, place, and time.   Skin: Skin is warm. No rash noted. She is not diaphoretic.   Nursing note and vitals reviewed.        Emergency Department Course   Interventions:  0925 NS 1L IV  0946 Toradol 15 mg IV  0947 Benadryl 25 mg IV  0948 Reglan 10 mg IV  1046 Ativan 0.5 mg IV   Imitrex 6 mg subcutaneous    Emergency Department Course:  Nursing notes and vitals reviewed. 0931 I performed an exam of the patient as documented above.     IV inserted. Medicine administered as documented above.     1021 I rechecked the patient and discussed the results of her workup thus far.     1125 I rechecked the patient. She was sleeping in the room when I entered.     Findings and plan explained to the Patient. Patient discharged home with instructions regarding supportive care, medications, and reasons to return. The importance of close follow-up was reviewed. The patient was prescribed Toradol, Ativan, Reglan, and Imitrex.    I personally reviewed the laboratory results with the Patient and answered all related questions prior to discharge.     Impression & Plan    Medical Decision Making:  His migraine.  Patient has a history of migraines since she was first having her period which is many years ago.  On examination I did consider cerebral venous thrombosis subarachnoid hemorrhage and meningitis none of these fit the clinical presentation patient has a constellation of headache light sensitivity and similar to prior migraines therefore no investigation into the cause of headache was performed.  Patient was given migraine medications  with relief.  Patient was offered discharge to follow-up with primary care.    Diagnosis:    ICD-10-CM   1. Migraine without aura and without status migrainosus, not intractable G43.009       Disposition:  discharged to home    Discharge Medications:  New Prescriptions    KETOROLAC (TORADOL) 10 MG TABLET    Take 1 tablet (10 mg) by mouth every 6 hours as needed for moderate pain    LORAZEPAM (ATIVAN) 0.5 MG TABLET    Take 1 tablet (0.5 mg) by mouth every 6 hours as needed for anxiety    METOCLOPRAMIDE (REGLAN) 10 MG TABLET    Take 1 tablet (10 mg) by mouth once as needed    SUMATRIPTAN (IMITREX) 25 MG TABLET    Take 25-100mg one time. May repeat 25mg every two hours up to a maximum of 200mg in 24 hours.     IRonda, am serving as a scribe on 1/19/2018 at 9:31 AM to personally document services performed by Beau Weller MD based on my observations and the provider's statements to me.       Ronda Villegas  1/19/2018   [facility] EMERGENCY DEPARTMENT       Beau Weller MD  01/22/18 [id number]

## 2018-01-23 NOTE — TELEPHONE ENCOUNTER
1/23/2018      Patient is aware of preventative health screenings and will call back on their own time to schedule.          Outreach ,  Liz Velez

## 2018-02-08 ENCOUNTER — TRANSFERRED RECORDS (OUTPATIENT)
Dept: HEALTH INFORMATION MANAGEMENT | Facility: CLINIC | Age: 53
End: 2018-02-08

## 2018-02-26 NOTE — PROGRESS NOTES
"appointment now says \"patient called to reschedule\" but left on schedule.  Did not come.  Not seen.  " 5

## 2018-03-07 ENCOUNTER — HOSPITAL ENCOUNTER (OUTPATIENT)
Dept: GENERAL RADIOLOGY | Facility: CLINIC | Age: 53
Discharge: HOME | End: 2018-03-07
Attending: PHYSICIAN ASSISTANT | Admitting: PHYSICIAN ASSISTANT
Payer: MEDICARE

## 2018-03-07 VITALS — SYSTOLIC BLOOD PRESSURE: 153 MMHG | DIASTOLIC BLOOD PRESSURE: 84 MMHG | HEART RATE: 76 BPM

## 2018-03-07 DIAGNOSIS — M48.062 SPINAL STENOSIS, LUMBAR REGION, WITH NEUROGENIC CLAUDICATION: ICD-10-CM

## 2018-03-07 PROCEDURE — 25000125 ZZHC RX 250

## 2018-03-07 PROCEDURE — 27211112 XR LUMBAR TRANSFORAMINAL INJ SINGLE

## 2018-03-07 PROCEDURE — 25000125 ZZHC RX 250: Performed by: PHYSICIAN ASSISTANT

## 2018-03-07 PROCEDURE — 25500064 ZZH RX 255 OP 636: Performed by: PHYSICIAN ASSISTANT

## 2018-03-07 PROCEDURE — 25000128 H RX IP 250 OP 636

## 2018-03-07 RX ORDER — DEXAMETHASONE SODIUM PHOSPHATE 10 MG/ML
20 INJECTION, SOLUTION INTRAMUSCULAR; INTRAVENOUS ONCE
Status: COMPLETED | OUTPATIENT
Start: 2018-03-07 | End: 2018-03-07

## 2018-03-07 RX ORDER — LIDOCAINE HYDROCHLORIDE 10 MG/ML
3 INJECTION, SOLUTION EPIDURAL; INFILTRATION; INTRACAUDAL; PERINEURAL ONCE
Status: COMPLETED | OUTPATIENT
Start: 2018-03-07 | End: 2018-03-07

## 2018-03-07 RX ORDER — IOPAMIDOL 408 MG/ML
10 INJECTION, SOLUTION INTRATHECAL ONCE
Status: COMPLETED | OUTPATIENT
Start: 2018-03-07 | End: 2018-03-07

## 2018-03-07 RX ORDER — DEXAMETHASONE SODIUM PHOSPHATE 10 MG/ML
INJECTION, SOLUTION INTRAMUSCULAR; INTRAVENOUS
Status: COMPLETED
Start: 2018-03-07 | End: 2018-03-07

## 2018-03-07 RX ORDER — LIDOCAINE HYDROCHLORIDE 10 MG/ML
5 INJECTION, SOLUTION EPIDURAL; INFILTRATION; INTRACAUDAL; PERINEURAL ONCE
Status: COMPLETED | OUTPATIENT
Start: 2018-03-07 | End: 2018-03-07

## 2018-03-07 RX ORDER — LIDOCAINE HYDROCHLORIDE 10 MG/ML
INJECTION, SOLUTION INFILTRATION; PERINEURAL
Status: COMPLETED
Start: 2018-03-07 | End: 2018-03-07

## 2018-03-07 RX ADMIN — LIDOCAINE HYDROCHLORIDE 3 ML: 10 INJECTION, SOLUTION INFILTRATION; PERINEURAL at 11:25

## 2018-03-07 RX ADMIN — DEXAMETHASONE SODIUM PHOSPHATE 20 MG: 10 INJECTION, SOLUTION INTRAMUSCULAR; INTRAVENOUS at 11:25

## 2018-03-07 RX ADMIN — LIDOCAINE HYDROCHLORIDE 5 ML: 10 INJECTION, SOLUTION INFILTRATION; PERINEURAL at 11:25

## 2018-03-07 RX ADMIN — LIDOCAINE HYDROCHLORIDE 5 ML: 10 INJECTION, SOLUTION EPIDURAL; INFILTRATION; INTRACAUDAL; PERINEURAL at 11:25

## 2018-03-07 RX ADMIN — IOPAMIDOL 1 ML: 408 INJECTION, SOLUTION INTRATHECAL at 11:24

## 2018-03-07 NOTE — PROGRESS NOTES
RADIOLOGY PROCEDURE NOTE  Patient name: Brooke John  MRN: [id number]  : 1965    Pre-procedure diagnosis: Back and left leg pain  Post-procedure diagnosis: Same    Procedure Date/Time: 2018  11:25 AM  Procedure: Left L4-L5 TFESI  Estimated blood loss: None  Specimen(s) collected with description: none  The patient tolerated the procedure well with no immediate complications.  Significant findings:none    See imaging dictation for procedural details.    Provider name: Dennis Roque  Assistant(s):None

## 2018-04-13 ENCOUNTER — TELEPHONE (OUTPATIENT)
Dept: FAMILY MEDICINE | Facility: CLINIC | Age: 53
End: 2018-04-13

## 2018-04-13 DIAGNOSIS — K21.9 GASTROESOPHAGEAL REFLUX DISEASE WITHOUT ESOPHAGITIS: Primary | ICD-10-CM

## 2018-04-13 RX ORDER — OMEPRAZOLE 40 MG/1
40 CAPSULE, DELAYED RELEASE ORAL DAILY
Qty: 90 CAPSULE | Refills: 1 | Status: SHIPPED | OUTPATIENT
Start: 2018-04-13 | End: 2018-10-10

## 2018-04-13 NOTE — TELEPHONE ENCOUNTER
Omeprazole 20mg requires a Prior Authorization.   Reason / Alternatives per Insurance rejection:Plam limitations exceeded , maximum daily dose of 1      Would you like to change the medication or attempt the prior authorization?    Patient's prescription insurance is as follows:  Insurance Company: [employer] Part D   Bin number: [id number]   Phone number: [phone]   ID # [id number]  Group # [id number]  PCN # [id number]    Please advise and let pharmacy know if and when PA is approved or denied.    -Audrey Alfonso, Certified Pharmacy Technician, CPhT, [facility], Ph. [phone]

## 2018-04-13 NOTE — TELEPHONE ENCOUNTER
Please ask pt if she can take this med only once daily or if she truly needs it twice daily.     If she does need twice daily, we can do prior auth. If not then re-do rx for once daily.

## 2018-04-16 ENCOUNTER — OFFICE VISIT (OUTPATIENT)
Dept: FAMILY MEDICINE | Facility: CLINIC | Age: 53
End: 2018-04-16
Payer: MEDICARE

## 2018-04-16 VITALS
SYSTOLIC BLOOD PRESSURE: 128 MMHG | HEART RATE: 84 BPM | DIASTOLIC BLOOD PRESSURE: 88 MMHG | BODY MASS INDEX: 39.65 KG/M2 | HEIGHT: 61 IN | OXYGEN SATURATION: 96 % | WEIGHT: 210 LBS | TEMPERATURE: 98.4 F

## 2018-04-16 DIAGNOSIS — M54.16 LUMBAR RADICULOPATHY: ICD-10-CM

## 2018-04-16 DIAGNOSIS — Z12.31 VISIT FOR SCREENING MAMMOGRAM: ICD-10-CM

## 2018-04-16 DIAGNOSIS — E66.01 MORBID OBESITY (H): ICD-10-CM

## 2018-04-16 DIAGNOSIS — J45.40 MODERATE PERSISTENT ASTHMA WITHOUT COMPLICATION: Primary | ICD-10-CM

## 2018-04-16 DIAGNOSIS — R06.02 SHORTNESS OF BREATH: ICD-10-CM

## 2018-04-16 DIAGNOSIS — G43.829 MENSTRUAL MIGRAINE WITHOUT STATUS MIGRAINOSUS, NOT INTRACTABLE: ICD-10-CM

## 2018-04-16 DIAGNOSIS — E03.9 HYPOTHYROIDISM, UNSPECIFIED TYPE: ICD-10-CM

## 2018-04-16 DIAGNOSIS — Z99.89 USE OF CANE AS AMBULATORY AID: ICD-10-CM

## 2018-04-16 DIAGNOSIS — M41.9 SCOLIOSIS, UNSPECIFIED SCOLIOSIS TYPE, UNSPECIFIED SPINAL REGION: ICD-10-CM

## 2018-04-16 DIAGNOSIS — K21.9 GASTROESOPHAGEAL REFLUX DISEASE WITHOUT ESOPHAGITIS: ICD-10-CM

## 2018-04-16 PROCEDURE — 99214 OFFICE O/P EST MOD 30 MIN: CPT | Performed by: FAMILY MEDICINE

## 2018-04-16 RX ORDER — SUMATRIPTAN 25 MG/1
TABLET, FILM COATED ORAL
Qty: 8 TABLET | Refills: 3 | Status: SHIPPED | OUTPATIENT
Start: 2018-04-16 | End: 2019-05-21

## 2018-04-16 RX ORDER — LEVOTHYROXINE SODIUM 75 UG/1
75 TABLET ORAL DAILY
Qty: 90 TABLET | Refills: 1 | Status: SHIPPED | OUTPATIENT
Start: 2018-04-16 | End: 2019-01-09

## 2018-04-16 RX ORDER — ALBUTEROL SULFATE 90 UG/1
2 AEROSOL, METERED RESPIRATORY (INHALATION) EVERY 4 HOURS PRN
Qty: 1 INHALER | Refills: 11 | Status: SHIPPED | OUTPATIENT
Start: 2018-04-16 | End: 2019-07-17

## 2018-04-16 RX ORDER — METOCLOPRAMIDE 10 MG/1
10 TABLET ORAL
Qty: 20 TABLET | Refills: 3 | Status: SHIPPED | OUTPATIENT
Start: 2018-04-16 | End: 2019-07-17
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
## 2018-04-16 NOTE — MR AVS SNAPSHOT
After Visit Summary   4/16/2018    Brooke John    MRN: [id number]           Patient Information     Date Of Birth          1965        Visit Information        Provider Department      4/16/2018 9:45 AM Mandy Bhatia MD [facility]        Today's Diagnoses     Moderate persistent asthma without complication    -  1    Shortness of breath        Hypothyroidism, unspecified type        Morbid obesity (H)        Gastroesophageal reflux disease without esophagitis        Menstrual migraine without status migrainosus, not intractable        Visit for screening mammogram        Use of cane as ambulatory aid        Lumbar radiculopathy- left - uses a cane - born with the scoliosis - sees [facility]         Scoliosis, unspecified scoliosis type, unspecified spinal region          Care Instructions    Recheck thyroid labs again in 7/2018 -  Recheck tsh, free t4, total t3  - We've entered future orders for this and you can do this as a lab only appointment.      Due for medicare annual wellness visit. Will have pt schedule that today. Come in fasting for that.     Please, call or return to clinic or go to the ER immediately if signs or symptoms worsen or fail to improve as anticipated.      See Patient Instructions.                Thank you for choosing [facility]  for your Health Care. It was a pleasure seeing you at your visit today. Please contact us with any questions or concerns you may have.                   Mandy Bhatia MD                                  To reach your [facility] care team after hours call:   [phone]    Our clinic hours are:     Monday- 7:30 am - 7:00 pm                             Tuesday through Friday- 7:30 am - 5:00 pm                                        Saturday- 8:00 am - 12:00 pm                  Phone:  [phone]    Our pharmacy hours are:     Monday  8:00 am to 7:00  pm      Tuesday through Friday 8:00am to 6:00pm                        Saturday - 9:00 am to 1:00 pm      Sunday : Closed.              Phone:  [phone]      There is also information available at our web site:  [email or web address]    If your provider ordered any lab tests and you do not receive the results within 10 business days, please call the clinic.    If you need a medication refill please contact your pharmacy.  Please allow 2 business days for your refill to be completed.    Our clinic offers telephone visits and e visits.  Please ask one of your team members to explain more.      Use [employer] (secure email communication and access to your chart) to send your primary care provider a message or make an appointment. Ask someone on your Team how to sign up for [employer].                       Follow-ups after your visit        Follow-up notes from your care team     Return in about 2 months (around 6/16/2018) for Routine Visit, Physical Exam- medicare annual wellness visit , Physical Exam.      Your next 10 appointments already scheduled     Jun 11, 2018  8:00 AM CDT   PHYSICAL with Mandy Bhatia MD   [facility]    [address] [place] [id number]   [phone]              Future tests that were ordered for you today     Open Future Orders        Priority Expected Expires Ordered    MA SCREENING DIGITAL BILAT - Future  (s+30) Routine  4/16/2019 4/16/2018    T3 total Routine 7/16/2018 9/16/2018 4/16/2018    TSH Routine 7/16/2018 9/16/2018 4/16/2018    T4 FREE Routine 7/16/2018 9/16/2018 4/16/2018            Who to contact     If you have questions or need follow up information about today's clinic visit or your schedule please contact [facility] directly at [phone].  Normal or non-critical lab and imaging results will be communicated to you by MyChart, letter or phone within 4 business days after the clinic has  "received the results. If you do not hear from us within 7 days, please contact the clinic through BTR or phone. If you have a critical or abnormal lab result, we will notify you by phone as soon as possible.  Submit refill requests through BTR or call your pharmacy and they will forward the refill request to us. Please allow 3 business days for your refill to be completed.          Additional Information About Your Visit        BTR Information     BTR lets you send messages to your doctor, view your test results, renew your prescriptions, schedule appointments and more. To sign up, go to [email or web address] . Click on \"Log in\" on the left side of the screen, which will take you to the Welcome page. Then click on \"Sign up Now\" on the right side of the page.     You will be asked to enter the access code listed below, as well as some personal information. Please follow the directions to create your username and password.     Your access code is: [email or web address]  Expires: 2018  2:26 PM     Your access code will  in 90 days. If you need help or a new code, please call your [facility] clinic or [phone].        Care EveryWhere ID     This is your Care EveryWhere ID. This could be used by other organizations to access your [facility] medical records  [id number]        Your Vitals Were     Pulse Temperature Height Pulse Oximetry BMI (Body Mass Index)       84 98.4  F (36.9  C) (Oral) 5' 1\" (1.549 m) 96% 39.68 kg/m2        Blood Pressure from Last 3 Encounters:   18 128/88   18 153/84   18 153/85    Weight from Last 3 Encounters:   18 210 lb (95.3 kg)   18 210 lb (95.3 kg)   17 209 lb 4.8 oz (94.9 kg)              We Performed the Following     MIGRAINE ACTION PLAN          Today's Medication Changes          These changes are accurate as of 18 10:44 AM.  If you have any questions, ask your nurse or doctor.               These medicines have changed or " have updated prescriptions.        Dose/Directions    albuterol 108 (90 Base) MCG/ACT Inhaler   Commonly known as:  PROAIR HFA/PROVENTIL HFA/VENTOLIN HFA   This may have changed:  additional instructions   Used for:  Shortness of breath   Changed by:  Mandy Bhatia MD        Dose:  2 puff   Inhale 2 puffs into the lungs every 4 hours as needed for shortness of breath / dyspnea or wheezing Cough   Quantity:  1 Inhaler   Refills:  11       levothyroxine 75 MCG tablet   Commonly known as:  SYNTHROID/LEVOTHROID   This may have changed:  See the new instructions.   Used for:  Hypothyroidism, unspecified type   Changed by:  Mandy Bhatia MD        Dose:  75 mcg   Take 1 tablet (75 mcg) by mouth daily   Quantity:  90 tablet   Refills:  1       metoclopramide 10 MG tablet   Commonly known as:  REGLAN   This may have changed:  additional instructions   Used for:  Gastroesophageal reflux disease without esophagitis   Changed by:  Mandy Bhatia MD        Dose:  10 mg   Take 1 tablet (10 mg) by mouth once as needed For nausea/stomach upset   Quantity:  20 tablet   Refills:  3            Where to get your medicines      These medications were sent to [facility] [place] [id number]     Phone:  [phone]     albuterol 108 (90 Base) MCG/ACT Inhaler    fluticasone-salmeterol 250-50 MCG/DOSE diskus inhaler    levothyroxine 75 MCG tablet    metoclopramide 10 MG tablet    SUMAtriptan 25 MG tablet                Primary Care Provider Office Phone # Fax #    Mandy Bhatia [phone]       [address] [place], [place] [address]        Equal Access to Services     [facility]: Hadii elodia damon Soleanne, waaxda luqadaha, qaybta kaalmada adecorey, antony fernandez. So [facility] [phone].    ATENCIÓN: Si habla español, tiene a elizabeth disposición servicios  chaitanya de asistencia lingüística. Jimmy castelan [phone].    We comply with applicable federal civil rights laws and [place] laws. We do not discriminate on the basis of race, color, national origin, age, disability, sex, sexual orientation, or gender identity.            Thank you!     Thank you for choosing [facility]  for your care. Our goal is always to provide you with excellent care. Hearing back from our patients is one way we can continue to improve our services. Please take a few minutes to complete the written survey that you may receive in the mail after your visit with us. Thank you!             Your Updated Medication List - Protect others around you: Learn how to safely use, store and throw away your medicines at www.disposemymeds.org.          This list is accurate as of 4/16/18 10:44 AM.  Always use your most recent med list.                   Brand Name Dispense Instructions for use Diagnosis    albuterol 108 (90 Base) MCG/ACT Inhaler    PROAIR HFA/PROVENTIL HFA/VENTOLIN HFA    1 Inhaler    Inhale 2 puffs into the lungs every 4 hours as needed for shortness of breath / dyspnea or wheezing Cough    Shortness of breath       cetirizine 10 MG tablet    zyrTEC    30 tablet    TAKE ONE TABLET BY MOUTH EVERY MORNING    Cough       fluticasone 50 MCG/ACT spray    FLONASE    16 g    USE TWO SPRAYS IN EACH NOSTRIL EVERY DAY    Moderate persistent asthma without complication       fluticasone-salmeterol 250-50 MCG/DOSE diskus inhaler    ADVAIR    1 Inhaler    Inhale 1 puff into the lungs 2 times daily    Moderate persistent asthma without complication       ketorolac 10 MG tablet    TORADOL    20 tablet    Take 1 tablet (10 mg) by mouth every 6 hours as needed for moderate pain        levothyroxine 75 MCG tablet    SYNTHROID/LEVOTHROID    90 tablet    Take 1 tablet (75 mcg) by mouth daily    Hypothyroidism, unspecified type       LORazepam 0.5 MG tablet    ATIVAN    10 tablet    Take 1  tablet (0.5 mg) by mouth every 6 hours as needed for anxiety        MELATONIN PO           metoclopramide 10 MG tablet    REGLAN    20 tablet    Take 1 tablet (10 mg) by mouth once as needed For nausea/stomach upset    Gastroesophageal reflux disease without esophagitis       Multi-vitamin Tabs tablet      Take 1 tablet by mouth daily        omeprazole 40 MG capsule    priLOSEC    90 capsule    Take 1 capsule (40 mg) by mouth daily Take 30-60 minutes before a meal.    Gastroesophageal reflux disease without esophagitis       * order for DME     1 Device    Walking cane for balance and mobility    Risk for falls, Balance problems       * order for DME     80 Units    Incontinence items, disposable underwear, absorbency pads, liners.    Risk for falls, Balance problems, Mixed incontinence       polyethylene glycol powder    MIRALAX    510 g    Take 17 g (1 capful) by mouth daily as needed for constipation    Constipation, unspecified constipation type       SUMAtriptan 25 MG tablet    IMITREX    8 tablet    Take 25-100mg one time. May repeat 25mg every two hours up to a maximum of 200mg in 24 hours.    Menstrual migraine without status migrainosus, not intractable       * Notice:  This list has 2 medication(s) that are the same as other medications prescribed for you. Read the directions carefully, and ask your doctor or other care provider to review them with you.

## 2018-04-16 NOTE — PATIENT INSTRUCTIONS
Recheck thyroid labs again in 7/2018 -  Recheck tsh, free t4, total t3  - We've entered future orders for this and you can do this as a lab only appointment.      Due for medicare annual wellness visit. Will have pt schedule that today. Come in fasting for that.     Please, call or return to clinic or go to the ER immediately if signs or symptoms worsen or fail to improve as anticipated.      See Patient Instructions.                Thank you for choosing [facility]  for your Health Care. It was a pleasure seeing you at your visit today. Please contact us with any questions or concerns you may have.                   Mandy Bhatia MD                                  To reach your [facility] care team after hours call:   [phone]    Our clinic hours are:     Monday- 7:30 am - 7:00 pm                             Tuesday through Friday- 7:30 am - 5:00 pm                                        Saturday- 8:00 am - 12:00 pm                  Phone:  [phone]    Our pharmacy hours are:     Monday  8:00 am to 7:00 pm      Tuesday through Friday 8:00am to 6:00pm                        Saturday - 9:00 am to 1:00 pm      Sunday : Closed.              Phone:  [phone]      There is also information available at our web site:  [email or web address]    If your provider ordered any lab tests and you do not receive the results within 10 business days, please call the clinic.    If you need a medication refill please contact your pharmacy.  Please allow 2 business days for your refill to be completed.    Our clinic offers telephone visits and e visits.  Please ask one of your team members to explain more.      Use [employer] (secure email communication and access to your chart) to send your primary care provider a message or make an appointment. Ask someone on your Team how to sign up for [employer].

## 2018-04-16 NOTE — LETTER
My Migraine Action Plan      Date: 4/16/2018     My Name: Brooke John   YOB: 1965  My Pharmacy: [facility] - [address]       My (Preventative) Control Medicine: none         My Rescue Medicine: imitrex    My Doctor: Mandy Bhatia     My Clinic: [address] [id number] [phone]        GREEN ZONE = Good Control    My headache plan is working.   I can do what I need to do.           I WILL:     ? Keep managing my triggers.  ? Write in my migraine diary each time I have a headache.  ? Keep taking my preventive (controller) medicine daily.  ? Take my relief and rescue medicine as needed.             YELLOW ZONE = Not Enough Control    My headache plan isn t always working.   My headaches keep me from doing   some of the things I need to do.       I WILL:     ? Set goals to control my triggers and act on them.  ? Write in my migraine diary each time I have a headache and review it for                      patterns or new triggers.  ? Keep taking my preventive (controller) medicine daily.  ? Take my relief and rescue medicine as needed.  ? Call my doctor or clinic at if I stay in the Yellow Zone.             RED ZONE = Poor or No Control    My headache plan has  failed. I can t do anything  when I have one. My  medicines aren t working.           I WILL:   ? Set goals to control my triggers and act on them.  ? Write in my migraine diary each time I have a headache and review it for                      patterns or new triggers.  ? Keep taking my preventive (controller) medicine daily.  ? Take my relief and rescue medicine as needed.  ? Call my doctor or clinic or go to urgent care or an ER if I m having the worst                  headache of my life.  ? Call my doctor or clinic or go to urgent care or an ER if my medicine doesn t work.  ? Let my doctor or clinic know within 2  weeks if I have gone to an urgent care or             emergency department.          Provider specific instructions:  None.

## 2018-04-16 NOTE — NURSING NOTE
"Chief Complaint   Patient presents with     Asthma     Initial /88 (BP Location: Right arm, Patient Position: Chair, Cuff Size: Adult Large)  Pulse 84  Temp 98.4  F (36.9  C) (Oral)  Ht 5' 1\" (1.549 m)  Wt 210 lb (95.3 kg)  SpO2 96%  BMI 39.68 kg/m2 Estimated body mass index is 39.68 kg/(m^2) as calculated from the following:    Height as of this encounter: 5' 1\" (1.549 m).    Weight as of this encounter: 210 lb (95.3 kg).  BP completed using cuff size large right Arm  Jessie Dorina CMA    "

## 2018-04-17 ASSESSMENT — ASTHMA QUESTIONNAIRES: ACT_TOTALSCORE: 20

## 2018-04-25 ENCOUNTER — TELEPHONE (OUTPATIENT)
Dept: FAMILY MEDICINE | Facility: CLINIC | Age: 53
End: 2018-04-25

## 2018-04-25 NOTE — TELEPHONE ENCOUNTER
Date Forms was received: April 25, 2018    Forms received by: Fax    Last office visit: 4/16/2018    Purpose of Form:  Incontinence supply order prescription    How the form needs to be returned for patient:  Fax [phone]    Form currently placed  [employer]    Thalia Sánchez, KHADRA

## 2018-04-26 NOTE — TELEPHONE ENCOUNTER
Completed forms faxed back to [facility] medical at [phone].   Originals sent to be scanned.     Claudia Parker

## 2018-05-07 ENCOUNTER — TRANSFERRED RECORDS (OUTPATIENT)
Dept: HEALTH INFORMATION MANAGEMENT | Facility: CLINIC | Age: 53
End: 2018-05-07

## 2018-05-15 ENCOUNTER — OFFICE VISIT (OUTPATIENT)
Dept: FAMILY MEDICINE | Facility: CLINIC | Age: 53
End: 2018-05-15
Payer: MEDICARE

## 2018-05-15 VITALS
DIASTOLIC BLOOD PRESSURE: 74 MMHG | SYSTOLIC BLOOD PRESSURE: 120 MMHG | OXYGEN SATURATION: 95 % | WEIGHT: 207 LBS | HEIGHT: 61 IN | HEART RATE: 78 BPM | TEMPERATURE: 98.4 F | BODY MASS INDEX: 39.08 KG/M2

## 2018-05-15 DIAGNOSIS — R25.3 EYE MUSCLE TWITCHES: Primary | ICD-10-CM

## 2018-05-15 LAB
BASOPHILS # BLD AUTO: 0 10E9/L (ref 0–0.2)
BASOPHILS NFR BLD AUTO: 0.6 %
DIFFERENTIAL METHOD BLD: NORMAL
EOSINOPHIL # BLD AUTO: 0.1 10E9/L (ref 0–0.7)
EOSINOPHIL NFR BLD AUTO: 1.5 %
ERYTHROCYTE [DISTWIDTH] IN BLOOD BY AUTOMATED COUNT: 13.6 % (ref 10–15)
HCT VFR BLD AUTO: 38.2 % (ref 35–47)
HGB BLD-MCNC: 12.2 G/DL (ref 11.7–15.7)
LYMPHOCYTES # BLD AUTO: 2.2 10E9/L (ref 0.8–5.3)
LYMPHOCYTES NFR BLD AUTO: 33.7 %
MCH RBC QN AUTO: 27.5 PG (ref 26.5–33)
MCHC RBC AUTO-ENTMCNC: 31.9 G/DL (ref 31.5–36.5)
MCV RBC AUTO: 86 FL (ref 78–100)
MONOCYTES # BLD AUTO: 0.6 10E9/L (ref 0–1.3)
MONOCYTES NFR BLD AUTO: 9.5 %
NEUTROPHILS # BLD AUTO: 3.6 10E9/L (ref 1.6–8.3)
NEUTROPHILS NFR BLD AUTO: 54.7 %
PLATELET # BLD AUTO: 253 10E9/L (ref 150–450)
RBC # BLD AUTO: 4.43 10E12/L (ref 3.8–5.2)
WBC # BLD AUTO: 6.6 10E9/L (ref 4–11)

## 2018-05-15 PROCEDURE — 85025 COMPLETE CBC W/AUTO DIFF WBC: CPT | Performed by: PHYSICIAN ASSISTANT

## 2018-05-15 PROCEDURE — 99213 OFFICE O/P EST LOW 20 MIN: CPT | Performed by: PHYSICIAN ASSISTANT

## 2018-05-15 PROCEDURE — 80053 COMPREHEN METABOLIC PANEL: CPT | Performed by: PHYSICIAN ASSISTANT

## 2018-05-15 PROCEDURE — 36415 COLL VENOUS BLD VENIPUNCTURE: CPT | Performed by: PHYSICIAN ASSISTANT

## 2018-05-15 PROCEDURE — 83735 ASSAY OF MAGNESIUM: CPT | Performed by: PHYSICIAN ASSISTANT

## 2018-05-15 NOTE — LETTER
[facility]  [address] [place] [address]                  [phone]   May 17, 2018    Brooke John  [address]    [facility] [address]      Dear Brooke,    Here is a summary of your recent test results:    -All of your labs are normal.       Your test results are enclosed.      Please contact me if you have any questions.    In addition, here is a list of due or overdue Health Maintenance reminders.    Health Maintenance Due   Topic Date Due     HIV SCREEN (SYSTEM ASSIGNED)  08/21/1981     Wellness Visit with your Primary Provider - yearly  03/14/2017     Mammogram - yearly  08/01/2017       Please call us at [phone] (or use [employer]) to address the above recommendations.            Thank you very much for trusting [facility]..     Healthy regards,      Alondra Michele PA-C        Results for orders placed or performed in visit on 05/15/18   Comprehensive metabolic panel   Result Value Ref Range    Sodium 140 133 - 144 mmol/L    Potassium 4.3 3.4 - 5.3 mmol/L    Chloride 108 94 - 109 mmol/L    Carbon Dioxide 23 20 - 32 mmol/L    Anion Gap 9 3 - 14 mmol/L    Glucose 121 (H) 70 - 99 mg/dL    Urea Nitrogen 13 7 - 30 mg/dL    Creatinine 0.84 0.52 - 1.04 mg/dL    GFR Estimate 70 >60 mL/min/1.7m2    GFR Estimate If Black 85 >60 mL/min/1.7m2    Calcium 9.0 8.5 - 10.1 mg/dL    Bilirubin Total 0.7 0.2 - 1.3 mg/dL    Albumin 3.6 3.4 - 5.0 g/dL    Protein Total 7.6 6.8 - 8.8 g/dL    Alkaline Phosphatase 81 40 - 150 U/L    ALT 21 0 - 50 U/L    AST 16 0 - 45 U/L   CBC with platelets differential   Result Value Ref Range    WBC 6.6 4.0 - 11.0 10e9/L    RBC Count 4.43 3.8 - 5.2 10e12/L    Hemoglobin 12.2 11.7 - 15.7 g/dL    Hematocrit 38.2 35.0 - 47.0 %    MCV 86 78 - 100 fl    MCH 27.5 26.5 - 33.0 pg    MCHC 31.9 31.5 - 36.5 g/dL    RDW 13.6 10.0 - 15.0 %    Platelet Count 253 150 - 450 10e9/L    Diff Method Automated Method     %  Neutrophils 54.7 %    % Lymphocytes 33.7 %    % Monocytes 9.5 %    % Eosinophils 1.5 %    % Basophils 0.6 %    Absolute Neutrophil 3.6 1.6 - 8.3 10e9/L    Absolute Lymphocytes 2.2 0.8 - 5.3 10e9/L    Absolute Monocytes 0.6 0.0 - 1.3 10e9/L    Absolute Eosinophils 0.1 0.0 - 0.7 10e9/L    Absolute Basophils 0.0 0.0 - 0.2 10e9/L   Magnesium   Result Value Ref Range    Magnesium 2.2 1.6 - 2.3 mg/dL

## 2018-05-15 NOTE — PROGRESS NOTES
"  SUBJECTIVE:                                                    Brooke John is a 52 year old female who presents to clinic today for the following health issues:      x2 days left eye twitching. No pain, redness, discharge, no allergies.     Patient states that she has had on and off eye twitching for the last two days.  She denies any blurry vision or visual changes.  She also denies any other muscle twitching, aches or weakness.        Problem list and histories reviewed & adjusted, as indicated.  Additional history: as documented      ROS:  Constitutional, HEENT, cardiovascular, pulmonary, GI, , musculoskeletal, neuro, skin, endocrine and psych systems are negative, except as otherwise noted.    OBJECTIVE:                                                    /74 (BP Location: Left arm, Patient Position: Chair, Cuff Size: Adult Large)  Pulse 78  Temp 98.4  F (36.9  C) (Oral)  Ht 5' 1\" (1.549 m)  Wt 207 lb (93.9 kg)  LMP 04/14/2018 (Approximate)  SpO2 95%  Breastfeeding? No  BMI 39.11 kg/m2  Body mass index is 39.11 kg/(m^2).  GENERAL: healthy, alert and no distress  EYES: Eyes grossly normal to inspection, PERRL and conjunctivae and sclerae normal  NECK: no adenopathy, no asymmetry, masses, or scars and thyroid normal to palpation  MS: no gross musculoskeletal defects noted, no edema  NEURO: Normal strength and tone, mentation intact and speech normal  NEURO: Normal strength and tone, sensory exam grossly normal, mentation intact, speech normal, cranial nerves 2-12 intact and DTR's normal and symmetric   PSYCH: mentation appears normal, affect normal/bright    Diagnostic Test Results:  Labs -pending     ASSESSMENT/PLAN:                                                      Brooke was seen today for eye problem.    Diagnoses and all orders for this visit:    Eye muscle twitches  -     Comprehensive metabolic panel  -     CBC with platelets differential  -     Magnesium    - No eye twitching seen " on exam today.  Patient reports that it comes and goes over the last 2 days.  She denies any other symptoms with the eye twitching and reports to having normal vision without concerns.    - Patient had normal eye movement on exam today.    - Discussed that eye twitching can be a result of being tired, poor sleep, and dehydration.  She admits to not always sleeping well, and is not good about consuming water.  She was encouraged to work on hydration and sleep habits.    - Labs also drawn today as well.   - Patient to followup if symptoms do not improve.  She should be seen sooner if symptoms change or worsen in any way.      -- I have discussed the patient's diagnosis, and my plan of treatment with the patient and/or family. Patient is aware to followup if symptoms do not improve.  Patient has been advised to be seen sooner or seek more immediate care if symptoms change or worsen.  Patient agrees with and understands the plan today.     See Patient Instructions        Alondra Michele PA-C    [facility] [place]

## 2018-05-15 NOTE — MR AVS SNAPSHOT
"              After Visit Summary   5/15/2018    Brooke John    MRN: [id number]           Patient Information     Date Of Birth          1965        Visit Information        Provider Department      5/15/2018 10:20 AM Alondra Michele PA-C [facility] [place] [facility]        Today's Diagnoses     Eye muscle twitches    -  1       Follow-ups after your visit        Follow-up notes from your care team     Return in about 1 week (around 5/22/2018) for If not improving, please be seen sooner if needed.      Your next 10 appointments already scheduled     Jun 06, 2018  8:15 AM CDT   MA SCREENING DIGITAL BILATERAL with [id number]   [facility] ([facility])    [address] [facility], [address]  [facility] [address] [phone]           Do not use any powder, lotion or deodorant under your arms or on your breast. If you do, we will ask you to remove it before your exam.  Wear comfortable, two-piece clothing.  If you have any allergies, tell your care team.  Bring any previous mammograms from other facilities or have them mailed to the breast center. Three-dimensional (3D) mammograms are available at [facility] locations in [facility], [place], [facility], [place], [place], and [place]. [facility] locations include [place] and [facility] in [place]. Benefits of 3D mammograms include: - Improved rate of cancer detection - Decreases your chance of having to go back for more tests, which means fewer: - \"False-positive\" results (This means that there is an abnormal area but it isn't cancer.) - Invasive testing procedures, such as a biopsy or surgery - Can provide clearer images of the breast if you have dense breast tissue. 3D mammography is an optional exam that anyone can have with a 2D mammogram. It doesn't replace or take the place of a 2D mammogram. 2D mammograms remain an effective screening test for all women.  Not all insurance companies " "cover the cost of a 3D mammogram. Check with your insurance.            2018  8:15 AM CDT   PHYSICAL with Mandy Bhatia MD   [facility]    [address] [place] [id number] [phone]              Who to contact     If you have questions or need follow up information about today's clinic visit or your schedule please contact [facility] directly at [phone].  Normal or non-critical lab and imaging results will be communicated to you by [employer], letter or phone within 4 business days after the clinic has received the results. If you do not hear from us within 7 days, please contact the clinic through [email or web address] or phone. If you have a critical or abnormal lab result, we will notify you by phone as soon as possible.  Submit refill requests through [employer] or call your pharmacy and they will forward the refill request to us. Please allow 3 business days for your refill to be completed.          Additional Information About Your Visit        [employer] Information     [employer] lets you send messages to your doctor, view your test results, renew your prescriptions, schedule appointments and more. To sign up, go to [email or web address] . Click on \"Log in\" on the left side of the screen, which will take you to the Welcome page. Then click on \"Sign up Now\" on the right side of the page.     You will be asked to enter the access code listed below, as well as some personal information. Please follow the directions to create your username and password.     Your access code is: [email or web address]  Expires: 2018  2:26 PM     Your access code will  in 90 days. If you need help or a new code, please call your [facility] or [phone].        Care EveryWhere ID     This is your Care EveryWhere ID. This could be used by other organizations to access your [place] medical records  [id number]        Your Vitals Were  " "   Pulse Temperature Height Last Period Pulse Oximetry Breastfeeding?    78 98.4  F (36.9  C) (Oral) 5' 1\" (1.549 m) 04/14/2018 (Approximate) 95% No    BMI (Body Mass Index)                   39.11 kg/m2            Blood Pressure from Last 3 Encounters:   05/15/18 120/74   04/16/18 128/88   03/07/18 153/84    Weight from Last 3 Encounters:   05/15/18 207 lb (93.9 kg)   04/16/18 210 lb (95.3 kg)   01/19/18 210 lb (95.3 kg)              We Performed the Following     CBC with platelets differential     Comprehensive metabolic panel     Magnesium        Primary Care Provider Office Phone # Fax #    Mandy Bhatia [phone] [address] [facility] [address]        Equal Access to Services     [facility] BREANNA : Hadii aad ku hadasho Soleanne, waaxda luqadaha, qaybta kaalmada jessica, antony smith . So [facility] [phone].    ATENCIÓN: Si habla español, tiene a elizabeth disposición servicios gratuitos de asistencia lingüística. Jimmy al [phone].    We comply with applicable federal civil rights laws and [place] laws. We do not discriminate on the basis of race, color, national origin, age, disability, sex, sexual orientation, or gender identity.            Thank you!     Thank you for choosing [facility]  for your care. Our goal is always to provide you with excellent care. Hearing back from our patients is one way we can continue to improve our services. Please take a few minutes to complete the written survey that you may receive in the mail after your visit with us. Thank you!             Your Updated Medication List - Protect others around you: Learn how to safely use, store and throw away your medicines at www.disposemymeds.org.          This list is accurate as of 5/15/18 11:04 AM.  Always use your most recent med list.                   Brand Name Dispense Instructions for use Diagnosis    albuterol 108 (90 Base) MCG/ACT Inhaler    " PROAIR HFA/PROVENTIL HFA/VENTOLIN HFA    1 Inhaler    Inhale 2 puffs into the lungs every 4 hours as needed for shortness of breath / dyspnea or wheezing Cough    Shortness of breath       cetirizine 10 MG tablet    zyrTEC    30 tablet    TAKE ONE TABLET BY MOUTH EVERY MORNING    Cough       fluticasone 50 MCG/ACT spray    FLONASE    16 g    USE TWO SPRAYS IN EACH NOSTRIL EVERY DAY    Moderate persistent asthma without complication       fluticasone-salmeterol 250-50 MCG/DOSE diskus inhaler    ADVAIR    1 Inhaler    Inhale 1 puff into the lungs 2 times daily    Moderate persistent asthma without complication       levothyroxine 75 MCG tablet    SYNTHROID/LEVOTHROID    90 tablet    Take 1 tablet (75 mcg) by mouth daily    Hypothyroidism, unspecified type       LORazepam 0.5 MG tablet    ATIVAN    10 tablet    Take 1 tablet (0.5 mg) by mouth every 6 hours as needed for anxiety        MELATONIN PO           metoclopramide 10 MG tablet    REGLAN    20 tablet    Take 1 tablet (10 mg) by mouth once as needed For nausea/stomach upset    Gastroesophageal reflux disease without esophagitis       Multi-vitamin Tabs tablet      Take 1 tablet by mouth daily        omeprazole 40 MG capsule    priLOSEC    90 capsule    Take 1 capsule (40 mg) by mouth daily Take 30-60 minutes before a meal.    Gastroesophageal reflux disease without esophagitis       * order for DME     1 Device    Walking cane for balance and mobility    Risk for falls, Balance problems       * order for DME     80 Units    Incontinence items, disposable underwear, absorbency pads, liners.    Risk for falls, Balance problems, Mixed incontinence       polyethylene glycol powder    MIRALAX    510 g    Take 17 g (1 capful) by mouth daily as needed for constipation    Constipation, unspecified constipation type       SUMAtriptan 25 MG tablet    IMITREX    8 tablet    Take 25-100mg one time. May repeat 25mg every two hours up to a maximum of 200mg in 24 hours.     Menstrual migraine without status migrainosus, not intractable       * Notice:  This list has 2 medication(s) that are the same as other medications prescribed for you. Read the directions carefully, and ask your doctor or other care provider to review them with you.

## 2018-05-16 LAB
ALBUMIN SERPL-MCNC: 3.6 G/DL (ref 3.4–5)
ALP SERPL-CCNC: 81 U/L (ref 40–150)
ALT SERPL W P-5'-P-CCNC: 21 U/L (ref 0–50)
ANION GAP SERPL CALCULATED.3IONS-SCNC: 9 MMOL/L (ref 3–14)
AST SERPL W P-5'-P-CCNC: 16 U/L (ref 0–45)
BILIRUB SERPL-MCNC: 0.7 MG/DL (ref 0.2–1.3)
BUN SERPL-MCNC: 13 MG/DL (ref 7–30)
CALCIUM SERPL-MCNC: 9 MG/DL (ref 8.5–10.1)
CHLORIDE SERPL-SCNC: 108 MMOL/L (ref 94–109)
CO2 SERPL-SCNC: 23 MMOL/L (ref 20–32)
CREAT SERPL-MCNC: 0.84 MG/DL (ref 0.52–1.04)
GFR SERPL CREATININE-BSD FRML MDRD: 70 ML/MIN/1.7M2
GLUCOSE SERPL-MCNC: 121 MG/DL (ref 70–99)
MAGNESIUM SERPL-MCNC: 2.2 MG/DL (ref 1.6–2.3)
POTASSIUM SERPL-SCNC: 4.3 MMOL/L (ref 3.4–5.3)
PROT SERPL-MCNC: 7.6 G/DL (ref 6.8–8.8)
SODIUM SERPL-SCNC: 140 MMOL/L (ref 133–144)

## 2018-05-17 NOTE — PROGRESS NOTES
Note to staff: Please send a result letter    The results from your recent lab work are within normal limits.    -All of your labs are normal.      -Please followup if the symptoms do not improve.  Please be seen sooner if needed.     Thank you for choosing [facility] for your health care needs,      Alondra Michele PA-C

## 2018-06-04 DIAGNOSIS — R05.9 COUGH: ICD-10-CM

## 2018-06-05 NOTE — TELEPHONE ENCOUNTER
"Requested Prescriptions   Pending Prescriptions Disp Refills     cetirizine (ZYRTEC) 10 MG tablet [Pharmacy Med Name: CETIRIZINE HCL 10MG TABS] 30 tablet 10        Last Written Prescription Date:  6.23.17  Last Fill Quantity: 30,  # refills: 10   Last Office Visit: 5/15/2018   Future Office Visit:    Next 5 appointments (look out 90 days)     Jun 11, 2018  8:15 AM CDT   PHYSICAL with M[facility] MD   [facility]    [address] [place] [id number]   [phone]                  Sig: TAKE ONE TABLET BY MOUTH EVERY MORNING    Antihistamines Protocol Passed    6/4/2018  6:08 PM       Passed - Patient is 3-64 years of age    Apply weight-based dosing for peds patients age 3 - 12 years of age.    Forward request to provider for patients under the age of 3 or over the age of 64.         Passed - Recent (12 mo) or future (30 days) visit within the authorizing provider's specialty    Patient had office visit in the last 12 months or has a visit in the next 30 days with authorizing provider or within the authorizing provider's specialty.  See \"Patient Info\" tab in inbasket, or \"Choose Columns\" in Meds & Orders section of the refill encounter.              "

## 2018-06-06 ENCOUNTER — HOSPITAL ENCOUNTER (OUTPATIENT)
Dept: MAMMOGRAPHY | Facility: CLINIC | Age: 53
Discharge: HOME | End: 2018-06-06
Attending: FAMILY MEDICINE | Admitting: FAMILY MEDICINE
Payer: MEDICARE

## 2018-06-06 DIAGNOSIS — Z12.31 VISIT FOR SCREENING MAMMOGRAM: ICD-10-CM

## 2018-06-06 PROCEDURE — 77063 BREAST TOMOSYNTHESIS BI: CPT

## 2018-06-06 RX ORDER — CETIRIZINE HYDROCHLORIDE 10 MG/1
TABLET ORAL
Qty: 30 TABLET | Refills: 0 | Status: SHIPPED | OUTPATIENT
Start: 2018-06-06 | End: 2018-07-02

## 2018-06-06 NOTE — TELEPHONE ENCOUNTER
Prescription approved per [facility] Refill Protocol.  Debbie Diaz RN  [facility]

## 2018-06-08 NOTE — PATIENT INSTRUCTIONS
"Establish an exercise regimen. Activity goal: working 30-45 minutes 5 days a week. New exercise routine: cardiovascular workout on exercise equipment, walking and weightlifting. Diet regimen was discussed and plan is self-directed dieting: reduce calories, reduce portions, reduce processed  carbs, increase fruits/vegetables and avoid sweets and supervised diet program. Avoid artificial sweeteners and \"diet\" drinks and sodas.       Recheck bp with me again in 3 months or sooner, if needed. Focus on weight loss as well .       Preventive Health Recommendations  Female Ages 50 - 64    Yearly exam: See your health care provider every year in order to  o Review health changes.   o Discuss preventive care.    o Review your medicines if your doctor has prescribed any.      Get a Pap test every three years (unless you have an abnormal result and your provider advises testing more often).    If you get Pap tests with HPV test, you only need to test every 5 years, unless you have an abnormal result.     You do not need a Pap test if your uterus was removed (hysterectomy) and you have not had cancer.    You should be tested each year for STDs (sexually transmitted diseases) if you're at risk.     Have a mammogram every 1 to 2 years.    Have a colonoscopy at age 50, or have a yearly FIT test (stool test). These exams screen for colon cancer.      Have a cholesterol test every 5 years, or more often if advised.    Have a diabetes test (fasting glucose) every three years. If you are at risk for diabetes, you should have this test more often.     If you are at risk for osteoporosis (brittle bone disease), think about having a bone density scan (DEXA).    Shots: Get a flu shot each year. Get a tetanus shot every 10 years.    Nutrition:     Eat at least 5 servings of fruits and vegetables each day.    Eat whole-grain bread, whole-wheat pasta and brown rice instead of white grains and rice.    Talk to your provider about Calcium and " Vitamin D.     Lifestyle    Exercise at least 150 minutes a week (30 minutes a day, 5 days a week). This will help you control your weight and prevent disease.    Limit alcohol to one drink per day.    No smoking.     Wear sunscreen to prevent skin cancer.     See your dentist every six months for an exam and cleaning.    See your eye doctor every 1 to 2 years.               Thank you for choosing [facility]  for your Health Care. It was a pleasure seeing you at your visit today. Please contact us with any questions or concerns you may have.                   Mandy Bhatia MD                                  To reach your [facility] care team after hours call:   [phone]    Our clinic hours are:     Monday- 7:30 am - 7:00 pm                             Tuesday through Friday- 7:30 am - 5:00 pm                                        Saturday- 8:00 am - 12:00 pm                  Phone:  [phone]    Our pharmacy hours are:     Monday  8:00 am to 7:00 pm      Tuesday through Friday 8:00am to 6:00pm                        Saturday - 9:00 am to 1:00 pm      Sunday : Closed.              Phone:  [phone]      There is also information available at our web site:  [email or web address]    If your provider ordered any lab tests and you do not receive the results within 10 business days, please call the clinic.    If you need a medication refill please contact your pharmacy.  Please allow 2 business days for your refill to be completed.    Our clinic offers telephone visits and e visits.  Please ask one of your team members to explain more.      Use [employer] (secure email communication and access to your chart) to send your primary care provider a message or make an appointment. Ask someone on your Team how to sign up for [employer].

## 2018-06-08 NOTE — PROGRESS NOTES
SUBJECTIVE:   CC: Brooke John is an 52 year old woman who presents for preventive health visit with her PCA, Magdalena. Helps with handling bills, paperwork,  and making sure she gets to her appointments.     Healthy Habits:    Do you get at least three servings of calcium containing foods daily (dairy, green leafy vegetables, etc.)? Yes, tries to and takes a multivitamin, doing meals on wheels    Amount of exercise or daily activities, outside of work: goes to [facility]    Problems taking medications regularly No    Medication side effects: No    Have you had an eye exam in the past two years? yes    Do you see a dentist twice per year? yes    Do you have sleep apnea, excessive snoring or daytime drowsiness? yes      Anxiety Follow-Up:     Status since last visit: No change    Other associated symptoms:None    Complicating factors:   Significant life event: No   Current substance abuse: None  Depression symptoms: No  DARRON-7 SCORE 7/28/2017   Total Score 1   DARRON-7      Migraine Follow-Up:     Headaches symptoms:  Stable    Frequency: 1-5 per month     Duration of headaches: 3 hours to all day    Able to do normal daily activities/work with migraines: No    Rescue/Relief medication:sumatriptan (Imitrex)              Effectiveness: total relief    Preventative medication: None    Neurologic complications: loss of vision, loss of speech, numbness and weakness    In the past 4 weeks, how often have you gone to Urgent Care or the emergency room because of your headaches?  1      Asthma Follow-Up:     Was ACT completed today?  20 today   Yes    ACT Total Scores 4/16/2018   ACT TOTAL SCORE (Goal Greater than or Equal to 20) 20   In the past 12 months, how many times did you visit the emergency room for your asthma without being admitted to the hospital? 0   In the past 12 months, how many times were you hospitalized overnight because of your asthma? 0       Recent asthma triggers that patient is dealing with:  None      Hypothyroidism Follow-up:       Since last visit, patient describes the following symptoms: Weight stable, no hair loss, no skin changes, no constipation, no loose stools    TSH   Date Value Ref Range Status   01/15/2018 3.33 0.40 - 4.00 mU/L Final   10/05/2017 4.64 (H) 0.40 - 4.00 mU/L Final   07/28/2017 4.19 (H) 0.40 - 4.00 mU/L Final   05/16/2017 4.99 (H) 0.40 - 4.00 mU/L Final   03/14/2016 7.11 (H) 0.40 - 4.00 mU/L Final     T4 Free   Date Value Ref Range Status   01/15/2018 1.06 0.76 - 1.46 ng/dL Final   10/05/2017 0.94 0.76 - 1.46 ng/dL Final   07/28/2017 0.85 0.76 - 1.46 ng/dL Final   05/16/2017 0.83 0.76 - 1.46 ng/dL Final   03/14/2016 0.81 0.76 - 1.46 ng/dL Final       Today's PHQ-2 Score:   PHQ-2 ( 1999 Pf[employer]) 4/16/2018 11/8/2016   Q1: Little interest or pleasure in doing things 0 0   Q2: Feeling down, depressed or hopeless 0 1   PHQ-2 Score 0 1       Abuse: Current or Past(Physical, Sexual or Emotional)- No  Do you feel safe in your environment - Yes      Social History   Substance Use Topics     Smoking status: Passive Smoke Exposure - Never Smoker     Smokeless tobacco: Never Used     Alcohol use No     If you drink alcohol do you typically have >3 drinks per day or >7 drinks per week? No                     Reviewed orders with patient.  Reviewed health maintenance and updated orders accordingly - Yes  BP Readings from Last 3 Encounters:   06/11/18 130/90   05/15/18 120/74   04/16/18 128/88    Wt Readings from Last 3 Encounters:   06/11/18 207 lb (93.9 kg)   05/15/18 207 lb (93.9 kg)   04/16/18 210 lb (95.3 kg)                  Patient Active Problem List   Diagnosis     Scoliosis- congenital - saw [facility] as a child - now [facility] - has signif lumbar radiculopathy - uses a cane      Arthritis     Hypothyroidism, unspecified type     History of herniated intervertebral disc     Pulmonary nodules     Moderate persistent asthma     Lung nodule     Allergic reaction to drug -  amoxicillin - Hives - proven 2016      Morbid obesity (H)     Constipation, unspecified constipation type     Gastroesophageal reflux disease without esophagitis     Menstrual migraine without status migrainosus, not intractable     Use of cane as ambulatory aid     Lumbar radiculopathy- left - uses a cane - born with the scoliosis - sees [facility]      Past Surgical History:   Procedure Laterality Date     ARTHROSCOPY KNEE  2007    left      BACK SURGERY        SECTION       COLONOSCOPY N/A 2017    no polyps./ return in ten years     COSMETIC SURGERY       ESOPHAGOSCOPY, GASTROSCOPY, DUODENOSCOPY (EGD), COMBINED N/A 2017    Procedure: COMBINED ESOPHAGOSCOPY, GASTROSCOPY, DUODENOSCOPY (EGD);  EGD w MAC:DX:Esophageal dysphagia,Hiatal hernia/prep & pre-op info mailed;  Surgeon: Moshe Caro MD;  Location:  GI     EYE SURGERY       ORTHOPEDIC SURGERY      lumbar discectomy       Social History   Substance Use Topics     Smoking status: Passive Smoke Exposure - Never Smoker     Smokeless tobacco: Never Used     Alcohol use No     Family History   Problem Relation Age of Onset     Scoliosis Son      Learning Disorder Son      lives in a protected place     HEART DISEASE Father      triple bypass     Unknown/Adopted Father      Unknown/Adopted Mother      Type 2 Diabetes Maternal Grandfather      Other - See Comments Brother      5 brothers, 1 sister.      Colon Cancer No family hx of          Current Outpatient Prescriptions   Medication Sig Dispense Refill     albuterol (PROAIR HFA/PROVENTIL HFA/VENTOLIN HFA) 108 (90 Base) MCG/ACT Inhaler Inhale 2 puffs into the lungs every 4 hours as needed for shortness of breath / dyspnea or wheezing Cough 1 Inhaler 11     cetirizine (ZYRTEC) 10 MG tablet TAKE ONE TABLET BY MOUTH EVERY MORNING 30 tablet 0     fluticasone (FLONASE) 50 MCG/ACT spray USE TWO SPRAYS IN EACH NOSTRIL EVERY DAY 16 g 3     fluticasone-salmeterol (ADVAIR) 250-50  MCG/DOSE diskus inhaler Inhale 1 puff into the lungs 2 times daily 1 Inhaler 11     levothyroxine (SYNTHROID/LEVOTHROID) 75 MCG tablet Take 1 tablet (75 mcg) by mouth daily 90 tablet 1     LORazepam (ATIVAN) 0.5 MG tablet Take 1 tablet (0.5 mg) by mouth every 6 hours as needed for anxiety 10 tablet 0     MELATONIN PO        metoclopramide (REGLAN) 10 MG tablet Take 1 tablet (10 mg) by mouth once as needed For nausea/stomach upset 20 tablet 3     multivitamin, therapeutic with minerals (MULTI-VITAMIN) TABS Take 1 tablet by mouth daily       omeprazole (PRILOSEC) 40 MG capsule Take 1 capsule (40 mg) by mouth daily Take 30-60 minutes before a meal. 90 capsule 1     order for DME Walking cane for balance and mobility 1 Device 0     order for DME Incontinence items, disposable underwear, absorbency pads, liners. 80 Units 11     polyethylene glycol (MIRALAX) powder Take 17 g (1 capful) by mouth daily as needed for constipation 510 g 11     SUMAtriptan (IMITREX) 25 MG tablet Take 25-100mg one time. May repeat 25mg every two hours up to a maximum of 200mg in 24 hours. 8 tablet 3     Allergies   Allergen Reactions     Amoxicillin      Lip swelling      Aspirin      GI upset     Milk Protein Extract      Upset stomach and dry cough     Zithromax [Azithromycin]      RASH     Recent Labs   Lab Test  05/15/18   1105  01/15/18   1415  10/05/17   1443   05/30/17   0759   03/14/16   0822  09/19/11   LDL   --    --    --    --    --    --   125*   --   119   HDL   --    --    --    --    --    --   56   --   52   TRIG   --    --    --    --    --    --   168*   --   254   ALT  21   --    --    --   25   --   22   < >   --    CR  0.84   --    --    --   0.77   --   0.71   < >   --    GFRESTIMATED  70   --    --    --   78   --   86   < >   --    GFRESTBLACK  85   --    --    --   >90   GFR Calc     --   >90   GFR Calc     < >   --    POTASSIUM  4.3   --    --    --   3.9   --   4.1   < >   --    TSH    --   3.33  4.64*   < >   --    < >  7.11*   < >   --     < > = values in this interval not displayed.      Mammogram: Patient over age 50, mutual decision to screen reflected in health maintenance.    Ma Screening Digital Bilat - Future  (s+30)    Result Date: 2016  Narrative: SCREENING MAMMOGRAM, BILATERAL, DIGITAL w/CAD - 2016 4:35 PM BREAST SYMPTOMS: No current breast complaints. COMPARISON:  Baseline. BREAST DENSITY: Heterogeneously dense. COMMENTS: No findings of suspicion for malignancy.            History of abnormal Pap smear: NO - age 30- 65 PAP every 3 years recommended    Lab Results   Component Value Date    PAP NIL 2016         Reviewed and updated as needed this visit by clinical staff  Tobacco  Allergies  Meds  Med Hx  Surg Hx  Fam Hx  Soc Hx      Reviewed and updated as needed this visit by Provider        Past Medical History:   Diagnosis Date     Arthritis      CARDIOVASCULAR SCREENING; LDL GOAL LESS THAN 130      Depressive disorder      Lung nodule     w/u neg - Dr Boyer     Moderate persistent asthma      Other specified hypothyroidism 3/14/2016     Scoliosis       Past Surgical History:   Procedure Laterality Date     ARTHROSCOPY KNEE      left      BACK SURGERY        SECTION       COLONOSCOPY N/A 2017    no polyps./ return in ten years     COSMETIC SURGERY       ESOPHAGOSCOPY, GASTROSCOPY, DUODENOSCOPY (EGD), COMBINED N/A 2017    Procedure: COMBINED ESOPHAGOSCOPY, GASTROSCOPY, DUODENOSCOPY (EGD);  EGD w MAC:DX:Esophageal dysphagia,Hiatal hernia/prep & pre-op info mailed;  Surgeon: Moshe Caro MD;  Location:  GI     EYE SURGERY       ORTHOPEDIC SURGERY      lumbar discectomy     Obstetric History       T0      L2     SAB0   TAB0   Ectopic0   Multiple0   Live Births0       # Outcome Date GA Lbr Mani/2nd Weight Sex Delivery Anes PTL Lv   2             1                Obstetric Comments   Menses regular   c-  "section x1       ROS:  CONSTITUTIONAL: NEGATIVE for fever, chills, change in weight  INTEGUMENTARY/SKIN: NEGATIVE for worrisome rashes, moles or lesions  EYES: NEGATIVE for vision changes or irritation  ENT: NEGATIVE for ear, mouth and throat problems  RESP: NEGATIVE for significant cough or SOB  BREAST: NEGATIVE for masses, tenderness or discharge  CV: NEGATIVE for chest pain, palpitations or peripheral edema  GI: NEGATIVE for nausea, abdominal pain, heartburn, or change in bowel habits  : NEGATIVE for unusual urinary or vaginal symptoms. No vaginal bleeding.  MUSCULOSKELETAL: NEGATIVE for significant arthralgias or myalgia  NEURO: NEGATIVE for weakness, dizziness or paresthesias  ENDOCRINE: NEGATIVE for temperature intolerance, skin/hair changes  HEME/ALLERGY/IMMUNE: NEGATIVE for bleeding problems  PSYCHIATRIC: NEGATIVE for changes in mood or affect     OBJECTIVE:   /90 (BP Location: Right arm, Patient Position: Chair, Cuff Size: Adult Large)  Pulse 72  Temp 98.2  F (36.8  C) (Oral)  Ht 5' 1\" (1.549 m)  Wt 207 lb (93.9 kg)  LMP 05/15/2018 (Within Days)  SpO2 98%  BMI 39.11 kg/m2  EXAM:  GENERAL APPEARANCE: obese, alert and no distress  EYES: Eyes grossly normal to inspection, PERRL and conjunctivae and sclerae normal; Xanthelasma of eyelid, bilateral- medial upper lids - causing some medial ptosis and decreased visual fields - needs to raise her eyelids with her fingers or by raising her brows bilaterally to see fully.   HENT: ear canals and TM's normal, nose and mouth without ulcers or lesions, oropharynx clear and oral mucous membranes moist  NECK: no adenopathy, no asymmetry, masses, or scars and thyroid normal to palpation  RESP: lungs clear to auscultation - no rales, rhonchi or wheezes  BREAST: normal without masses, tenderness or nipple discharge and no palpable axillary masses or adenopathy  CV: regular rate and rhythm, normal S1 S2, no S3 or S4, no murmur, click or rub, no peripheral edema " and peripheral pulses strong  ABDOMEN: soft, nontender, no hepatosplenomegaly, no masses and bowel sounds normal  MS: no musculoskeletal defects are noted and gait is age appropriate without ataxia  SKIN: no suspicious lesions or rashes  NEURO: Normal strength and tone, sensory exam grossly normal, mentation intact and speech normal  PSYCH: mentation appears normal and affect normal/bright - Alert and oriented. No acute distress. Appears well-groomed and casually dressed. Affect is normal, not particularly depressed. In good humor and laughs appropriately. Not particularly anxious. No evidence of psychosis.     ASSESSMENT/PLAN:       ICD-10-CM    1. Encounter for routine adult medical exam with abnormal findings Z00.01 Comprehensive metabolic panel     CBC with platelets   2. Elevated blood pressure reading without diagnosis of hypertension- not well controlled today  R03.0 Comprehensive metabolic panel     CBC with platelets     OFFICE/OUTPT VISIT,EST,LEVL IV   3. Hypothyroidism, unspecified type E03.9 TSH     T4 free     T3, total     OFFICE/OUTPT VISIT,EST,LEVL IV   4. Xanthelasma of eyelid, bilateral- medial upper lids - causing some medial ptosis and decreased visual fields  H02.63 PLASTIC SURGERY REFERRAL    H02.66 OFFICE/OUTPT VISIT,EST,LEVL IV   5. Menstrual migraine without status migrainosus, not intractable G43.829 OFFICE/OUTPT VISIT,EST,LEVL IV   6. Balance problems R26.89 order for DME     order for DME     OFFICE/OUTPT VISIT,EST,LEVL IV   7. Moderate persistent asthma without complication J45.40 fluticasone (FLONASE) 50 MCG/ACT spray     OFFICE/OUTPT VISIT,EST,LEVL IV   8. Mixed incontinence N39.46 order for DME     OFFICE/OUTPT VISIT,EST,LEVL IV   9. Primary osteoarthritis of both knees M17.0    10. Lumbar radiculopathy- left - uses a cane - born with the scoliosis - sees [facility]  M54.16 OFFICE/OUTPT VISIT,EST,LEVL IV   11. Risk for falls Z91.81 order for DME     order for DME   12.  "CARDIOVASCULAR SCREENING; LDL GOAL LESS THAN 130 Z13.6 Lipid panel reflex to direct LDL Fasting   13. Screening for HIV (human immunodeficiency virus) Z11.4 HIV Screening     Wt Readings from Last 5 Encounters:   06/11/18 207 lb (93.9 kg)   05/15/18 207 lb (93.9 kg)   04/16/18 210 lb (95.3 kg)   01/19/18 210 lb (95.3 kg)   12/13/17 209 lb 4.8 oz (94.9 kg)       COUNSELING:   Reviewed preventive health counseling, as reflected in patient instructions    BP Screening:   Last 3 BP Readings:    BP Readings from Last 3 Encounters:   06/11/18 130/90   05/15/18 120/74   04/16/18 128/88       The following was recommended to the patient:  Re-screen BP within a year and recommended lifestyle modifications   Establish an exercise regimen. Activity goal: 45 minutes 5 days a week. New exercise routine: cardiovascular workout on exercise equipment, walking and weightlifting. Diet regimen was discussed and plan is self-directed dieting: reduce calories, reduce portions, reduce processed  carbs, increase fruits/vegetables and avoid sweets and supervised diet program. Avoid artificial sweeteners and \"diet\" drinks and sodas.        reports that she is a non-smoker but has been exposed to tobacco smoke. She has never used smokeless tobacco.    Estimated body mass index is 39.11 kg/(m^2) as calculated from the following:    Height as of this encounter: 5' 1\" (1.549 m).    Weight as of this encounter: 207 lb (93.9 kg).   Weight management plan: see next   Establish an exercise regimen. Activity goal: 45 minutes 5 days a week. New exercise routine: cardiovascular workout on exercise equipment, walking and weightlifting. Diet regimen was discussed and plan is self-directed dieting: reduce calories, reduce portions, reduce processed  carbs, increase fruits/vegetables and avoid sweets and supervised diet program. Avoid artificial sweeteners and \"diet\" drinks and sodas.       Spent  25 minutes on pt care today outside of preventative care. " All face to face time from 845am  to 0910am.  Greater than 50% of time spent in coordination of care/counseling today re:  Elevated blood pressure reading without diagnosis of hypertension- not well controlled today     Hypothyroidism, unspecified type    Xanthelasma of eyelid, bilateral- medial upper lids - causing some medial ptosis and decreased visual fields     Menstrual migraine without status migrainosus, not intractable    Balance problems    Moderate persistent asthma without complication    Mixed incontinence    Primary osteoarthritis of both knees    Lumbar radiculopathy- left - uses a cane - born with the scoliosis - sees [facility]     Risk for falls        Counseling Resources:  ATP IV Guidelines  Pooled Cohorts Equation Calculator  Breast Cancer Risk Calculator  FRAX Risk Assessment  ICSI Preventive Guidelines  Dietary Guidelines for Americans, 2010  USDA's MyPlate  ASA Prophylaxis  Lung CA Screening    Mandy Bhatia MD  [facility]

## 2018-06-11 ENCOUNTER — OFFICE VISIT (OUTPATIENT)
Dept: FAMILY MEDICINE | Facility: CLINIC | Age: 53
End: 2018-06-11
Payer: MEDICARE

## 2018-06-11 VITALS
HEIGHT: 61 IN | DIASTOLIC BLOOD PRESSURE: 90 MMHG | TEMPERATURE: 98.2 F | SYSTOLIC BLOOD PRESSURE: 130 MMHG | WEIGHT: 207 LBS | BODY MASS INDEX: 39.08 KG/M2 | HEART RATE: 72 BPM | OXYGEN SATURATION: 98 %

## 2018-06-11 DIAGNOSIS — M17.0 PRIMARY OSTEOARTHRITIS OF BOTH KNEES: ICD-10-CM

## 2018-06-11 DIAGNOSIS — R03.0 ELEVATED BLOOD PRESSURE READING WITHOUT DIAGNOSIS OF HYPERTENSION: ICD-10-CM

## 2018-06-11 DIAGNOSIS — Z00.01 ENCOUNTER FOR ROUTINE ADULT MEDICAL EXAM WITH ABNORMAL FINDINGS: Primary | ICD-10-CM

## 2018-06-11 DIAGNOSIS — Z23 NEED FOR SHINGLES VACCINE: ICD-10-CM

## 2018-06-11 DIAGNOSIS — E03.9 HYPOTHYROIDISM, UNSPECIFIED TYPE: ICD-10-CM

## 2018-06-11 DIAGNOSIS — Z91.81 RISK FOR FALLS: ICD-10-CM

## 2018-06-11 DIAGNOSIS — H02.66 XANTHELASMA OF EYELID, BILATERAL: ICD-10-CM

## 2018-06-11 DIAGNOSIS — N39.46 MIXED INCONTINENCE: ICD-10-CM

## 2018-06-11 DIAGNOSIS — Z11.4 SCREENING FOR HIV (HUMAN IMMUNODEFICIENCY VIRUS): ICD-10-CM

## 2018-06-11 DIAGNOSIS — H02.63 XANTHELASMA OF EYELID, BILATERAL: ICD-10-CM

## 2018-06-11 DIAGNOSIS — M54.16 LUMBAR RADICULOPATHY: ICD-10-CM

## 2018-06-11 DIAGNOSIS — Z13.6 CARDIOVASCULAR SCREENING; LDL GOAL LESS THAN 130: ICD-10-CM

## 2018-06-11 DIAGNOSIS — R26.89 BALANCE PROBLEMS: ICD-10-CM

## 2018-06-11 DIAGNOSIS — G43.829 MENSTRUAL MIGRAINE WITHOUT STATUS MIGRAINOSUS, NOT INTRACTABLE: ICD-10-CM

## 2018-06-11 DIAGNOSIS — J45.40 MODERATE PERSISTENT ASTHMA WITHOUT COMPLICATION: ICD-10-CM

## 2018-06-11 LAB
ERYTHROCYTE [DISTWIDTH] IN BLOOD BY AUTOMATED COUNT: 13.5 % (ref 10–15)
HCT VFR BLD AUTO: 38.5 % (ref 35–47)
HGB BLD-MCNC: 12.4 G/DL (ref 11.7–15.7)
HIV 1+2 AB+HIV1 P24 AG SERPL QL IA: NONREACTIVE
MCH RBC QN AUTO: 28.1 PG (ref 26.5–33)
MCHC RBC AUTO-ENTMCNC: 32.2 G/DL (ref 31.5–36.5)
MCV RBC AUTO: 87 FL (ref 78–100)
PLATELET # BLD AUTO: 242 10E9/L (ref 150–450)
RBC # BLD AUTO: 4.42 10E12/L (ref 3.8–5.2)
WBC # BLD AUTO: 6.5 10E9/L (ref 4–11)

## 2018-06-11 PROCEDURE — 84443 ASSAY THYROID STIM HORMONE: CPT | Performed by: FAMILY MEDICINE

## 2018-06-11 PROCEDURE — 83721 ASSAY OF BLOOD LIPOPROTEIN: CPT | Mod: 59 | Performed by: FAMILY MEDICINE

## 2018-06-11 PROCEDURE — 99396 PREV VISIT EST AGE 40-64: CPT | Performed by: FAMILY MEDICINE

## 2018-06-11 PROCEDURE — 87389 HIV-1 AG W/HIV-1&-2 AB AG IA: CPT | Performed by: FAMILY MEDICINE

## 2018-06-11 PROCEDURE — 85027 COMPLETE CBC AUTOMATED: CPT | Performed by: FAMILY MEDICINE

## 2018-06-11 PROCEDURE — 84480 ASSAY TRIIODOTHYRONINE (T3): CPT | Performed by: FAMILY MEDICINE

## 2018-06-11 PROCEDURE — 36415 COLL VENOUS BLD VENIPUNCTURE: CPT | Performed by: FAMILY MEDICINE

## 2018-06-11 PROCEDURE — 99214 OFFICE O/P EST MOD 30 MIN: CPT | Mod: 25 | Performed by: FAMILY MEDICINE

## 2018-06-11 PROCEDURE — 80061 LIPID PANEL: CPT | Performed by: FAMILY MEDICINE

## 2018-06-11 PROCEDURE — 80053 COMPREHEN METABOLIC PANEL: CPT | Performed by: FAMILY MEDICINE

## 2018-06-11 PROCEDURE — 84439 ASSAY OF FREE THYROXINE: CPT | Performed by: FAMILY MEDICINE

## 2018-06-11 RX ORDER — FLUTICASONE PROPIONATE 50 MCG
SPRAY, SUSPENSION (ML) NASAL
Qty: 16 G | Refills: 3 | Status: SHIPPED | OUTPATIENT
Start: 2018-06-11 | End: 2019-07-17

## 2018-06-11 ASSESSMENT — ANXIETY QUESTIONNAIRES
6. BECOMING EASILY ANNOYED OR IRRITABLE: NOT AT ALL
2. NOT BEING ABLE TO STOP OR CONTROL WORRYING: NOT AT ALL
5. BEING SO RESTLESS THAT IT IS HARD TO SIT STILL: NOT AT ALL
GAD7 TOTAL SCORE: 0
7. FEELING AFRAID AS IF SOMETHING AWFUL MIGHT HAPPEN: NOT AT ALL
3. WORRYING TOO MUCH ABOUT DIFFERENT THINGS: NOT AT ALL
1. FEELING NERVOUS, ANXIOUS, OR ON EDGE: NOT AT ALL

## 2018-06-11 ASSESSMENT — PATIENT HEALTH QUESTIONNAIRE - PHQ9: 5. POOR APPETITE OR OVEREATING: NOT AT ALL

## 2018-06-11 NOTE — MR AVS SNAPSHOT
"              After Visit Summary   6/11/2018    Brooke John    MRN: [id number]           Patient Information     Date Of Birth          1965        Visit Information        Provider Department      6/11/2018 8:15 AM Mandy Bhatia MD [facility] [place]        Today's Diagnoses     Encounter for routine adult medical exam with abnormal findings    -  1    Elevated blood pressure reading without diagnosis of hypertension- not well controlled today         Hypothyroidism, unspecified type        Xanthelasma of eyelid, bilateral- medial upper lids - causing some medial ptosis and decreased visual fields         Menstrual migraine without status migrainosus, not intractable        Balance problems        Moderate persistent asthma without complication        Mixed incontinence        Primary osteoarthritis of both knees        Lumbar radiculopathy- left - uses a cane - born with the scoliosis - sees [facility]         Risk for falls        CARDIOVASCULAR SCREENING; LDL GOAL LESS THAN 130        Screening for HIV (human immunodeficiency virus)          Care Instructions    Establish an exercise regimen. Activity goal: working 30-45 minutes 5 days a week. New exercise routine: cardiovascular workout on exercise equipment, walking and weightlifting. Diet regimen was discussed and plan is self-directed dieting: reduce calories, reduce portions, reduce processed  carbs, increase fruits/vegetables and avoid sweets and supervised diet program. Avoid artificial sweeteners and \"diet\" drinks and sodas.       Recheck bp with me again in 3 months or sooner, if needed. Focus on weight loss as well .       Preventive Health Recommendations  Female Ages 50 - 64    Yearly exam: See your health care provider every year in order to  o Review health changes.   o Discuss preventive care.    o Review your medicines if your doctor has prescribed any.      Get a Pap test every three years (unless you have " an abnormal result and your provider advises testing more often).    If you get Pap tests with HPV test, you only need to test every 5 years, unless you have an abnormal result.     You do not need a Pap test if your uterus was removed (hysterectomy) and you have not had cancer.    You should be tested each year for STDs (sexually transmitted diseases) if you're at risk.     Have a mammogram every 1 to 2 years.    Have a colonoscopy at age 50, or have a yearly FIT test (stool test). These exams screen for colon cancer.      Have a cholesterol test every 5 years, or more often if advised.    Have a diabetes test (fasting glucose) every three years. If you are at risk for diabetes, you should have this test more often.     If you are at risk for osteoporosis (brittle bone disease), think about having a bone density scan (DEXA).    Shots: Get a flu shot each year. Get a tetanus shot every 10 years.    Nutrition:     Eat at least 5 servings of fruits and vegetables each day.    Eat whole-grain bread, whole-wheat pasta and brown rice instead of white grains and rice.    Talk to your provider about Calcium and Vitamin D.     Lifestyle    Exercise at least 150 minutes a week (30 minutes a day, 5 days a week). This will help you control your weight and prevent disease.    Limit alcohol to one drink per day.    No smoking.     Wear sunscreen to prevent skin cancer.     See your dentist every six months for an exam and cleaning.    See your eye doctor every 1 to 2 years.               Thank you for choosing [facility]  for your Health Care. It was a pleasure seeing you at your visit today. Please contact us with any questions or concerns you may have.                   Mandy Bhatia MD                                  To reach your [facility] care team after hours call:   [phone]    Our clinic hours are:     Monday- 7:30 am - 7:00 pm                             Tuesday  through Friday- 7:30 am - 5:00 pm                                        Saturday- 8:00 am - 12:00 pm                  Phone:  [phone]    Our pharmacy hours are:     Monday  8:00 am to 7:00 pm      Tuesday through Friday 8:00am to 6:00pm                        Saturday - 9:00 am to 1:00 pm      Sunday : Closed.              Phone:  [phone]      There is also information available at our web site:  [email or web address]    If your provider ordered any lab tests and you do not receive the results within 10 business days, please call the clinic.    If you need a medication refill please contact your pharmacy.  Please allow 2 business days for your refill to be completed.    Our clinic offers telephone visits and e visits.  Please ask one of your team members to explain more.      Use [employer] (secure email communication and access to your chart) to send your primary care provider a message or make an appointment. Ask someone on your Team how to sign up for [employer].                         Follow-ups after your visit        Additional Services     PLASTIC SURGERY REFERRAL       Your provider has referred you to: [facility] [place] [phone]   [email or web address]    Please be aware that coverage of these services is subject to the terms and limitations of your health insurance plan.  Call member services at your health plan with any benefit or coverage questions.      Please bring the following with you to your appointment:    (1) Any X-Rays, CTs or MRIs which have been performed.  Contact the facility where they were done to arrange for  prior to your scheduled appointment.    (2) List of current medications  (3) This referral request   (4) Any documents/labs given to you for this referral                  Follow-up notes from your care team     Return in about 3 months (around 9/11/2018) for BP and weight Recheck.      Who to contact     If you have questions or need  "follow up information about today's clinic visit or your schedule please contact [facility] directly at [phone].  Normal or non-critical lab and imaging results will be communicated to you by MyChart, letter or phone within 4 business days after the clinic has received the results. If you do not hear from us within 7 days, please contact the clinic through MyChart or phone. If you have a critical or abnormal lab result, we will notify you by phone as soon as possible.  Submit refill requests through [employer] or call your pharmacy and they will forward the refill request to us. Please allow 3 business days for your refill to be completed.          Additional Information About Your Visit        Care EveryWhere ID     This is your Care EveryWhere ID. This could be used by other organizations to access your [facility] medical records  [id number]        Your Vitals Were     Pulse Temperature Height Last Period Pulse Oximetry BMI (Body Mass Index)    72 98.2  F (36.8  C) (Oral) 5' 1\" (1.549 m) 05/15/2018 (Within Days) 98% 39.11 kg/m2       Blood Pressure from Last 3 Encounters:   06/11/18 130/90   05/15/18 120/74   04/16/18 128/88    Weight from Last 3 Encounters:   06/11/18 207 lb (93.9 kg)   05/15/18 207 lb (93.9 kg)   04/16/18 210 lb (95.3 kg)              We Performed the Following     CBC with platelets     Comprehensive metabolic panel     HIV Screening     Lipid panel reflex to direct LDL Fasting     OFFICE/OUTPT VISIT,EST,LEVL IV     PLASTIC SURGERY REFERRAL     T3, total     T4 free     TSH          Where to get your medicines      These medications were sent to [facility] [place] - [place], [place] - [address]  [address] [address]     Phone:  [phone]     fluticasone 50 MCG/ACT spray         Some of these will need a paper prescription and others can be bought over the counter.  Ask your nurse if you have questions.     Bring a paper " prescription for each of these medications     order for DME    order for DME          Primary Care Provider Office Phone # Fax #    Mandy Bhatia [phone]       [facility] [address]        Equal Access to Services     [place] CARLOS : Hadii elodia quiroz hadalyshao Soomaali, waaxda luqadaha, qaybta kaalmada adeegyada, waxarpit idiin haygarthn elisabethchelsie torresdelroy fernandez. So [facility] [phone].    ATENCIÓN: Si habla español, tiene a elizabeth disposición servicios gratuitos de asistencia lingüística. Llame al [phone].    We comply with applicable federal civil rights laws and [place] laws. We do not discriminate on the basis of race, color, national origin, age, disability, sex, sexual orientation, or gender identity.            Thank you!     Thank you for choosing [facility]  for your care. Our goal is always to provide you with excellent care. Hearing back from our patients is one way we can continue to improve our services. Please take a few minutes to complete the written survey that you may receive in the mail after your visit with us. Thank you!             Your Updated Medication List - Protect others around you: Learn how to safely use, store and throw away your medicines at www.disposemymeds.org.          This list is accurate as of 6/11/18  9:27 AM.  Always use your most recent med list.                   Brand Name Dispense Instructions for use Diagnosis    albuterol 108 (90 Base) MCG/ACT Inhaler    PROAIR HFA/PROVENTIL HFA/VENTOLIN HFA    1 Inhaler    Inhale 2 puffs into the lungs every 4 hours as needed for shortness of breath / dyspnea or wheezing Cough    Shortness of breath       cetirizine 10 MG tablet    zyrTEC    30 tablet    TAKE ONE TABLET BY MOUTH EVERY MORNING    Cough       fluticasone 50 MCG/ACT spray    FLONASE    16 g    USE TWO SPRAYS IN EACH NOSTRIL EVERY DAY    Moderate persistent asthma without complication       fluticasone-salmeterol  250-50 MCG/DOSE diskus inhaler    ADVAIR    1 Inhaler    Inhale 1 puff into the lungs 2 times daily    Moderate persistent asthma without complication       levothyroxine 75 MCG tablet    SYNTHROID/LEVOTHROID    90 tablet    Take 1 tablet (75 mcg) by mouth daily    Hypothyroidism, unspecified type       LORazepam 0.5 MG tablet    ATIVAN    10 tablet    Take 1 tablet (0.5 mg) by mouth every 6 hours as needed for anxiety        MELATONIN PO           metoclopramide 10 MG tablet    REGLAN    20 tablet    Take 1 tablet (10 mg) by mouth once as needed For nausea/stomach upset    Gastroesophageal reflux disease without esophagitis       Multi-vitamin Tabs tablet      Take 1 tablet by mouth daily        omeprazole 40 MG capsule    priLOSEC    90 capsule    Take 1 capsule (40 mg) by mouth daily Take 30-60 minutes before a meal.    Gastroesophageal reflux disease without esophagitis       order for DME     80 Units    Incontinence items, disposable underwear, absorbency pads, liners.    Risk for falls, Balance problems, Mixed incontinence       order for DME     1 Device    Walking cane for balance and mobility    Risk for falls, Balance problems       polyethylene glycol powder    MIRALAX    510 g    Take 17 g (1 capful) by mouth daily as needed for constipation    Constipation, unspecified constipation type       SUMAtriptan 25 MG tablet    IMITREX    8 tablet    Take 25-100mg one time. May repeat 25mg every two hours up to a maximum of 200mg in 24 hours.    Menstrual migraine without status migrainosus, not intractable

## 2018-06-11 NOTE — LETTER
[facility]  [address] [place] [address]                  [phone]   July 3, 2018    Brooke John  [address]  [address]  [place] [facility] [address]      Dear Brooke,    Here is a summary of your recent test results:    -Liver and gallbladder tests are normal. (ALT,AST, Alk phos, bilirubin), kidney function is normal (Cr, GFR), Sodium is normal, Potassium is normal, Calcium is normal, Glucose is normal (diabetes screening test).   -LDL(bad) cholesterol level is elevated, HDL(good) cholesterol level is low and your triglycerides are elevated which can increase your heart disease risk.  A diet high in fat and simple carbohydrates, genetics and being overweight can contribute to this. ADVISE: Exercise, a low fat, low carbohydrate diet, weight control, and omega-3 fatty acids (fish oil) 5517-9131 mg daily are helpful to improve this.  Rechecking your fasting cholesterol panel in 6 months is recommended (Lipid w/ LDL reflex, DX: hyperlipidemia)   The testing of your blood sugar, kidney function, liver function and electrolytes was normal.   TSH - thyroid stimulating hormone and other circulating thyroid hormones (free t4 and total T3) = normal thyroid function.   HIV screening test done per current CDC guidelines = negative.     CBC with hemoglobin, white blood cells , and platelets all normal       For additional lab test information, labtestsonline.org is an excellent reference.     Your test results are enclosed.      Please contact me if you have any questions.    In addition, here is a list of due or overdue Health Maintenance reminders.    Health Maintenance Due   Topic Date Due     Asthma Action Plan - yearly  07/28/2018       Please call us at [phone] (or use [employer]) to address the above recommendations.            Thank you very much for trusting [facility]..     Healthy regards,       Mandy Bhatia,  M.D.          Results for orders placed or performed in visit on 06/11/18   HIV Screening   Result Value Ref Range    HIV Antigen Antibody Combo Nonreactive NR^Nonreactive       Comprehensive metabolic panel   Result Value Ref Range    Sodium 142 133 - 144 mmol/L    Potassium 4.6 3.4 - 5.3 mmol/L    Chloride 109 94 - 109 mmol/L    Carbon Dioxide 24 20 - 32 mmol/L    Anion Gap 9 3 - 14 mmol/L    Glucose 77 70 - 99 mg/dL    Urea Nitrogen 10 7 - 30 mg/dL    Creatinine 0.83 0.52 - 1.04 mg/dL    GFR Estimate 71 >60 mL/min/1.7m2    GFR Estimate If Black 86 >60 mL/min/1.7m2    Calcium 9.1 8.5 - 10.1 mg/dL    Bilirubin Total 0.7 0.2 - 1.3 mg/dL    Albumin 3.7 3.4 - 5.0 g/dL    Protein Total 7.7 6.8 - 8.8 g/dL    Alkaline Phosphatase 81 40 - 150 U/L    ALT 18 0 - 50 U/L    AST 17 0 - 45 U/L   CBC with platelets   Result Value Ref Range    WBC 6.5 4.0 - 11.0 10e9/L    RBC Count 4.42 3.8 - 5.2 10e12/L    Hemoglobin 12.4 11.7 - 15.7 g/dL    Hematocrit 38.5 35.0 - 47.0 %    MCV 87 78 - 100 fl    MCH 28.1 26.5 - 33.0 pg    MCHC 32.2 31.5 - 36.5 g/dL    RDW 13.5 10.0 - 15.0 %    Platelet Count 242 150 - 450 10e9/L   Lipid panel reflex to direct LDL Fasting   Result Value Ref Range    Cholesterol 237 (H) <200 mg/dL    Triglycerides 463 (H) <150 mg/dL    HDL Cholesterol 40 (L) >49 mg/dL    LDL Cholesterol Calculated  <100 mg/dL     Cannot estimate LDL when triglyceride exceeds 400 mg/dL    Non HDL Cholesterol 197 (H) <130 mg/dL   TSH   Result Value Ref Range    TSH 2.34 0.40 - 4.00 mU/L   T4 free   Result Value Ref Range    T4 Free 1.04 0.76 - 1.46 ng/dL   LDL cholesterol direct   Result Value Ref Range    LDL Cholesterol Direct 135 (H) <100 mg/dL

## 2018-06-12 ASSESSMENT — ASTHMA QUESTIONNAIRES: ACT_TOTALSCORE: 20

## 2018-06-12 ASSESSMENT — ANXIETY QUESTIONNAIRES: GAD7 TOTAL SCORE: 0

## 2018-06-12 ASSESSMENT — PATIENT HEALTH QUESTIONNAIRE - PHQ9: SUM OF ALL RESPONSES TO PHQ QUESTIONS 1-9: 6

## 2018-06-13 LAB
ALBUMIN SERPL-MCNC: 3.7 G/DL (ref 3.4–5)
ALP SERPL-CCNC: 81 U/L (ref 40–150)
ALT SERPL W P-5'-P-CCNC: 18 U/L (ref 0–50)
ANION GAP SERPL CALCULATED.3IONS-SCNC: 9 MMOL/L (ref 3–14)
AST SERPL W P-5'-P-CCNC: 17 U/L (ref 0–45)
BILIRUB SERPL-MCNC: 0.7 MG/DL (ref 0.2–1.3)
BUN SERPL-MCNC: 10 MG/DL (ref 7–30)
CALCIUM SERPL-MCNC: 9.1 MG/DL (ref 8.5–10.1)
CHLORIDE SERPL-SCNC: 109 MMOL/L (ref 94–109)
CHOLEST SERPL-MCNC: 237 MG/DL
CO2 SERPL-SCNC: 24 MMOL/L (ref 20–32)
CREAT SERPL-MCNC: 0.83 MG/DL (ref 0.52–1.04)
GFR SERPL CREATININE-BSD FRML MDRD: 71 ML/MIN/1.7M2
GLUCOSE SERPL-MCNC: 77 MG/DL (ref 70–99)
HDLC SERPL-MCNC: 40 MG/DL
LDLC SERPL CALC-MCNC: ABNORMAL MG/DL
LDLC SERPL DIRECT ASSAY-MCNC: 135 MG/DL
NONHDLC SERPL-MCNC: 197 MG/DL
POTASSIUM SERPL-SCNC: 4.6 MMOL/L (ref 3.4–5.3)
PROT SERPL-MCNC: 7.7 G/DL (ref 6.8–8.8)
SODIUM SERPL-SCNC: 142 MMOL/L (ref 133–144)
T4 FREE SERPL-MCNC: 1.04 NG/DL (ref 0.76–1.46)
TRIGL SERPL-MCNC: 463 MG/DL
TSH SERPL DL<=0.005 MIU/L-ACNC: 2.34 MU/L (ref 0.4–4)

## 2018-06-20 DIAGNOSIS — E03.9 HYPOTHYROIDISM, UNSPECIFIED TYPE: ICD-10-CM

## 2018-06-20 LAB — T3 SERPL-MCNC: 111 NG/DL (ref 60–181)

## 2018-06-20 PROCEDURE — 84480 ASSAY TRIIODOTHYRONINE (T3): CPT | Performed by: FAMILY MEDICINE

## 2018-06-20 PROCEDURE — 36415 COLL VENOUS BLD VENIPUNCTURE: CPT | Performed by: FAMILY MEDICINE

## 2018-07-02 DIAGNOSIS — R05.9 COUGH: ICD-10-CM

## 2018-07-02 RX ORDER — CETIRIZINE HYDROCHLORIDE 10 MG/1
TABLET ORAL
Qty: 30 TABLET | Refills: 3 | Status: SHIPPED | OUTPATIENT
Start: 2018-07-02 | End: 2018-10-30

## 2018-07-02 NOTE — TELEPHONE ENCOUNTER
"Requested Prescriptions   Pending Prescriptions Disp Refills     cetirizine (ZYRTEC) 10 MG tablet [Pharmacy Med Name: CETIRIZINE HCL 10MG TABS] 30 tablet 0     Sig: TAKE ONE TABLET BY MOUTH EVERY MORNING    Antihistamines Protocol Passed    7/2/2018  5:39 PM       Passed - Patient is 3-64 years of age    Apply weight-based dosing for peds patients age 3 - 12 years of age.    Forward request to provider for patients under the age of 3 or over the age of 64.         Passed - Recent (12 mo) or future (30 days) visit within the authorizing provider's specialty    Patient had office visit in the last 12 months or has a visit in the next 30 days with authorizing provider or within the authorizing provider's specialty.  See \"Patient Info\" tab in inbasket, or \"Choose Columns\" in Meds & Orders section of the refill encounter.                  Disp Refills Start End [facility]   cetirizine (ZYRTEC) 10 MG tablet 30 tablet 0 6/6/2018  No   Sig: TAKE ONE TABLET BY MOUTH EVERY MORNING   Class: E-Prescribe   Order: [id number]   E-Prescribing Status: Receipt confirmed by pharmacy (6/6/2018 11:13 AM CDT)       Last office visit: 6/11/2018 with prescribing provider:  6/11/2018   Future Office Visit:      Prescription approved per [facility] Refill Protocol.  Debbie Diaz RN  [place] [facility]        "

## 2018-07-14 DIAGNOSIS — K21.9 GASTROESOPHAGEAL REFLUX DISEASE WITHOUT ESOPHAGITIS: ICD-10-CM

## 2018-07-14 NOTE — TELEPHONE ENCOUNTER
"Requested Prescriptions   Pending Prescriptions Disp Refills     omeprazole (PRILOSEC) 40 MG capsule [Pharmacy Med Name: OMEPRAZOLE 40MG CPDR]  Last Written Prescription Date:  4/13/18  Last Fill Quantity: 90 capsule,  # refills: 1   Last office visit: 6/11/2018 with prescribing provider:  Sriram   Future Office Visit:     90 capsule 1     Sig: TAKE ONE CAPSULE BY MOUTH EVERY DAY 30-60 MINUTES BEFORE A MEAL    PPI Protocol Passed    7/14/2018  8:02 AM       Passed - Not on Clopidogrel (unless Pantoprazole ordered)       Passed - No diagnosis of osteoporosis on record       Passed - Recent (12 mo) or future (30 days) visit within the authorizing provider's specialty    Patient had office visit in the last 12 months or has a visit in the next 30 days with authorizing provider or within the authorizing provider's specialty.  See \"Patient Info\" tab in inbasket, or \"Choose Columns\" in Meds & Orders section of the refill encounter.           Passed - Patient is age 18 or older       Passed - No active pregnacy on record       Passed - No positive pregnancy test in past 12 months          "

## 2018-07-16 RX ORDER — OMEPRAZOLE 40 MG/1
CAPSULE, DELAYED RELEASE ORAL
Qty: 90 CAPSULE | Refills: 1 | OUTPATIENT
Start: 2018-07-16

## 2018-07-16 NOTE — TELEPHONE ENCOUNTER
Pt should not need refills, alerted pharmacy.  Debbie Diaz RN  [facility] [place]

## 2018-07-23 ENCOUNTER — TELEPHONE (OUTPATIENT)
Dept: OTHER | Facility: CLINIC | Age: 53
End: 2018-07-23

## 2018-07-23 ENCOUNTER — OFFICE VISIT (OUTPATIENT)
Dept: FAMILY MEDICINE | Facility: CLINIC | Age: 53
End: 2018-07-23
Payer: MEDICARE

## 2018-07-23 VITALS
DIASTOLIC BLOOD PRESSURE: 96 MMHG | OXYGEN SATURATION: 95 % | TEMPERATURE: 98.2 F | HEIGHT: 61 IN | WEIGHT: 206 LBS | HEART RATE: 61 BPM | BODY MASS INDEX: 38.89 KG/M2 | SYSTOLIC BLOOD PRESSURE: 156 MMHG

## 2018-07-23 DIAGNOSIS — M41.9 SCOLIOSIS, UNSPECIFIED SCOLIOSIS TYPE, UNSPECIFIED SPINAL REGION: ICD-10-CM

## 2018-07-23 DIAGNOSIS — M54.16 LUMBAR RADICULOPATHY: ICD-10-CM

## 2018-07-23 DIAGNOSIS — I83.90 VARICOSE VEIN OF LEG: Primary | ICD-10-CM

## 2018-07-23 PROCEDURE — 99213 OFFICE O/P EST LOW 20 MIN: CPT | Performed by: PHYSICIAN ASSISTANT

## 2018-07-23 NOTE — PATIENT INSTRUCTIONS
Please followup this week with the Spine clinic.  Please seek more immediate medical attention if symptoms change or worsen or if you loose bladder or bowel control.      Please followup with vascular medicine regarding the veins in your feet.      Please followup in clinic if things are not improving.

## 2018-07-23 NOTE — PROGRESS NOTES
SUBJECTIVE:                                                    Brooke John is a 52 year old female who presents to clinic today for the following health issues:      Varicose Veins - Left side outer ankle is worse does have some on right leg as well. Hurts when become enlarged due to being on feet.       Back Pain       Duration: She says that this has been going on for a couple of years, current flare has been for at least 2 weeks.  She has had a cortisone shot in the past for the sciatica.          Specific cause: none    Description:   Location of pain: low back left  Character of pain: sharp and burning  Pain radiation:radiates into the left buttocks and radiates into the left leg  New numbness or weakness in legs, not attributed to pain:  no     Intensity: Currently 3-5/10, At its worst 5-8/10    History:   Pain interferes with job: Not applicable - does not work - volunteers at [employer]   History of back problems: Sciatic nerve 2-3 years  Any previous MRI or X-rays: Yes- at [facility].  Date not sure  Sees a specialist for back pain:  [facility] seen -- has had injection at [facility]   Therapies tried without relief: acetaminophen (Tylenol), elevating legs    Alleviating factors:   Improved by: rest - minor relief      Precipitating factors:  Worsened by: Walking and pressure on legs    Functional and Psychosocial Screen (Kiara STarT Back):      Not performed today        Accompanying Signs & Symptoms:  Risk of Fracture:  None  Risk of Cauda Equina:  None  Risk of Infection:  None  Risk of Cancer:  None  Risk of Ankylosing Spondylitis:  Onset at age <35, male, AND morning back stiffness. no        She reports that she has a flare up of her sciatica.  She states that she gets burning and sharp pain in her left side buttock.  She says that the pain/burning does travel down her leg occasionally.     She has tried tylenol once in a while for the pain, but has not taken this regularly.  She has also  "applied some icy/hot to the leg.    She has tried ice, but not much of that.        She has followed with a doctor at [facility] spine.    Patient Had an epidural steroid injection done on 03.07.2018 at L4-L5.  Patient has not followed up with the spine clinic since the injection.      Problem list and histories reviewed & adjusted, as indicated.  Additional history: as documented      ROS:  Constitutional, HEENT, cardiovascular, pulmonary, GI, , musculoskeletal, neuro, skin, endocrine and psych systems are negative, except as otherwise noted.    OBJECTIVE:                                                    BP (!) 156/96 (BP Location: Left arm, Patient Position: Chair, Cuff Size: Adult Large)  Pulse 61  Temp 98.2  F (36.8  C) (Oral)  Ht 5' 1\" (1.549 m)  Wt 206 lb (93.4 kg)  SpO2 95%  Breastfeeding? No  BMI 38.92 kg/m2  Body mass index is 38.92 kg/(m^2).  GENERAL: healthy, alert and no distress  EYES: Eyes grossly normal to inspection, PERRL and conjunctivae and sclerae normal  MS: no gross musculoskeletal defects noted, no edema  NEURO: Normal strength and tone, mentation intact and speech normal  BACK: no CVA tenderness, no paralumbar tenderness  PSYCH: mentation appears normal, affect normal/bright    MR LUMBAR SPINE WITHOUT CONTRAST  5/15/2017  6:28 PM      HISTORY: Low back pain. Spinal stenosis, cervical region. Scoliosis,  unspecified. Left leg pain.     COMPARISON: Compare 9/25/2014.     TECHNIQUE: Sagittal T1 and STIR. Sagittal T2 and axial dual-echo T2.      FINDINGS: Five lumbar vertebrae are assumed. Levoscoliosis. Prominent  degenerative disc disease at L5-S1. Bilateral renal fluid intensity  structures again seen; please see ultrasound report from April 2016.     Findings by specific level:     There is minimal to mild multilevel facet degenerative change.     T12-L1, L1-L2: No disc herniation or stenosis.     L2-L3: Mild left foraminal disc protrusion. No central or right  foraminal stenosis. " Mild left foraminal stenosis.     L3-L4: No disc herniation or stenosis.     L4-L5: There is a mild broad-based disc protrusion extending from  central to left posterolateral. This abuts the left L5 nerve root.  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 There is mild-moderate left  foraminal stenosis and no right foraminal stenosis.     L5-S1: Changes of left laminectomy. Congenital tapering of the thecal  sac. Disc bulging without central or right foraminal stenosis. The  left neural foramen appears adequate.     Compare with the previous exam, the left L2-L3 lateral disc protrusion  is mildly increased. Overall, the L4-L5 disc protrusion is mildly  decreased.          IMPRESSION:  1. Multilevel degenerative disc and facet disease.  2. L2-L3: Mild left foraminal disc protrusion.  3. L4-L5: Mild left asymmetric disc protrusion. Mild-moderate left  foraminal stenosis.     HARSH RECINOS MD    Diagnostic Test Results:  none      ASSESSMENT/PLAN:                                                      Brooke was seen today for back pain and varicose vein.    Diagnoses and all orders for this visit:    Varicose vein of leg  -     VASCULAR REFERRAL    Scoliosis, unspecified scoliosis type, unspecified spinal region    Lumbar radiculopathy- left - uses a cane - [facility]     -Exam today is unremarkable.  Patient denies any concerning symptoms of numbness or tingling in the extremities, or any loss of bladder or bowel control.  -Due to chronic nature of pain and complex history patient has been advised to follow-up with the spine clinic, as she has followed with them for this pain.    -- I have discussed the patient's diagnosis, and my plan of treatment with the patient and/or family. Patient is aware to followup if symptoms do not improve.  Patient has been advised to be seen sooner or seek more immediate care if symptoms change  or worsen.  Patient agrees with and understands the plan today.     See Patient Instructions:  Please followup this week with the Spine clinic.  Please seek more immediate medical attention if symptoms change or worsen or if you loose bladder or bowel control.      Please followup with vascular medicine regarding the veins in your feet.      Please followup in clinic if things are not improving.          Alondra Michele PA-C    [facility] [place]

## 2018-07-23 NOTE — TELEPHONE ENCOUNTER
"Referral received via EPIC \"in box\", per  guidelines referral forwarded to [facility].     Renu Godwin, CRUZITON, RN    "

## 2018-07-23 NOTE — MR AVS SNAPSHOT
After Visit Summary   7/23/2018    Brooke John    MRN: [id number]           Patient Information     Date Of Birth          1965        Visit Information        Provider Department      7/23/2018 9:00 AM Alondra Michele PA-C [facility] [place]        Today's Diagnoses     Varicose vein of leg    -  1    Scoliosis, unspecified scoliosis type, unspecified spinal region        Lumbar radiculopathy- left - uses a cane - born with the scoliosis - sees [facility]           Care Instructions    Please followup this week with the Spine clinic.  Please seek more immediate medical attention if symptoms change or worsen or if you loose bladder or bowel control.      Please followup with vascular medicine regarding the veins in your feet.      Please followup in clinic if things are not improving.            Follow-ups after your visit        Additional Services     VASCULAR REFERRAL       Your provider has referred you to the [facility].    Reason for Referral: Vascular Medicine    Urgency of Referral: Next available    A referral has been sent to our Vascular  Services. If you do not receive a call from them within 24-48 hours you may reach them at [phone].    Please be aware that coverage of these services is subject to the terms and limitations of your health insurance plan.  Call member services at your health plan with any benefit or coverage questions.      Please bring the following with you to your appointment:  (1) Any X-Rays, CTs or MRIs which have been performed.  Contact the facility where they were done to arrange for  prior to your scheduled appointment.  Any new CT, MRI or other procedures ordered by your specialist must be performed at a [facility] facility or coordinated by your clinic's referral office.    (2) List of current medications   (3) This referral request   (4) Any documents/labs given to you for this referral   "                Follow-up notes from your care team     Return in about 3 days (around 7/26/2018) for Specialty followup, please be seen sooner if needed.      Who to contact     If you have questions or need follow up information about today's clinic visit or your schedule please contact [facility] directly at [phone].  Normal or non-critical lab and imaging results will be communicated to you by MyChart, letter or phone within 4 business days after the clinic has received the results. If you do not hear from us within 7 days, please contact the clinic through MyChart or phone. If you have a critical or abnormal lab result, we will notify you by phone as soon as possible.  Submit refill requests through [employer] or call your pharmacy and they will forward the refill request to us. Please allow 3 business days for your refill to be completed.          Additional Information About Your Visit        Care EveryWhere ID     This is your Care EveryWhere ID. This could be used by other organizations to access your [facility] medical records  [id number]        Your Vitals Were     Pulse Temperature Height Pulse Oximetry Breastfeeding? BMI (Body Mass Index)    61 98.2  F (36.8  C) (Oral) 5' 1\" (1.549 m) 95% No 38.92 kg/m2       Blood Pressure from Last 3 Encounters:   07/23/18 (!) 156/96   06/11/18 130/90   05/15/18 120/74    Weight from Last 3 Encounters:   07/23/18 206 lb (93.4 kg)   06/11/18 207 lb (93.9 kg)   05/15/18 207 lb (93.9 kg)              We Performed the Following     VASCULAR REFERRAL        Primary Care Provider Office Phone # Fax #    Mandy Bhatia [phone] [address] [facility] [address]        Equal Access to Services     KESHA CARLOS : Luis Antonio Hardin, yovana chino, antony blas. So [facility] [phone].    ATENCIÓN: Si habla español, tiene a elizabeth disposición servicios " chaitanya de asistencia lingüística. Jimmy castelan [phone].    We comply with applicable federal civil rights laws and [place] laws. We do not discriminate on the basis of race, color, national origin, age, disability, sex, sexual orientation, or gender identity.            Thank you!     Thank you for choosing [facility]  for your care. Our goal is always to provide you with excellent care. Hearing back from our patients is one way we can continue to improve our services. Please take a few minutes to complete the written survey that you may receive in the mail after your visit with us. Thank you!             Your Updated Medication List - Protect others around you: Learn how to safely use, store and throw away your medicines at www.disposemymeds.org.          This list is accurate as of 7/23/18  9:51 AM.  Always use your most recent med list.                   Brand Name Dispense Instructions for use Diagnosis    albuterol 108 (90 Base) MCG/ACT Inhaler    PROAIR HFA/PROVENTIL HFA/VENTOLIN HFA    1 Inhaler    Inhale 2 puffs into the lungs every 4 hours as needed for shortness of breath / dyspnea or wheezing Cough    Shortness of breath       cetirizine 10 MG tablet    zyrTEC    30 tablet    TAKE ONE TABLET BY MOUTH EVERY MORNING    Cough       fluticasone 50 MCG/ACT spray    FLONASE    16 g    USE TWO SPRAYS IN EACH NOSTRIL EVERY DAY    Moderate persistent asthma without complication       fluticasone-salmeterol 250-50 MCG/DOSE diskus inhaler    ADVAIR    1 Inhaler    Inhale 1 puff into the lungs 2 times daily    Moderate persistent asthma without complication       levothyroxine 75 MCG tablet    SYNTHROID/LEVOTHROID    90 tablet    Take 1 tablet (75 mcg) by mouth daily    Hypothyroidism, unspecified type       LORazepam 0.5 MG tablet    ATIVAN    10 tablet    Take 1 tablet (0.5 mg) by mouth every 6 hours as needed for anxiety        MELATONIN PO           metoclopramide 10 MG tablet    REGLAN     20 tablet    Take 1 tablet (10 mg) by mouth once as needed For nausea/stomach upset    Gastroesophageal reflux disease without esophagitis       Multi-vitamin Tabs tablet      Take 1 tablet by mouth daily        omeprazole 40 MG capsule    priLOSEC    90 capsule    Take 1 capsule (40 mg) by mouth daily Take 30-60 minutes before a meal.    Gastroesophageal reflux disease without esophagitis       order for DME     80 Units    Incontinence items, disposable underwear, absorbency pads, liners.    Risk for falls, Balance problems, Mixed incontinence       order for DME     1 Device    Walking cane for balance and mobility    Risk for falls, Balance problems       polyethylene glycol powder    MIRALAX    510 g    Take 17 g (1 capful) by mouth daily as needed for constipation    Constipation, unspecified constipation type       SUMAtriptan 25 MG tablet    IMITREX    8 tablet    Take 25-100mg one time. May repeat 25mg every two hours up to a maximum of 200mg in 24 hours.    Menstrual migraine without status migrainosus, not intractable

## 2018-07-30 ENCOUNTER — APPOINTMENT (OUTPATIENT)
Dept: VASCULAR SURGERY | Facility: CLINIC | Age: 53
End: 2018-07-30
Attending: PHYSICIAN ASSISTANT
Payer: MEDICARE

## 2018-07-30 PROCEDURE — 99207 ZZC VEINSOLUTIONS FREE SCREENING: CPT | Performed by: SURGERY

## 2018-08-22 ENCOUNTER — HOSPITAL ENCOUNTER (OUTPATIENT)
Dept: GENERAL RADIOLOGY | Facility: CLINIC | Age: 53
Discharge: HOME | End: 2018-08-22
Attending: PHYSICIAN ASSISTANT | Admitting: PHYSICIAN ASSISTANT
Payer: MEDICARE

## 2018-08-22 VITALS — HEART RATE: 65 BPM | SYSTOLIC BLOOD PRESSURE: 149 MMHG | DIASTOLIC BLOOD PRESSURE: 74 MMHG

## 2018-08-22 DIAGNOSIS — M48.062 SPINAL STENOSIS, LUMBAR REGION, WITH NEUROGENIC CLAUDICATION: ICD-10-CM

## 2018-08-22 PROCEDURE — 27211112 XR LUMBAR EPIDURAL INJECTION INCL IMAGING

## 2018-08-22 PROCEDURE — 25000128 H RX IP 250 OP 636

## 2018-08-22 PROCEDURE — 25000125 ZZHC RX 250: Performed by: PHYSICIAN ASSISTANT

## 2018-08-22 PROCEDURE — 25000125 ZZHC RX 250

## 2018-08-22 PROCEDURE — 25500064 ZZH RX 255 OP 636: Performed by: PHYSICIAN ASSISTANT

## 2018-08-22 RX ORDER — IOPAMIDOL 408 MG/ML
10 INJECTION, SOLUTION INTRATHECAL ONCE
Status: COMPLETED | OUTPATIENT
Start: 2018-08-22 | End: 2018-08-22

## 2018-08-22 RX ORDER — DEXAMETHASONE SODIUM PHOSPHATE 10 MG/ML
INJECTION, SOLUTION INTRAMUSCULAR; INTRAVENOUS
Status: COMPLETED
Start: 2018-08-22 | End: 2018-08-22

## 2018-08-22 RX ORDER — LIDOCAINE HYDROCHLORIDE 10 MG/ML
3 INJECTION, SOLUTION EPIDURAL; INFILTRATION; INTRACAUDAL; PERINEURAL ONCE
Status: COMPLETED | OUTPATIENT
Start: 2018-08-22 | End: 2018-08-22

## 2018-08-22 RX ORDER — DEXAMETHASONE SODIUM PHOSPHATE 10 MG/ML
20 INJECTION, SOLUTION INTRAMUSCULAR; INTRAVENOUS ONCE
Status: COMPLETED | OUTPATIENT
Start: 2018-08-22 | End: 2018-08-22

## 2018-08-22 RX ORDER — LIDOCAINE HYDROCHLORIDE 10 MG/ML
INJECTION, SOLUTION EPIDURAL; INFILTRATION; INTRACAUDAL; PERINEURAL
Status: COMPLETED
Start: 2018-08-22 | End: 2018-08-22

## 2018-08-22 RX ORDER — BETAMETHASONE SODIUM PHOSPHATE AND BETAMETHASONE ACETATE 3; 3 MG/ML; MG/ML
18 INJECTION, SUSPENSION INTRA-ARTICULAR; INTRALESIONAL; INTRAMUSCULAR; SOFT TISSUE ONCE
Status: DISCONTINUED | OUTPATIENT
Start: 2018-08-22 | End: 2018-08-22

## 2018-08-22 RX ORDER — LIDOCAINE HYDROCHLORIDE 10 MG/ML
5 INJECTION, SOLUTION EPIDURAL; INFILTRATION; INTRACAUDAL; PERINEURAL ONCE
Status: COMPLETED | OUTPATIENT
Start: 2018-08-22 | End: 2018-08-22

## 2018-08-22 RX ADMIN — LIDOCAINE HYDROCHLORIDE 3 ML: 10 INJECTION, SOLUTION EPIDURAL; INFILTRATION; INTRACAUDAL; PERINEURAL at 08:53

## 2018-08-22 RX ADMIN — IOPAMIDOL 10 ML: 408 INJECTION, SOLUTION INTRATHECAL at 08:52

## 2018-08-22 RX ADMIN — DEXAMETHASONE SODIUM PHOSPHATE 20 MG: 10 INJECTION, SOLUTION INTRAMUSCULAR; INTRAVENOUS at 08:53

## 2018-08-22 RX ADMIN — LIDOCAINE HYDROCHLORIDE 5 ML: 10 INJECTION, SOLUTION EPIDURAL; INFILTRATION; INTRACAUDAL; PERINEURAL at 08:53

## 2018-08-22 NOTE — PROGRESS NOTES
RADIOLOGY PROCEDURE NOTE  Patient name: Brooke John  MRN: [id number]  : 1965    Pre-procedure diagnosis: Left back pain  Post-procedure diagnosis: Same    Procedure Date/Time: 2018  8:58 AM  Procedure: Left L4-L5 TFESI  Estimated blood loss: None  Specimen(s) collected with description: none  The patient tolerated the procedure well with no immediate complications.  Significant findings:none    See imaging dictation for procedural details.    Provider name: Dennis Roque  Assistant(s):None

## 2018-08-22 NOTE — PROGRESS NOTES
Pt was in Radiology today for a L4-5 DANY, interlaminar was ordered but she does have some metal in her back and a transforaminal was helpful last time.Pt tolerated ortho procedure well. Pre procedure pain was 5 post procedure pain level 1.Procedure was completed by ELIZABET GIBBONS. There were no complications during this procedure. Pt verbalized understanding of written and verbal instructions and left department in stable and satisfactory condition with caregiver. There is no evidence of bleeding or any other complications upon discharge.

## 2018-10-10 ENCOUNTER — OFFICE VISIT (OUTPATIENT)
Dept: FAMILY MEDICINE | Facility: CLINIC | Age: 53
End: 2018-10-10
Payer: MEDICARE

## 2018-10-10 VITALS
HEIGHT: 61 IN | WEIGHT: 208 LBS | DIASTOLIC BLOOD PRESSURE: 83 MMHG | SYSTOLIC BLOOD PRESSURE: 130 MMHG | OXYGEN SATURATION: 97 % | TEMPERATURE: 98.3 F | BODY MASS INDEX: 39.27 KG/M2 | HEART RATE: 74 BPM

## 2018-10-10 DIAGNOSIS — R25.2 CRAMP OF TOE: Primary | ICD-10-CM

## 2018-10-10 DIAGNOSIS — K21.9 GASTROESOPHAGEAL REFLUX DISEASE WITHOUT ESOPHAGITIS: ICD-10-CM

## 2018-10-10 DIAGNOSIS — Z23 NEED FOR PROPHYLACTIC VACCINATION AND INOCULATION AGAINST INFLUENZA: ICD-10-CM

## 2018-10-10 DIAGNOSIS — M20.41 HAMMER TOE OF RIGHT FOOT: ICD-10-CM

## 2018-10-10 PROCEDURE — 82550 ASSAY OF CK (CPK): CPT | Performed by: PHYSICIAN ASSISTANT

## 2018-10-10 PROCEDURE — G0008 ADMIN INFLUENZA VIRUS VAC: HCPCS | Performed by: PHYSICIAN ASSISTANT

## 2018-10-10 PROCEDURE — 99213 OFFICE O/P EST LOW 20 MIN: CPT | Mod: 25 | Performed by: PHYSICIAN ASSISTANT

## 2018-10-10 PROCEDURE — 90686 IIV4 VACC NO PRSV 0.5 ML IM: CPT | Performed by: PHYSICIAN ASSISTANT

## 2018-10-10 PROCEDURE — 36415 COLL VENOUS BLD VENIPUNCTURE: CPT | Performed by: PHYSICIAN ASSISTANT

## 2018-10-10 RX ORDER — OMEPRAZOLE 40 MG/1
CAPSULE, DELAYED RELEASE ORAL
Qty: 90 CAPSULE | Refills: 3 | Status: SHIPPED | OUTPATIENT
Start: 2018-10-10 | End: 2019-07-17

## 2018-10-10 NOTE — PROGRESS NOTES
"  SUBJECTIVE:                                                    Brooke John is a 53 year old female who presents to clinic today for the following health issues:      Joint Pain    Onset: x10 years - off and on     Description:   Location: All Toes are cramping  Character: Cramping    Intensity: moderate    Progression of Symptoms: worse - seems like it used to be with menses but is happening more frequently    Accompanying Signs & Symptoms:  Other symptoms: tingling    History:   Previous similar pain: no       Precipitating factors:   Trauma or overuse: no     Alleviating factors:  Improved by: vicks and lenin rizzo - moderate     Therapies Tried and outcome: above      Problem list and histories reviewed & adjusted, as indicated.  Additional history: as documented      ROS:  Constitutional, HEENT, cardiovascular, pulmonary, GI, , musculoskeletal, neuro, skin, endocrine and psych systems are negative, except as otherwise noted.    OBJECTIVE:                                                    /83 (BP Location: Left arm, Patient Position: Chair, Cuff Size: Adult Large)  Pulse 74  Temp 98.3  F (36.8  C) (Oral)  Ht 5' 1\" (1.549 m)  Wt 208 lb (94.3 kg)  SpO2 97%  Breastfeeding? No  BMI 39.3 kg/m2  Body mass index is 39.3 kg/(m^2).  GENERAL: healthy, alert and no distress  EYES: Eyes grossly normal to inspection, PERRL and conjunctivae and sclerae normal  MS: no gross musculoskeletal defects noted, no edema  SKIN: no suspicious lesions or rashes  NEURO: Normal strength and tone, mentation intact and speech normal  PSYCH: mentation appears normal, affect normal/bright    Diagnostic Test Results:  none      ASSESSMENT/PLAN:                                                      Brooke was seen today for musculoskeletal problem.    Diagnoses and all orders for this visit:    Cramp of toe  -     [facility] PT, HAND, AND CHIROPRACTIC REFERRAL; Future  -     CK total    Hammer toe of right foot  -     [facility] PT, " HAND, AND CHIROPRACTIC REFERRAL; Future  -     CK total    Need for prophylactic vaccination and inoculation against influenza  -     FLU VACCINE, SPLIT VIRUS, IM (QUADRIVALENT) [10960]- >3 YRS  -     Vaccine Administration, Initial [21303]    Patient was seen today in clinic for an ongoing toe concern.  She reports that her toes turn downward and she gets cramps in her toes that are painful.  On exam it appears that she has signs of mild hammer toe, and she has been advised to followup with physical therapy for review and treatment.  Patient should followup with podiatry if not improving with physical therapy.  Patient to seek more immediate care if needed.     Followup: Return in about 1 week (around 10/17/2018) for Specialty followup, please be seen sooner if needed.    -- I have discussed the patient's diagnosis, and my plan of treatment with the patient and/or family. Patient is aware to followup if symptoms do not improve.  Patient has been advised to be seen sooner or seek more immediate care if symptoms change or worsen.  Patient agrees with and understands the plan today.     See Patient Instructions        Alondra Michele PA-C    [facility] [place]

## 2018-10-10 NOTE — TELEPHONE ENCOUNTER
"Requested Prescriptions   Pending Prescriptions Disp Refills     omeprazole (PRILOSEC) 40 MG capsule [Pharmacy Med Name: OMEPRAZOLE 40MG CPDR] 90 capsule 1    Last Written Prescription Date:  4/13/2018  Last Fill Quantity: 90,  # refills: 1   Last office visit: No previous visit found with prescribing provider:  Leny 10/10/2018  Future Office Visit:     Sig: TAKE ONE CAPSULE BY MOUTH EVERY DAY 30-60 MINUTES BEFORE A MEAL    PPI Protocol Passed    10/10/2018  9:57 AM       Passed - Not on Clopidogrel (unless Pantoprazole ordered)       Passed - No diagnosis of osteoporosis on record       Passed - Recent (12 mo) or future (30 days) visit within the authorizing provider's specialty    Patient had office visit in the last 12 months or has a visit in the next 30 days with authorizing provider or within the authorizing provider's specialty.  See \"Patient Info\" tab in inbasket, or \"Choose Columns\" in Meds & Orders section of the refill encounter.           Passed - Patient is age 18 or older       Passed - No active pregnacy on record       Passed - No positive pregnancy test in past 12 months        Filled per [facility] protocol.     Brenna NJ RN  [employer] [facility]    "

## 2018-10-10 NOTE — PATIENT INSTRUCTIONS
Treating Mallet, Hammer, and Claw Toes  Definitions  A hammer toe has an abnormal bend in the middle joint of your toe (toe is bent upward at joint).  Mallet toe affects the joint nearest your toenail (toe is bent downward at joint).  Claw toe affects the joint at the ball of your foot (toe is bent upward at joint), as well as both toe joints (toe is bent downward at both joints).  Hammer toe and mallet toe are most likely to occur in the toe next to your big toe.  Causes  The most common cause for all 3 deformities is poorly fitting shoes and tight shoes, especially high heels for women. Trauma and nerve damage from various diseases like diabetes may also cause these deformities.  Treatment  Buying shoes with more room in the toes, filing down corns and calluses, and padding, taping, or strapping the toe most often relieve the pain. Toe stretching and exercises may also be helpful. If these steps don t work, you may need surgery to straighten your toes.  Shoes  Buy low-heeled shoes with plenty of room in the front. This keeps your toes from being jammed against the end of your shoe. It also keeps your shoe from rubbing the tops of your toes.  Corns and calluses    To file down a corn or callus, soak your foot in warm water. This softens the hard skin. Dry your foot. Then gently rub the corn or callus with a pumice stone or nail file.  Pads and splints  If you still have pain, you may need to put a pad or splint on your toe. This helps take pressure off the painful corn or callus.    For a mallet toe, you can put a gel pad on the toe. This keeps the tip of the toe from rubbing against the bottom of the shoe.    For a hammer or claw toe, you can put a felt or foam pad over the bent joint. This keeps the toe from rubbing on the top of the shoe.    For a hammer or claw toe that is still flexible, you can put a splint on the toe. This keeps it straight so it doesn't rub on the top of the shoe.    Date Last Reviewed:  1/1/2018 2000-2017 [employer]. [address] [place] [address], [place] [address]. All rights reserved. This information is not intended as a substitute for professional medical care. Always follow your healthcare professional's instructions.

## 2018-10-10 NOTE — MR AVS SNAPSHOT
After Visit Summary   10/10/2018    Brooke John    MRN: [id number]           Patient Information     Date Of Birth          1965        Visit Information        Provider Department      10/10/2018 9:00 AM Alondra Michele PA-C [facility] [place]        Today's Diagnoses     Cramp of toe    -  1    Hammer toe of right foot          Care Instructions      Treating Mallet, Hammer, and Claw Toes  Definitions  A hammer toe has an abnormal bend in the middle joint of your toe (toe is bent upward at joint).  Mallet toe affects the joint nearest your toenail (toe is bent downward at joint).  Claw toe affects the joint at the ball of your foot (toe is bent upward at joint), as well as both toe joints (toe is bent downward at both joints).  Hammer toe and mallet toe are most likely to occur in the toe next to your big toe.  Causes  The most common cause for all 3 deformities is poorly fitting shoes and tight shoes, especially high heels for women. Trauma and nerve damage from various diseases like diabetes may also cause these deformities.  Treatment  Buying shoes with more room in the toes, filing down corns and calluses, and padding, taping, or strapping the toe most often relieve the pain. Toe stretching and exercises may also be helpful. If these steps don t work, you may need surgery to straighten your toes.  Shoes  Buy low-heeled shoes with plenty of room in the front. This keeps your toes from being jammed against the end of your shoe. It also keeps your shoe from rubbing the tops of your toes.  Corns and calluses    To file down a corn or callus, soak your foot in warm water. This softens the hard skin. Dry your foot. Then gently rub the corn or callus with a pumice stone or nail file.  Pads and splints  If you still have pain, you may need to put a pad or splint on your toe. This helps take pressure off the painful corn or callus.    For a mallet toe, you can put a gel pad on the  toe. This keeps the tip of the toe from rubbing against the bottom of the shoe.    For a hammer or claw toe, you can put a felt or foam pad over the bent joint. This keeps the toe from rubbing on the top of the shoe.    For a hammer or claw toe that is still flexible, you can put a splint on the toe. This keeps it straight so it doesn't rub on the top of the shoe.    Date Last Reviewed: 1/1/2018 2000-2017 [employer]. [address] [place] [address]. All rights reserved. This information is not intended as a substitute for professional medical care. Always follow your healthcare professional's instructions.                Follow-ups after your visit        Additional Services     PIO PT, HAND, AND CHIROPRACTIC REFERRAL       Physical Therapy, Hand Therapy and Chiropractic Care are available through:  *[facility]  *Hand Therapy (Occupational Therapy or Physical Therapy)  *[facility]    Call one number to schedule at any of the above locations: [phone].    Physical therapy, Hand therapy and/or Chiropractic care has been recommended by your physician as an excellent treatment option to reduce pain and help people return to normal activities, including sports.  Therapy and/or chiropractic care services are a great complement or alternative to expensive and invasive surgery, injections, or long-term use of prescription medications. The primary goal is to identify the underlying problem and provide you the tools to manage your condition on your own.     Please be aware that coverage of these services is subject to the terms and limitations of your health insurance plan.  Call member services at your health plan with any benefit or coverage questions.      Please bring the following to your appointment:  *Your personal calendar for scheduling future appointments  *Comfortable clothing                  Follow-up notes from your care team      "Return in about 1 week (around 10/17/2018) for Specialty followup, please be seen sooner if needed.      Your next 10 appointments already scheduled     Dec 03, 2018  1:00 PM CST   Return Sleep Patient with Doris Rodrigez MD   [facility] ([facility])    [address] [place] [address]   [phone]              Future tests that were ordered for you today     Open Future Orders        Priority Expected Expires Ordered    PIO PT, HAND, AND CHIROPRACTIC REFERRAL Routine  10/10/2019 10/10/2018            Who to contact     If you have questions or need follow up information about today's clinic visit or your schedule please contact [facility] [place] directly at [phone].  Normal or non-critical lab and imaging results will be communicated to you by [employer], letter or phone within 4 business days after the clinic has received the results. If you do not hear from us within 7 days, please contact the clinic through [employer] or phone. If you have a critical or abnormal lab result, we will notify you by phone as soon as possible.  Submit refill requests through [employer] or call your pharmacy and they will forward the refill request to us. Please allow 3 business days for your refill to be completed.          Additional Information About Your Visit        [employer] Information     [employer] lets you send messages to your doctor, view your test results, renew your prescriptions, schedule appointments and more. To sign up, go to [email or web address] . Click on \"Log in\" on the left side of the screen, which will take you to the Welcome page. Then click on \"Sign up Now\" on the right side of the page.     You will be asked to enter the access code listed below, as well as some personal information. Please follow the directions to create your username and password.     Your access code is: [email or web address]  Expires: " "2019  9:33 AM     Your access code will  in 90 days. If you need help or a new code, please call your [place] clinic or [phone].        Care EveryWhere ID     This is your Care EveryWhere ID. This could be used by other organizations to access your [place] medical records  [id number]        Your Vitals Were     Pulse Temperature Height Pulse Oximetry Breastfeeding? BMI (Body Mass Index)    74 98.3  F (36.8  C) (Oral) 5' 1\" (1.549 m) 97% No 39.3 kg/m2       Blood Pressure from Last 3 Encounters:   10/10/18 130/83   18 149/74   18 (!) 156/96    Weight from Last 3 Encounters:   10/10/18 208 lb (94.3 kg)   18 206 lb (93.4 kg)   18 207 lb (93.9 kg)              We Performed the Following     CK total        Primary Care Provider Office Phone # Fax #    Mandy Bhatia [phone]       [address] [place] [address]        Equal Access to Services     : Hadii elodia quiroz hadalyshao Soleanne, waaxda luqadaha, qaybta kaalmada jessica, antony smith . So [facility] [phone].    ATENCIÓN: Si habla español, tiene a elizabeth disposición servicios gratuitos de asistencia lingüística. Llame al [phone].    We comply with applicable federal civil rights laws and [place] laws. We do not discriminate on the basis of race, color, national origin, age, disability, sex, sexual orientation, or gender identity.            Thank you!     Thank you for choosing [facility]  for your care. Our goal is always to provide you with excellent care. Hearing back from our patients is one way we can continue to improve our services. Please take a few minutes to complete the written survey that you may receive in the mail after your visit with us. Thank you!             Your Updated Medication List - Protect others around you: Learn how to safely use, store and throw away your medicines at www.LocalGuidingeds.org.        "   This list is accurate as of 10/10/18  9:33 AM.  Always use your most recent med list.                   Brand Name Dispense Instructions for use Diagnosis    albuterol 108 (90 Base) MCG/ACT inhaler    PROAIR HFA/PROVENTIL HFA/VENTOLIN HFA    1 Inhaler    Inhale 2 puffs into the lungs every 4 hours as needed for shortness of breath / dyspnea or wheezing Cough    Shortness of breath       cetirizine 10 MG tablet    zyrTEC    30 tablet    TAKE ONE TABLET BY MOUTH EVERY MORNING    Cough       fluticasone 50 MCG/ACT spray    FLONASE    16 g    USE TWO SPRAYS IN EACH NOSTRIL EVERY DAY    Moderate persistent asthma without complication       fluticasone-salmeterol 250-50 MCG/DOSE diskus inhaler    ADVAIR    1 Inhaler    Inhale 1 puff into the lungs 2 times daily    Moderate persistent asthma without complication       levothyroxine 75 MCG tablet    SYNTHROID/LEVOTHROID    90 tablet    Take 1 tablet (75 mcg) by mouth daily    Hypothyroidism, unspecified type       LORazepam 0.5 MG tablet    ATIVAN    10 tablet    Take 1 tablet (0.5 mg) by mouth every 6 hours as needed for anxiety        MELATONIN PO           metoclopramide 10 MG tablet    REGLAN    20 tablet    Take 1 tablet (10 mg) by mouth once as needed For nausea/stomach upset    Gastroesophageal reflux disease without esophagitis       Multi-vitamin Tabs tablet      Take 1 tablet by mouth daily        omeprazole 40 MG capsule    priLOSEC    90 capsule    Take 1 capsule (40 mg) by mouth daily Take 30-60 minutes before a meal.    Gastroesophageal reflux disease without esophagitis       order for DME     80 Units    Incontinence items, disposable underwear, absorbency pads, liners.    Risk for falls, Balance problems, Mixed incontinence       order for DME     1 Device    Walking cane for balance and mobility    Risk for falls, Balance problems       polyethylene glycol powder    MIRALAX    510 g    Take 17 g (1 capful) by mouth daily as needed for constipation     Constipation, unspecified constipation type       SUMAtriptan 25 MG tablet    IMITREX    8 tablet    Take 25-100mg one time. May repeat 25mg every two hours up to a maximum of 200mg in 24 hours.    Menstrual migraine without status migrainosus, not intractable

## 2018-10-10 NOTE — LETTER
[facility]  [address] [place] [address], [place] [address]                  [phone]   October 15, 2018    Brooke John  [address]  [place] [address]      Dear Brooke,    Here is a summary of your recent test results:      The results from your recent lab work are within normal limits.  The CK lab for your muscles appears to be good.         Your test results are enclosed.      Please contact me if you have any questions.    In addition, here is a list of due or overdue Health Maintenance reminders.    Health Maintenance Due   Topic Date Due     Asthma Action Plan - yearly  07/28/2018     Discuss Advance Directive Planning  08/21/2018     Colon Cancer Screening - FIT Test - yearly  10/18/2018       Please call us at [phone] (or use "[employer] to address the above recommendations.            Thank you very much for trusting [facility]..     Healthy regards,      Alondra Michele PA-C        Results for orders placed or performed in visit on 10/10/18   CK total   Result Value Ref Range    CK Total 61 30 - 225 U/L

## 2018-10-11 LAB — CK SERPL-CCNC: 61 U/L (ref 30–225)

## 2018-10-12 ENCOUNTER — THERAPY VISIT (OUTPATIENT)
Dept: PHYSICAL THERAPY | Facility: CLINIC | Age: 53
End: 2018-10-12
Payer: MEDICARE

## 2018-10-12 DIAGNOSIS — M20.41 HAMMER TOE OF RIGHT FOOT: ICD-10-CM

## 2018-10-12 DIAGNOSIS — M79.674 PAIN OF TOE OF RIGHT FOOT: Primary | ICD-10-CM

## 2018-10-12 DIAGNOSIS — R25.2 CRAMP OF TOE: ICD-10-CM

## 2018-10-12 PROCEDURE — 97110 THERAPEUTIC EXERCISES: CPT | Mod: GP | Performed by: PHYSICAL THERAPIST

## 2018-10-12 PROCEDURE — G8978 MOBILITY CURRENT STATUS: HCPCS | Mod: GP | Performed by: PHYSICAL THERAPIST

## 2018-10-12 PROCEDURE — G8979 MOBILITY GOAL STATUS: HCPCS | Mod: GP | Performed by: PHYSICAL THERAPIST

## 2018-10-12 PROCEDURE — 97161 PT EVAL LOW COMPLEX 20 MIN: CPT | Mod: GP | Performed by: PHYSICAL THERAPIST

## 2018-10-12 NOTE — MR AVS SNAPSHOT
"              After Visit Summary   10/12/2018    Brooke John    MRN: [id number]           Patient Information     Date Of Birth          1965        Visit Information        Provider Department      10/12/2018 7:10 AM Master Woodall PT [facility] [place]        Today's Diagnoses     Pain of toe of right foot    -  1    Cramp of toe        Hammer toe of right foot           Follow-ups after your visit        Your next 10 appointments already scheduled     Oct 25, 2018  9:10 AM CDT   PIO Extremity with Master Woodall PT   [facility] [place] ([facility] [place])    [address] [id number] [phone]            Dec 03, 2018  1:00 PM CST   Return Sleep Patient with Doris Rodrigez MD   [facility] ([facility])    [address] [place] [address] [phone]              Who to contact     If you have questions or need follow up information about today's clinic visit or your schedule please contact [facility] [place] directly at [phone].  Normal or non-critical lab and imaging results will be communicated to you by MyChart, letter or phone within 4 business days after the clinic has received the results. If you do not hear from us within 7 days, please contact the clinic through [employer] or phone. If you have a critical or abnormal lab result, we will notify you by phone as soon as possible.  Submit refill requests through [employer] or call your pharmacy and they will forward the refill request to us. Please allow 3 business days for your refill to be completed.          Additional Information About Your Visit        MyChart Information     [employer] lets you send messages to your doctor, view your test results, renew your prescriptions, schedule appointments and more. To sign up, go to [email or web address] . Click on \"Log " "in\" on the left side of the screen, which will take you to the Welcome page. Then click on \"Sign up Now\" on the right side of the page.     You will be asked to enter the access code listed below, as well as some personal information. Please follow the directions to create your username and password.     Your access code is: [email or web address]  Expires: 2019  9:33 AM     Your access code will  in 90 days. If you need help or a new code, please call your [place] clinic or [phone].        Care EveryWhere ID     This is your Care EveryWhere ID. This could be used by other organizations to access your [place] medical records  [id number]         Blood Pressure from Last 3 Encounters:   10/10/18 130/83   18 149/74   18 (!) 156/96    Weight from Last 3 Encounters:   10/10/18 94.3 kg (208 lb)   18 93.4 kg (206 lb)   18 93.9 kg (207 lb)              We Performed the Following     HC PT EVAL, LOW COMPLEXITY     PIO CERT REPORT     PIO PT, HAND, AND CHIROPRACTIC REFERRAL     THERAPEUTIC EXERCISES        Primary Care Provider Office Phone # Fax #    Mandydesean Bhatia [phone]       [address] [place] [address]        Equal Access to Services     RICO CARLOS : Hadii elodia quiroz hadasho Soleanne, waaxda luqadaha, qaybta kaalmada jessica, antony fernandez. So [facility] [phone].    ATENCIÓN: Si habla español, tiene a elizabeth disposición servicios gratuitos de asistencia lingüística. Jimmy al [phone].    We comply with applicable federal civil rights laws and [place] laws. We do not discriminate on the basis of race, color, national origin, age, disability, sex, sexual orientation, or gender identity.            Thank you!     Thank you for choosing [facility] [place]  for your care. Our goal is always to provide you with excellent care. Hearing back from our patients is one way we can continue to improve our " services. Please take a few minutes to complete the written survey that you may receive in the mail after your visit with us. Thank you!             Your Updated Medication List - Protect others around you: Learn how to safely use, store and throw away your medicines at www.disposemymeds.org.          This list is accurate as of 10/12/18 11:11 AM.  Always use your most recent med list.                   Brand Name Dispense Instructions for use Diagnosis    albuterol 108 (90 Base) MCG/ACT inhaler    PROAIR HFA/PROVENTIL HFA/VENTOLIN HFA    1 Inhaler    Inhale 2 puffs into the lungs every 4 hours as needed for shortness of breath / dyspnea or wheezing Cough    Shortness of breath       cetirizine 10 MG tablet    zyrTEC    30 tablet    TAKE ONE TABLET BY MOUTH EVERY MORNING    Cough       fluticasone 50 MCG/ACT spray    FLONASE    16 g    USE TWO SPRAYS IN EACH NOSTRIL EVERY DAY    Moderate persistent asthma without complication       fluticasone-salmeterol 250-50 MCG/DOSE diskus inhaler    ADVAIR    1 Inhaler    Inhale 1 puff into the lungs 2 times daily    Moderate persistent asthma without complication       levothyroxine 75 MCG tablet    SYNTHROID/LEVOTHROID    90 tablet    Take 1 tablet (75 mcg) by mouth daily    Hypothyroidism, unspecified type       LORazepam 0.5 MG tablet    ATIVAN    10 tablet    Take 1 tablet (0.5 mg) by mouth every 6 hours as needed for anxiety        MELATONIN PO           metoclopramide 10 MG tablet    REGLAN    20 tablet    Take 1 tablet (10 mg) by mouth once as needed For nausea/stomach upset    Gastroesophageal reflux disease without esophagitis       Multi-vitamin Tabs tablet      Take 1 tablet by mouth daily        omeprazole 40 MG capsule    priLOSEC    90 capsule    TAKE ONE CAPSULE BY MOUTH EVERY DAY 30-60 MINUTES BEFORE A MEAL    Gastroesophageal reflux disease without esophagitis       order for DME     80 Units    Incontinence items, disposable underwear, absorbency pads,  liners.    Risk for falls, Balance problems, Mixed incontinence       order for DME     1 Device    Walking cane for balance and mobility    Risk for falls, Balance problems       polyethylene glycol powder    MIRALAX    510 g    Take 17 g (1 capful) by mouth daily as needed for constipation    Constipation, unspecified constipation type       SUMAtriptan 25 MG tablet    IMITREX    8 tablet    Take 25-100mg one time. May repeat 25mg every two hours up to a maximum of 200mg in 24 hours.    Menstrual migraine without status migrainosus, not intractable

## 2018-10-12 NOTE — LETTER
DEPARTMENT OF HEALTH AND HUMAN SERVICES  CENTERS FOR MEDICARE & MEDICAID SERVICES    PLAN/UPDATED PLAN OF PROGRESS FOR OUTPATIENT REHABILITATION    PATIENTS NAME:  Brooke John   : 1965  PROVIDER NUMBER:    [id number]  HICN:    [id number]  PROVIDER NAME: [email or web address] [place]  MEDICAL RECORD NUMBER: [id number]   START OF CARE DATE:  SOC Date: 10/12/18   TYPE:  PT  PRIMARY/TREATMENT DIAGNOSIS:  Cramp of toe  Hammer toe of right foot, Pain of toe of right foot  VISITS FROM START OF CARE:  Rxs Used: 1     [facility] Initial Evaluation  Subjective:  Patient is a 53 year old female presenting with rehab right ankle/foot hpi. The history is provided by the patient. No  was used.   Brooke John is a 53 year old female with a right ankle and right foot condition.  Condition occurred with:  Insidious onset (R foot, 5th MTP pain and misalingment noted over the past few months.  Pt uses cane for walking due to some LE weakness and spine scoliosis.).  Condition occurred: for unknown reasons.  This is a chronic condition  MD order Oct 2018  Patient reports pain:  Toes.     and is intermittent and reported as 3/10 and 5/10.  Associated symptoms:  Loss of motion/stiffness. Pain is worse during the night and worse during the day.  Symptoms are exacerbated by activity and relieved by rest.  Since onset symptoms are unchanged.        General health as reported by patient is good.  Pertinent medical history includes:  None.  Medical allergies: yes.  Other surgeries include:  Other.  Medication history: see epic.  Current occupation is Not working  Barriers include:  None as reported by patient.  Red flags:  None as reported by patient.    Objective:  Standing Alignment:    Ankle/Foot:  Hallux valgus L and hallux valgus R (R 5th MTP medial toe drift - underneath 4th MTP)  Gait:    Gait Type:  Antalgic   Weight Bearing Status:  WBAT   Assistive Devices:   Cane  Ankle/Foot Evaluation  ROM:  AROM is normal.PROM is normal (R 5th MTP AROM limited and PROM min limits in all planes).  Strength is not assessed.   PALPATION:    Right ankle tenderness not present at:  plantar fascia; medial calcaneal; anterior tibiofibular ligament or posterior tibiofibular ligament    Assessment/Plan:    Patient is a 53 year old female with right side ankle complaints.    Patient has the following significant findings with corresponding treatment plan.                Diagnosis 1:  R foot/toe pain  Pain -  hot/cold therapy, self management, education and home program  PATIENTS NAME:  Brooke John   : 1965    Decreased ROM/flexibility - manual therapy and therapeutic exercise  Decreased strength - therapeutic exercise and therapeutic activities  Impaired balance - neuro re-education and therapeutic activities  Decreased proprioception - neuro re-education and therapeutic activities  Impaired gait - gait training  Therapy Evaluation Codes:   1) History comprised of:   Personal factors that impact the plan of care:      None.    Comorbidity factors that impact the plan of care are:      None.     Medications impacting care: None.  2) Examination of Body Systems comprised of:   Body structures and functions that impact the plan of care:      Toes.   Activity limitations that impact the plan of care are:      Stairs, Standing and Walking.  3) Clinical presentation characteristics are:   Stable/Uncomplicated.  4) Decision-Making    Low complexity using standardized patient assessment instrument and/or measureable assessment of functional outcome.  Cumulative Therapy Evaluation is: Low complexity.  Previous and current functional limitations:  (See Goal Flow Sheet for this information)    Short term and Long term goals: (See Goal Flow Sheet for this information)     Communication ability:  Patient appears to be able to clearly communicate and understand verbal and written communication  "and follow directions correctly.  Treatment Explanation - The following has been discussed with the patient:   RX ordered/plan of care  Anticipated outcomes  Possible risks and side effects  This patient would benefit from PT intervention to resume normal activities.   Rehab potential is good.    Frequency:  2 X a month, once daily  Duration:  for 2 months  Discharge Plan:  Achieve all LTG.  Independent in home treatment program.  Reach maximal therapeutic benefit.                      PATIENTS NAME:  Brooke John   : 1965      Caregiver Signature/Credentials _____________________________ Date ________       Treating Provider: Jackson Woodall PT     I have reviewed and certified the need for these services and plan of treatment while under my care.        PHYSICIAN'S SIGNATURE:   _____________________________________  Date___________                          Alondra Michele PAC    Certification period:  Beginning of Cert date period: 10/12/18 to  End of Cert period date: 18     Functional Level Progress Report: Please see attached \"Goal Flow sheet for Functional level.\"    ____X____ Continue Services or       ________ DC Services                Service dates: From  SOC Date: 10/12/18 date to present                         "

## 2018-10-12 NOTE — PROGRESS NOTES
[facility] Initial Evaluation  Subjective:  Patient is a 53 year old female presenting with rehab right ankle/foot hpi. The history is provided by the patient. No  was used.   Brooke John is a 53 year old female with a right ankle and right foot condition.  Condition occurred with:  Insidious onset (R foot, 5th MTP pain and misalingment noted over the past few months.  Pt uses cane for walking due to some LE weakness and spine scoliosis.).  Condition occurred: for unknown reasons.  This is a chronic condition  MD order Oct 2018  .    Patient reports pain:  Toes.     and is intermittent and reported as 3/10 and 5/10.  Associated symptoms:  Loss of motion/stiffness. Pain is worse during the night and worse during the day.  Symptoms are exacerbated by activity and relieved by rest.  Since onset symptoms are unchanged.        General health as reported by patient is good.  Pertinent medical history includes:  None.  Medical allergies: yes.  Other surgeries include:  Other.  Medication history: see epic.  Current occupation is Not working    .        Barriers include:  None as reported by patient.    Red flags:  None as reported by patient.                        Objective:  Standing Alignment:                Ankle/Foot:  Hallux valgus L and hallux valgus R (R 5th MTP medial toe drift - underneath 4th MTP)    Gait:    Gait Type:  Antalgic   Weight Bearing Status:  WBAT   Assistive Devices:  Cane            Ankle/Foot Evaluation  ROM:  AROM is normal.PROM is normal (R 5th MTP AROM limited and PROM min limits in all planes).      Strength is not assessed.      PALPATION:       Right ankle tenderness not present at:  plantar fascia; medial calcaneal; anterior tibiofibular ligament or posterior tibiofibular ligament                                                        General     ROS    Assessment/Plan:    Patient is a 53 year old female with right side ankle complaints.     Patient has the following significant findings with corresponding treatment plan.                Diagnosis 1:  R foot/toe pain  Pain -  hot/cold therapy, self management, education and home program  Decreased ROM/flexibility - manual therapy and therapeutic exercise  Decreased strength - therapeutic exercise and therapeutic activities  Impaired balance - neuro re-education and therapeutic activities  Decreased proprioception - neuro re-education and therapeutic activities  Impaired gait - gait training    Therapy Evaluation Codes:   1) History comprised of:   Personal factors that impact the plan of care:      None.    Comorbidity factors that impact the plan of care are:      None.     Medications impacting care: None.  2) Examination of Body Systems comprised of:   Body structures and functions that impact the plan of care:      Toes.   Activity limitations that impact the plan of care are:      Stairs, Standing and Walking.  3) Clinical presentation characteristics are:   Stable/Uncomplicated.  4) Decision-Making    Low complexity using standardized patient assessment instrument and/or measureable assessment of functional outcome.  Cumulative Therapy Evaluation is: Low complexity.    Previous and current functional limitations:  (See Goal Flow Sheet for this information)    Short term and Long term goals: (See Goal Flow Sheet for this information)     Communication ability:  Patient appears to be able to clearly communicate and understand verbal and written communication and follow directions correctly.  Treatment Explanation - The following has been discussed with the patient:   RX ordered/plan of care  Anticipated outcomes  Possible risks and side effects  This patient would benefit from PT intervention to resume normal activities.   Rehab potential is good.    Frequency:  2 X a month, once daily  Duration:  for 2 months  Discharge Plan:  Achieve all LTG.  Independent in home treatment program.  Reach maximal  therapeutic benefit.    Please refer to the daily flowsheet for treatment today, total treatment time and time spent performing 1:1 timed codes.

## 2018-10-13 NOTE — PROGRESS NOTES
Note to staff: Please send a result letter    The results from your recent lab work are within normal limits.  The CK lab for your muscles appears to be good.        Thank you for choosing [facility] for your health care needs,      Alondra Michele PA-C

## 2018-10-16 ENCOUNTER — TELEPHONE (OUTPATIENT)
Dept: FAMILY MEDICINE | Facility: CLINIC | Age: 53
End: 2018-10-16

## 2018-10-16 NOTE — TELEPHONE ENCOUNTER
Date Forms was received: October 16, 2018    Forms received by: Fax    Last office visit: 10/10/2018    Purpose of Form:  PT/OT Orders Plan/update of progress for outpatient Rehibilitation    When the form is due:  asap    How the form needs to be returned for patient:  Fax - [phone]    Form currently placed  - Alondra's desk - Bin on  side - [place]

## 2018-10-17 NOTE — TELEPHONE ENCOUNTER
Completed forms faxed back to [facility] at [phone].   Originals sent to be scanned.     Claudia Parker

## 2018-10-25 ENCOUNTER — THERAPY VISIT (OUTPATIENT)
Dept: PHYSICAL THERAPY | Facility: CLINIC | Age: 53
End: 2018-10-25
Payer: MEDICARE

## 2018-10-25 DIAGNOSIS — R25.2 CRAMP OF TOE: ICD-10-CM

## 2018-10-25 DIAGNOSIS — M79.674 PAIN OF TOE OF RIGHT FOOT: ICD-10-CM

## 2018-10-25 DIAGNOSIS — M20.41 HAMMER TOE OF RIGHT FOOT: ICD-10-CM

## 2018-10-25 PROCEDURE — 97110 THERAPEUTIC EXERCISES: CPT | Mod: GP | Performed by: PHYSICAL THERAPIST

## 2018-10-25 PROCEDURE — G8979 MOBILITY GOAL STATUS: HCPCS | Mod: GP | Performed by: PHYSICAL THERAPIST

## 2018-10-25 PROCEDURE — G8980 MOBILITY D/C STATUS: HCPCS | Mod: GP | Performed by: PHYSICAL THERAPIST

## 2018-10-25 NOTE — PROGRESS NOTES
Subjective:  HPI                    Objective:  System    Physical Exam    General     ROS    Assessment/Plan:    DISCHARGE REPORT    Progress reporting period is from initial to 10/25.       SUBJECTIVE  Subjective changes noted by patient:  Brooke is now using toe socks and little toe lashonda to help splint 5th MTP and maintain better 5th MTP alignment.  Walking less pain and staying active at [facility] 2-3x/week.  Changes in function:  Yes (See Goal flowsheet attached for changes in current functional level)  Adverse reaction to treatment or activity: None    OBJECTIVE  Changes noted in objective findings:  Yes, Objective: Gait improved, no limp with shoes on.  Posture/alignment improved with toe lashonda spacer.    ASSESSMENT/PLAN  Updated problem list and treatment plan: Diagnosis 1:  5th MTP pain, alignment  Pain -  hot/cold therapy and splint/taping/bracing/orthotics  Decreased ROM/flexibility - manual therapy and therapeutic exercise  Decreased strength - therapeutic exercise and therapeutic activities  STG/LTGs have been met or progress has been made towards goals:  Yes (See Goal flow sheet completed today.)  Assessment of Progress: The patient's condition is improving.  Self Management Plans:  Patient has been instructed in a home treatment program.  Patient is independent in a home treatment program.  Patient  has been instructed in self management of symptoms.  Patient is independent in self management of symptoms.  I have re-evaluated this patient and find that the nature, scope, duration and intensity of the therapy is appropriate for the medical condition of the patient.  Brooke continues to require the following intervention to meet STG and LTG's:  PT intervention is no longer required to meet STG/LTG.    Recommendations:  This patient is ready to be discharged from therapy and continue their home treatment program.    Please refer to the daily flowsheet for treatment today, total treatment time and time spent  performing 1:1 timed codes.

## 2018-10-25 NOTE — MR AVS SNAPSHOT
"              After Visit Summary   10/25/2018    Brooke John    MRN: [id number]           Patient Information     Date Of Birth          1965        Visit Information        Provider Department      10/25/2018 9:10 AM Master Woodall PT [facility]        Today's Diagnoses     Pain of toe of right foot        Cramp of toe        Hammer toe of right foot           Follow-ups after your visit        Your next 10 appointments already scheduled     Dec 03, 2018  1:00 PM CST   Return Sleep Patient with Doris Rodrigez MD   [facility])    [address] [place] [id number] [phone]              Who to contact     If you have questions or need follow up information about today's clinic visit or your schedule please contact [facility] directly at [phone].  Normal or non-critical lab and imaging results will be communicated to you by [employer], letter or phone within 4 business days after the clinic has received the results. If you do not hear from us within 7 days, please contact the clinic through [employer] or phone. If you have a critical or abnormal lab result, we will notify you by phone as soon as possible.  Submit refill requests through [employer] or call your pharmacy and they will forward the refill request to us. Please allow 3 business days for your refill to be completed.          Additional Information About Your Visit        [employer] Voicemail Information     [employer] lets you send messages to your doctor, view your test results, renew your prescriptions, schedule appointments and more. To sign up, go to [email or web address] . Click on \"Log in\" on the left side of the screen, which will take you to the Welcome page. Then click on \"Sign up Now\" on the right side of the page.     You will be asked to enter the access code listed below, " as well as some personal information. Please follow the directions to create your username and password.     Your access code is: [email or web address]  Expires: 2019  9:33 AM     Your access code will  in 90 days. If you need help or a new code, please call your [place] clinic or [phone].        Care EveryWhere ID     This is your Care EveryWhere ID. This could be used by other organizations to access your [place] medical records  [id number]         Blood Pressure from Last 3 Encounters:   10/10/18 130/83   18 149/74   18 (!) 156/96    Weight from Last 3 Encounters:   10/10/18 94.3 kg (208 lb)   18 93.4 kg (206 lb)   18 93.9 kg (207 lb)              We Performed the Following     PIO PROGRESS NOTES REPORT     THERAPEUTIC EXERCISES        Primary Care Provider Office Phone # Fax #    Mandy Bhatia [phone]       [address] [place] [address]        Equal Access to Services     [facility]: Hadii aad ku hadasho Soomaali, waaxda luqadaha, qaybta kaalmada adecorey, antony smith . So [facility] [place] [phone].    ATENCIÓN: Si habla español, tiene a elizabeth disposición servicios gratuitos de asistencia lingüística. [facility] [phone].    We comply with applicable federal civil rights laws and [place] laws. We do not discriminate on the basis of race, color, national origin, age, disability, sex, sexual orientation, or gender identity.            Thank you!     Thank you for choosing [facility]  for your care. Our goal is always to provide you with excellent care. Hearing back from our patients is one way we can continue to improve our services. Please take a few minutes to complete the written survey that you may receive in the mail after your visit with us. Thank you!             Your Updated Medication List - Protect others around you: Learn how to safely use, store and throw away your medicines  at www.disposemymeds.org.          This list is accurate as of 10/25/18  9:52 AM.  Always use your most recent med list.                   Brand Name Dispense Instructions for use Diagnosis    albuterol 108 (90 Base) MCG/ACT inhaler    PROAIR HFA/PROVENTIL HFA/VENTOLIN HFA    1 Inhaler    Inhale 2 puffs into the lungs every 4 hours as needed for shortness of breath / dyspnea or wheezing Cough    Shortness of breath       cetirizine 10 MG tablet    zyrTEC    30 tablet    TAKE ONE TABLET BY MOUTH EVERY MORNING    Cough       fluticasone 50 MCG/ACT spray    FLONASE    16 g    USE TWO SPRAYS IN EACH NOSTRIL EVERY DAY    Moderate persistent asthma without complication       fluticasone-salmeterol 250-50 MCG/DOSE diskus inhaler    ADVAIR    1 Inhaler    Inhale 1 puff into the lungs 2 times daily    Moderate persistent asthma without complication       levothyroxine 75 MCG tablet    SYNTHROID/LEVOTHROID    90 tablet    Take 1 tablet (75 mcg) by mouth daily    Hypothyroidism, unspecified type       LORazepam 0.5 MG tablet    ATIVAN    10 tablet    Take 1 tablet (0.5 mg) by mouth every 6 hours as needed for anxiety        MELATONIN PO           metoclopramide 10 MG tablet    REGLAN    20 tablet    Take 1 tablet (10 mg) by mouth once as needed For nausea/stomach upset    Gastroesophageal reflux disease without esophagitis       Multi-vitamin Tabs tablet      Take 1 tablet by mouth daily        omeprazole 40 MG capsule    priLOSEC    90 capsule    TAKE ONE CAPSULE BY MOUTH EVERY DAY 30-60 MINUTES BEFORE A MEAL    Gastroesophageal reflux disease without esophagitis       order for DME     80 Units    Incontinence items, disposable underwear, absorbency pads, liners.    Risk for falls, Balance problems, Mixed incontinence       order for DME     1 Device    Walking cane for balance and mobility    Risk for falls, Balance problems       polyethylene glycol powder    MIRALAX    510 g    Take 17 g (1 capful) by mouth daily as  needed for constipation    Constipation, unspecified constipation type       SUMAtriptan 25 MG tablet    IMITREX    8 tablet    Take 25-100mg one time. May repeat 25mg every two hours up to a maximum of 200mg in 24 hours.    Menstrual migraine without status migrainosus, not intractable

## 2018-10-30 DIAGNOSIS — R05.9 COUGH: ICD-10-CM

## 2018-10-31 RX ORDER — CETIRIZINE HYDROCHLORIDE 10 MG/1
TABLET ORAL
Qty: 30 TABLET | Refills: 3 | Status: SHIPPED | OUTPATIENT
Start: 2018-10-31 | End: 2019-02-28

## 2018-10-31 NOTE — TELEPHONE ENCOUNTER
Prescription approved per [facility] Refill Protocol.  Debbie Diaz RN  [facility]

## 2018-10-31 NOTE — TELEPHONE ENCOUNTER
"Requested Prescriptions   Pending Prescriptions Disp Refills     cetirizine (ZYRTEC) 10 MG tablet [Pharmacy Med Name: CETIRIZINE HCL 10MG TABS] 30 tablet 3        Last Written Prescription Date:  7.2.18  Last Fill Quantity: 30,  # refills: 3   Last Office Visit: 10/10/2018   Future Office Visit:      Sig: TAKE ONE TABLET BY MOUTH EVERY MORNING    Antihistamines Protocol Passed    10/30/2018  5:32 PM       Passed - Patient is 3-64 years of age    Apply weight-based dosing for peds patients age 3 - 12 years of age.    Forward request to provider for patients under the age of 3 or over the age of 64.         Passed - Recent (12 mo) or future (30 days) visit within the authorizing provider's specialty    Patient had office visit in the last 12 months or has a visit in the next 30 days with authorizing provider or within the authorizing provider's specialty.  See \"Patient Info\" tab in inbasket, or \"Choose Columns\" in Meds & Orders section of the refill encounter.                "

## 2018-12-03 ENCOUNTER — OFFICE VISIT (OUTPATIENT)
Dept: SLEEP MEDICINE | Facility: CLINIC | Age: 53
End: 2018-12-03
Payer: MEDICARE

## 2018-12-03 VITALS
RESPIRATION RATE: 16 BRPM | BODY MASS INDEX: 39.65 KG/M2 | WEIGHT: 210 LBS | HEART RATE: 79 BPM | OXYGEN SATURATION: 96 % | HEIGHT: 61 IN | SYSTOLIC BLOOD PRESSURE: 128 MMHG | DIASTOLIC BLOOD PRESSURE: 76 MMHG

## 2018-12-03 DIAGNOSIS — G47.33 OSA ON CPAP: Primary | ICD-10-CM

## 2018-12-03 PROCEDURE — 99214 OFFICE O/P EST MOD 30 MIN: CPT | Performed by: INTERNAL MEDICINE

## 2018-12-03 NOTE — PATIENT INSTRUCTIONS

## 2018-12-03 NOTE — MR AVS SNAPSHOT
After Visit Summary   12/3/2018    Brooke John    MRN: [id number]           Patient Information     Date Of Birth          1965        Visit Information        Provider Department      12/3/2018 1:00 PM Doris Rodrigez MD [facility]        Today's Diagnoses     HEVER on CPAP    -  1      Care Instructions      Your BMI is Body mass index is 39.7 kg/(m^2).  Weight management is a personal decision.  If you are interested in exploring weight loss strategies, the following discussion covers the approaches that may be successful. Body mass index (BMI) is one way to tell whether you are at a healthy weight, overweight, or obese. It measures your weight in relation to your height.  A BMI of 18.5 to 24.9 is in the healthy range. A person with a BMI of 25 to 29.9 is considered overweight, and someone with a BMI of 30 or greater is considered obese. More than two-thirds of American adults are considered overweight or obese.  Being overweight or obese increases the risk for further weight gain. Excess weight may lead to heart disease and diabetes.  Creating and following plans for healthy eating and physical activity may help you improve your health.  Weight control is part of healthy lifestyle and includes exercise, emotional health, and healthy eating habits. Careful eating habits lifelong are the mainstay of weight control. Though there are significant health benefits from weight loss, long-term weight loss with diet alone may be very difficult to achieve- studies show long-term success with dietary management in less than 10% of people. Attaining a healthy weight may be especially difficult to achieve in those with severe obesity. In some cases, medications, devices and surgical management might be considered.  What can you do?  If you are overweight or obese and are interested in methods for weight loss, you should discuss this with your provider.      Consider reducing daily calorie intake by 500 calories.     Keep a food journal.     Avoiding skipping meals, consider cutting portions instead.    Diet combined with exercise helps maintain muscle while optimizing fat loss. Strength training is particularly important for building and maintaining muscle mass. Exercise helps reduce stress, increase energy, and improves fitness. Increasing exercise without diet control, however, may not burn enough calories to loose weight.       Start walking three days a week 10-20 minutes at a time    Work towards walking thirty minutes five days a week     Eventually, increase the speed of your walking for 1-2 minutes at time    In addition, we recommend that you review healthy lifestyles and methods for weight loss available through the National Institutes of Health patient information sites:  http://win.niddk.nih.gov/publications/index.htm    And look into health and wellness programs that may be available through your health insurance provider, employer, local community center, or loren club.    Weight management plan: Patient was referred to their PCP to discuss a diet and exercise plan.              Follow-ups after your visit        Follow-up notes from your care team     Return in about 1 year (around 12/3/2019).      Your next 10 appointments already scheduled     Dec 03, 2018  1:00 PM CST   Return Sleep Patient with Doris Rodrigez MD   [facility])    [address] [place] [id number] [phone]            Dec 03, 2019  9:20 AM CST   Return Sleep Patient with Doris Rodrigez MD   [facility])    [address] [place] [id number] [phone]              Who to contact     If you have questions or need follow up information about today's  "clinic visit or your schedule please contact [facility] directly at [phone].  Normal or non-critical lab and imaging results will be communicated to you by MyChart, letter or phone within 4 business days after the clinic has received the results. If you do not hear from us within 7 days, please contact the clinic through MyChart or phone. If you have a critical or abnormal lab result, we will notify you by phone as soon as possible.  Submit refill requests through [employer] or call your pharmacy and they will forward the refill request to us. Please allow 3 business days for your refill to be completed.          Additional Information About Your Visit        Care EveryWhere ID     This is your Care EveryWhere ID. This could be used by other organizations to access your [place] medical records  [id number]        Your Vitals Were     Pulse Respirations Height Pulse Oximetry BMI (Body Mass Index)       79 16 1.549 m (5' 0.98\") 96% 39.7 kg/m2        Blood Pressure from Last 3 Encounters:   12/03/18 128/76   10/10/18 130/83   08/22/18 149/74    Weight from Last 3 Encounters:   12/03/18 95.3 kg (210 lb)   10/10/18 94.3 kg (208 lb)   07/23/18 93.4 kg (206 lb)              We Performed the Following     Comprehensive DME        Primary Care Provider Office Phone # Fax #    Mandy Bhatia [phone]       [address] [place] [id number]        Equal Access to Services     [facility] AH: Hadii elodia costelloo Soleanne, waaxda luqadaha, qaybta kaalmada jessica, wax candelario fernandez. So [facility] [phone].    ATENCIÓN: Si habla español, tiene a elizabeth disposición servicios gratuitos de asistencia lingüística. Llame al [phone].    We comply with applicable federal civil rights laws and [place] laws. We do not discriminate on the basis of race, color, national origin, age, disability, sex, sexual orientation, or gender identity.          "   Thank you!     Thank you for choosing [facility]  for your care. Our goal is always to provide you with excellent care. Hearing back from our patients is one way we can continue to improve our services. Please take a few minutes to complete the written survey that you may receive in the mail after your visit with us. Thank you!             Your Updated Medication List - Protect others around you: Learn how to safely use, store and throw away your medicines at www.disposemymeds.org.          This list is accurate as of 12/3/18 12:07 PM.  Always use your most recent med list.                   Brand Name Dispense Instructions for use Diagnosis    albuterol 108 (90 Base) MCG/ACT inhaler    PROAIR HFA/PROVENTIL HFA/VENTOLIN HFA    1 Inhaler    Inhale 2 puffs into the lungs every 4 hours as needed for shortness of breath / dyspnea or wheezing Cough    Shortness of breath       cetirizine 10 MG tablet    zyrTEC    30 tablet    TAKE ONE TABLET BY MOUTH EVERY MORNING    Cough       fluticasone 50 MCG/ACT nasal spray    FLONASE    16 g    USE TWO SPRAYS IN EACH NOSTRIL EVERY DAY    Moderate persistent asthma without complication       fluticasone-salmeterol 250-50 MCG/DOSE inhaler    ADVAIR    1 Inhaler    Inhale 1 puff into the lungs 2 times daily    Moderate persistent asthma without complication       levothyroxine 75 MCG tablet    SYNTHROID/LEVOTHROID    90 tablet    Take 1 tablet (75 mcg) by mouth daily    Hypothyroidism, unspecified type       LORazepam 0.5 MG tablet    ATIVAN    10 tablet    Take 1 tablet (0.5 mg) by mouth every 6 hours as needed for anxiety        MELATONIN PO           metoclopramide 10 MG tablet    REGLAN    20 tablet    Take 1 tablet (10 mg) by mouth once as needed For nausea/stomach upset    Gastroesophageal reflux disease without esophagitis       Multi-vitamin tablet      Take 1 tablet by mouth daily        omeprazole 40 MG DR capsule    priLOSEC    90 capsule    TAKE  ONE CAPSULE BY MOUTH EVERY DAY 30-60 MINUTES BEFORE A MEAL    Gastroesophageal reflux disease without esophagitis       order for DME     80 Units    Incontinence items, disposable underwear, absorbency pads, liners.    Risk for falls, Balance problems, Mixed incontinence       order for DME     1 Device    Walking cane for balance and mobility    Risk for falls, Balance problems       polyethylene glycol powder    MIRALAX    510 g    Take 17 g (1 capful) by mouth daily as needed for constipation    Constipation, unspecified constipation type       SUMAtriptan 25 MG tablet    IMITREX    8 tablet    Take 25-100mg one time. May repeat 25mg every two hours up to a maximum of 200mg in 24 hours.    Menstrual migraine without status migrainosus, not intractable

## 2018-12-03 NOTE — NURSING NOTE
Cpap pressure changed from original settings of 7 Min - 15 Max to new settings of 8 Min - 16 Max.  TIMOTHY Gardner

## 2018-12-03 NOTE — NURSING NOTE
"    Chief Complaint   Patient presents with     CPAP Follow Up       Initial /76  Pulse 79  Resp 16  Ht 1.549 m (5' 0.98\")  Wt 95.3 kg (210 lb)  SpO2 96%  BMI 39.7 kg/m2 Estimated body mass index is 39.7 kg/(m^2) as calculated from the following:    Height as of this encounter: 1.549 m (5' 0.98\").    Weight as of this encounter: 95.3 kg (210 lb).    Medication Reconciliation: complete    Neck circumference:  inches /  centimeters.    DME: [facility]       "

## 2018-12-04 NOTE — PROGRESS NOTES
DICTATED THE SLEEP CLINIC VISIT NOTE FOR TODAY.  Doris Rodrigez MD   of Medicine,  Division of Pulmonary/Sleep Medicine  [facility].

## 2018-12-04 NOTE — PROGRESS NOTES
"SLEEP CLINIC FOLLOW UP VISIT:     Visit Date:   12/03/2018      CHIEF COMPLAINT:  Follow-up of HEVER.      HISTORY OF PRESENT ILLNESS:  Ms. Brooke John is a 53-year-old female who presents to sleep clinic  today accompanied by her ILS worker for follow-up of HEVER.  She is being treated with auto CPAP pressure setting 7-15 cm of water.  She uses a full facemask.  She reports using the device regularly during sleep.  She sleeps alone and there is not anyone to report whether or not she snores with the CPAP.  She denies awakening due to gasping for air or choking.  She does report air hunger when she first puts the mask on.      In the first 2 weeks of November, she was not getting much sleep because she was getting her apartment ready for inspection, but now that has been completed and her sleep has improved.      The downloadable compliance data for the past 1 month shows that she has used the device for 30/30 days with an average   use of 7 hours and 23 minutes on the days used.  Median pressure 8.9, percentile 13.6 and maximum pressure of 14.6 cm of water.  95th  percentile air leak was 21.4 liters per minute.  Residual 1.5 per hour.      She does not drive.      According to the ILS worker who has visited her at different times during the day, mentions that she has always found Brooke  Energetic, active  and alert and never saw her dosing off or for being sleepy.       Current meds, Past medical history, Past surgical history, Allergies, Social history, Family history: reviewed, per EMR    Exam:  /76  Pulse 79  Resp 16  Ht 1.549 m (5' 0.98\")  Wt 95.3 kg (210 lb)  SpO2 96%  BMI 39.7 kg/m2  General appearance:  in no apparent distress  Pt is dressed casually, cooperative with good eye contact.   Speech is spontaneous with regular rate and volume.   Mood: euthymic; affect congruent with full range and intensity.   Sensorium: awake, alert and oriented to person, place, time, and situation.      ASSESSMENT " "AND PLAN:   1.  Obstructive sleep apnea.  The patient reports adequate compliance with the CPAP treatment and based on the compliance DL, HEVER  is adequately controlled with auto CPAP at the current pressure settings.  However, since she reports hunger and also based on the compliance download, I recommended increasing the pressure settings on her Auto CPAP:  minimum pressure equal to 8 and the maximum pressure equal to 16 cm of water.  I have  also provided her with  prescription for renewal of all the supplies.  She was recommended to use the device regularly during sleep and instructed to get the supplies for the device replaced regularly.    She was encouraged to eat healthy and exercise regularly.      She will follow-up in the Sleep Clinic in 1 year or sooner if any concerns.      The above note was dictated using voice recognition software. Although reviewed after completion, some word and grammatical error may remain . Please contact the author for any clarifications.    \"I spent a total of 25  minutes face to face with Darci John during today's office visit. Over 50% of this time was spent counseling the patient and  coordinating care regarding sleep apnea, CPAP treatment, weight management.\"       WOODY DONALDSON MD             D: 2018   T: 2018   MT: SHANTELLE      Name:     CRESENCIO DARCI   MRN:      [id number]        Account:      [id number]   :      1965           Visit Date:   2018      Document: [id number]      "

## 2018-12-20 ENCOUNTER — TELEPHONE (OUTPATIENT)
Dept: FAMILY MEDICINE | Facility: CLINIC | Age: 53
End: 2018-12-20

## 2018-12-20 NOTE — TELEPHONE ENCOUNTER
Reason for Call:  Form, our goal is to have forms completed with 72 hours, however, some forms may require a visit or additional information.    Type of letter, form or note:  Special Diet Information Request    Who is the form from?: [facility] and [facility] (if other please explain)    Where did the form come from: Patient or family brought in       What clinic location was the form placed at?: [facility]    Where the form was placed: Given to physician    What number is listed as a contact on the form?: [phone] - Brooke       Additional comments:     Call taken on 12/20/2018 at 4:42 PM by Thalia Sánchez

## 2019-01-09 DIAGNOSIS — E03.9 HYPOTHYROIDISM, UNSPECIFIED TYPE: ICD-10-CM

## 2019-01-09 RX ORDER — LEVOTHYROXINE SODIUM 75 UG/1
75 TABLET ORAL DAILY
Qty: 90 TABLET | Refills: 1 | Status: SHIPPED | OUTPATIENT
Start: 2019-01-09 | End: 2019-07-08

## 2019-01-09 NOTE — TELEPHONE ENCOUNTER
"Requested Prescriptions   Pending Prescriptions Disp Refills     levothyroxine (SYNTHROID/LEVOTHROID) 75 MCG tablet 90 tablet 1        Last Written Prescription Date:  4.16.18  Last Fill Quantity: 90,  # refills: 1   Last Office Visit: 10/10/2018   Future Office Visit:      Sig: Take 1 tablet (75 mcg) by mouth daily    Thyroid Protocol Passed - 1/9/2019 12:00 PM       Passed - Patient is 12 years or older       Passed - Recent (12 mo) or future (30 days) visit within the authorizing provider's specialty    Patient had office visit in the last 12 months or has a visit in the next 30 days with authorizing provider or within the authorizing provider's specialty.  See \"Patient Info\" tab in inbasket, or \"Choose Columns\" in Meds & Orders section of the refill encounter.             Passed - Medication is active on med list       Passed - Normal TSH on file in past 12 months    Recent Labs   Lab Test 06/11/18  0936   TSH 2.34             Passed - No active pregnancy on record    If patient is pregnant or has had a positive pregnancy test, please check TSH.         Passed - No positive pregnancy test in past 12 months    If patient is pregnant or has had a positive pregnancy test, please check TSH.            "

## 2019-01-09 NOTE — TELEPHONE ENCOUNTER
Reason for Call:  Medication or medication refill:    Do you use a [facility]?  Name of the pharmacy and phone number for the current request:  [facility] - [phone]    Name of the medication requested: levothyroxine (SYNTHROID/LEVOTHROID) 75 MCG tablet    Other request: To be filled today if possible. Pt is out.    Can we leave a detailed message on this number? YES    Phone number patient can be reached at: Home number on file [phone] (home)    Best Time: any      Call taken on 1/9/2019 at 11:59 AM by Elsa Elizabeth

## 2019-02-28 DIAGNOSIS — R05.9 COUGH: ICD-10-CM

## 2019-02-28 RX ORDER — CETIRIZINE HYDROCHLORIDE 10 MG/1
TABLET ORAL
Qty: 30 TABLET | Refills: 3 | Status: SHIPPED | OUTPATIENT
Start: 2019-02-28 | End: 2019-06-27

## 2019-02-28 NOTE — TELEPHONE ENCOUNTER
"Requested Prescriptions   Pending Prescriptions Disp Refills     cetirizine (ZYRTEC) 10 MG tablet [Pharmacy Med Name: CETIRIZINE HCL 10MG TABS] 30 tablet 3     Sig: TAKE ONE TABLET BY MOUTH EVERY MORNING    Last Refill:   cetirizine (ZYRTEC) 10 MG tablet 30 tablet 3 10/31/2018  No   Sig: TAKE ONE TABLET BY MOUTH EVERY MORNING     Antihistamines Protocol Passed - 2/28/2019  2:15 PM       Passed - Patient is 3-64 years of age    Apply weight-based dosing for peds patients age 3 - 12 years of age.    Forward request to provider for patients under the age of 3 or over the age of 64.         Passed - Recent (12 mo) or future (30 days) visit within the authorizing provider's specialty    Patient had office visit in the last 12 months or has a visit in the next 30 days with authorizing provider or within the authorizing provider's specialty.  See \"Patient Info\" tab in inbasket, or \"Choose Columns\" in Meds & Orders section of the refill encounter.      LOV: 10/10/2018         Passed - Medication is active on med list        Refilled per RN Protocol.     Brenna Pickering RN  [facility]    "

## 2019-04-15 ENCOUNTER — TELEPHONE (OUTPATIENT)
Dept: FAMILY MEDICINE | Facility: CLINIC | Age: 54
End: 2019-04-15

## 2019-04-15 ENCOUNTER — OFFICE VISIT (OUTPATIENT)
Dept: FAMILY MEDICINE | Facility: CLINIC | Age: 54
End: 2019-04-15
Payer: COMMERCIAL

## 2019-04-15 VITALS
SYSTOLIC BLOOD PRESSURE: 122 MMHG | BODY MASS INDEX: 39.65 KG/M2 | TEMPERATURE: 98.1 F | HEART RATE: 114 BPM | OXYGEN SATURATION: 96 % | DIASTOLIC BLOOD PRESSURE: 78 MMHG | WEIGHT: 210 LBS | HEIGHT: 61 IN

## 2019-04-15 DIAGNOSIS — L30.9 ECZEMA, UNSPECIFIED TYPE: Primary | ICD-10-CM

## 2019-04-15 PROCEDURE — 99213 OFFICE O/P EST LOW 20 MIN: CPT | Performed by: PHYSICIAN ASSISTANT

## 2019-04-15 RX ORDER — TRIAMCINOLONE ACETONIDE 1 MG/G
CREAM TOPICAL 2 TIMES DAILY
Qty: 30 G | Refills: 0 | Status: SHIPPED | OUTPATIENT
Start: 2019-04-15 | End: 2019-07-17

## 2019-04-15 ASSESSMENT — MIFFLIN-ST. JEOR: SCORE: 1484.61

## 2019-04-15 NOTE — PROGRESS NOTES
SUBJECTIVE:   Brooke John is a 53 year old female who presents to clinic today for the following health issues:    Patient is accompanied by her daughter.     Rash      Duration: 1-2 month     Description  Location: Right thumb   Itching: mild    Intensity:  moderate    Accompanying signs and symptoms: red rash     History (similar episodes/previous evaluation): Has been treated for it in the past.     Precipitating or alleviating factors:  New exposures:  None  Recent travel: no      Therapies tried and outcome: lotion gloves at night-slightly effective, Band-Aids     Patient presents to clinic with rash on right thumb that started 1-2 months ago. This rash is only present on right thumb currently. She has been applying Vaseline to thumb and uses band aid if it starts bleeding. She wears gloves at night when she remembers to do so. She is right handed, but does not write a lot. She has hx of eczema and had episodes back in high school that was caused by chemical exposure. She thinks her current eczema break out is also due to chemical exposure.     Back pain  Patient reports she has been having back pain.. She would like phone number for [facility] to schedule an epidural injection.     Problem list and histories reviewed & adjusted, as indicated.  Additional history: as documented    Patient Active Problem List   Diagnosis     Scoliosis- congenital - saw [facility] as a child - now [facility] - has signif lumbar radiculopathy - uses a cane      Arthritis     Hypothyroidism, unspecified type     History of herniated intervertebral disc     Pulmonary nodules     Moderate persistent asthma     Lung nodule     Allergic reaction to drug - amoxicillin - Hives - proven 8/27/2016      Morbid obesity (H)     Constipation, unspecified constipation type     Gastroesophageal reflux disease without esophagitis     Menstrual migraine without status migrainosus, not intractable     Use of cane as ambulatory aid      Lumbar radiculopathy- left - uses a cane - born with the scoliosis - sees [facility]      Primary osteoarthritis of both knees     Xanthelasma of eyelid, bilateral- medial upper lids - causing some medial ptosis and decreased visual fields      Past Surgical History:   Procedure Laterality Date     ARTHROSCOPY KNEE  2007    left      BACK SURGERY        SECTION       COLONOSCOPY N/A 2017    no polyps./ return in ten years     COSMETIC SURGERY       ESOPHAGOSCOPY, GASTROSCOPY, DUODENOSCOPY (EGD), COMBINED N/A 2017    Procedure: COMBINED ESOPHAGOSCOPY, GASTROSCOPY, DUODENOSCOPY (EGD);  EGD w MAC:DX:Esophageal dysphagia,Hiatal hernia/prep & pre-op info mailed;  Surgeon: Moshe Caro MD;  Location:  GI     EYE SURGERY       ORTHOPEDIC SURGERY      lumbar discectomy       Social History     Tobacco Use     Smoking status: Passive Smoke Exposure - Never Smoker     Smokeless tobacco: Never Used   Substance Use Topics     Alcohol use: No     Alcohol/week: 0.0 oz     Family History   Problem Relation Age of Onset     Scoliosis Son      Learning Disorder Son         lives in a protected place     Heart Disease Father         triple bypass     Unknown/Adopted Father      Unknown/Adopted Mother      Diabetes Type 2  Maternal Grandfather      Other - See Comments Brother         5 brothers, 1 sister.      Colon Cancer No family hx of          Current Outpatient Medications   Medication Sig Dispense Refill     albuterol (PROAIR HFA/PROVENTIL HFA/VENTOLIN HFA) 108 (90 Base) MCG/ACT Inhaler Inhale 2 puffs into the lungs every 4 hours as needed for shortness of breath / dyspnea or wheezing Cough 1 Inhaler 11     cetirizine (ZYRTEC) 10 MG tablet TAKE ONE TABLET BY MOUTH EVERY MORNING 30 tablet 3     fluticasone (FLONASE) 50 MCG/ACT spray USE TWO SPRAYS IN EACH NOSTRIL EVERY DAY 16 g 3     fluticasone-salmeterol (ADVAIR) 250-50 MCG/DOSE diskus inhaler Inhale 1 puff into the lungs 2 times daily  1 Inhaler 11     levothyroxine (SYNTHROID/LEVOTHROID) 75 MCG tablet Take 1 tablet (75 mcg) by mouth daily 90 tablet 1     LORazepam (ATIVAN) 0.5 MG tablet Take 1 tablet (0.5 mg) by mouth every 6 hours as needed for anxiety 10 tablet 0     MELATONIN PO        metoclopramide (REGLAN) 10 MG tablet Take 1 tablet (10 mg) by mouth once as needed For nausea/stomach upset 20 tablet 3     multivitamin, therapeutic with minerals (MULTI-VITAMIN) TABS Take 1 tablet by mouth daily       omeprazole (PRILOSEC) 40 MG capsule TAKE ONE CAPSULE BY MOUTH EVERY DAY 30-60 MINUTES BEFORE A MEAL 90 capsule 3     polyethylene glycol (MIRALAX) powder Take 17 g (1 capful) by mouth daily as needed for constipation 510 g 11     SUMAtriptan (IMITREX) 25 MG tablet Take 25-100mg one time. May repeat 25mg every two hours up to a maximum of 200mg in 24 hours. 8 tablet 3     triamcinolone (KENALOG) 0.1 % external cream Apply topically 2 times daily 30 g 0     order for DME Incontinence items, disposable underwear, absorbency pads, liners. 80 Units 11     order for DME Walking cane for balance and mobility 1 Device 0     Allergies   Allergen Reactions     Amoxicillin      Lip swelling      Aspirin      GI upset     Milk Protein Extract      Upset stomach and dry cough     Zithromax [Azithromycin]      RASH       Reviewed and updated as needed this visit by clinical staff  Tobacco  Allergies  Meds  Problems  Med Hx  Surg Hx  Fam Hx  Soc Hx        Reviewed and updated as needed this visit by Provider  Tobacco  Allergies  Meds  Problems  Med Hx  Surg Hx  Fam Hx         ROS:  Constitutional, HEENT, cardiovascular, pulmonary, GI, , musculoskeletal, neuro, skin, endocrine and psych systems are negative, except as otherwise noted.    This document serves as a record of the services and decisions personally performed and made by Salome Hollis PA-C. It was created on her behalf by Sandi Parker, a trained medical scribe. The creation of this document  "is based on the provider's statements to the medical scribe.  Sandi Parker 12:18 AM April 15, 2019  OBJECTIVE:   /78 (BP Location: Left arm, Cuff Size: Adult Regular)   Pulse 114   Temp 98.1  F (36.7  C) (Oral)   Ht 1.549 m (5' 0.98\")   Wt 95.3 kg (210 lb)   SpO2 96%   BMI 39.71 kg/m   Body mass index is 39.71 kg/m .    GENERAL: healthy, alert and no distress  EYES: Eyes grossly normal to inspection, PERRL and conjunctivae and sclerae normal  MS: Thickened area of erythematous skin on radial aspect of left thumb, with full thickness disruption over PIP joint, no edema  SKIN: no suspicious lesions or rashes  NEURO: Normal strength and tone, mentation intact and speech normal  PSYCH: mentation appears normal, affect normal/bright    Diagnostic Test Results:  none   ASSESSMENT/PLAN:   Brooke was seen today for derm problem.    Diagnoses and all orders for this visit:    Eczema, unspecified type  Start Kenalog topically BID. Place band aid over area after applying Kenalog. Use thick layer of Aquaphor and wear gloves at night. Advised to wear gloves when dealing with chemicals. Let me know if symptoms are not improving.   -     triamcinolone (KENALOG) 0.1 % external cream; Apply topically 2 times daily    The information in this document, created by the medical scribe for me, accurately reflects the services I personally performed and the decisions made by me. I have reviewed and approved this document for accuracy prior to leaving the patient care area.  April 15, 2019 12:26 PM    Salome Hollis PA-C  [facility] [place]      "

## 2019-04-15 NOTE — TELEPHONE ENCOUNTER
Date Forms was received: April 15, 2019    Forms received by: Fax    Last office visit: 10/10/2018    Purpose of Form:  Order for Incontinence supplies    When the form is due:  ASAP    How the form needs to be returned for patient:  Fax to [phone]    Form currently placed  [facility]

## 2019-04-15 NOTE — LETTER
My Asthma Action Plan  Name: Brooke John   YOB: 1965  Date: 4/15/2019   My doctor: Salome Hollis PA-C   My clinic: [facility]   Good Control    I feel good    No cough or wheeze    Can work, sleep and play without asthma symptoms       Take your asthma control medicine every day.     1. If exercise triggers your asthma, take your rescue medication    15 minutes before exercise or sports, and    During exercise if you have asthma symptoms  2. Spacer to use with inhaler: If you have a spacer, make sure to use it with your inhaler             YELLOW ZONE Getting Worse  I have ANY of these:    I do not feel good    Cough or wheeze    Chest feels tight    Wake up at night   1. Keep taking your Green Zone medications  2. Start taking your rescue medicine:    every 20 minutes for up to 1 hour. Then every 4 hours for 24-48 hours.  3. If you stay in the Yellow Zone for more than 12-24 hours, contact your doctor.  4. If you do not return to the Green Zone in 12-24 hours or you get worse, start taking your oral steroid medicine if prescribed by your provider.           RED ZONE Medical Alert - Get Help  I have ANY of these:    I feel awful    Medicine is not helping    Breathing getting harder    Trouble walking or talking    Nose opens wide to breathe       1. Take your rescue medicine NOW  2. If your provider has prescribed an oral steroid medicine, start taking it NOW  3. Call your doctor NOW  4. If you are still in the Red Zone after 20 minutes and you have not reached your doctor:    Take your rescue medicine again and    Call 911 or go to the emergency room right away    See your regular doctor within 2 weeks of an Emergency Room or Urgent Care visit for follow-up treatment.          Annual Reminders:  Meet with Asthma Educator,  Flu Shot in the Fall, consider Pneumonia Vaccination for patients with asthma (aged 19 and older).    Pharmacy:  [facility] [place] - [place], [place] - [address] [place] [address]                      Asthma Triggers  How To Control Things That Make Your Asthma Worse    Triggers are things that make your asthma worse.  Look at the list below to help you find your triggers and what you can do about them.  You can help prevent asthma flare-ups by staying away from your triggers.      Trigger                                                          What you can do   Cigarette Smoke  Tobacco smoke can make asthma worse. Do not allow smoking in your home, car or around you.  Be sure no one smokes at a child s day care or school.  If you smoke, ask your health care provider for ways to help you quit.  Ask family members to quit too.  Ask your health care provider for a referral to Quit Plan to help you quit smoking, or call 6-274-482-PLAN.     Colds, Flu, Bronchitis  These are common triggers of asthma. Wash your hands often.  Don t touch your eyes, nose or mouth.  Get a flu shot every year.     Dust Mites  These are tiny bugs that live in cloth or carpet. They are too small to see. Wash sheets and blankets in hot water every week.   Encase pillows and mattress in dust mite proof covers.  Avoid having carpet if you can. If you have carpet, vacuum weekly.   Use a dust mask and HEPA vacuum.   Pollen and Outdoor Mold  Some people are allergic to trees, grass, or weed pollen, or molds. Try to keep your windows closed.  Limit time out doors when pollen count is high.   Ask you health care provider about taking medicine during allergy season.     Animal Dander  Some people are allergic to skin flakes, urine or saliva from pets with fur or feathers. Keep pets with fur or feathers out of your home.    If you can t keep the pet outdoors, then keep the pet out of your bedroom.  Keep the bedroom door closed.  Keep pets off cloth furniture and away from stuffed toys.     Mice, Rats, and Cockroaches  Some people are allergic to the waste  from these pests.   Cover food and garbage.  Clean up spills and food crumbs.  Store grease in the refrigerator.   Keep food out of the bedroom.   Indoor Mold  This can be a trigger if your home has high moisture. Fix leaking faucets, pipes, or other sources of water.   Clean moldy surfaces.  Dehumidify basement if it is damp and smelly.   Smoke, Strong Odors, and Sprays  These can reduce air quality. Stay away from strong odors and sprays, such as perfume, powder, hair spray, paints, smoke incense, paint, cleaning products, candles and new carpet.   Exercise or Sports  Some people with asthma have this trigger. Be active!  Ask your doctor about taking medicine before sports or exercise to prevent symptoms.    Warm up for 5-10 minutes before and after sports or exercise.     Other Triggers of Asthma  Cold air:  Cover your nose and mouth with a scarf.  Sometimes laughing or crying can be a trigger.  Some medicines and food can trigger asthma.

## 2019-04-15 NOTE — TELEPHONE ENCOUNTER
Completed forms faxed back to [facility] medical at [phone].   Originals sent to be scanned.     Claudia Parker

## 2019-04-16 ASSESSMENT — ASTHMA QUESTIONNAIRES: ACT_TOTALSCORE: 20

## 2019-05-01 ENCOUNTER — HOSPITAL ENCOUNTER (OUTPATIENT)
Dept: GENERAL RADIOLOGY | Facility: CLINIC | Age: 54
Discharge: HOME | End: 2019-05-01
Attending: PHYSICIAN ASSISTANT | Admitting: PHYSICIAN ASSISTANT
Payer: COMMERCIAL

## 2019-05-01 VITALS — HEART RATE: 97 BPM | DIASTOLIC BLOOD PRESSURE: 61 MMHG | SYSTOLIC BLOOD PRESSURE: 120 MMHG

## 2019-05-01 DIAGNOSIS — M79.606 PAIN OF LOWER EXTREMITY, UNSPECIFIED LATERALITY: ICD-10-CM

## 2019-05-01 DIAGNOSIS — M54.50 LOW BACK PAIN: ICD-10-CM

## 2019-05-01 PROCEDURE — 62323 NJX INTERLAMINAR LMBR/SAC: CPT

## 2019-05-01 PROCEDURE — 25500064 ZZH RX 255 OP 636: Performed by: PHYSICIAN ASSISTANT

## 2019-05-01 PROCEDURE — 25000128 H RX IP 250 OP 636

## 2019-05-01 PROCEDURE — 25000125 ZZHC RX 250

## 2019-05-01 RX ORDER — IOPAMIDOL 408 MG/ML
10 INJECTION, SOLUTION INTRATHECAL ONCE
Status: COMPLETED | OUTPATIENT
Start: 2019-05-01 | End: 2019-05-01

## 2019-05-01 RX ORDER — NICOTINE POLACRILEX 4 MG
15-30 LOZENGE BUCCAL
Status: DISCONTINUED | OUTPATIENT
Start: 2019-05-01 | End: 2019-05-02 | Stop reason: HOSPADM

## 2019-05-01 RX ORDER — DEXAMETHASONE SODIUM PHOSPHATE 10 MG/ML
20 INJECTION, SOLUTION INTRAMUSCULAR; INTRAVENOUS ONCE
Status: DISCONTINUED | OUTPATIENT
Start: 2019-05-01 | End: 2019-05-02 | Stop reason: HOSPADM

## 2019-05-01 RX ORDER — DEXAMETHASONE SODIUM PHOSPHATE 10 MG/ML
INJECTION, SOLUTION INTRAMUSCULAR; INTRAVENOUS
Status: COMPLETED
Start: 2019-05-01 | End: 2019-05-01

## 2019-05-01 RX ORDER — DEXTROSE MONOHYDRATE 25 G/50ML
25-50 INJECTION, SOLUTION INTRAVENOUS
Status: DISCONTINUED | OUTPATIENT
Start: 2019-05-01 | End: 2019-05-02 | Stop reason: HOSPADM

## 2019-05-01 RX ORDER — BETAMETHASONE SODIUM PHOSPHATE AND BETAMETHASONE ACETATE 3; 3 MG/ML; MG/ML
18 INJECTION, SUSPENSION INTRA-ARTICULAR; INTRALESIONAL; INTRAMUSCULAR; SOFT TISSUE ONCE
Status: DISCONTINUED | OUTPATIENT
Start: 2019-05-01 | End: 2019-05-01 | Stop reason: CLARIF

## 2019-05-01 RX ORDER — LIDOCAINE HYDROCHLORIDE 10 MG/ML
INJECTION, SOLUTION EPIDURAL; INFILTRATION; INTRACAUDAL; PERINEURAL
Status: COMPLETED
Start: 2019-05-01 | End: 2019-05-01

## 2019-05-01 RX ORDER — BETAMETHASONE SODIUM PHOSPHATE AND BETAMETHASONE ACETATE 3; 3 MG/ML; MG/ML
INJECTION, SUSPENSION INTRA-ARTICULAR; INTRALESIONAL; INTRAMUSCULAR; SOFT TISSUE
Status: DISPENSED
Start: 2019-05-01 | End: 2019-05-01

## 2019-05-01 RX ADMIN — DEXAMETHASONE SODIUM PHOSPHATE 10 MG: 10 INJECTION, SOLUTION INTRAMUSCULAR; INTRAVENOUS at 08:57

## 2019-05-01 RX ADMIN — IOPAMIDOL 4 ML: 408 INJECTION, SOLUTION INTRATHECAL at 08:56

## 2019-05-01 RX ADMIN — LIDOCAINE HYDROCHLORIDE 300 MG: 10 INJECTION, SOLUTION EPIDURAL; INFILTRATION; INTRACAUDAL; PERINEURAL at 08:50

## 2019-05-01 RX ADMIN — LIDOCAINE HYDROCHLORIDE 50 MG: 10 INJECTION, SOLUTION EPIDURAL; INFILTRATION; INTRACAUDAL; PERINEURAL at 08:58

## 2019-05-01 NOTE — PROGRESS NOTES
Lumbar epidural steroid injection completed per Dennis GIBBONS without difficulty, patient tolerated well. All discharge criteria were met and patient discharged to home ambulatory in stable condition with caregiver. See imaging dictation for pre and post pain levels.

## 2019-05-01 NOTE — PROGRESS NOTES
RADIOLOGY PROCEDURE NOTE  Patient name: Brooke John  MRN: [id number]  : 1965    Pre-procedure diagnosis: Back and left calf pain  Post-procedure diagnosis: Same    Procedure Date/Time: May 1, 2019  9:01 AM  Procedure: Left L4-L5 TFESI - repeat  Estimated blood loss: None  Specimen(s) collected with description: none  The patient tolerated the procedure well with no immediate complications.  Significant findings:none    See imaging dictation for procedural details.    Provider name: Dennis Roque  Assistant(s):None

## 2019-05-21 ENCOUNTER — HOSPITAL ENCOUNTER (EMERGENCY)
Facility: CLINIC | Age: 54
Discharge: HOME | End: 2019-05-21
Attending: EMERGENCY MEDICINE | Admitting: EMERGENCY MEDICINE
Payer: COMMERCIAL

## 2019-05-21 VITALS
WEIGHT: 217.15 LBS | DIASTOLIC BLOOD PRESSURE: 76 MMHG | TEMPERATURE: 97.2 F | BODY MASS INDEX: 41.06 KG/M2 | SYSTOLIC BLOOD PRESSURE: 142 MMHG | OXYGEN SATURATION: 97 % | HEART RATE: 69 BPM | RESPIRATION RATE: 18 BRPM

## 2019-05-21 DIAGNOSIS — R51.9 ACUTE NONINTRACTABLE HEADACHE, UNSPECIFIED HEADACHE TYPE: ICD-10-CM

## 2019-05-21 LAB
ALBUMIN SERPL-MCNC: 3.4 G/DL (ref 3.4–5)
ALBUMIN UR-MCNC: NEGATIVE MG/DL
ALP SERPL-CCNC: 91 U/L (ref 40–150)
ALT SERPL W P-5'-P-CCNC: 29 U/L (ref 0–50)
ANION GAP SERPL CALCULATED.3IONS-SCNC: 4 MMOL/L (ref 3–14)
APPEARANCE UR: CLEAR
AST SERPL W P-5'-P-CCNC: 24 U/L (ref 0–45)
BASOPHILS # BLD AUTO: 0 10E9/L (ref 0–0.2)
BASOPHILS NFR BLD AUTO: 0.7 %
BILIRUB SERPL-MCNC: 0.9 MG/DL (ref 0.2–1.3)
BILIRUB UR QL STRIP: NEGATIVE
BUN SERPL-MCNC: 12 MG/DL (ref 7–30)
CALCIUM SERPL-MCNC: 8.3 MG/DL (ref 8.5–10.1)
CHLORIDE SERPL-SCNC: 111 MMOL/L (ref 94–109)
CO2 SERPL-SCNC: 26 MMOL/L (ref 20–32)
COLOR UR AUTO: ABNORMAL
CREAT SERPL-MCNC: 0.77 MG/DL (ref 0.52–1.04)
DIFFERENTIAL METHOD BLD: NORMAL
EOSINOPHIL # BLD AUTO: 0.1 10E9/L (ref 0–0.7)
EOSINOPHIL NFR BLD AUTO: 1.2 %
ERYTHROCYTE [DISTWIDTH] IN BLOOD BY AUTOMATED COUNT: 14.5 % (ref 10–15)
GFR SERPL CREATININE-BSD FRML MDRD: 86 ML/MIN/{1.73_M2}
GLUCOSE SERPL-MCNC: 100 MG/DL (ref 70–99)
GLUCOSE UR STRIP-MCNC: NEGATIVE MG/DL
HCT VFR BLD AUTO: 38 % (ref 35–47)
HGB BLD-MCNC: 12 G/DL (ref 11.7–15.7)
HGB UR QL STRIP: NEGATIVE
IMM GRANULOCYTES # BLD: 0 10E9/L (ref 0–0.4)
IMM GRANULOCYTES NFR BLD: 0.2 %
KETONES UR STRIP-MCNC: NEGATIVE MG/DL
LEUKOCYTE ESTERASE UR QL STRIP: NEGATIVE
LYMPHOCYTES # BLD AUTO: 1.4 10E9/L (ref 0.8–5.3)
LYMPHOCYTES NFR BLD AUTO: 24.9 %
MCH RBC QN AUTO: 26.7 PG (ref 26.5–33)
MCHC RBC AUTO-ENTMCNC: 31.6 G/DL (ref 31.5–36.5)
MCV RBC AUTO: 84 FL (ref 78–100)
MONOCYTES # BLD AUTO: 0.5 10E9/L (ref 0–1.3)
MONOCYTES NFR BLD AUTO: 9.4 %
MUCOUS THREADS #/AREA URNS LPF: PRESENT /LPF
NEUTROPHILS # BLD AUTO: 3.7 10E9/L (ref 1.6–8.3)
NEUTROPHILS NFR BLD AUTO: 63.6 %
NITRATE UR QL: NEGATIVE
NRBC # BLD AUTO: 0 10*3/UL
NRBC BLD AUTO-RTO: 0 /100
PH UR STRIP: 6 PH (ref 5–7)
PLATELET # BLD AUTO: 202 10E9/L (ref 150–450)
POTASSIUM SERPL-SCNC: 3.8 MMOL/L (ref 3.4–5.3)
PROT SERPL-MCNC: 7.3 G/DL (ref 6.8–8.8)
RBC # BLD AUTO: 4.5 10E12/L (ref 3.8–5.2)
RBC #/AREA URNS AUTO: <1 /HPF (ref 0–2)
SODIUM SERPL-SCNC: 141 MMOL/L (ref 133–144)
SOURCE: ABNORMAL
SP GR UR STRIP: 1.02 (ref 1–1.03)
SQUAMOUS #/AREA URNS AUTO: 1 /HPF (ref 0–1)
TROPONIN I SERPL-MCNC: <0.015 UG/L (ref 0–0.04)
UROBILINOGEN UR STRIP-MCNC: NORMAL MG/DL (ref 0–2)
WBC # BLD AUTO: 5.8 10E9/L (ref 4–11)
WBC #/AREA URNS AUTO: 2 /HPF (ref 0–5)

## 2019-05-21 PROCEDURE — 96375 TX/PRO/DX INJ NEW DRUG ADDON: CPT

## 2019-05-21 PROCEDURE — 96361 HYDRATE IV INFUSION ADD-ON: CPT

## 2019-05-21 PROCEDURE — 25000128 H RX IP 250 OP 636: Performed by: EMERGENCY MEDICINE

## 2019-05-21 PROCEDURE — 96374 THER/PROPH/DIAG INJ IV PUSH: CPT

## 2019-05-21 PROCEDURE — 85025 COMPLETE CBC W/AUTO DIFF WBC: CPT | Performed by: EMERGENCY MEDICINE

## 2019-05-21 PROCEDURE — 81001 URINALYSIS AUTO W/SCOPE: CPT | Performed by: EMERGENCY MEDICINE

## 2019-05-21 PROCEDURE — 99284 EMERGENCY DEPT VISIT MOD MDM: CPT | Mod: 25

## 2019-05-21 PROCEDURE — 80053 COMPREHEN METABOLIC PANEL: CPT | Performed by: EMERGENCY MEDICINE

## 2019-05-21 PROCEDURE — 84484 ASSAY OF TROPONIN QUANT: CPT | Performed by: EMERGENCY MEDICINE

## 2019-05-21 RX ORDER — ONDANSETRON 2 MG/ML
4 INJECTION INTRAMUSCULAR; INTRAVENOUS ONCE
Status: COMPLETED | OUTPATIENT
Start: 2019-05-21 | End: 2019-05-21

## 2019-05-21 RX ORDER — SUMATRIPTAN 25 MG/1
TABLET, FILM COATED ORAL
Qty: 8 TABLET | Refills: 0 | Status: SHIPPED | OUTPATIENT
Start: 2019-05-21 | End: 2019-07-17

## 2019-05-21 RX ORDER — DEXAMETHASONE SODIUM PHOSPHATE 10 MG/ML
10 INJECTION, SOLUTION INTRAMUSCULAR; INTRAVENOUS ONCE
Status: COMPLETED | OUTPATIENT
Start: 2019-05-21 | End: 2019-05-21

## 2019-05-21 RX ORDER — SODIUM CHLORIDE 9 MG/ML
1000 INJECTION, SOLUTION INTRAVENOUS CONTINUOUS
Status: DISCONTINUED | OUTPATIENT
Start: 2019-05-21 | End: 2019-05-21 | Stop reason: HOSPADM

## 2019-05-21 RX ORDER — ONDANSETRON 4 MG/1
4 TABLET, ORALLY DISINTEGRATING ORAL EVERY 8 HOURS PRN
Qty: 15 TABLET | Refills: 0 | Status: SHIPPED | OUTPATIENT
Start: 2019-05-21 | End: 2019-05-26

## 2019-05-21 RX ADMIN — ONDANSETRON 4 MG: 2 INJECTION INTRAMUSCULAR; INTRAVENOUS at 13:22

## 2019-05-21 RX ADMIN — DEXAMETHASONE SODIUM PHOSPHATE 10 MG: 10 INJECTION, SOLUTION INTRAMUSCULAR; INTRAVENOUS at 13:22

## 2019-05-21 RX ADMIN — SODIUM CHLORIDE 1000 ML: 9 INJECTION, SOLUTION INTRAVENOUS at 13:19

## 2019-05-21 ASSESSMENT — ENCOUNTER SYMPTOMS
FEVER: 0
COUGH: 0
VOMITING: 1
ABDOMINAL PAIN: 0
WEAKNESS: 0
NAUSEA: 1

## 2019-05-21 NOTE — ED AVS SNAPSHOT
[facility] Emergency Department  [address]  [facility] [address]  Phone:  [phone]  Fax:  [phone]                                    Brooke John   MRN: [id number]    Department:  [facility] Emergency Department   Date of Visit:  5/21/2019           After Visit Summary Signature Page    I have received my discharge instructions, and my questions have been answered. I have discussed any challenges I see with this plan with the nurse or doctor.    ..........................................................................................................................................  Patient/Patient Representative Signature      ..........................................................................................................................................  Patient Representative Print Name and Relationship to Patient    ..................................................               ................................................  Date                                   Time    ..........................................................................................................................................  Reviewed by Signature/Title    ...................................................              ..............................................  Date                                               Time          22EPIC Rev 08/18

## 2019-05-21 NOTE — ED PROVIDER NOTES
History     Chief Complaint:  Nausea    HPI   Brooke John is a 53 year old female, with a history of esophageal reflux and migraine, who presents with nausea and headache. The patient states that she felt generally unwell over the past 4 days, but today had developed a headache, consistent with her past presentations of migraine, and one episode of vomiting, prompting her ED visit. Here, the patient states that she did take Tylenol at 1100 for her headache, but did not have resolution of symptoms. She denies any associated symptoms including chest pain, abdominal pain, leg weakness, fever, cough. The patient states that she has been experiencing menopausal side effects including hot flashes. She notes that her headaches correlates with her hot flashes which has been ongoing for awhile. Of note, the patient has a prescription for Imitrex, but out of refills at the moment.   Patient comes to the ED with her PCA.  She has not yet called per PCP for refills.  This headache is similar to her previous and not the worst ever.  Bilateral headaches.    Allergies:  Amoxicillin  Aspirin  Milk Protein Extract  Zithromax [Azithromycin]      Medications:    Albuterol  Zyrtec  Advair  Levothyroxine  Ativan  Imitrex    Past Medical History:    Arthritis  Depression  Lung nodule  Asthma  Hypothyroidism  Scoliosis  Osteoarthritis   Gastroesophageal reflux  Migraines/headaches    Past Surgical History:    Left arthroscopy knee  Back surgery   section  Cosmetic surgery   EGD combined  Eye surgery   Lumbar discectomy     Family History:    Scoliosis  Learning disorder  Triple bypass    Social History:  Presents with her PCA.  Negative for alcohol use.  Negative for tobacco use.   Marital Status:  Single [1]     Review of Systems   Constitutional: Negative for fever.   Respiratory: Negative for cough.    Cardiovascular: Negative for chest pain.   Gastrointestinal: Positive for nausea and vomiting. Negative for abdominal  pain.   Neurological: Negative for weakness.   All other systems reviewed and are negative.  Patient comes in for evaluation of generalized body aches, nausea, vomiting, and headache which started today. Also states she feel sl mirna she is burning up but has not taken her temperature.     Physical Exam   First Vitals:  /90   Pulse 74   Temp 98.1  F (36.7  C) (Oral)   Resp 18   Wt 98.5 kg (217 lb 2.5 oz)   SpO2 96%   BMI 41.06 kg/m      Physical Exam  GEN: patient nauseated but smiling, PCA at the bedside  HEAD: atraumatic, normocephalic, no temple tenderness  EYES: pupils reactive (3plus to 2plus), extraocular muscles intact, conjunctivae normal  ENT: TMs flat and white bilaterally, oropharynx normal with no erythema or exudate, mucus membranes moist  NECK: no cervical LAD, no meningeal signs  RESPIRATORY: no tachypnea, breath sounds clear to auscultation (no rales, wheezes, rhonchi), chest wall nontender, normal phonation  CVS: normal S1/S2, no murmurs/rubs/gallops  ABDOMEN: soft, nontender, no masses or organomegaly, no rebound, positive bowel sounds  BACK: no costovertebral angle tenderness  EXTREMITIES: intact pulses x 4, full range of motion at joints, no edema  MUSCULOSKELETAL: no deformities  SKIN: warm and dry, no acute rashes   NEURO: GCS 15, cranial nerves intact.  Motor- moves all 4 extremities with 5/5 strength, DF and PF 5/5.  Sensation- intact all dermatones to pinprick and light touch.  Reflexes- DTRs 2plus.  Coordination- romberg negative, normal tandem gait, no ataxia.  Overall symmetrical exam  HEME: no bruising or petechiae/contusions  LYMPH: no lymphadenopathy      Emergency Department Course   Laboratory:  CBC: WBC: 5.8, HGB: 12.0, PLT: 202  CMP: Glucose 100 (H), Chloride: 111 (H), Calcium: 8.3 (L), o/w WNL (Creatinine: 0.77)    Troponin: <0.015  UA with micro: mucous present o/w negative     Interventions:  1319 0.9% Sodium Chloride Bolus, 1L, IV  1322 Zofran, 4 mg,  IV  1322 Decadron, 10 mg, IV   [place]  Cardiac/Sp02 monitoring    Emergency Department Course:  Nursing notes and vitals reviewed.     1236  I performed an exam of the patient as documented above.     IV inserted. Medicine administered as documented above. Blood drawn. This was sent to the lab for further testing, results above.    1350 I rechecked the patient and discussed the results of her workup thus far. The patient provided a urine sample here in the emergency department. This was sent for laboratory testing, findings above.    1532 I rechecked the patient and she was updated.       Findings and plan explained to the Patient and caregiver. Patient discharged home with instructions regarding supportive care, medications, and reasons to return. The importance of close follow-up was reviewed. The patient was prescribed Zofran.     I personally reviewed the laboratory results with the Patient and caregiver and answered all related questions prior to discharge.     Patient feels better.  Wrote Rx for imitrex for the patient.  /76   Pulse 69   Temp 97.2  F (36.2  C) (Oral)   Resp 18   Wt 98.5 kg (217 lb 2.5 oz)   SpO2 97%   BMI 41.06 kg/m        Impression & Plan      Medical Decision Making:  Brooke John is a 53 year old female who has a PCA and past history of migraine for which she takes Imitrex, but ran out of her Imitrex and her headache started to get worse today. She has been taking some Naproxen and Tylenol, but has not been helping. An IV was placed and labs were sent.  IV hydration started and given zofran and decadron.  Patient not given toradol as she does not tolerate ASA or NSAIDs well.  Normal neurological exam.    The patient's CBC and CMP was pretty much normal and Troponin did not show any signs of ischemia. Her urine did not show any infection. She was feeling better after the Decadron as well as fluids and Zofran. Her neurologic exam is at her baseline. I think this is acute  onset of her headache consistent with her regular migraine and she does not need more work up.     Disposition is discharge to home.   UA with no sign of infection.    Diagnosis:    ICD-10-CM   1. Acute nonintractable headache, unspecified headache type R51       Disposition:  discharged to home    Discharge Medications:   Details   ondansetron (ZOFRAN ODT) 4 MG ODT tab Take 1 tablet (4 mg) by mouth every 8 hours as needed for nausea  Qty: 15 tablet, Refills: 0     Imitrex    Zhane ANSARI am serving as a scribe on 5/21/2019 at 12:36 PM to personally document services performed by Melanie Hinojosa MD based on my observations and the provider's statements to me.      Zhane Wolf  5/21/2019   [facility] EMERGENCY DEPARTMENT       Melanie Hinojosa MD  05/22/19 0717

## 2019-05-21 NOTE — ED TRIAGE NOTES
Patient comes in for evaluation of generalized body aches, nausea, vomiting, and headache which started today. Also states she feel sl mirna she is burning up but has not taken her temperature. ABCs intact.

## 2019-06-17 ENCOUNTER — ALLIED HEALTH/NURSE VISIT (OUTPATIENT)
Dept: FAMILY MEDICINE | Facility: CLINIC | Age: 54
End: 2019-06-17
Payer: COMMERCIAL

## 2019-06-17 VITALS — DIASTOLIC BLOOD PRESSURE: 82 MMHG | SYSTOLIC BLOOD PRESSURE: 149 MMHG

## 2019-06-17 DIAGNOSIS — I10 HYPERTENSION, UNSPECIFIED TYPE: Primary | ICD-10-CM

## 2019-06-17 NOTE — PROGRESS NOTES
Brooke John was evaluated at [facility] on June 17, 2019 at which time her blood pressure was:    BP Readings from Last 3 Encounters:   06/17/19 149/82   05/21/19 142/76   05/01/19 120/61     Pulse Readings from Last 3 Encounters:   05/21/19 69   05/01/19 97   04/15/19 114       Reviewed lifestyle modifications for blood pressure control and reduction: including making healthy food choices, managing weight, getting regular exercise, smoking cessation, reducing alcohol consumption, monitoring blood pressure regularly.     Symptoms: None    BP Goal:< 140/90 mmHg    BP Assessment:  BP too high    Potential Reasons for BP too high: Unknown/Other: .    BP Follow-Up Plan: Recheck BP in 30 days at pharmacy    Recommendation to Provider: none    Note completed by: Thank you,  Bernadine Au, PharmD, [facility] Pharmacist  [facility]

## 2019-07-08 DIAGNOSIS — E03.9 HYPOTHYROIDISM, UNSPECIFIED TYPE: ICD-10-CM

## 2019-07-08 NOTE — TELEPHONE ENCOUNTER
"Requested Prescriptions   Pending Prescriptions Disp Refills     levothyroxine (SYNTHROID/LEVOTHROID) 75 MCG tablet [Pharmacy Med Name: LEVOTHYROXINE SODIUM 75MCG TABS]          Last Written Prescription Date:  1.9.19  Last Fill Quantity: 90 tablet,  # refills: 1   Last office visit: 4/15/2019 with prescribing provider:  Mandy Bhatia MD               Future Office Visit:   Next 5 appointments (look out 90 days)    Jul 17, 2019 10:20 AM CDT  Office Visit with Salome Hollis PA-C  [facility] [address] [place] [id number]  [phone]            90 tablet 1     Sig: TAKE ONE TABLET BY MOUTH EVERY DAY       Thyroid Protocol Failed - 7/8/2019  9:34 AM        Failed - Normal TSH on file in past 12 months     Recent Labs   Lab Test 06/11/18  0936   TSH 2.34              Passed - Patient is 12 years or older        Passed - Recent (12 mo) or future (30 days) visit within the authorizing provider's specialty     Patient had office visit in the last 12 months or has a visit in the next 30 days with authorizing provider or within the authorizing provider's specialty.  See \"Patient Info\" tab in inbasket, or \"Choose Columns\" in Meds & Orders section of the refill encounter.              Passed - Medication is active on med list        Passed - No active pregnancy on record     If patient is pregnant or has had a positive pregnancy test, please check TSH.          Passed - No positive pregnancy test in past 12 months     If patient is pregnant or has had a positive pregnancy test, please check TSH.          "

## 2019-07-09 RX ORDER — LEVOTHYROXINE SODIUM 75 UG/1
TABLET ORAL
Qty: 30 TABLET | Refills: 0 | Status: SHIPPED | OUTPATIENT
Start: 2019-07-09 | End: 2019-07-17

## 2019-07-09 NOTE — TELEPHONE ENCOUNTER
Patient due for fasting office visit- 30 days supply given.  Routing to team to schedule appointment     Latoya Lofton RN  [facility]  [phone]

## 2019-07-17 ENCOUNTER — OFFICE VISIT (OUTPATIENT)
Dept: FAMILY MEDICINE | Facility: CLINIC | Age: 54
End: 2019-07-17
Payer: COMMERCIAL

## 2019-07-17 ENCOUNTER — TELEPHONE (OUTPATIENT)
Dept: FAMILY MEDICINE | Facility: CLINIC | Age: 54
End: 2019-07-17

## 2019-07-17 VITALS
DIASTOLIC BLOOD PRESSURE: 96 MMHG | TEMPERATURE: 98.1 F | WEIGHT: 207 LBS | BODY MASS INDEX: 39.08 KG/M2 | HEART RATE: 86 BPM | OXYGEN SATURATION: 97 % | SYSTOLIC BLOOD PRESSURE: 126 MMHG | HEIGHT: 61 IN

## 2019-07-17 DIAGNOSIS — E78.1 HYPERTRIGLYCERIDEMIA: ICD-10-CM

## 2019-07-17 DIAGNOSIS — R26.89 BALANCE PROBLEMS: ICD-10-CM

## 2019-07-17 DIAGNOSIS — I10 BENIGN ESSENTIAL HYPERTENSION: Primary | ICD-10-CM

## 2019-07-17 DIAGNOSIS — H02.63 XANTHELASMA OF EYELID, BILATERAL: ICD-10-CM

## 2019-07-17 DIAGNOSIS — G47.33 OSA ON CPAP: ICD-10-CM

## 2019-07-17 DIAGNOSIS — E03.9 HYPOTHYROIDISM, UNSPECIFIED TYPE: ICD-10-CM

## 2019-07-17 DIAGNOSIS — F41.9 ANXIETY: ICD-10-CM

## 2019-07-17 DIAGNOSIS — R03.0 ELEVATED BLOOD PRESSURE READING WITHOUT DIAGNOSIS OF HYPERTENSION: ICD-10-CM

## 2019-07-17 DIAGNOSIS — K44.9 HIATAL HERNIA: ICD-10-CM

## 2019-07-17 DIAGNOSIS — Z12.31 VISIT FOR SCREENING MAMMOGRAM: ICD-10-CM

## 2019-07-17 DIAGNOSIS — K21.9 GASTROESOPHAGEAL REFLUX DISEASE WITHOUT ESOPHAGITIS: ICD-10-CM

## 2019-07-17 DIAGNOSIS — J45.40 MODERATE PERSISTENT ASTHMA WITHOUT COMPLICATION: ICD-10-CM

## 2019-07-17 DIAGNOSIS — G43.909 MIGRAINE WITHOUT STATUS MIGRAINOSUS, NOT INTRACTABLE, UNSPECIFIED MIGRAINE TYPE: ICD-10-CM

## 2019-07-17 DIAGNOSIS — N39.46 MIXED INCONTINENCE: ICD-10-CM

## 2019-07-17 DIAGNOSIS — Z91.81 RISK FOR FALLS: ICD-10-CM

## 2019-07-17 DIAGNOSIS — H02.66 XANTHELASMA OF EYELID, BILATERAL: ICD-10-CM

## 2019-07-17 DIAGNOSIS — J30.2 SEASONAL ALLERGIC RHINITIS, UNSPECIFIED TRIGGER: ICD-10-CM

## 2019-07-17 DIAGNOSIS — L30.9 ECZEMA, UNSPECIFIED TYPE: ICD-10-CM

## 2019-07-17 LAB — T3FREE SERPL-MCNC: 2.3 PG/ML (ref 2.3–4.2)

## 2019-07-17 PROCEDURE — 84443 ASSAY THYROID STIM HORMONE: CPT | Performed by: PHYSICIAN ASSISTANT

## 2019-07-17 PROCEDURE — 84481 FREE ASSAY (FT-3): CPT | Performed by: PHYSICIAN ASSISTANT

## 2019-07-17 PROCEDURE — 83721 ASSAY OF BLOOD LIPOPROTEIN: CPT | Mod: 59 | Performed by: PHYSICIAN ASSISTANT

## 2019-07-17 PROCEDURE — 80061 LIPID PANEL: CPT | Performed by: PHYSICIAN ASSISTANT

## 2019-07-17 PROCEDURE — 80053 COMPREHEN METABOLIC PANEL: CPT | Performed by: PHYSICIAN ASSISTANT

## 2019-07-17 PROCEDURE — 99215 OFFICE O/P EST HI 40 MIN: CPT | Performed by: PHYSICIAN ASSISTANT

## 2019-07-17 PROCEDURE — 36415 COLL VENOUS BLD VENIPUNCTURE: CPT | Performed by: PHYSICIAN ASSISTANT

## 2019-07-17 PROCEDURE — 84439 ASSAY OF FREE THYROXINE: CPT | Performed by: PHYSICIAN ASSISTANT

## 2019-07-17 PROCEDURE — 82043 UR ALBUMIN QUANTITATIVE: CPT | Performed by: PHYSICIAN ASSISTANT

## 2019-07-17 RX ORDER — LOSARTAN POTASSIUM 25 MG/1
25 TABLET ORAL DAILY
Qty: 90 TABLET | Refills: 0 | Status: SHIPPED | OUTPATIENT
Start: 2019-07-17 | End: 2019-08-07

## 2019-07-17 RX ORDER — CETIRIZINE HYDROCHLORIDE 10 MG/1
10 TABLET ORAL EVERY MORNING
Qty: 90 TABLET | Refills: 3 | Status: SHIPPED | OUTPATIENT
Start: 2019-07-17 | End: 2019-10-17

## 2019-07-17 RX ORDER — SUMATRIPTAN 25 MG/1
TABLET, FILM COATED ORAL
Qty: 8 TABLET | Refills: 0 | Status: SHIPPED | OUTPATIENT
Start: 2019-07-17 | End: 2019-10-17

## 2019-07-17 RX ORDER — ALBUTEROL SULFATE 90 UG/1
2 AEROSOL, METERED RESPIRATORY (INHALATION) EVERY 4 HOURS PRN
Qty: 1 INHALER | Refills: 11 | Status: SHIPPED | OUTPATIENT
Start: 2019-07-17 | End: 2019-10-17

## 2019-07-17 RX ORDER — TRIAMCINOLONE ACETONIDE 1 MG/G
CREAM TOPICAL 2 TIMES DAILY PRN
Qty: 30 G | Refills: 1 | Status: SHIPPED | OUTPATIENT
Start: 2019-07-17

## 2019-07-17 RX ORDER — ROSUVASTATIN CALCIUM 10 MG/1
10 TABLET, COATED ORAL AT BEDTIME
Qty: 90 TABLET | Refills: 1 | Status: SHIPPED | OUTPATIENT
Start: 2019-07-17 | End: 2019-10-17

## 2019-07-17 RX ORDER — LORAZEPAM 0.5 MG/1
0.5 TABLET ORAL EVERY 6 HOURS PRN
Qty: 10 TABLET | Refills: 0 | Status: SHIPPED | OUTPATIENT
Start: 2019-07-17 | End: 2020-01-02

## 2019-07-17 RX ORDER — OMEPRAZOLE 40 MG/1
CAPSULE, DELAYED RELEASE ORAL
Qty: 90 CAPSULE | Refills: 3 | Status: SHIPPED | OUTPATIENT
Start: 2019-07-17 | End: 2019-10-17 | Stop reason: ALTCHOICE

## 2019-07-17 RX ORDER — LEVOTHYROXINE SODIUM 75 UG/1
75 TABLET ORAL DAILY
Qty: 90 TABLET | Refills: 1 | Status: SHIPPED | OUTPATIENT
Start: 2019-07-17 | End: 2019-10-17

## 2019-07-17 RX ORDER — METOCLOPRAMIDE 10 MG/1
10 TABLET ORAL
Qty: 20 TABLET | Refills: 3 | Status: SHIPPED | OUTPATIENT
Start: 2019-07-17 | End: 2019-10-17

## 2019-07-17 RX ORDER — FLUTICASONE PROPIONATE 50 MCG
SPRAY, SUSPENSION (ML) NASAL
Qty: 16 G | Refills: 3 | Status: SHIPPED | OUTPATIENT
Start: 2019-07-17 | End: 2019-10-17

## 2019-07-17 ASSESSMENT — ANXIETY QUESTIONNAIRES
7. FEELING AFRAID AS IF SOMETHING AWFUL MIGHT HAPPEN: SEVERAL DAYS
3. WORRYING TOO MUCH ABOUT DIFFERENT THINGS: NOT AT ALL
6. BECOMING EASILY ANNOYED OR IRRITABLE: NOT AT ALL
GAD7 TOTAL SCORE: 2
IF YOU CHECKED OFF ANY PROBLEMS ON THIS QUESTIONNAIRE, HOW DIFFICULT HAVE THESE PROBLEMS MADE IT FOR YOU TO DO YOUR WORK, TAKE CARE OF THINGS AT HOME, OR GET ALONG WITH OTHER PEOPLE: SOMEWHAT DIFFICULT
5. BEING SO RESTLESS THAT IT IS HARD TO SIT STILL: NOT AT ALL
1. FEELING NERVOUS, ANXIOUS, OR ON EDGE: SEVERAL DAYS
2. NOT BEING ABLE TO STOP OR CONTROL WORRYING: NOT AT ALL

## 2019-07-17 ASSESSMENT — PATIENT HEALTH QUESTIONNAIRE - PHQ9
SUM OF ALL RESPONSES TO PHQ QUESTIONS 1-9: 4
5. POOR APPETITE OR OVEREATING: NOT AT ALL

## 2019-07-17 ASSESSMENT — MIFFLIN-ST. JEOR: SCORE: 1471.01

## 2019-07-17 NOTE — PROGRESS NOTES
Subjective     Brooke John is a 53 year old female who presents to clinic today for the following health issues:    HPI   Anxiety Follow-Up  [facility] use is very rare.  Anxiety is situational and usually presents when sitting near the entrance of her independent living facility and people try to talk to her. Last fill was 10 tablets on 01/2018.    How are you doing with your anxiety since your last visit? No change    Are you having other symptoms that might be associated with anxiety? No    Have you had a significant life event? No     Are you feeling depressed? No    Do you have any concerns with your use of alcohol or other drugs? No    Social History     Tobacco Use     Smoking status: Passive Smoke Exposure - Never Smoker     Smokeless tobacco: Never Used   Substance Use Topics     Alcohol use: No     Alcohol/week: 0.0 oz     Drug use: No     DARRON-7 SCORE 7/28/2017 6/11/2018 7/17/2019   Total Score 1 0 2     PHQ 7/28/2017 6/11/2018 7/17/2019   PHQ-9 Total Score 4 6 4   Q9: Thoughts of better off dead/self-harm past 2 weeks Not at all Not at all Not at all         Migraine   Controlled with Imitrex 25 mg and Tylenol. Unclear when she takes imitrex vs tylenol - depends on where she it. Imitrex works well. Recently seen in the ER for a HA 5/2019. Migraines are usually accompanied with nausea and uses Reglan 10 mg to treat nausea.     Since your last clinic visit, how have your headaches changed?  No change    How often are you getting headaches or migraines? 1-2 per month      Are you able to do normal daily activities when you have a migraine? No    Are you taking rescue/relief medications? (Select all that apply) sumatriptan (Imitrex)     How helpful is your rescue/relief medication?  I get some relief    Are you taking any medications to prevent migraines? (Select all that apply)  No    In the past 4 weeks, how often have you gone to urgent care or the emergency room because of your headaches?   0      GERD/ Hiatal Hernia  Brooke is prescribed omeprazole 40 mg daily at bedtime for GERD prevention. EGD on 6/8/19 showed she has a hiatal hernia and tortuous esophagus.  If GERD is uncontrolled causes nausea and upset stomach which she takes Reglan 10 mg PRN.  Both medications working well, no adverse side effects.      Elevated Cholesterol/Xanthelasma   Uncontrolled. She currently is not on a statin or taking a daily fish oil supplement. Reports she leans more towards a high carbohydrate diet. Pt has cholesterol deposits on her eyelids. Shes had them removed in the past but they have come back.   Recent Labs   Lab Test 06/11/18  0936 03/14/16  0822   CHOL 237* 215*   HDL 40* 56   LDL Cannot estimate LDL when triglyceride exceeds 400 mg/dL  135* 125*   TRIG 463* 168*         Hypothyroidism Follow-up  Controlled with levothyroxine 75 mcg. Medication working well, no adverse side effects.     Since last visit, patient describes the following symptoms: Weight stable, no hair loss, no skin changes, no constipation, no loose stools      Amount of exercise or physical activity: 2-3 days/week for an average of 30-45 minutes    Problems taking medications regularly: No    Medication side effects: none    Diet: regular (no restrictions)      Elevated BP  BP elevated in clinic today. Does not check BP regularly at home. Has never been on medication BP.   BP Readings from Last 3 Encounters:   07/17/19 (!) 126/96   06/17/19 149/82   05/21/19 142/76     Asthma/Allergies  Brooke uses Advair daily and her albuterol inhaler a few times a week. She currently takes Zyrtec and Flonase daily for allergies.  Triggers: temperature, chlorine     Eczema  Stable. Pt uses triamcinolone cream for flares which is effective.     Scoliosis  Pt was born with scoliosis and follows with [facility]. Reports balance issues and ambulates with a cane occasionally.     Urinary Incontinence  Uses pads, liners and disposable underwear  secondary to incontinence.     HEVER  Follows with sleep medicine and would like to establish closer to home. Uses CPAP every night.      Patient Active Problem List   Diagnosis     Scoliosis- congenital - saw [facility] as a child - now [facility] - has signif lumbar radiculopathy - uses a cane      Arthritis     Hypothyroidism, unspecified type     History of herniated intervertebral disc     Pulmonary nodules     Moderate persistent asthma     Lung nodule     Allergic reaction to drug - amoxicillin - Hives - proven 2016      Morbid obesity (H)     Constipation, unspecified constipation type     Gastroesophageal reflux disease without esophagitis     Menstrual migraine without status migrainosus, not intractable     Use of cane as ambulatory aid     Lumbar radiculopathy- left - uses a cane - born with the scoliosis - sees [facility]      Primary osteoarthritis of both knees     Xanthelasma of eyelid, bilateral- medial upper lids - causing some medial ptosis and decreased visual fields      Hypertriglyceridemia     Anxiety     Elevated blood pressure reading without diagnosis of hypertension- not well controlled today      Seasonal allergic rhinitis, unspecified trigger     Past Surgical History:   Procedure Laterality Date     ARTHROSCOPY KNEE      left      BACK SURGERY        SECTION       COLONOSCOPY N/A 2017    no polyps./ return in ten years     COSMETIC SURGERY       ESOPHAGOSCOPY, GASTROSCOPY, DUODENOSCOPY (EGD), COMBINED N/A 2017    Procedure: COMBINED ESOPHAGOSCOPY, GASTROSCOPY, DUODENOSCOPY (EGD);  EGD w MAC:DX:Esophageal dysphagia,Hiatal hernia/prep & pre-op info mailed;  Surgeon: Moshe Caro MD;  Location:  GI     EYE SURGERY       ORTHOPEDIC SURGERY      lumbar discectomy       Social History     Tobacco Use     Smoking status: Passive Smoke Exposure - Never Smoker     Smokeless tobacco: Never Used   Substance Use Topics     Alcohol use: No      Alcohol/week: 0.0 oz     Family History   Problem Relation Age of Onset     Scoliosis Son      Learning Disorder Son         lives in a protected place     Heart Disease Father         triple bypass     Unknown/Adopted Father      Unknown/Adopted Mother      Diabetes Type 2  Maternal Grandfather      Other - See Comments Brother         5 brothers, 1 sister.      Colon Cancer No family hx of          Current Outpatient Medications   Medication Sig Dispense Refill     albuterol (PROAIR HFA/PROVENTIL HFA/VENTOLIN HFA) 108 (90 Base) MCG/ACT inhaler Inhale 2 puffs into the lungs every 4 hours as needed for shortness of breath / dyspnea or wheezing Cough 1 Inhaler 11     cetirizine (ZYRTEC) 10 MG tablet Take 1 tablet (10 mg) by mouth every morning 90 tablet 3     fluticasone (FLONASE) 50 MCG/ACT nasal spray USE TWO SPRAYS IN EACH NOSTRIL EVERY DAY 16 g 3     fluticasone-salmeterol (ADVAIR) 250-50 MCG/DOSE inhaler Inhale 1 puff into the lungs 2 times daily 1 Inhaler 11     levothyroxine (SYNTHROID/LEVOTHROID) 75 MCG tablet Take 1 tablet (75 mcg) by mouth daily 90 tablet 1     LORazepam (ATIVAN) 0.5 MG tablet Take 1 tablet (0.5 mg) by mouth every 6 hours as needed for anxiety (not intended for daily use) 10 tablet 0     losartan (COZAAR) 25 MG tablet Take 1 tablet (25 mg) by mouth daily 90 tablet 0     MELATONIN PO        metoclopramide (REGLAN) 10 MG tablet Take 1 tablet (10 mg) by mouth once as needed For nausea/stomach upset 20 tablet 3     multivitamin, therapeutic with minerals (MULTI-VITAMIN) TABS Take 1 tablet by mouth daily       omeprazole (PRILOSEC) 40 MG DR capsule TAKE ONE CAPSULE BY MOUTH EVERY DAY 30-60 MINUTES BEFORE A MEAL 90 capsule 3     order for DME Incontinence items, disposable underwear, absorbency pads, liners. 80 Units 11     order for DME Walking cane for balance and mobility 1 Device 0     polyethylene glycol (MIRALAX) powder Take 17 g (1 capful) by mouth daily as needed for constipation 510 g 11  "    rosuvastatin (CRESTOR) 10 MG tablet Take 1 tablet (10 mg) by mouth At Bedtime For cholesterol 90 tablet 1     SUMAtriptan (IMITREX) 25 MG tablet Take 25 one time. May repeat 25mg every four hours up to a maximum of 100mg in 24 hours. 8 tablet 0     triamcinolone (KENALOG) 0.1 % external cream Apply topically 2 times daily as needed for irritation (eczema) 30 g 1     Allergies   Allergen Reactions     Amoxicillin      Lip swelling      Aspirin      GI upset     Milk Protein Extract      Upset stomach and dry cough     Zithromax [Azithromycin]      RASH       Reviewed and updated as needed this visit by Provider  Tobacco  Allergies  Meds  Problems  Med Hx  Surg Hx  Fam Hx         Review of Systems   ROS COMP: Constitutional, HEENT, cardiovascular, pulmonary, GI, , musculoskeletal, neuro, skin, endocrine and psych systems are negative, except as otherwise noted.    This document serves as a record of the services and decisions personally performed and made by SHAI Ruggiero. It was created on her behalf by Rachel Bautista, a trained medical scribe. The creation of this document is based on the provider's statements to the medical scribe.  Rachel Bautista July 17, 2019 10:38 AM          Objective    BP (!) 126/96 (BP Location: Right arm, Cuff Size: Adult Regular)   Pulse 86   Temp 98.1  F (36.7  C) (Oral)   Ht 1.549 m (5' 0.98\")   Wt 93.9 kg (207 lb)   SpO2 97%   BMI 39.14 kg/m    Body mass index is 39.14 kg/m .  Physical Exam   GENERAL: healthy, alert and no distress  RESP: lungs clear to auscultation - no rales, rhonchi or wheezes  CV: regular rate and rhythm, normal S1 S2, no S3 or S4, no murmur, click or rub, no peripheral edema and peripheral pulses strong  MS: no gross musculoskeletal defects noted, no edema  SKIN: no suspicious lesions or rashes  NEURO: Normal strength and tone, mentation intact and speech normal  PSYCH: mentation appears normal, affect normal/bright  White plaque on upper " bilateral eyelids c/w cholesterol deposits  Diagnostic Test Results:  Labs reviewed in Epic  No results found for this or any previous visit (from the past 24 hour(s)).        Assessment & Plan     Brooke was seen today for recheck medication.    Diagnoses and all orders for this visit:    Benign essential hypertension, Elevated blood pressure reading without diagnosis of hypertension- not well controlled today  Uncontrolled today. Start Losartan 25 mg daily to improve BP. Recommended to check BP at home occassionally.   -     losartan (COZAAR) 25 MG tablet; Take 1 tablet (25 mg) by mouth daily  -     Comprehensive metabolic panel  -     Albumin Random Urine Quantitative with Creat Ratio    Hypothyroidism, unspecified type  Controlled with current therapy. Checking labs to day for surveillance.   -     levothyroxine (SYNTHROID/LEVOTHROID) 75 MCG tablet; Take 1 tablet (75 mcg) by mouth daily  -     TSH with free T4 reflex  -     T3, Free  -     T4, free    Gastroesophageal reflux disease without esophagitis, Hiatal hernia  Controlled with current therapy.  -     omeprazole (PRILOSEC) 40 MG DR capsule; TAKE ONE CAPSULE BY MOUTH EVERY DAY 30-60 MINUTES BEFORE A MEAL  -     metoclopramide (REGLAN) 10 MG tablet; Take 1 tablet (10 mg) by mouth once as needed For nausea/stomach upset    Xanthelasma of eyelid, bilateral- medial upper lids - causing some medial ptosis and decreased visual fields, Hypertriglyceridemia  Currently untreated. Start rosuvastatin 10 mg daily for better control of cholesterol. Checking labs today for new baseline. Will refer to plastic surgery today for removal of cholesterol deposits.  -     Lipid panel reflex to direct LDL Fasting  -     rosuvastatin (CRESTOR) 10 MG tablet; Take 1 tablet (10 mg) by mouth At Bedtime For cholesterol    Anxiety  Stable. Refilled today, use is very rare.   -     LORazepam (ATIVAN) 0.5 MG tablet; Take 1 tablet (0.5 mg) by mouth every 6 hours as needed for anxiety (not  intended for daily use)    Migraine without status migrainosus, not intractable, unspecified migraine type  Controlled with current therapy.  -     SUMAtriptan (IMITREX) 25 MG tablet; Take 25 one time. May repeat 25mg every four hours up to a maximum of 100mg in 24 hours.    Moderate persistent asthma without complication, Seasonal allergic rhinitis, unspecified trigger  -     fluticasone-salmeterol (ADVAIR) 250-50 MCG/DOSE inhaler; Inhale 1 puff into the lungs 2 times daily  -     albuterol (PROAIR HFA/PROVENTIL HFA/VENTOLIN HFA) 108 (90 Base) MCG/ACT inhaler; Inhale 2 puffs into the lungs every 4 hours as needed for shortness of breath / dyspnea or wheezing Cough  -     cetirizine (ZYRTEC) 10 MG tablet; Take 1 tablet (10 mg) by mouth every morning  -     fluticasone (FLONASE) 50 MCG/ACT nasal spray; USE TWO SPRAYS IN EACH NOSTRIL EVERY DAY    Eczema, unspecified type  Controlled with current therapy.  -     triamcinolone (KENALOG) 0.1 % external cream; Apply topically 2 times daily as needed for irritation (eczema)    Risk for falls, Balance problems, Mixed incontinence  Would like refills for incontinence items. Pt denied need of new cane at this time.   -     order for DME; Incontinence items, disposable underwear, absorbency pads, liners.    HEVER on CPAP  New sleep medicine referral placed today to establish with a closer provider.   -     SLEEP EVALUATION & MANAGEMENT REFERRAL - ADULT -[facility] [place]  [phone] (Age 18 and up); Future    Visit for screening mammogram  Pt will schedule mammogram at future date.   -     *MA Screening Digital Bilateral; Future    Greater than 55 minutes were spent with the patient. The majority of this time was coordinating care and counseling regarding the above diagnosis .      Return in about 3 months (around 10/17/2019) for  Physical Exam, Lab Work.    The information in this document, created by the medical scribe for me, accurately reflects the  services I personally performed and the decisions made by me. I have reviewed and approved this document for accuracy prior to leaving the patient care area.  July 17, 2019 10:47 AM      Salome Hollis PA-C  [facility] [place]

## 2019-07-17 NOTE — TELEPHONE ENCOUNTER
Please call pt and advise that Plastic surgery is who she would see to discuss removal of the xanthelasma on her bilateral eyelids    Referral [facility]: Plastic and Reconstructive Surgery Clinic [facility] [phone]   [email or web address]      Salome Hollis MS, PA-C

## 2019-07-17 NOTE — LETTER
[facility]  [address] [place] [address]                              [phone]   July 18, 2019    Brooke John  [address] [address] [facility]   [facility] [address]      Dear Brooke,    Here is a summary of your recent test results:    -Your cholesterol is elevated, as we expected.  Start your rosuvastatin as prescribed and we will recheck at your physical in 3 months.  Please come to that appointment fasting  -Liver and gallbladder tests are normal (ALT,AST, Alk phos, bilirubin), kidney function is normal (Cr, GFR), sodium is normal, potassium is normal, calcium is normal, glucose is normal.  -TSH (thyroid stimulating hormone), T4 and T3 (thyroid hormones) are normal which indicates appropriate thyroid replacement dosing.  ADVISE: continuing same replacement dose and rechecking this in 12 months.  -Microalbumin (urine protein) test is normal.  ADVISE: rechecking this annually.    For additional lab test information, labtestsonline.org is an excellent reference.    Please call us at [phone] (or use [employer]) to address the above recommendations.            Thank you very much for choosing [facility].     Best regards,      Salome Hollis PA-C    Results for orders placed or performed in visit on 07/17/19   Lipid panel reflex to direct LDL Fasting   Result Value Ref Range    Cholesterol 307 (H) <200 mg/dL    Triglycerides 430 (H) <150 mg/dL    HDL Cholesterol 44 (L) >49 mg/dL    LDL Cholesterol Calculated  <100 mg/dL     Cannot estimate LDL when triglyceride exceeds 400 mg/dL    Non HDL Cholesterol 263 (H) <130 mg/dL   Comprehensive metabolic panel   Result Value Ref Range    Sodium 135 133 - 144 mmol/L    Potassium 4.3 3.4 - 5.3 mmol/L    Chloride 106 94 - 109 mmol/L    Carbon Dioxide 20 20 - 32 mmol/L    Anion Gap 9 3 - 14 mmol/L    Glucose 88 70 - 99 mg/dL    Urea Nitrogen 13 7 - 30 mg/dL    Creatinine 0.67 0.52 - 1.04 mg/dL    GFR  Estimate >90 >60 mL/min/[1.73_m2]    GFR Estimate If Black >90 >60 mL/min/[1.73_m2]    Calcium 8.7 8.5 - 10.1 mg/dL    Bilirubin Total 0.8 0.2 - 1.3 mg/dL    Albumin 3.7 3.4 - 5.0 g/dL    Protein Total 7.7 6.8 - 8.8 g/dL    Alkaline Phosphatase 110 40 - 150 U/L    ALT 34 0 - 50 U/L    AST 29 0 - 45 U/L   TSH with free T4 reflex   Result Value Ref Range    TSH 1.67 0.40 - 4.00 mU/L   T3, Free   Result Value Ref Range    Free T3 2.3 2.3 - 4.2 pg/mL   T4, free   Result Value Ref Range    T4 Free 1.09 0.76 - 1.46 ng/dL   Albumin Random Urine Quantitative with Creat Ratio   Result Value Ref Range    Creatinine Urine 93 mg/dL    Albumin Urine mg/L 8 mg/L    Albumin Urine mg/g Cr 8.46 0 - 25 mg/g Cr   LDL cholesterol direct   Result Value Ref Range    LDL Cholesterol Direct 179 (H) <100 mg/dL

## 2019-07-18 LAB
ALBUMIN SERPL-MCNC: 3.7 G/DL (ref 3.4–5)
ALP SERPL-CCNC: 110 U/L (ref 40–150)
ALT SERPL W P-5'-P-CCNC: 34 U/L (ref 0–50)
ANION GAP SERPL CALCULATED.3IONS-SCNC: 9 MMOL/L (ref 3–14)
AST SERPL W P-5'-P-CCNC: 29 U/L (ref 0–45)
BILIRUB SERPL-MCNC: 0.8 MG/DL (ref 0.2–1.3)
BUN SERPL-MCNC: 13 MG/DL (ref 7–30)
CALCIUM SERPL-MCNC: 8.7 MG/DL (ref 8.5–10.1)
CHLORIDE SERPL-SCNC: 106 MMOL/L (ref 94–109)
CHOLEST SERPL-MCNC: 307 MG/DL
CO2 SERPL-SCNC: 20 MMOL/L (ref 20–32)
CREAT SERPL-MCNC: 0.67 MG/DL (ref 0.52–1.04)
CREAT UR-MCNC: 93 MG/DL
GFR SERPL CREATININE-BSD FRML MDRD: >90 ML/MIN/{1.73_M2}
GLUCOSE SERPL-MCNC: 88 MG/DL (ref 70–99)
HDLC SERPL-MCNC: 44 MG/DL
LDLC SERPL CALC-MCNC: ABNORMAL MG/DL
LDLC SERPL DIRECT ASSAY-MCNC: 179 MG/DL
MICROALBUMIN UR-MCNC: 8 MG/L
MICROALBUMIN/CREAT UR: 8.46 MG/G CR (ref 0–25)
NONHDLC SERPL-MCNC: 263 MG/DL
POTASSIUM SERPL-SCNC: 4.3 MMOL/L (ref 3.4–5.3)
PROT SERPL-MCNC: 7.7 G/DL (ref 6.8–8.8)
SODIUM SERPL-SCNC: 135 MMOL/L (ref 133–144)
T4 FREE SERPL-MCNC: 1.09 NG/DL (ref 0.76–1.46)
TRIGL SERPL-MCNC: 430 MG/DL
TSH SERPL DL<=0.005 MIU/L-ACNC: 1.67 MU/L (ref 0.4–4)

## 2019-07-18 ASSESSMENT — ANXIETY QUESTIONNAIRES: GAD7 TOTAL SCORE: 2

## 2019-07-18 ASSESSMENT — ASTHMA QUESTIONNAIRES: ACT_TOTALSCORE: 20

## 2019-07-18 NOTE — RESULT ENCOUNTER NOTE
Letter mailed to pt:        Dear Brooke,     Here is a summary of your recent test results:     -Your cholesterol is elevated, as we expected.  Start your rosuvastatin as prescribed and we will recheck at your physical in 3 months.  Please come to that appointment fasting  -Liver and gallbladder tests are normal (ALT,AST, Alk phos, bilirubin), kidney function is normal (Cr, GFR), sodium is normal, potassium is normal, calcium is normal, glucose is normal.  -TSH (thyroid stimulating hormone), T4 and T3 (thyroid hormones) are normal which indicates appropriate thyroid replacement dosing.  ADVISE: continuing same replacement dose and rechecking this in 12 months.  -Microalbumin (urine protein) test is normal.  ADVISE: rechecking this annually.     For additional lab test information, labtestsonline.org is an excellent reference.     Please call us at [phone] (or use [employer]) to address the above recommendations.     Thank you very much for choosing [facility].      Best regards,       Salome Hollis PA-C

## 2019-07-26 ENCOUNTER — HOSPITAL ENCOUNTER (OUTPATIENT)
Dept: MAMMOGRAPHY | Facility: CLINIC | Age: 54
Discharge: HOME | End: 2019-07-26
Attending: PHYSICIAN ASSISTANT | Admitting: PHYSICIAN ASSISTANT
Payer: COMMERCIAL

## 2019-07-26 DIAGNOSIS — Z12.31 VISIT FOR SCREENING MAMMOGRAM: ICD-10-CM

## 2019-07-26 PROCEDURE — 77063 BREAST TOMOSYNTHESIS BI: CPT

## 2019-07-26 NOTE — RESULT ENCOUNTER NOTE
Brooke  Here are your recent results.  They are normal.  If you have any questions please do not hesitate to contact our office via phone ([phone]) or MyChart.    Salome Hollis MS, PA-C  [facility]

## 2019-08-05 ENCOUNTER — ALLIED HEALTH/NURSE VISIT (OUTPATIENT)
Dept: FAMILY MEDICINE | Facility: CLINIC | Age: 54
End: 2019-08-05
Payer: COMMERCIAL

## 2019-08-05 VITALS — DIASTOLIC BLOOD PRESSURE: 82 MMHG | SYSTOLIC BLOOD PRESSURE: 138 MMHG

## 2019-08-05 DIAGNOSIS — R03.0 ELEVATED BLOOD PRESSURE READING WITHOUT DIAGNOSIS OF HYPERTENSION: Primary | ICD-10-CM

## 2019-08-05 PROCEDURE — 99207 ZZC NO CHARGE NURSE ONLY: CPT | Performed by: PHYSICIAN ASSISTANT

## 2019-08-05 NOTE — PROGRESS NOTES
Brooke John was evaluated at [facility] on August 5, 2019 at which time her blood pressure was:    BP Readings from Last 3 Encounters:   08/05/19 138/82   07/17/19 (!) 126/96   06/17/19 149/82     Pulse Readings from Last 3 Encounters:   07/17/19 86   05/21/19 69   05/01/19 97       Reviewed lifestyle modifications for blood pressure control and reduction: including making healthy food choices, managing weight, getting regular exercise, smoking cessation, reducing alcohol consumption, monitoring blood pressure regularly.     Symptoms: None    BP Goal:< 140/90 mmHg    BP Assessment:  BP at goal    Potential Reasons for BP too high: NA - Not applicable    BP Follow-Up Plan: Recheck BP in 6 months at pharmacy    Recommendation to Provider: no change    Note completed by: Thank you,  Sandi Archer RPh, Mgr [facility] [place] [phone]'

## 2019-08-06 ENCOUNTER — TELEPHONE (OUTPATIENT)
Dept: FAMILY MEDICINE | Facility: CLINIC | Age: 54
End: 2019-08-06

## 2019-08-06 DIAGNOSIS — I10 BENIGN ESSENTIAL HYPERTENSION: ICD-10-CM

## 2019-08-06 NOTE — PROGRESS NOTES
BP is improved, but not quite at goal.  Increase to 50 mg once daily.  Will recheck at fasting physical in OCT.  Send in new RX #90 to pharmacy of choice.      Salome Hollis MS, PA-C

## 2019-08-06 NOTE — TELEPHONE ENCOUNTER
Attempt # 1    Called #   Telephone Information:   Mobile [phone]       Left a non detailed VM to call back at [phone] and ask for any available Triage Nurse.    Sherman Chowdary RN   [facility]

## 2019-08-07 RX ORDER — LOSARTAN POTASSIUM 50 MG/1
50 TABLET ORAL DAILY
Qty: 90 TABLET | Refills: 0 | Status: SHIPPED | OUTPATIENT
Start: 2019-08-07 | End: 2019-10-17

## 2019-08-07 NOTE — TELEPHONE ENCOUNTER
Progress Notes   Salome Hollis PA-C (Physician Assistant)     Physician Assistant      BP is improved, but not quite at goal.  Increase to 50 mg once daily.  Will recheck at fasting physical in OCT.  Send in new RX #90 to pharmacy of choice.       Salome Hollis, MS, PAFlowerC         Progress Notes   Sandi Archer [facility] (Pharmacist)      Brooke John was evaluated at [facility] on August 5, 2019 at which time her blood pressure was:         BP Readings from Last 3 Encounters:   08/05/19 138/82   07/17/19 (!) 126/96   06/17/19 149/82          Pulse Readings from Last 3 Encounters:   07/17/19 86   05/21/19 69   05/01/19 97         Reviewed lifestyle modifications for blood pressure control and reduction: including making healthy food choices, managing weight, getting regular exercise, smoking cessation, reducing alcohol consumption, monitoring blood pressure regularly.      Symptoms: None     BP Goal:< 140/90 mmHg     BP Assessment:  BP at goal     Potential Reasons for BP too high: NA - Not applicable     BP Follow-Up Plan: Recheck BP in 6 months at pharmacy     Recommendation to Provider: no change     Note completed by: Thank you,  Sandi Archer RP, Mgr [facility] [place] [phone]'

## 2019-08-07 NOTE — TELEPHONE ENCOUNTER
Called patient @ [phone] -     Advised of SEAMUS SANCHEZ message below - Patient stated an understanding and agreed with plan.  Rx sent to [facility]    Brenna Pickering RN  [facility]

## 2019-10-03 NOTE — PROGRESS NOTES
Brooke rescheduled annual pap visit from 12/3/19 to 12/4/19.  She requested a letter with the new date/time and location.  Sent this letter on file.

## 2019-10-03 NOTE — LETTER
[facility]  [id number] [place] [address]  [address]  [facility] [address]  [phone]      October 3, 2019        RE:  Appointment rescheduled.      Dear Brooke John,       Your appointment for your annual cpap visit has been rescheduled to Wednesday, December 4, 2019, at 9:00AM. Your appointment is at [facility], located in the [facility], [address] , [facility] [address], [place] [id number].  This building is located behind [facility]      Thank you    Sleep Care Team

## 2019-10-08 ENCOUNTER — OFFICE VISIT (OUTPATIENT)
Dept: FAMILY MEDICINE | Facility: CLINIC | Age: 54
End: 2019-10-08
Payer: COMMERCIAL

## 2019-10-08 VITALS
HEIGHT: 61 IN | OXYGEN SATURATION: 97 % | SYSTOLIC BLOOD PRESSURE: 122 MMHG | DIASTOLIC BLOOD PRESSURE: 76 MMHG | TEMPERATURE: 97.8 F | WEIGHT: 209 LBS | BODY MASS INDEX: 39.46 KG/M2 | HEART RATE: 102 BPM

## 2019-10-08 DIAGNOSIS — L01.00 IMPETIGO: Primary | ICD-10-CM

## 2019-10-08 DIAGNOSIS — Z23 NEED FOR PROPHYLACTIC VACCINATION AND INOCULATION AGAINST INFLUENZA: ICD-10-CM

## 2019-10-08 PROCEDURE — 90682 RIV4 VACC RECOMBINANT DNA IM: CPT | Performed by: FAMILY MEDICINE

## 2019-10-08 PROCEDURE — 99213 OFFICE O/P EST LOW 20 MIN: CPT | Mod: 25 | Performed by: FAMILY MEDICINE

## 2019-10-08 PROCEDURE — 90471 IMMUNIZATION ADMIN: CPT | Performed by: FAMILY MEDICINE

## 2019-10-08 RX ORDER — SULFAMETHOXAZOLE/TRIMETHOPRIM 800-160 MG
1 TABLET ORAL 2 TIMES DAILY
Qty: 14 TABLET | Refills: 0 | Status: SHIPPED | OUTPATIENT
Start: 2019-10-08 | End: 2019-12-06

## 2019-10-08 ASSESSMENT — MIFFLIN-ST. JEOR: SCORE: 1475.08

## 2019-10-08 NOTE — PROGRESS NOTES
"Subjective   Brooke John is a 54 year old female who presents to clinic today for the following health issues:    HPI   Derm Problem     Duration: few months     Description (location/character/radiation): Red, spotty, itchy. Right temple near the ear and one on forehead above left eyebrow.     Intensity:  mild    Accompanying signs and symptoms: Itchiness.     History (similar episodes/previous evaluation): None    Precipitating or alleviating factors: Possibly due to Cpap face mask wraps around the forehead and ears.    Therapies tried and outcome: Eczema cream, neosporin, wet wipes.      She has had her CPAP for a few years now. When she used cream she says it did not seem to be as bad but it did not go away. The damian above the eyebrow showed up first and spread. The cat likes to paw her awake.    Reviewed and updated as needed this visit by provider:  Tobacco  Allergies  Meds  Problems  Med Hx  Surg Hx  Fam Hx       Review of Systems   Constitutional, HEENT, cardiovascular, pulmonary, GI, , musculoskeletal, neuro, skin, endocrine and psych systems are negative, except as otherwise noted.    This document serves as a record of the services and decisions personally performed and made by Patrick Ludwig MD. It was created on his behalf by Bridger Fitzpatrick, a trained medical scribe. The creation of this document is based the provider's statements to the medical scribe.  Scribaissatou Fitzpatrick 1:34 PM, October 8, 2019    Objective   /76   Pulse 102   Temp 97.8  F (36.6  C) (Oral)   Ht 1.549 m (5' 0.98\")   Wt 94.8 kg (209 lb)   SpO2 97%   Breastfeeding? No   BMI 39.52 kg/m   Body mass index is 39.52 kg/m .  Physical Exam   GENERAL: healthy, alert, well nourished, well hydrated, no distress  EYES: Eyes grossly normal to inspection, extraocular movements - intact, and PERRL  HENT: ear canals- normal; TMs- normal; Nose- normal; Mouth- no ulcers, no lesions  NECK: no tenderness, no adenopathy, no " "asymmetry, no masses, no stiffness; thyroid- normal to palpation  RESP: lungs clear to auscultation - no rales, no rhonchi, no wheezes  CV: regular rates and rhythm, normal S1 S2, no S3 or S4 and no murmur, no click or rub -  ABDOMEN: soft, no tenderness, no  hepatosplenomegaly, no masses, normal bowel sounds  MS: extremities- no gross deformities noted, no edema  SKIN: 1 cm pink path above the left eyebrow and in the right temple area 4 8mm pink patches with yellow crust.    NEURO: strength and tone- normal, sensory exam- grossly normal, mentation- intact, speech- normal, reflexes- symmetric  BACK: no CVA tenderness, no paralumbar tenderness  PSYCH: Alert and oriented times 3; speech- coherent , normal rate and volume; able to articulate logical thoughts, able to abstract reason, no tangential thoughts, no hallucinations or delusions, affect- normal  LYMPHATICS: ant. cervical- normal, post. cervical- normal, axillary- normal, supraclavicular- normal, inguinal- normal    Diagnostic Test Results      Assessment & Plan   Brooke was seen today for derm problem and imm/inj.    Diagnoses and all orders for this visit:    Impetigo - likely bacterial infection. Patient has allergy to Amoxicillin. Recommend cleaning CPAP as well as starting:  -     sulfamethoxazole-trimethoprim (BACTRIM DS/SEPTRA DS) 800-160 MG tablet; Take 1 tablet by mouth 2 times daily for 7 days    Need for prophylactic vaccination and inoculation against influenza  -     INFLUENZA QUAD, RECOMBINANT, P-FREE (RIV4) (FLUBLOCK) [83286]  -     Vaccine Administration, Initial [39155]    BMI:   Estimated body mass index is 39.14 kg/m  as calculated from the following:    Height as of 7/17/19: 1.549 m (5' 0.98\").    Weight as of 7/17/19: 93.9 kg (207 lb).   Weight management plan: Discussed healthy diet and exercise guidelines    See Patient Instructions    Return in about 1 week (around 10/15/2019), or if symptoms worsen or fail to improve, for " recheck.    The information in this document, created by the medical scribe for me, accurately reflects the services I personally performed and the decisions made by me. I have reviewed and approved this document for accuracy prior to leaving the patient care area.  1:46 PM, 10/08/19        Glenn Ludwig MD   Pager - [phone]  [facility] [place]

## 2019-10-16 NOTE — PROGRESS NOTES
"   SUBJECTIVE:   CC: Brooke John is an 54 year old woman who presents for preventive health visit.  Healthy Habits:    In general, how would you rate your overall health?  Fair    Frequency of exercise:  2-3 days/week    Duration of exercise:  45-60 minutes    Do you usually eat at least 4 servings of fruit and vegetables a day, include whole grains    & fiber and avoid regularly eating high fat or \"junk\" foods?  Yes    Taking medications regularly:  Yes    Barriers to taking medications:  None    Medication side effects:  None    Ability to successfully perform activities of daily living:  No assistance needed    Home Safety:  No safety concerns identified    Hearing Impairment:  No hearing concerns    In the past 6 months, have you been bothered by leaking of urine? Yes    In general, how would you rate your overall mental or emotional health?  Fair      PHQ-2 Total Score:    Additional concerns today:  Yes (ear rash that she would like to talk about )    Ability to successfully perform activities of daily living: Yes, no assistance needed  Home safety:  none identified   Hearing impairment: No      Anxiety Follow-Up  Got a good support system through her [employer]. Does not have a relationship with her mother, father or siblings.     How are you doing with your anxiety since your last visit? No change    Are you having other symptoms that might be associated with anxiety? No    Have you had a significant life event? No     Are you feeling depressed? No    Do you have any concerns with your use of alcohol or other drugs? No    Social History     Tobacco Use     Smoking status: Passive Smoke Exposure - Never Smoker     Smokeless tobacco: Never Used   Substance Use Topics     Alcohol use: No     Alcohol/week: 0.0 standard drinks     Drug use: No     DARRON-7 SCORE 7/28/2017 6/11/2018 7/17/2019   Total Score 1 0 2     PHQ 7/28/2017 6/11/2018 7/17/2019   PHQ-9 Total Score 4 6 4   Q9: Thoughts of better off " dead/self-harm past 2 weeks Not at all Not at all Not at all       Asthma Follow-Up  Taking controller inhaler daily. With the weather getting colder she reports she is needing her rescue inhaler more often.   Was ACT completed today?  No      ACT Total Scores 6/11/2018 4/15/2019 7/17/2019   ACT TOTAL SCORE (Goal Greater than or Equal to 20) 20 20 20   In the past 12 months, how many times did you visit the emergency room for your asthma without being admitted to the hospital? 0 0 0   In the past 12 months, how many times were you hospitalized overnight because of your asthma? 0 0 0         Migraine Follow Up  Brooke controls her migraines with Imitrex 25 mg, and repeat 25 mg every 4 hours (max 100 mg) until migraine relief is reached. Reports she experiences migraines a couple times a month. Her triggers are cold weather changes and stress.     Since your last clinic visit, how have your headaches changed?  Improved    How often are you getting headaches or migraines? 2 times a month      Are you able to do normal daily activities when you have a migraine? No    Are you taking rescue/relief medications? (Select all that apply) sumatriptan (Imitrex)    How helpful is your rescue/relief medication?  I get total relief    Are you taking any medications to prevent migraines? (Select all that apply)  No    In the past 4 weeks, how often have you gone to urgent care or the emergency room because of your headaches?  0    Hypertension Follow-up  Brooke BP is currently well controlled with losartan 50 mg . She was initially started on losartan 25 mg but at a nurse only visit on 8/6/2019 showed her BP was still not passing. Losartan was then increased to 50 mg which has better a better dose for HTN control.    Do you check your blood pressure regularly outside of the clinic? No     Are you following a low salt diet? No  BP Readings from Last 3 Encounters:   10/17/19 124/72   10/08/19 122/76   08/05/19 138/82        Hypothyroidism Follow-up  Controlled with levothyroxine 75 mcg. Reports compliance with this medication, no negative side effects noted.     Since last visit, patient describes the following symptoms: Weight stable, no hair loss, no skin changes, no constipation, no loose stools  TSH   Date Value Ref Range Status   07/17/2019 1.67 0.40 - 4.00 mU/L Final   06/11/2018 2.34 0.40 - 4.00 mU/L Final   01/15/2018 3.33 0.40 - 4.00 mU/L Final   10/05/2017 4.64 (H) 0.40 - 4.00 mU/L Final   07/28/2017 4.19 (H) 0.40 - 4.00 mU/L Final     T4 Free   Date Value Ref Range Status   07/17/2019 1.09 0.76 - 1.46 ng/dL Final   06/11/2018 1.04 0.76 - 1.46 ng/dL Final   01/15/2018 1.06 0.76 - 1.46 ng/dL Final   10/05/2017 0.94 0.76 - 1.46 ng/dL Final   07/28/2017 0.85 0.76 - 1.46 ng/dL Final     Hyperlipidemia Follow-Up  Was not on rosuvastatin 10 mg at last lipid check. No negative side effects with this medication.     Are you having any of the following symptoms? (Select all that apply)  No complaints of shortness of breath, chest pain or pressure.  No increased sweating or nausea with activity.  No left-sided neck or arm pain.  No complaints of pain in calves when walking 1-2 blocks.    Are you regularly taking any medication or supplement to lower your cholesterol?   Yes- Rosuvastatin    Are you having muscle aches or other side effects that you think could be caused by your cholesterol lowering medication?  No  Recent Labs   Lab Test 07/17/19  1119 06/11/18  0936   CHOL 307* 237*   HDL 44* 40*   LDL Cannot estimate LDL when triglyceride exceeds 400 mg/dL  179* Cannot estimate LDL when triglyceride exceeds 400 mg/dL  135*   TRIG 430* 463*       GERD  Brooke currently takes omeprazole 40 mg daily for heartburn. She has also been taking reglan 10 mg approx. every other day for stomach upsets. An upper GI EGD in 2017 showed a hiatal hernia and a tortuous esophagus.   Recommended pantoprazole daily for control, in hopes Reglan  would be uses less often.     Constipation  Brooke has not been using MiraLAX for her constipation. She has found that one grapefruit a day keeps her BM's regular.        Today's PHQ-2 Score:   PHQ-2 ( 1999 [employer]) 6/11/2018   Q1: Little interest or pleasure in doing things 1   Q2: Feeling down, depressed or hopeless 1   PHQ-2 Score 2       Abuse: Current or Past(Physical, Sexual or Emotional)- NO  Do you feel safe in your environment? Sometimes    Social History     Tobacco Use     Smoking status: Passive Smoke Exposure - Never Smoker     Smokeless tobacco: Never Used   Substance Use Topics     Alcohol use: No     Alcohol/week: 0.0 standard drinks     If you drink alcohol do you typically have >3 drinks per day or >7 drinks per week? No    Alcohol Use 10/17/2019   Prescreen: >3 drinks/day or >7 drinks/week? No       Reviewed orders with patient.  Reviewed health maintenance and updated orders accordingly - Yes  Patient Active Problem List   Diagnosis     Scoliosis- congenital - saw [facility] as a child - now [facility] - has signif lumbar radiculopathy - uses a cane      Arthritis     Hypothyroidism, unspecified type     History of herniated intervertebral disc     Pulmonary nodules     Moderate persistent asthma     Lung nodule     Morbid obesity (H)     Constipation, unspecified constipation type     Gastroesophageal reflux disease without esophagitis     Use of cane as ambulatory aid     Lumbar radiculopathy- left - uses a cane - born with the scoliosis - sees [facility]      Primary osteoarthritis of both knees     Xanthelasma of eyelid, bilateral- medial upper lids - causing some medial ptosis and decreased visual fields      Hypertriglyceridemia     Anxiety     Seasonal allergic rhinitis, unspecified trigger     Migraine without status migrainosus, not intractable, unspecified migraine type     Hiatal hernia     Past Surgical History:   Procedure Laterality Date     ARTHROSCOPY KNEE  2007    left       BACK SURGERY        SECTION       COLONOSCOPY N/A 2017    no polyps./ return in ten years     COSMETIC SURGERY       ESOPHAGOSCOPY, GASTROSCOPY, DUODENOSCOPY (EGD), COMBINED N/A 2017    Procedure: COMBINED ESOPHAGOSCOPY, GASTROSCOPY, DUODENOSCOPY (EGD);  EGD w MAC:DX:Esophageal dysphagia,Hiatal hernia/prep & pre-op info mailed;  Surgeon: Moshe Caro MD;  Location:  GI     EYE SURGERY       ORTHOPEDIC SURGERY      lumbar discectomy       Social History     Tobacco Use     Smoking status: Passive Smoke Exposure - Never Smoker     Smokeless tobacco: Never Used   Substance Use Topics     Alcohol use: No     Alcohol/week: 0.0 standard drinks     Family History   Problem Relation Age of Onset     Scoliosis Son      Learning Disorder Son         lives in a protected place     Heart Disease Father         triple bypass     Unknown/Adopted Father      Unknown/Adopted Mother      Diabetes Type 2  Maternal Grandfather      Other - See Comments Brother         5 brothers, 1 sister.      No Known Problems Paternal Grandmother      No Known Problems Paternal Grandfather      Colon Cancer No family hx of          Current Outpatient Medications   Medication Sig Dispense Refill     albuterol (PROAIR HFA/PROVENTIL HFA/VENTOLIN HFA) 108 (90 Base) MCG/ACT inhaler Inhale 2 puffs into the lungs every 4 hours as needed for shortness of breath / dyspnea or wheezing Cough 1 Inhaler 5     cetirizine (ZYRTEC) 10 MG tablet Take 1 tablet (10 mg) by mouth every morning 90 tablet 1     fluticasone (FLONASE) 50 MCG/ACT nasal spray USE TWO SPRAYS IN EACH NOSTRIL EVERY DAY 16 g 1     fluticasone-salmeterol (ADVAIR) 250-50 MCG/DOSE inhaler Inhale 1 puff into the lungs 2 times daily 1 Inhaler 5     levothyroxine (SYNTHROID/LEVOTHROID) 75 MCG tablet Take 1 tablet (75 mcg) by mouth daily 90 tablet 1     LORazepam (ATIVAN) 0.5 MG tablet Take 1 tablet (0.5 mg) by mouth every 6 hours as needed for anxiety (not  intended for daily use) 10 tablet 0     losartan (COZAAR) 50 MG tablet Take 1 tablet (50 mg) by mouth daily 90 tablet 1     metoclopramide (REGLAN) 10 MG tablet Take 1 tablet (10 mg) by mouth once as needed (nausea/stomach upset) 20 tablet 3     multivitamin, therapeutic with minerals (MULTI-VITAMIN) TABS Take 1 tablet by mouth daily       mupirocin (BACTROBAN) 2 % external cream Apply topically 3 times daily for 5 days To forehead 30 g 0     order for DME Incontinence items, disposable underwear, absorbency pads, liners. 80 Units 11     order for DME Walking cane for balance and mobility 1 Device 0     pantoprazole (PROTONIX) 40 MG EC tablet Take 1 tablet (40 mg) by mouth daily For reflux/heartburn 90 tablet 1     polyethylene glycol (MIRALAX) powder Take 17 g (1 capful) by mouth daily as needed for constipation 510 g 11     rosuvastatin (CRESTOR) 10 MG tablet Take 1 tablet (10 mg) by mouth At Bedtime For cholesterol 90 tablet 1     SUMAtriptan (IMITREX) 25 MG tablet Take 25 one time. May repeat 25mg every four hours up to a maximum of 100mg in 24 hours. 8 tablet 0     triamcinolone (KENALOG) 0.1 % external cream Apply topically 2 times daily as needed for irritation (eczema) 30 g 1     MELATONIN PO        Allergies   Allergen Reactions     Amoxicillin      Lip swelling and hives     Aspirin      GI upset     Milk Protein Extract      Upset stomach and dry cough     Zithromax [Azithromycin]      RASH       Mammogram Screening: Patient over age 50, mutual decision to screen reflected in health maintenance.    Pertinent mammograms are reviewed under the imaging tab.  History of abnormal Pap smear: NO - age 30- 65 PAP every 3 years recommended  PAP / HPV Latest Ref Rng & Units 3/14/2016   PAP - NIL   HPV 16 DNA NEG Negative   HPV 18 DNA NEG Negative   OTHER HR HPV NEG Negative     Reviewed and updated as needed this visit by clinical staff  Tobacco  Allergies  Meds  Problems  Med Hx  Surg Hx  Fam Hx  Soc Hx           Reviewed and updated as needed this visit by Provider  Tobacco  Allergies  Meds  Problems  Med Hx  Surg Hx  Fam Hx  Soc Hx         Past Medical History:   Diagnosis Date     Allergic reaction to drug - amoxicillin - Hives - proven 2016     Arthritis      Depressive disorder      Hypertriglyceridemia      Lung nodule     w/u neg - Dr Boyer     Moderate persistent asthma      Other specified hypothyroidism 3/14/2016     Scoliosis       Past Surgical History:   Procedure Laterality Date     ARTHROSCOPY KNEE  2007    left      BACK SURGERY        SECTION       COLONOSCOPY N/A 2017    no polyps./ return in ten years     COSMETIC SURGERY       ESOPHAGOSCOPY, GASTROSCOPY, DUODENOSCOPY (EGD), COMBINED N/A 2017    Procedure: COMBINED ESOPHAGOSCOPY, GASTROSCOPY, DUODENOSCOPY (EGD);  EGD w MAC:DX:Esophageal dysphagia,Hiatal hernia/prep & pre-op info mailed;  Surgeon: Moshe Caro MD;  Location:  GI     EYE SURGERY       ORTHOPEDIC SURGERY      lumbar discectomy       Review of Systems  CONSTITUTIONAL: NEGATIVE for fever, chills, change in weight  INTEGUMENTARU/SKIN: NEGATIVE for worrisome rashes, moles or lesions  EYES: NEGATIVE for vision changes or irritation  ENT: NEGATIVE for ear, mouth and throat problems  RESP: NEGATIVE for significant cough or SOB  BREAST: NEGATIVE for masses, tenderness or discharge  CV: NEGATIVE for chest pain, palpitations or peripheral edema  GI: NEGATIVE for nausea, abdominal pain, heartburn, or change in bowel habits  : NEGATIVE for unusual urinary or vaginal symptoms. Periods are regular.  MUSCULOSKELETAL: NEGATIVE for significant arthralgias or myalgia  NEURO: NEGATIVE for weakness, dizziness or paresthesias  PSYCHIATRIC: NEGATIVE for changes in mood or affect    This document serves as a record of the services and decisions personally performed and made by SHAI Ruggiero. It was created on her behalf by lesly Gagnon  "trained medical scribe. The creation of this document is based on the provider's statements to the medical scribe.  Rachel Bautista October 17, 2019 9:31 AM    OBJECTIVE:   /72 (BP Location: Right arm, Cuff Size: Adult Regular)   Pulse 78   Temp 97.5  F (36.4  C) (Oral)   Ht 1.549 m (5' 0.98\")   Wt 95.9 kg (211 lb 8 oz)   SpO2 98%   BMI 39.99 kg/m    Physical Exam  GENERAL: healthy, alert and no distress  EYES: Eyes - bilateral upper lids show large xanthomas , PERRL and conjunctivae and sclerae normal  HENT: ear canals and TM's normal, nose and mouth without ulcers or lesions  NECK: no adenopathy, no asymmetry, masses, or scars and thyroid normal to palpation  RESP: lungs clear to auscultation - no rales, rhonchi or wheezes  BREAST: normal without masses, tenderness or nipple discharge and no palpable axillary masses or adenopathy  CV: regular rate and rhythm, normal S1 S2, no S3 or S4, no murmur, click or rub, no peripheral edema and peripheral pulses strong  ABDOMEN: soft, nontender, no hepatosplenomegaly, no masses and bowel sounds normal  MS: no gross musculoskeletal defects noted, no edema  SKIN: no suspicious lesions or rashes, erythematous patch above left medial eyebrow with overlying irritation - c/w resolving impetigo.  Rash behind right ear resolved  NEURO: Normal strength and tone, mentation intact and speech normal  PSYCH: mentation appears normal, affect normal/bright    Diagnostic Test Results:  Labs reviewed in Epic  Results for orders placed or performed in visit on 10/17/19 (from the past 24 hour(s))   CBC with platelets   Result Value Ref Range    WBC 5.9 4.0 - 11.0 10e9/L    RBC Count 4.50 3.8 - 5.2 10e12/L    Hemoglobin 12.5 11.7 - 15.7 g/dL    Hematocrit 38.8 35.0 - 47.0 %    MCV 86 78 - 100 fl    MCH 27.8 26.5 - 33.0 pg    MCHC 32.2 31.5 - 36.5 g/dL    RDW 13.9 10.0 - 15.0 %    Platelet Count 196 150 - 450 10e9/L       ASSESSMENT/PLAN:   Brooke was seen today for " physical.    Diagnoses and all orders for this visit:    Routine general medical examination at a health care facility  Normal physical exam, pt doing well.     Morbid obesity (H)  Discussed the importance of exercising and healthy eating habits today. BMI today is 39.99    Hypothyroidism, unspecified type  Stable. Controlled with current therapy. Checking labs today for surveillance.    -     levothyroxine (SYNTHROID/LEVOTHROID) 75 MCG tablet; Take 1 tablet (75 mcg) by mouth daily  -     TSH  -     T4, free    Benign essential hypertension  Stable. Controlled with current therapy. Checking labs today for surveillance.  -     losartan (COZAAR) 50 MG tablet; Take 1 tablet (50 mg) by mouth daily  -     Comprehensive metabolic panel    Moderate persistent asthma without complication, Seasonal allergic rhinitis, unspecified trigger  Stable. Controlled with current therapy. Checking labs today for surveillance.  -     fluticasone-salmeterol (ADVAIR) 250-50 MCG/DOSE inhaler; Inhale 1 puff into the lungs 2 times daily  -     albuterol (PROAIR HFA/PROVENTIL HFA/VENTOLIN HFA) 108 (90 Base) MCG/ACT inhaler; Inhale 2 puffs into the lungs every 4 hours as needed for shortness of breath / dyspnea or wheezing Cough  -     cetirizine (ZYRTEC) 10 MG tablet; Take 1 tablet (10 mg) by mouth every morning  -     fluticasone (FLONASE) 50 MCG/ACT nasal spray; USE TWO SPRAYS IN EACH NOSTRIL EVERY DAY    Hypertriglyceridemia. Xanthelasma of eyelid, bilateral- medial upper lids - causing some medial ptosis and decreased visual fields   Stable. Controlled with current therapy. Checking labs today for surveillance.  -     rosuvastatin (CRESTOR) 10 MG tablet; Take 1 tablet (10 mg) by mouth At Bedtime For cholesterol  -     Lipid panel reflex to direct LDL Fasting  -     Comprehensive metabolic panel    Migraine without status migrainosus, not intractable, unspecified migraine type  Stable. Controlled with current therapy.   -     SUMAtriptan  "(IMITREX) 25 MG tablet; Take 25 one time. May repeat 25mg every four hours up to a maximum of 100mg in 24 hours.    Gastroesophageal reflux disease without esophagitis, Hiatal hernia  Switch to pantoprazole 40 mg (from omeprazole) for more effective GERD control and Reglan use will become more rare.   -     metoclopramide (REGLAN) 10 MG tablet; Take 1 tablet (10 mg) by mouth once as needed (nausea/stomach upset)  -     pantoprazole (PROTONIX) 40 MG EC tablet; Take 1 tablet (40 mg) by mouth daily For reflux/heartburn    Constipation, unspecified constipation type  Pt uses grapefruit for regular BM's and will use MiraLAX PRN.   -     polyethylene glycol (MIRALAX) powder; Take 17 g (1 capful) by mouth daily as needed for constipation    Screening for deficiency anemia  Routine screening.   -     CBC with platelets    Anxiety  Well controlled at this point in time.     Numerous skin moles, Skin lesion  Derm referral today. Start application of Bactroban TID x 5 days to T-zone/erythematous patch  -     SKIN CARE REFERRAL  -     mupirocin (BACTROBAN) 2 % external cream; Apply topically 3 times daily for 5 days To forehead        COUNSELING:  Reviewed preventive health counseling, as reflected in patient instructions       Regular exercise       Healthy diet/nutrition    Estimated body mass index is 39.99 kg/m  as calculated from the following:    Height as of this encounter: 1.549 m (5' 0.98\").    Weight as of this encounter: 95.9 kg (211 lb 8 oz).    Weight management plan: Discussed healthy diet and exercise guidelines     reports that she is a non-smoker but has been exposed to tobacco smoke. She has never used smokeless tobacco.      Counseling Resources:  ATP IV Guidelines  Pooled Cohorts Equation Calculator  Breast Cancer Risk Calculator  FRAX Risk Assessment  ICSI Preventive Guidelines  Dietary Guidelines for Americans, 2010  USDA's MyPlate  ASA Prophylaxis  Lung CA Screening    The information in this document, " created by the medical scribe for me, accurately reflects the services I personally performed and the decisions made by me. I have reviewed and approved this document for accuracy prior to leaving the patient care area.  October 17, 2019 9:31 AM      Salome Hollis PA-C  [facility] [place]

## 2019-10-17 ENCOUNTER — OFFICE VISIT (OUTPATIENT)
Dept: FAMILY MEDICINE | Facility: CLINIC | Age: 54
End: 2019-10-17
Payer: COMMERCIAL

## 2019-10-17 VITALS
TEMPERATURE: 97.5 F | DIASTOLIC BLOOD PRESSURE: 72 MMHG | HEIGHT: 61 IN | WEIGHT: 211.5 LBS | SYSTOLIC BLOOD PRESSURE: 124 MMHG | OXYGEN SATURATION: 98 % | BODY MASS INDEX: 39.93 KG/M2 | HEART RATE: 78 BPM

## 2019-10-17 DIAGNOSIS — J30.2 SEASONAL ALLERGIC RHINITIS, UNSPECIFIED TRIGGER: ICD-10-CM

## 2019-10-17 DIAGNOSIS — Z00.00 ROUTINE GENERAL MEDICAL EXAMINATION AT A HEALTH CARE FACILITY: Primary | ICD-10-CM

## 2019-10-17 DIAGNOSIS — H02.66 XANTHELASMA OF EYELID, BILATERAL: ICD-10-CM

## 2019-10-17 DIAGNOSIS — E03.9 HYPOTHYROIDISM, UNSPECIFIED TYPE: ICD-10-CM

## 2019-10-17 DIAGNOSIS — L98.9 SKIN LESION: ICD-10-CM

## 2019-10-17 DIAGNOSIS — E78.1 HYPERTRIGLYCERIDEMIA: ICD-10-CM

## 2019-10-17 DIAGNOSIS — D22.9 NUMEROUS SKIN MOLES: ICD-10-CM

## 2019-10-17 DIAGNOSIS — F41.9 ANXIETY: ICD-10-CM

## 2019-10-17 DIAGNOSIS — I10 BENIGN ESSENTIAL HYPERTENSION: ICD-10-CM

## 2019-10-17 DIAGNOSIS — E66.01 MORBID OBESITY (H): ICD-10-CM

## 2019-10-17 DIAGNOSIS — K44.9 HIATAL HERNIA: ICD-10-CM

## 2019-10-17 DIAGNOSIS — Z13.0 SCREENING FOR DEFICIENCY ANEMIA: ICD-10-CM

## 2019-10-17 DIAGNOSIS — G43.909 MIGRAINE WITHOUT STATUS MIGRAINOSUS, NOT INTRACTABLE, UNSPECIFIED MIGRAINE TYPE: ICD-10-CM

## 2019-10-17 DIAGNOSIS — H02.63 XANTHELASMA OF EYELID, BILATERAL: ICD-10-CM

## 2019-10-17 DIAGNOSIS — K59.00 CONSTIPATION, UNSPECIFIED CONSTIPATION TYPE: ICD-10-CM

## 2019-10-17 DIAGNOSIS — J45.40 MODERATE PERSISTENT ASTHMA WITHOUT COMPLICATION: ICD-10-CM

## 2019-10-17 DIAGNOSIS — K21.9 GASTROESOPHAGEAL REFLUX DISEASE WITHOUT ESOPHAGITIS: ICD-10-CM

## 2019-10-17 PROBLEM — R03.0 ELEVATED BLOOD PRESSURE READING WITHOUT DIAGNOSIS OF HYPERTENSION: Status: RESOLVED | Noted: 2019-07-17 | Resolved: 2019-10-17

## 2019-10-17 PROBLEM — G43.829 MENSTRUAL MIGRAINE WITHOUT STATUS MIGRAINOSUS, NOT INTRACTABLE: Status: RESOLVED | Noted: 2018-04-16 | Resolved: 2019-10-17

## 2019-10-17 LAB
ERYTHROCYTE [DISTWIDTH] IN BLOOD BY AUTOMATED COUNT: 13.9 % (ref 10–15)
HCT VFR BLD AUTO: 38.8 % (ref 35–47)
HGB BLD-MCNC: 12.5 G/DL (ref 11.7–15.7)
MCH RBC QN AUTO: 27.8 PG (ref 26.5–33)
MCHC RBC AUTO-ENTMCNC: 32.2 G/DL (ref 31.5–36.5)
MCV RBC AUTO: 86 FL (ref 78–100)
PLATELET # BLD AUTO: 196 10E9/L (ref 150–450)
RBC # BLD AUTO: 4.5 10E12/L (ref 3.8–5.2)
WBC # BLD AUTO: 5.9 10E9/L (ref 4–11)

## 2019-10-17 PROCEDURE — 80061 LIPID PANEL: CPT | Performed by: PHYSICIAN ASSISTANT

## 2019-10-17 PROCEDURE — 99396 PREV VISIT EST AGE 40-64: CPT | Performed by: PHYSICIAN ASSISTANT

## 2019-10-17 PROCEDURE — 84439 ASSAY OF FREE THYROXINE: CPT | Performed by: PHYSICIAN ASSISTANT

## 2019-10-17 PROCEDURE — 85027 COMPLETE CBC AUTOMATED: CPT | Performed by: PHYSICIAN ASSISTANT

## 2019-10-17 PROCEDURE — 80053 COMPREHEN METABOLIC PANEL: CPT | Performed by: PHYSICIAN ASSISTANT

## 2019-10-17 PROCEDURE — 99214 OFFICE O/P EST MOD 30 MIN: CPT | Mod: 25 | Performed by: PHYSICIAN ASSISTANT

## 2019-10-17 PROCEDURE — 84443 ASSAY THYROID STIM HORMONE: CPT | Performed by: PHYSICIAN ASSISTANT

## 2019-10-17 PROCEDURE — 36415 COLL VENOUS BLD VENIPUNCTURE: CPT | Performed by: PHYSICIAN ASSISTANT

## 2019-10-17 RX ORDER — PANTOPRAZOLE SODIUM 40 MG/1
40 TABLET, DELAYED RELEASE ORAL DAILY
Qty: 90 TABLET | Refills: 1 | Status: SHIPPED | OUTPATIENT
Start: 2019-10-17 | End: 2020-05-18

## 2019-10-17 RX ORDER — LEVOTHYROXINE SODIUM 75 UG/1
75 TABLET ORAL DAILY
Qty: 90 TABLET | Refills: 1 | Status: SHIPPED | OUTPATIENT
Start: 2019-10-17 | End: 2020-04-29

## 2019-10-17 RX ORDER — FLUTICASONE PROPIONATE 50 MCG
SPRAY, SUSPENSION (ML) NASAL
Qty: 16 G | Refills: 1 | Status: SHIPPED | OUTPATIENT
Start: 2019-10-17 | End: 2020-03-26

## 2019-10-17 RX ORDER — MUPIROCIN CALCIUM 20 MG/G
CREAM TOPICAL 3 TIMES DAILY
Qty: 30 G | Refills: 0 | Status: SHIPPED | OUTPATIENT
Start: 2019-10-17 | End: 2019-12-06

## 2019-10-17 RX ORDER — LOSARTAN POTASSIUM 50 MG/1
50 TABLET ORAL DAILY
Qty: 90 TABLET | Refills: 1 | Status: SHIPPED | OUTPATIENT
Start: 2019-10-17 | End: 2020-05-08

## 2019-10-17 RX ORDER — ALBUTEROL SULFATE 90 UG/1
2 AEROSOL, METERED RESPIRATORY (INHALATION) EVERY 4 HOURS PRN
Qty: 1 INHALER | Refills: 5 | Status: SHIPPED | OUTPATIENT
Start: 2019-10-17 | End: 2020-05-18

## 2019-10-17 RX ORDER — ROSUVASTATIN CALCIUM 10 MG/1
10 TABLET, COATED ORAL AT BEDTIME
Qty: 90 TABLET | Refills: 1 | Status: SHIPPED | OUTPATIENT
Start: 2019-10-17 | End: 2020-05-12

## 2019-10-17 RX ORDER — OMEPRAZOLE 40 MG/1
CAPSULE, DELAYED RELEASE ORAL
Qty: 90 CAPSULE | Refills: 3 | Status: CANCELLED | OUTPATIENT
Start: 2019-10-17

## 2019-10-17 RX ORDER — POLYETHYLENE GLYCOL 3350 17 G/17G
1 POWDER, FOR SOLUTION ORAL DAILY PRN
Qty: 510 G | Refills: 11 | Status: SHIPPED | OUTPATIENT
Start: 2019-10-17 | End: 2020-11-04

## 2019-10-17 RX ORDER — SUMATRIPTAN 25 MG/1
TABLET, FILM COATED ORAL
Qty: 8 TABLET | Refills: 0 | Status: SHIPPED | OUTPATIENT
Start: 2019-10-17 | End: 2020-01-02

## 2019-10-17 RX ORDER — CETIRIZINE HYDROCHLORIDE 10 MG/1
10 TABLET ORAL EVERY MORNING
Qty: 90 TABLET | Refills: 1 | Status: SHIPPED | OUTPATIENT
Start: 2019-10-17 | End: 2020-05-12

## 2019-10-17 RX ORDER — METOCLOPRAMIDE 10 MG/1
10 TABLET ORAL
Qty: 20 TABLET | Refills: 3 | Status: SHIPPED | OUTPATIENT
Start: 2019-10-17 | End: 2020-05-18

## 2019-10-17 ASSESSMENT — ACTIVITIES OF DAILY LIVING (ADL): CURRENT_FUNCTION: NO ASSISTANCE NEEDED

## 2019-10-17 ASSESSMENT — MIFFLIN-ST. JEOR: SCORE: 1486.42

## 2019-10-17 NOTE — LETTER
[facility]  [address] [place] [address]                              [phone]   October 21, 2019    Brooke John  [address]   [place] [place] [address]      Dear Brooke,    Here is a summary of your recent test results:  -Normal red blood cell (hgb) levels, normal white blood cell count and normal platelet levels.  -Cholesterol levels are markedly improved and at your goal levels.  ADVISE: continuing your medication, a regular exercise program with at least 150 minutes of aerobic exercise per week, and eating a low saturated fat/low carbohydrate diet.  Also, you should recheck this fasting cholesterol panel in 12 months.  -Liver and gallbladder tests are normal (ALT,AST, Alk phos, bilirubin), kidney function is normal (Cr, GFR), sodium is normal, potassium is normal, calcium is normal, glucose is normal.  -TSH (thyroid stimulating hormone) level is normal which indicates appropriate thyroid replacement dosing.  ADVISE: continuing same replacement dose and rechecking this in 12 months.  Your tests results are enclosed.    Please call us at [phone] (or use [employer]) to address the above recommendations.            Thank you very much for choosing [facility].     Best regards,      Salome Hollis PA-C    Results for orders placed or performed in visit on 10/17/19   Lipid panel reflex to direct LDL Fasting   Result Value Ref Range    Cholesterol 165 <200 mg/dL    Triglycerides 195 (H) <150 mg/dL    HDL Cholesterol 49 (L) >49 mg/dL    LDL Cholesterol Calculated 77 <100 mg/dL    Non HDL Cholesterol 116 <130 mg/dL   Comprehensive metabolic panel   Result Value Ref Range    Sodium 138 133 - 144 mmol/L    Potassium 4.3 3.4 - 5.3 mmol/L    Chloride 109 94 - 109 mmol/L    Carbon Dioxide 24 20 - 32 mmol/L    Anion Gap 5 3 - 14 mmol/L    Glucose 72 70 - 99 mg/dL    Urea Nitrogen 13 7 - 30 mg/dL    Creatinine 0.76 0.52 - 1.04 mg/dL     GFR Estimate 88 >60 mL/min/[1.73_m2]    GFR Estimate If Black >90 >60 mL/min/[1.73_m2]    Calcium 8.6 8.5 - 10.1 mg/dL    Bilirubin Total 0.7 0.2 - 1.3 mg/dL    Albumin 4.0 3.4 - 5.0 g/dL    Protein Total 7.5 6.8 - 8.8 g/dL    Alkaline Phosphatase 89 40 - 150 U/L    ALT 46 0 - 50 U/L    AST 27 0 - 45 U/L   TSH   Result Value Ref Range    TSH 1.36 0.40 - 4.00 mU/L   T4, free   Result Value Ref Range    T4 Free 0.99 0.76 - 1.46 ng/dL   CBC with platelets   Result Value Ref Range    WBC 5.9 4.0 - 11.0 10e9/L    RBC Count 4.50 3.8 - 5.2 10e12/L    Hemoglobin 12.5 11.7 - 15.7 g/dL    Hematocrit 38.8 35.0 - 47.0 %    MCV 86 78 - 100 fl    MCH 27.8 26.5 - 33.0 pg    MCHC 32.2 31.5 - 36.5 g/dL    RDW 13.9 10.0 - 15.0 %    Platelet Count 196 150 - 450 10e9/L

## 2019-10-18 LAB
ALBUMIN SERPL-MCNC: 4 G/DL (ref 3.4–5)
ALP SERPL-CCNC: 89 U/L (ref 40–150)
ALT SERPL W P-5'-P-CCNC: 46 U/L (ref 0–50)
ANION GAP SERPL CALCULATED.3IONS-SCNC: 5 MMOL/L (ref 3–14)
AST SERPL W P-5'-P-CCNC: 27 U/L (ref 0–45)
BILIRUB SERPL-MCNC: 0.7 MG/DL (ref 0.2–1.3)
BUN SERPL-MCNC: 13 MG/DL (ref 7–30)
CALCIUM SERPL-MCNC: 8.6 MG/DL (ref 8.5–10.1)
CHLORIDE SERPL-SCNC: 109 MMOL/L (ref 94–109)
CHOLEST SERPL-MCNC: 165 MG/DL
CO2 SERPL-SCNC: 24 MMOL/L (ref 20–32)
CREAT SERPL-MCNC: 0.76 MG/DL (ref 0.52–1.04)
GFR SERPL CREATININE-BSD FRML MDRD: 88 ML/MIN/{1.73_M2}
GLUCOSE SERPL-MCNC: 72 MG/DL (ref 70–99)
HDLC SERPL-MCNC: 49 MG/DL
LDLC SERPL CALC-MCNC: 77 MG/DL
NONHDLC SERPL-MCNC: 116 MG/DL
POTASSIUM SERPL-SCNC: 4.3 MMOL/L (ref 3.4–5.3)
PROT SERPL-MCNC: 7.5 G/DL (ref 6.8–8.8)
SODIUM SERPL-SCNC: 138 MMOL/L (ref 133–144)
T4 FREE SERPL-MCNC: 0.99 NG/DL (ref 0.76–1.46)
TRIGL SERPL-MCNC: 195 MG/DL
TSH SERPL DL<=0.005 MIU/L-ACNC: 1.36 MU/L (ref 0.4–4)

## 2019-10-21 NOTE — RESULT ENCOUNTER NOTE
Brooke  I have reviewed your recent labs. Here are the results:    -Normal red blood cell (hgb) levels, normal white blood cell count and normal platelet levels.  -Cholesterol levels are markedly improved and at your goal levels.  ADVISE: continuing your medication, a regular exercise program with at least 150 minutes of aerobic exercise per week, and eating a low saturated fat/low carbohydrate diet.  Also, you should recheck this fasting cholesterol panel in 12 months.  -Liver and gallbladder tests are normal (ALT,AST, Alk phos, bilirubin), kidney function is normal (Cr, GFR), sodium is normal, potassium is normal, calcium is normal, glucose is normal.  -TSH (thyroid stimulating hormone) level is normal which indicates appropriate thyroid replacement dosing.  ADVISE: continuing same replacement dose and rechecking this in 12 months.     If you have any questions please do not hesitate to contact our office via phone ([phone]) or MyChart.    Salome Hollis, MS, PA-C  [facility]

## 2019-12-04 ENCOUNTER — TELEPHONE (OUTPATIENT)
Dept: SLEEP MEDICINE | Facility: CLINIC | Age: 54
End: 2019-12-04

## 2019-12-04 ENCOUNTER — OFFICE VISIT (OUTPATIENT)
Dept: SLEEP MEDICINE | Facility: CLINIC | Age: 54
End: 2019-12-04
Payer: COMMERCIAL

## 2019-12-04 VITALS
HEART RATE: 85 BPM | RESPIRATION RATE: 20 BRPM | SYSTOLIC BLOOD PRESSURE: 125 MMHG | WEIGHT: 211 LBS | OXYGEN SATURATION: 96 % | DIASTOLIC BLOOD PRESSURE: 78 MMHG | BODY MASS INDEX: 39.84 KG/M2 | HEIGHT: 61 IN

## 2019-12-04 DIAGNOSIS — G47.33 OSA ON CPAP: Primary | ICD-10-CM

## 2019-12-04 PROCEDURE — 99214 OFFICE O/P EST MOD 30 MIN: CPT | Performed by: INTERNAL MEDICINE

## 2019-12-04 ASSESSMENT — MIFFLIN-ST. JEOR: SCORE: 1484.47

## 2019-12-04 NOTE — NURSING NOTE
"/78   Pulse 85   Resp 20   Ht 1.549 m (5' 1\")   Wt 95.7 kg (211 lb)   SpO2 96%   BMI 39.87 kg/m      Neck 39cm/15.25inches    DME-cpap    ESS- 15  SHARATH-14    Med Rec-complete    Leana Boone, Medical Assistant 12/4/2019 9:11 AM      "

## 2019-12-04 NOTE — PATIENT INSTRUCTIONS
Here are the ranges based off your height and current weight.    Body mass index is 39.87 kg/m .        Underweight = <18.5  Normal weight = 18.5-24.9   Overweight = 25-29.9   Obesity = BMI of 30 or greater       Your BMI is Body mass index is 39.87 kg/m .  Weight management is a personal decision.  If you are interested in exploring weight loss strategies, the following discussion covers the approaches that may be successful. Body mass index (BMI) is one way to tell whether you are at a healthy weight, overweight, or obese. It measures your weight in relation to your height.  A BMI of 18.5 to 24.9 is in the healthy range. A person with a BMI of 25 to 29.9 is considered overweight, and someone with a BMI of 30 or greater is considered obese. More than two-thirds of American adults are considered overweight or obese.  Being overweight or obese increases the risk for further weight gain. Excess weight may lead to heart disease and diabetes.  Creating and following plans for healthy eating and physical activity may help you improve your health.  Weight control is part of healthy lifestyle and includes exercise, emotional health, and healthy eating habits. Careful eating habits lifelong are the mainstay of weight control. Though there are significant health benefits from weight loss, long-term weight loss with diet alone may be very difficult to achieve- studies show long-term success with dietary management in less than 10% of people. Attaining a healthy weight may be especially difficult to achieve in those with severe obesity. In some cases, medications, devices and surgical management might be considered.  What can you do?  If you are overweight or obese and are interested in methods for weight loss, you should discuss this with your provider.     Consider reducing daily calorie intake by 500 calories.     Keep a food journal.     Avoiding skipping meals, consider cutting portions instead.    Diet combined with  exercise helps maintain muscle while optimizing fat loss. Strength training is particularly important for building and maintaining muscle mass. Exercise helps reduce stress, increase energy, and improves fitness. Increasing exercise without diet control, however, may not burn enough calories to loose weight.       Start walking three days a week 10-20 minutes at a time    Work towards walking thirty minutes five days a week     Eventually, increase the speed of your walking for 1-2 minutes at time    In addition, we recommend that you review healthy lifestyles and methods for weight loss available through the National Institutes of Health patient information sites:  http://win.niddk.nih.gov/publications/index.htm    And look into health and wellness programs that may be available through your health insurance provider, employer, local community center, or loren club.    Weight management plan: Discussed healthy diet and exercise guidelines   Your blood pressure was checked while you were in clinic today.  Please read the guidelines below about what these numbers mean and what you should do about them.  Your systolic blood pressure is the top number.  This is the pressure when the heart is pumping.  Your diastolic blood pressure is the bottom number.  This is the pressure in between beats.  If your systolic blood pressure is less than 120 and your diastolic blood pressure is less than 80, then your blood pressure is normal. There is nothing more that you need to do about it  If your systolic blood pressure is 120-139 or your diastolic blood pressure is 80-89, your blood pressure may be higher than it should be.  You should have your blood pressure re-checked within a year by a primary care provider.  If your systolic blood pressure is 140 or greater or your diastolic blood pressure is 90 or greater, you may have high blood pressure.  High blood pressure is treatable, but if left untreated over time it can put you at  risk for heart attack, stroke, or kidney failure.  You should have your blood pressure re-checked by a primary care provider within the next four weeks.

## 2019-12-04 NOTE — PROGRESS NOTES
SLEEP CLINIC FOLLOW UP VISIT:      Visit Date:  12/04/2019      CHIEF COMPLAINT:  Follow-up of HEVER.      HISTORY OF PRESENT ILLNESS:  Ms. Boroke John is a 54 year-old female who presents to sleep clinic today accompanied by her ILS worker for follow-up of HEVER.      She is being treated with auto CPAP pressure setting 8-16 cm water.  She uses a full facemask. She reports using the device regularly during sleep.  She reports sometimes the hose gets disconnected from the mask.  She also reports occasional air leaks with due to mask dislodgment with positional changes during sleep. She sleeps alone and there is not anyone to report whether or not she snores with the CPAP.  She denies awakening due to gasping for air or choking with CPAP.  She  reports air hunger when she first puts the mask on.   She denies fatigue or excessive daytime sleepiness, though it does not correlate with the [place] sleepiness score that she endorses at 15 out of 24.    Her weight has not significantly changed since her last sleep clinic visit on December 3, 2018.    She goes to water aerobics classes at least 2 times per week.    She does not drive.     The downloadable compliance data for the past 1 month shows that she has used the device for 30/30 days with an average use of 7.31 hours.  Median pressure: 9.1, 95th percentile pressure: 11.9 and maximum pressure of 12.8 cm of water.  95th  percentile air leak was 30.8 liters per minute.  Residual 1.7 per hour.        Current meds:  Current Outpatient Medications   Medication Sig Dispense Refill     albuterol (PROAIR HFA/PROVENTIL HFA/VENTOLIN HFA) 108 (90 Base) MCG/ACT inhaler Inhale 2 puffs into the lungs every 4 hours as needed for shortness of breath / dyspnea or wheezing Cough 1 Inhaler 5     cetirizine (ZYRTEC) 10 MG tablet Take 1 tablet (10 mg) by mouth every morning 90 tablet 1     fluticasone (FLONASE) 50 MCG/ACT nasal spray USE TWO SPRAYS IN EACH NOSTRIL EVERY DAY 16 g 1      fluticasone-salmeterol (ADVAIR) 250-50 MCG/DOSE inhaler Inhale 1 puff into the lungs 2 times daily 1 Inhaler 5     levothyroxine (SYNTHROID/LEVOTHROID) 75 MCG tablet Take 1 tablet (75 mcg) by mouth daily 90 tablet 1     LORazepam (ATIVAN) 0.5 MG tablet Take 1 tablet (0.5 mg) by mouth every 6 hours as needed for anxiety (not intended for daily use) 10 tablet 0     losartan (COZAAR) 50 MG tablet Take 1 tablet (50 mg) by mouth daily 90 tablet 1     MELATONIN PO        metoclopramide (REGLAN) 10 MG tablet Take 1 tablet (10 mg) by mouth once as needed (nausea/stomach upset) 20 tablet 3     multivitamin, therapeutic with minerals (MULTI-VITAMIN) TABS Take 1 tablet by mouth daily       order for DME Incontinence items, disposable underwear, absorbency pads, liners. 80 Units 11     order for DME Walking cane for balance and mobility 1 Device 0     pantoprazole (PROTONIX) 40 MG EC tablet Take 1 tablet (40 mg) by mouth daily For reflux/heartburn 90 tablet 1     polyethylene glycol (MIRALAX) powder Take 17 g (1 capful) by mouth daily as needed for constipation 510 g 11     rosuvastatin (CRESTOR) 10 MG tablet Take 1 tablet (10 mg) by mouth At Bedtime For cholesterol 90 tablet 1     SUMAtriptan (IMITREX) 25 MG tablet Take 25 one time. May repeat 25mg every four hours up to a maximum of 100mg in 24 hours. 8 tablet 0     triamcinolone (KENALOG) 0.1 % external cream Apply topically 2 times daily as needed for irritation (eczema) 30 g 1     Problem list:  Patient Active Problem List   Diagnosis     Scoliosis- congenital - saw [facility] as a child - now [facility] - has signif lumbar radiculopathy - uses a cane      Arthritis     Hypothyroidism, unspecified type     History of herniated intervertebral disc     Pulmonary nodules     Moderate persistent asthma     Lung nodule     Morbid obesity (H)     Constipation, unspecified constipation type     Gastroesophageal reflux disease without esophagitis     Use of cane as ambulatory  "aid     Lumbar radiculopathy- left - uses a cane - [facility]      Primary osteoarthritis of both knees     Xanthelasma of eyelid, bilateral- medial upper lids - causing some medial ptosis and decreased visual fields      Hypertriglyceridemia     Anxiety     Seasonal allergic rhinitis, unspecified trigger     Migraine without status migrainosus, not intractable, unspecified migraine type     Hiatal hernia     Past medical history, Past surgical history, Allergies, Social history, Family history: reviewed, per EMR     Exam:  /78   Pulse 85   Resp 20   Ht 1.549 m (5' 1\")   Wt 95.7 kg (211 lb)   SpO2 96%   BMI 39.87 kg/m    General appearance:  in no apparent distress  Pt is dressed casually, cooperative with good eye contact.   Speech is spontaneous with regular rate and volume.   Mood: euthymic; affect congruent with full range and intensity.   Sensorium: awake, alert and oriented to person, place, time, and situation.        ASSESSMENT AND PLAN:   Obstructive sleep apnea.  The patient reports adequate compliance with the CPAP treatment and based on the compliance DL, HEVER  is adequately controlled with auto CPAP at the current pressure settings.  However, since she reports hunger and also based on the compliance download, I recommended increasing the minimum pressure settings on her auto CPAP to 9 while  the maximum pressure will stay the same as before at 16 cm of water.  I have  also provided her with  prescription for renewal of all the supplies.  She was recommended to use the device regularly during sleep and instructed to get the supplies for the device replaced regularly.    She was instructed to schedule an appointment with the [facility] medical DME vendor in order to obtain mask fit optimization.  We also discussed  about the option of using a CPAP pillow since she reports  air leaks due to mask dislodgment with positional changes during sleep.    She was " "encouraged to eat healthy and exercise regularly.       She will follow-up in the Sleep Clinic in 1 year or sooner if any concerns.      The above note was dictated using voice recognition software. Although reviewed after completion, some word and grammatical error may remain . Please contact the author for any clarifications.     \"I spent a total of 25  minutes face to face with Brooke John during today's office visit. Over 50% of this time was spent counseling the patient and  coordinating care regarding sleep apnea, CPAP treatment, interface concerns and weight management.\"      Doris Rodrigez MD  [facility] [address] [address] [phone]  Dept: [phone]        "

## 2019-12-06 ENCOUNTER — OFFICE VISIT (OUTPATIENT)
Dept: FAMILY MEDICINE | Facility: CLINIC | Age: 54
End: 2019-12-06
Payer: COMMERCIAL

## 2019-12-06 VITALS
SYSTOLIC BLOOD PRESSURE: 104 MMHG | DIASTOLIC BLOOD PRESSURE: 68 MMHG | OXYGEN SATURATION: 98 % | TEMPERATURE: 97.8 F | HEART RATE: 94 BPM | WEIGHT: 208 LBS | BODY MASS INDEX: 39.27 KG/M2 | HEIGHT: 61 IN

## 2019-12-06 DIAGNOSIS — M54.16 LUMBAR RADICULOPATHY: Primary | ICD-10-CM

## 2019-12-06 PROCEDURE — 99213 OFFICE O/P EST LOW 20 MIN: CPT | Performed by: FAMILY MEDICINE

## 2019-12-06 ASSESSMENT — MIFFLIN-ST. JEOR: SCORE: 1470.86

## 2019-12-06 NOTE — PROGRESS NOTES
"Subjective   Brooke John is a 54 year old female who presents to clinic today for the following health issues:    HPI   Back pain with left radiculopathy/Scoliosis history   Scoliosis - last injection 05/01/2019 6 months ago at specialty clinic in [place] - relief lasted about a month. Would like another injection today. Pain again for about 2 months. Radiating down left leg. Weakness in left leg. Water aerobics 4 days ago and she had to stop due to fatigue. Pain in lower back. Arms get numb and hands tingle at times.      Incontinence -urinary  Worse and  intermittently.     Reviewed and updated as needed this visit by provider:  Tobacco  Allergies  Meds  Problems  Med Hx  Surg Hx  Fam Hx         Review of Systems   Constitutional, HEENT, cardiovascular, pulmonary, GI, , musculoskeletal, neuro, skin, endocrine and psych systems are negative, except as otherwise noted.    This document serves as a record of the services and decisions personally performed and made by Patrick Ludwig MD. It was created on his behalf by Bridger Fitzpatrick, a trained medical scribe. The creation of this document is based the provider's statements to the medical scribe.  Scribe Bridger Fitzpatrick 4:39 PM, December 6, 2019    Objective   /68   Pulse 94   Temp 97.8  F (36.6  C) (Oral)   Ht 1.549 m (5' 1\")   Wt 94.3 kg (208 lb)   SpO2 98%   BMI 39.30 kg/m   Body mass index is 39.3 kg/m .  Physical Exam   GENERAL: healthy, alert, well nourished, well hydrated, no distress  EYES: Eyes grossly normal to inspection, extraocular movements - intact, and PERRL  HENT: ear canals- normal; TMs- normal; Nose- normal; Mouth- no ulcers, no lesions  NECK: no tenderness, no adenopathy, no asymmetry, no masses, no stiffness; thyroid- normal to palpation  RESP: lungs clear to auscultation - no rales, no rhonchi, no wheezes  CV: regular rates and rhythm, normal S1 S2, no S3 or S4 and no murmur, no click or rub -  ABDOMEN: soft, no " "tenderness, no  hepatosplenomegaly, no masses, normal bowel sounds  MS: slight paresthesias from the knee down on the left leg, negative straight leg raising, otherwise, extremities- no gross deformities noted, no edema  NEURO: strength and tone- normal, sensory exam- grossly normal, mentation- intact, speech- normal, reflexes- symmetric  BACK: no CVA tenderness, no paralumbar tenderness  PSYCH: Alert and oriented times 3; speech- coherent , normal rate and volume; able to articulate logical thoughts, able to abstract reason, no tangential thoughts, no hallucinations or delusions, affect- normal  LYMPHATICS: ant. cervical- normal, post. cervical- normal, axillary- normal, supraclavicular- normal, inguinal- normal        Assessment & Plan   Brooke was seen today for imm/inj.    Diagnoses and all orders for this visit:    Lumbar radiculopathy- left L4-L5 - worse the last couple months. Radiation into left leg. Referral given for possible epidural as indicated.   -     PAIN MANAGEMENT REFERRAL    BMI:   Estimated body mass index is 39.87 kg/m  as calculated from the following:    Height as of 12/4/19: 1.549 m (5' 1\").    Weight as of 12/4/19: 95.7 kg (211 lb).   Weight management plan: Discussed healthy diet and exercise guidelines    See Patient Instructions    No follow-ups on file.    The information in this document, created by the medical scribe for me, accurately reflects the services I personally performed and the decisions made by me. I have reviewed and approved this document for accuracy prior to leaving the patient care area.  4:59 PM, 12/06/19        Glenn Ludwig MD      [address] [address]  [email or web address]   Office: [phone]  Pager: [phone]         "

## 2019-12-09 ENCOUNTER — TELEPHONE (OUTPATIENT)
Dept: PALLIATIVE MEDICINE | Facility: CLINIC | Age: 54
End: 2019-12-09

## 2019-12-09 NOTE — TELEPHONE ENCOUNTER
Pre-screening Questions for Radiology Injections:    Injection to be done at which interventional clinic site? [facility]    Instruct patient to arrive as directed prior to the scheduled appointment time:    [place] minutes before      [place]: 30 minutes before; if IV needed 1 hour before     Procedure ordered by Patrick Ludwig MD    Procedure ordered? Epidural: Lumbar      Transforaminal Cervical DANY - Dr. Teetee Bermudez ONLY    What insurance would patient like us to bill for this procedure? [employer]      Worker's comp or MVA (motor vehicle accident) -Any injection DO NOT SCHEDULE and route to Jesika Porter.      [employer] insurance - For SI joint injections, DO NOT SCHEDULE and route Jesika Porter.       [employer] - Any injection besides hip/shoulder/knee joint DO NOT SCHEDULE and route to Jesika Porter. She will obtain PA and call pt back to schedule procedure or notify pt of denial.       [employer]-Route to [facility] for review      **BCBS- ALL need to be routed to [facility] for review if a PA is needed**      IF SCHEDULING IN [place] AND NEEDS A PA, IT IS OKAY TO SCHEDULE. [place] HANDLES THEIR OWN PA'S AFTER THE PATIENT IS SCHEDULED. PLEASE SCHEDULE AT LEAST 1 WEEK OUT SO A PA CAN BE OBTAINED.    Any chance of pregnancy? NO   If YES, do NOT schedule and route to RN pool    Is an  needed? No     Patient has a drive home? (mandatory) YES: informed.    Is patient taking any blood thinners (i.e. plavix, coumadin, jantoven, warfarin, heparin, pradaxa or dabigatran, etc)? No   If hold needed, do NOT schedule, route to RN pool     Is patient taking any aspirin products (includes Excedrin and Fiorinal)? No     If more than 325mg/day do NOT schedule; route to RN pool     For CERVICAL procedures, hold all aspirin products for 6 days.     Tell pt that if aspirin product is not held for 6 days, the procedure WILL BE cancelled.      Does the patient have a bleeding or clotting disorder? No     If YES,  okay to schedule AND route to RN nurse pool    For any patients with platelet count <100, must be forwarded to provider    Is patient diabetic?  No  If YES, instruct them to bring their glucometer.    Does patient have an active infection or treated for one within the past week? No     Is patient currently taking any antibiotics?  No     For patients on chronic, preventative, or prophylactic antibiotics, procedures may be scheduled.     For patients on antibiotics for active or recent infection:antibiotic course must have been completed for 4 days    Is patient currently taking any steroid medications? (i.e. Prednisone, Medrol)  No     For patients on steroid medications, course must have been completed for 4 days    Reviewed with patient:  If you are started on any steroids or antibiotics between now and your appointment, you must contact us because the procedure may need to be cancelled.  Yes    Is patient actively being treated for cancer or immunocompromised? No  If YES, do NOT schedule and route to RN pool     Are you able to get on and off an exam table with minimal or no assistance? Yes  If NO, do NOT schedule and route to RN pool    Are you able to roll over and lay on your stomach with minimal or no assistance? Yes  If NO, do NOT schedule and route to RN pool     Any allergies to contrast dye, iodine, shellfish, or numbing and steroid medications? No  If YES, route to RN pool AND add allergy information to appointment notes    Allergies: Amoxicillin; Aspirin; Milk protein extract; and Zithromax [azithromycin]      Has the patient had a flu shot or any other vaccinations within 7 days before or after the procedure.  No     Does patient have an MRI/CT?  YES: 2017  Check Procedure Scheduling Grid to see if required.      Was the MRI done within the last 3 years?  Yes    If yes, where was the MRI done i.e.[facility], [facility], [place], [facility] etc? [facility]      If no, do not schedule and route to RN  pool    If MRI was not done at [facility], [facility] or [facility] do NOT schedule and route to RN pool.      If pt has an imaging disc, the injection MAY be scheduled but pt has to bring disc to appt.     If they show up without the disc the injection cannot be done    Reminders (please tell patient if applicable):       Instructed pt to arrive 30 minutes early for IV start if required. (Check Procedure Scheduling Grid)  Not Applicable      If celiac plexus block, informed patient NPO for 6 hours and that it is okay to take medications with sips of water, especially blood pressure medications  Not Applicable         If this is for a cervical procedure, informed patient that aspirin needs to be held for 6 days.   Not Applicable      For all patients not having spinal cord stimulator (SCS) trials or radiofrequency ablations (RFAs), informed patient:    IV sedation is not provided for this procedure.  If you feel that an oral anti-anxiety medication is needed, you can discuss this further with your referring provider or primary care provider.  The Pain Clinic provider will discuss specifics of what the procedure includes at your appointment.  Most procedures last 10-20 minutes.  We use numbing medications to help with any discomfort during the procedure.  Not Applicable      Do not schedule procedures requiring IV placement in the first appointment of the day or first appointment after lunch. Do NOT schedule at 0745, 0815 or 1245.       For patients 85 or older we recommend having an adult stay w/ them for the remainder of the day.       Does the patient have any questions?  NO  Lana Kitchen  [facility]

## 2019-12-09 NOTE — PROGRESS NOTES
[facility] - Procedure Note    Date of Service: 12/11/2019    Procedure performed: Left L4-5 transforaminal epidural steroid injection with fluoroscopic guidance  Diagnosis: Lumbar spondylosis; Lumbar radiculitis/radiculopathy  : Maria L Ta MD & Figueroa Sethi DO (pain fellow)   Anesthesia: none    Indications: Brooke John is a 54 year old female who is seen at the request of Dr. SONAM Osorio for lumbar transforaminal epidural steroid injection. The patient describes low back pain and left leg pain. The patient has been exhibiting symptoms consistent with lumbar intraspinal inflammation and radiculopathy. Symptoms have been persistent, disabling, and intermittently severe. The patient reports minimal improvement with conservative treatment, including over the counter medications.    This is a repeat injection.  Previous Left L4-5 transforaminal epidural steroid injection done by radiology on 5/1/2019 provided good pain relief for a period of 6 months.       Lumbar MRI was done on 5/15/2017 which showed:   FINDINGS: Five lumbar vertebrae are assumed. Levoscoliosis. Prominent  degenerative disc disease at L5-S1. Bilateral renal fluid intensity  structures again seen; please see ultrasound report from April 2016.     Findings by specific level:     There is minimal to mild multilevel facet degenerative change.     T12-L1, L1-L2: No disc herniation or stenosis.     L2-L3: Mild left foraminal disc protrusion. No central or right  foraminal stenosis. Mild left foraminal stenosis.     L3-L4: No disc herniation or stenosis.     L4-L5: There is a mild broad-based disc protrusion extending from  central to left posterolateral. This abuts the left L5 nerve root.  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 There is mild-moderate left  foraminal stenosis and no right foraminal stenosis.     L5-S1: Changes of left laminectomy.  Congenital tapering of the thecal  sac. Disc bulging without central or right foraminal stenosis. The  left neural foramen appears adequate.     Compare with the previous exam, the left L2-L3 lateral disc protrusion  is mildly increased. Overall, the L4-L5 disc protrusion is mildly  decreased.                                                                     IMPRESSION:  1. Multilevel degenerative disc and facet disease.  2. L2-L3: Mild left foraminal disc protrusion.  3. L4-L5: Mild left asymmetric disc protrusion. Mild-moderate left  foraminal stenosis.    Allergies:      Allergies   Allergen Reactions     Amoxicillin      Lip swelling and hives     Aspirin      GI upset     Milk Protein Extract      Upset stomach and dry cough     Zithromax [Azithromycin]      RASH        Vitals:  BP (!) 156/93 (BP Location: Left arm, Cuff Size: Adult Large)   Pulse 67   SpO2 98%     Review of Systems: The patient denies recent fever, chills, illness, use of antibiotics or anticoagulants. All other 10-point review of systems negative.     Procedure: The procedure and risks were explained, and informed written consent was obtained from the patient. Risks include but are not limited to: infection, bleeding, increased pain, and damage to soft tissue, nerve, muscle, and vasculature structures. After getting informed consent, patient was brought into the procedure suite and was placed in a prone position on the procedure table. A Pause for the Cause was performed. Patient was prepped and draped in sterile fashion.     After identifying the left L4 neuroforamen, the C-arm was rotated to a left lateral oblique angle.  A total of 4.5 mL of Lidocaine 1% was used to anesthetize the skin and the needle track at a skin entry site coaxial with the fluoroscopy beam, and overriding the superior aspect of the neuroforamen.  A 22 gauge 5 inch spinal needle was advanced under intermittent fluoroscopy until it entered the foramen  superiorly.    The position was then inspected from anteroposterior and lateral views, and the needle adjusted appropriately.  After negative aspiration, a total of 1 mL of Omnipaque-300 was injected using static and continuous fluoroscopy confirming appropriate position, with spread along the nerve root sheath and into the epidural space, with no intravascular or intrathecal uptake. 9 mL of Omnipaque-300 was wasted.    2mL of 1% lidocaine with 20 mg of dexamethasone was injected.  The needle was removed. Hemostasis was achieved, the area was cleaned, and bandaids were placed when appropriate. Images were saved to PACS.    The patient tolerated the procedure well, and was taken to the recovery room, and there was no evidence of procedural complications. No new sensory or motor deficits were noted following the procedure. The patient was stable and able to ambulate on discharge home. Post-procedure instructions were provided.     Pre-procedure pain score: 7/10 in the back, 7/10 in the leg  Post-procedure pain score: 1/10 in the back, 1/10 in the leg    Assessment/Plan: Brooke John is a 54 year old female s/p Left L4-5 transforaminal epidural steroid injection today for lumbar spondylosis, radiculitis/radiculopathy.     1. Following today's procedure, the patient was advised to contact the Pain Management Center for any of the following:   Fever, chills, or night sweats   New onset of pain, numbness, or weakness   Any questions/concerns regarding the procedure  If unable to contact the Pain Center, the patient was instructed to go to a local Emergency Room for any complications.   2. The patient will receive a follow-up call in 1 week.  3. The patient should follow-up with the referring provider in 2 weeks for post-procedure evaluation.      LIANNE TRUJILLO MD   Pain Management & Addiction Medicine

## 2019-12-11 ENCOUNTER — ANCILLARY PROCEDURE (OUTPATIENT)
Dept: GENERAL RADIOLOGY | Facility: CLINIC | Age: 54
End: 2019-12-11
Attending: ANESTHESIOLOGY
Payer: COMMERCIAL

## 2019-12-11 ENCOUNTER — RADIOLOGY INJECTION OFFICE VISIT (OUTPATIENT)
Dept: PALLIATIVE MEDICINE | Facility: CLINIC | Age: 54
End: 2019-12-11
Payer: COMMERCIAL

## 2019-12-11 VITALS — SYSTOLIC BLOOD PRESSURE: 156 MMHG | OXYGEN SATURATION: 98 % | DIASTOLIC BLOOD PRESSURE: 93 MMHG | HEART RATE: 67 BPM

## 2019-12-11 DIAGNOSIS — M54.16 LUMBAR RADICULOPATHY: ICD-10-CM

## 2019-12-11 DIAGNOSIS — M54.16 LUMBAR RADICULOPATHY: Primary | ICD-10-CM

## 2019-12-11 PROCEDURE — 64483 NJX AA&/STRD TFRM EPI L/S 1: CPT | Mod: LT | Performed by: ANESTHESIOLOGY

## 2019-12-11 NOTE — NURSING NOTE
Discharge Information    IV Discontiued Time:  NA    Amount of Fluid Infused:  NA    Discharge Criteria = When patient returns to baseline or as per MD order    Consciousness:  Pt is fully awake    Circulation:  BP +/- 20% of pre-procedure level    Respiration:  Patient is able to breathe deeply    O2 Sat:  Patient is able to maintain O2 Sat >92% on room air    Activity:  Moves 4 extremities on command    Ambulation:  Patient is able to stand and walk or stand and pivot into wheelchair    Dressing:  Clean/dry or No Dressing    Notes:   Discharge instructions and AVS given to patient    Patient meets criteria for discharge?  YES    Admitted to PCU?  No    Responsible adult present to accompany patient home?  Yes    Signature/Title:    Ashanti Ortega RN Care Coordinator  [facility] [place]

## 2019-12-11 NOTE — PATIENT INSTRUCTIONS
[facility] Procedure Discharge Instructions    Today you saw:   Dr. Maria L Ta , Dr Sethi    Your procedure:  Epidural steroid injection       Medications used:  Lidocaine (anesthetic)  Dexamethasone (steroid) Omnipaque (contrast)                  Be cautious when walking as numbness and/or weakness in the legs may occur up to 6-8 hours after the procedure due to effect of the local anesthetic    Do not drive for 6 hours. The effect of the local anesthetic could slow your reflexes.     Avoid strenuous activity for the first 24 hours. You may resume your regular activities after that.     You may shower, however avoid swimming, tub baths or hot tubs for 24 hours following your procedure    You may have a mild to moderate increase in pain for several days following the injection.      You may use ice packs for 10-15 minutes, 3 to 4 times a day at the injection site for comfort    Do not use heat to painful areas for 6 to 8 hours. This will give the local anesthetic time to wear off and prevent you from accidentally burning your skin.    Unless you have been directed to avoid the use of anti-inflammatory medications (NSAIDS-ibuprofen, Aleve, Motrin), you may use these medications or Tylenol for pain control if needed.     With diabetes, check your blood sugar more frequently than usual as your blood sugar may be higher than normal for 10-14 days following a steroid injection. Contact your doctor who manages your diabetes if your blood sugar is higher than usual    Possible side effects of steroids that you may experience include flushing, elevated blood pressure, increased appetite, mild headaches and restlessness.  All of these symptoms will get better with time.    It may take up to 14 days for the steroid medication to start working although you may feel the effect as early as a few days after the procedure.     Follow up with your referring provider in 2-3 weeks      If you experience any of the  following, call the pain center line during work hours at [phone] or on-call physician after hours at [phone]:  -Fever over 100 degree F  -Swelling, bleeding, redness, drainage, warmth at the injection site  -Progressive weakness or numbness in your legs   -Loss of bowel or bladder function  -Unusual headache that is not relieved by Tylenol or your regular headache medication  -Unusual new onset of pain that is not improving

## 2019-12-31 DIAGNOSIS — F41.9 ANXIETY: ICD-10-CM

## 2019-12-31 DIAGNOSIS — G43.909 MIGRAINE WITHOUT STATUS MIGRAINOSUS, NOT INTRACTABLE, UNSPECIFIED MIGRAINE TYPE: ICD-10-CM

## 2019-12-31 NOTE — TELEPHONE ENCOUNTER
"Requested Prescriptions   Pending Prescriptions Disp Refills       LORazepam (ATIVAN) 0.5 MG tablet        Last Written Prescription Date:  7.17.19  Last Fill Quantity: 10 tablet,  # refills: 0   Last office visit: 12/6/2019 with prescribing provider:  SHAI Ruggiero           Future Office Visit:        Routing refill request to provider for review/approval because:  Drug not on the [facility], [facility] or [facility] refill protocol or controlled substance                  SUMAtriptan (IMITREX) 25 MG tablet [Pharmacy Med Name: SUMATRIPTAN SUCCINATE 25MG TABS]        Last Written Prescription Date:  10.17.19  Last Fill Quantity: 8 tablet,  # refills: 0   Last office visit: 12/6/2019 with prescribing provider:  SHAI Ruggiero             Future Office Visit:       8 tablet 0     Sig: TAKE 1 TABLET BY MOUTH ONCE. MAY REPEAT  WITH 1 TABLET EVERY 4 HOURS UP TO A MAXIMUM  MG/24 HOURS       Serotonin Agonists Failed - 12/31/2019  3:02 PM        Failed - Blood pressure under 140/90 in past 12 months     BP Readings from Last 3 Encounters:   12/11/19 (!) 156/93   12/06/19 104/68   12/04/19 125/78                 Failed - Serotonin Agonist request needs review.     Please review patient's record. If patient has had 8 or more treatments in the past month, please forward to provider.          Passed - Recent (12 mo) or future (30 days) visit within the authorizing provider's specialty     Patient has had an office visit with the authorizing provider or a provider within the authorizing providers department within the previous 12 mos or has a future within next 30 days. See \"Patient Info\" tab in inbasket, or \"Choose Columns\" in Meds & Orders section of the refill encounter.              Passed - Medication is active on med list        Passed - Patient is age 18 or older        Passed - No active pregnancy on record        Passed - No positive pregnancy test in past 12 months        "

## 2019-12-31 NOTE — TELEPHONE ENCOUNTER
[place] reviewed:  reviewed- no concerns 12/31/2019    [place]:  [email or web address]    Routing refill request to provider for review/approval because:  Drug not on the [facility] refill protocol   Protocol failed    HAIDER Wolff, RN  [facility]

## 2020-01-02 ENCOUNTER — TELEPHONE (OUTPATIENT)
Dept: FAMILY MEDICINE | Facility: CLINIC | Age: 55
End: 2020-01-02

## 2020-01-02 RX ORDER — SUMATRIPTAN 25 MG/1
TABLET, FILM COATED ORAL
Qty: 8 TABLET | Refills: 0 | Status: SHIPPED | OUTPATIENT
Start: 2020-01-02 | End: 2020-04-15

## 2020-01-02 RX ORDER — LORAZEPAM 0.5 MG/1
TABLET ORAL
Qty: 10 TABLET | Refills: 0 | Status: SHIPPED | OUTPATIENT
Start: 2020-01-02 | End: 2020-05-18 | Stop reason: SINTOL

## 2020-01-02 NOTE — TELEPHONE ENCOUNTER
Reason for call:  Form   Our goal is to have forms completed within 72 hours, however some forms may require a visit or additional information.     Who is the form from? Patient  Where did the form come from? Patient or family brought in     What clinic [place]   Where was the form placed? Given to physician    Type of letter, form or note: Special Diet Information Request    Phone number to reach patient:  Cell number on file:    Telephone Information:   Mobile [phone]     Can we leave a detailed message on this number?  YES     Mandy Gutierres MA

## 2020-01-02 NOTE — TELEPHONE ENCOUNTER
Completed form at  for patient to .   Called and left patient a voice mail that it is at the  .  Copy sent to be scanned.       Latoya Barrow

## 2020-01-09 DIAGNOSIS — K44.9 HIATAL HERNIA: ICD-10-CM

## 2020-01-09 DIAGNOSIS — K21.9 GASTROESOPHAGEAL REFLUX DISEASE WITHOUT ESOPHAGITIS: ICD-10-CM

## 2020-01-09 RX ORDER — OMEPRAZOLE 40 MG/1
CAPSULE, DELAYED RELEASE ORAL
COMMUNITY
Start: 2019-10-08 | End: 2020-05-18 | Stop reason: ALTCHOICE

## 2020-01-09 NOTE — TELEPHONE ENCOUNTER
"Requested Prescriptions   Pending Prescriptions Disp Refills     omeprazole (PRILOSEC) 40 MG DR capsule [Pharmacy Med Name: OMEPRAZOLE 40MG CPDR] 90 capsule 3     Sig: TAKE ONE CAPSULE BY MOUTH EVERY DAY 30-60 MINUTES BEFORE A MEAL   Last Written Prescription Date:  10/8/2019  Last Fill Quantity: 90 Capsule,  # refills: 3   Last office visit: 12/6/2019 with prescribing provider:  SOHAM Ludwig   Future Office Visit:        PPI Protocol Failed - 1/9/2020  1:34 PM        Failed - Medication is active on med list        Passed - Not on Clopidogrel (unless Pantoprazole ordered)        Passed - No diagnosis of osteoporosis on record        Passed - Recent (12 mo) or future (30 days) visit within the authorizing provider's specialty     Patient has had an office visit with the authorizing provider or a provider within the authorizing providers department within the previous 12 mos or has a future within next 30 days. See \"Patient Info\" tab in inbasket, or \"Choose Columns\" in Meds & Orders section of the refill encounter.              Passed - Patient is age 18 or older        Passed - No active pregnacy on record        Passed - No positive pregnancy test in past 12 months          "

## 2020-01-09 NOTE — TELEPHONE ENCOUNTER
Need to verify med    Attempt #1  Called patient @ [phone] - Left a non-detailed message to call back and speak with any triage nurse.    Brenna Pickering RN  [facility]

## 2020-01-10 RX ORDER — OMEPRAZOLE 40 MG/1
CAPSULE, DELAYED RELEASE ORAL
Qty: 90 CAPSULE | Refills: 3 | OUTPATIENT
Start: 2020-01-10

## 2020-01-10 NOTE — TELEPHONE ENCOUNTER
Called patient @ # below -     Patient stated she is no longer taking this medication.   Rx denied    Brenna Pickering RN  [facility]

## 2020-02-02 ENCOUNTER — HOSPITAL ENCOUNTER (EMERGENCY)
Facility: CLINIC | Age: 55
Discharge: HOME | End: 2020-02-02
Attending: EMERGENCY MEDICINE | Admitting: EMERGENCY MEDICINE
Payer: COMMERCIAL

## 2020-02-02 VITALS
HEART RATE: 90 BPM | BODY MASS INDEX: 37.53 KG/M2 | OXYGEN SATURATION: 100 % | WEIGHT: 198.63 LBS | RESPIRATION RATE: 18 BRPM | SYSTOLIC BLOOD PRESSURE: 160 MMHG | TEMPERATURE: 97.6 F | DIASTOLIC BLOOD PRESSURE: 98 MMHG

## 2020-02-02 DIAGNOSIS — R22.0 FACIAL SWELLING: ICD-10-CM

## 2020-02-02 DIAGNOSIS — K04.7 DENTAL INFECTION: ICD-10-CM

## 2020-02-02 PROCEDURE — 99282 EMERGENCY DEPT VISIT SF MDM: CPT

## 2020-02-02 RX ORDER — CLINDAMYCIN HCL 300 MG
300 CAPSULE ORAL 4 TIMES DAILY
Qty: 28 CAPSULE | Refills: 0 | Status: SHIPPED | OUTPATIENT
Start: 2020-02-02 | End: 2020-05-18

## 2020-02-02 ASSESSMENT — ENCOUNTER SYMPTOMS
FEVER: 0
FACIAL SWELLING: 1

## 2020-02-02 NOTE — ED TRIAGE NOTES
Patient presents with complaints of left sided facial swelling which started on Thursday after going to the dentist.Patient states she is also having right sided lower dental pain  ABC intact without need for intervention at this time.

## 2020-02-02 NOTE — DISCHARGE INSTRUCTIONS
Discharge Instructions  Dental Pain    You have been seen today for a toothache. Your pain may be caused by an exposed nerve, an infection (pulpitis), a root abscess, or other problems. You will need to see a dentist for a solution to your tooth problem. Emergency Department care is only to help control your problem until you can see a dentist.  Today, we did not find any sign that your toothache was caused by a serious condition, but sometimes symptoms develop over time and cannot be found during an emergency visit, so it is very important that you follow up with your dentist.      Return to the Emergency Department if:  You develop a fever over 101 degrees Fahrenheit  You can t open your mouth normally, can t move your tongue well, or can t swallow  You have new or increased swelling of your face or neck.  You develop drainage of pus or foul smelling material from around your tooth.  What can I do to help myself?  Take any antibiotic the doctor may have prescribed for you today.  Avoid very hot or very cold foods as both can cause pain.  Make an appointment to see a dentist as soon as possible. If you wish, we can provide you with a list of low-cost dental clinics.       Remember that you can always come back to the Emergency Department if you are not able to see your regular doctor in the amount of time listed above, if you get any new symptoms, or if there is anything that worries you.        Dental Resources  Name/Address/Phone Eligibility Hours Fee   [facility]  [address], [address]  [place] [address]  [phone] Anyone Call for appointment [facility] Emergency Department  Medical [facility]  Private Insurance   [facility]  Hygiene Clinic  [facility] [place], [place] [id number] [phone] Anyone Call for appointment    [facility] refers to low-cost dental clinics for non-preventive care    Turkish Interpreters available Prices start at   Adults        Cleaning $36-$160        X-Ray  $20-40  Children        Cleaning $15        X-ray $10-20        Fluoride $10  Accepts cash, check or credit;  Does not take insurance or MA.   [facility]  [address] [place], [place]  [id number] [phone] Anyone Afternoons and evenings    September-May    Answers phones after 10 AM $30.00 per visit   ($15.00 per visit if 62 or older)   Preventive care.  Restoration care; sliding fee; MA   [facility]  [address] [place], [place] [id number] [phone] Children birth to age 18 and pregnant women    [facility] [place] Residents without insurance will be asked to apply for Assured Care. M TH F 8:30 am - 5 pm  T W 8:30 am - 7 pm    30 locations metro wide    Call for appointment and to confirm hours. Sliding Fee  [facility]  Medical Assistance  Assured Access  Private Insurance    8 Languages Spoken   [facility] [address] [address]  [phone] Anyone Call for appointment Sliding Fee  Accept insurance, [place],   [place] and self-pay.  Call if no insurance.    All services provided.  Staff fluent in Hmong, Laotian, Wes, Yakut, Kiswahili, Faroese, and Farsi.   [facility]  [address] [place] [address]  [phone] Children  Adults in a walk in basis Mon - Fri. 8 - 5 pm       (closed 1-2 pm)  General Dentists & Hygienists  Private Dentists  Dentures Fees based on family size and income ranging from 40% - 100% payment by patient.   [facility]  [address] [place], [place]  [phone] Anyone Mon - Fri 7:30 am - 5:00 pm  By Appt.    Tues & Wed @ 3:00 call for urgent care Appt for next day service Sliding fee:  MA; Insurance   [facility]  [address] [place], [place]  [phone] Anyone Call for an appointment.  Days open vary every semester. Adult cleaning $25  Child cleaning $15  X-Rays $10-$15  Whitening  services available  $75, includes cleaning  Seniors 50% off   [facility]  [address] [place]  [address]  [phone] Anyone M-F 8 am - 5 pm Most insurances accepted.  MA and Sliding Scale.   [facility]  [facility] [place], [place]  [address] [phone] Anyone without insurance Call to make appt   M-F 9:00 am - 5 pm   (Closed noon hour 12-1)    6 pm- 8 pm Evening hours also available for care Sliding fee based on income and size of household.   [facility]  Dental Clinic  [address] [place]  [address]  [phone] press option 1    For the [facility] press option 4 Anyone              Anyone Monday  4 - 6:30 pm  Tuesday 12:30 - 3 & 4-6:30  Thursday 8:30 - 11 am & 12-2:30 pm  September through May only    All year around on Thursdays from 5-9 pm (only time a dentist is in.) Cleanings & X-Rays Only  Cleanings:  Adults $30                   Kids $20  X-Rays:  Adults $34                Kids $10    MA and Sliding fee   [address] [id number] [phone] Anyone    (GetYourGuideong interpreters available) M-F 8:00 am - 5:00 pm       By appointment only  (same day appointments available) Sliding fee ($40+ may be due at appointment, remainder billed); MA; Insurance   [facility]  [address] [place], [place] [phone]   Anyone    (Hmong interpreters available) M-Th 8:00 am - 8:00 pm  F 8:00 am - 5:00 pm    By appointment only  (same day appointments available) Sliding fee ($40+ may be due at appointment, remainder billed); MA; Insurance   [facility]  [address] [place], [place]  [id number] [phone] Anyone    Must live within [facility] to qualify for sliding fee services Mon, Tues, Thurs, Fri  8:30 am - 5:00 pm  Wed. 8:30 am - 7:00 pm  All other services by appt. only Sliding  Fee; MA   Offer payment plans    Have financial workers that will assist with [place] and will use sliding fee for those who do not qualify.      [facility]  [address] [place] [address]  [phone] Anyone without insurance     Hours and day of week vary  (Call ahead for hours)    Walk-in only Free Services    Cleanings; Fillings; Extractions   [facility]  [address] [place] [address]  [address]  [phone] Anyone Call for an appointment Accept patients with [place] and Medical Assistance.  10% discount if bill is paid in full on day of service.  No sliding fee scale.     [facility]  [address] [place] [address]  [phone] Anyone M-F  8 am-5 pm  Call for appt.    Walk-in hours 8 am - 11am and 1 pm - 5 pm, however take scheduled appointments first    No emergency services or oral surgeries Sliding Fee available with an [place] or [place] denial letter and proof of income.    Accepts Assured Access card and [place] coverage.     Name/Address/Phone Eligibility Hours Fee   [facility]  [address] [place]  [address]  [phone] Anyone with emergencies only M & W clinic begins at 6 pm   Call ahead    Alternate Fridays for children by Appt only Free   [facility]  [address] [place], [place] [address] [phone]    Emergencies (adults only):  [phone] Anyone Free walk-in screens for oral surgery    Call ahead for hours    All other services by appt. only  Accepts MA for pediatrics only    Rates are about 25% - 40% less than private dental office.    No sliding fee scale   [facility]  [address] [place]  [address]  [phone] Anyone as long as they do not have health insurance Hours on Mondays, Tuesdays, and Thursdays Sliding fees based on monthly income    No root canals, tooth pulls or emergencies   [facility]  [address] [phone] Anyone  M - F 8:00 am - 5:00 pm  Wed 8:00 am - 8 pm Sliding fees; MA; Insurance   [facility]  [address]  [place], [place]  [address] [phone] Anyone age 55+ M - F 8:00 am - 4:30 pm    Appt. only Set slightly lower fees;  MA; Insurance         Give Kids a smile day in [facility] Children Takes place in February at a few locations                          Dental Pain:      Dear Emergency Department Patient:     Here at [facility], we are always pleased to evaluate you for emergency conditions and offer a screening examination. Today, we have seen you and determined that you have dental pain and/or a dental problem.  We do not have the equipment and/or advanced training to perform definitive dental care, therefore, you need to be seen by a dentist for further care.     You may see your regular dent  ist if you have one, or we have attached a list of community dental providers, including some who provide care at a reduced fee.      Please be aware that if a narcotic medication was prescribed, it will not be refilled in the emergency department.  Accordingly, if you should have ongoing problems and/or pain, you should contact a dentist right away for definitive treatment.    Sincerely,        [facility] ([facility])

## 2020-02-02 NOTE — ED AVS SNAPSHOT
[facility]  [address]  [facility] [address]  Phone:  [phone]  Fax:  [phone]                                    Brooke John   MRN: [id number]    Department:  [facility]   Date of Visit:  2/2/2020           After Visit Summary Signature Page    I have received my discharge instructions, and my questions have been answered. I have discussed any challenges I see with this plan with the nurse or doctor.    ..........................................................................................................................................  Patient/Patient Representative Signature      ..........................................................................................................................................  Patient Representative Print Name and Relationship to Patient    ..................................................               ................................................  Date                                   Time    ..........................................................................................................................................  Reviewed by Signature/Title    ...................................................              ..............................................  Date                                               Time          22EPIC Rev 08/18

## 2020-02-02 NOTE — ED PROVIDER NOTES
History     Chief Complaint:  Facial Swelling    HPI   Brooke John is a 54 year old female who presents with facial swelling and dental pain. Per the patient, 2-3 days ago she went to her dentist for tooth pain behind her chin. They found nothing wrong and thought it was pressure from another tooth. The pain persisted and she started to develop facial swelling in the chin area. It got progressively worse and she took Tylenol, salt water, and used numbing cream, which reduced the swelling. She notes it less swollen today but has not resolved. She denies fever. 1200 today is the last time she took something for the pain.    Allergies:  Amoxicillin  Aspirin  Milk Protein Extract  Zithromax    Medications:    Zyrtec  Levothyroxine  Ativan  Cozaar  Reglan  Prilosec  Protonix  Crestor  Imitrex    Past Medical History:    Arthritis  Depressive disorder  Hypertriglyceridemia  Asthma  Hypothyroidism  Scoliosis  Anxiety  Lumbar radiculopathy  GERD  Morbid obesity    Past Surgical History:    Lumbar disectomy    Cosmetic surgery  Eye surgery    Family History:    Son: Scoliosis, learning disorder  Father: Heart disease (triple bypass)    Social History:  The patient was accompanied to the ED by a friend.  Smoking Status: Never Smoker  Smokeless Tobacco: Never Used  Alcohol Use: Negative  Drug Use: Negative  PCP: Salome Hollis  Marital Status:  Single    Review of Systems   Constitutional: Negative for fever.   HENT: Positive for dental problem and facial swelling.    All other systems reviewed and are negative.        Physical Exam     Patient Vitals for the past 24 hrs:   BP Temp Pulse Heart Rate Resp SpO2 Weight   20 1624 -- -- -- 80 -- -- --   20 1610 (!) 160/98 97.6  F (36.4  C) 90 -- 18 100 % 90.1 kg (198 lb 10.2 oz)       Physical Exam    Nursing note and vitals reviewed.    Constitutional: Pleasant and well groomed.          HENT:    Mouth/Throat: Oropharynx is without swelling or  erythema. Oral mucosa moist. Floor of the mouth is  Soft.  The lower tooth which she reports is painful is normal-appearing without obvious decay.  Intraoral- Swelling adjacent to her lower central incisors extending to her left lower 2nd premolar.   Eyes: Conjunctivae are normal. No scleral icterus.    Neck: Neck supple. No cervical adenopathy.  Cardiovascular: Normal rate, regular rhythm and intact distal pulses. No murmur.    Pulmonary/Chest: Effort normal and breath sounds normal.   Abdominal: Soft.  No distension. There is no tenderness.   Musculoskeletal:  No edema, No calf tenderness  Neurological:Alert. Coordination normal.   Skin: Skin is warm and dry. Some swelling and erythema over the chin extending left lateral over the mandible.   Psychiatric: Normal mood and affect.     Emergency Department Course     Emergency Department Course:    1619 Nursing notes and vitals reviewed.    1623 I performed an exam of the patient as documented above.     1635 Patient rechecked and updated.     1706 Findings and plan explained to the patient. Patient discharged home with instructions regarding supportive care, medications, and reasons to return. The importance of close follow-up was reviewed. The patient was prescribed Cleocin.    Impression & Plan      Medical Decision Making:  The patient presents with a tooth ache.  There is no fluctuance to suggest an abscess that would benefit from  incision and drainage. There is some swelling and erythema over the chin extending left lateral over the mandible.  The differential diagnosis includes, but is not limited to;  pulpitis, periapical abscess. There is no evidence signs of Narciso's angina.     I have instructed the patient to return to the ED with new or worse symptoms.  I have stressed the importance of  follow up with a dentist/endodontist as soon as possible for definitive management of their dental problem.  The patient was provided with community dental resources.      Plan:   1. Return to the ED with new or worse symptoms  2. Follow up with a dentist for definitive management as soon as possible.   3. Provided with standard [facility] Discharge instructions for Dental Pain as well as local dental resources.     Diagnosis:    ICD-10-CM    1. Facial swelling R22.0    2. Dental infection K04.7      Disposition:   Patient was discharged home.    Discharge Medications:  Discharge Medication List as of 2/2/2020  4:57 PM      START taking these medications    Details   clindamycin (CLEOCIN) 300 MG capsule Take 1 capsule (300 mg) by mouth 4 times daily for 7 days, Disp-28 capsule, R-0, Local Print             Scribe Disclosure:  I, Yuriy Reyes, am serving as a scribe at 4:23 PM on 2/2/2020 to document services personally performed by Sarah Gardiner MD based on my observations and the provider's statements to me.    [facility] EMERGENCY DEPARTMENT       Sarah Gardiner MD  02/02/20 [id number]

## 2020-02-27 ENCOUNTER — ALLIED HEALTH/NURSE VISIT (OUTPATIENT)
Dept: FAMILY MEDICINE | Facility: CLINIC | Age: 55
End: 2020-02-27

## 2020-02-27 ENCOUNTER — ALLIED HEALTH/NURSE VISIT (OUTPATIENT)
Dept: FAMILY MEDICINE | Facility: CLINIC | Age: 55
End: 2020-02-27
Payer: COMMERCIAL

## 2020-02-27 VITALS — DIASTOLIC BLOOD PRESSURE: 73 MMHG | SYSTOLIC BLOOD PRESSURE: 120 MMHG

## 2020-02-27 DIAGNOSIS — Z53.9 ERRONEOUS ENCOUNTER--DISREGARD: Primary | ICD-10-CM

## 2020-02-27 DIAGNOSIS — Z01.30 BLOOD PRESSURE CHECK: Primary | ICD-10-CM

## 2020-02-27 PROCEDURE — 99207 ZZC NO CHARGE NURSE ONLY: CPT | Performed by: PHYSICIAN ASSISTANT

## 2020-02-27 NOTE — PROGRESS NOTES
Brooke John was evaluated at [facility] on February 27, 2020 at which time her blood pressure was:    BP Readings from Last 3 Encounters:   02/27/20 120/73   02/02/20 (!) 160/98   12/11/19 (!) 156/93     Pulse Readings from Last 3 Encounters:   02/02/20 90   12/11/19 67   12/06/19 94       Reviewed lifestyle modifications for blood pressure control and reduction: including making healthy food choices, managing weight, getting regular exercise, smoking cessation, reducing alcohol consumption, monitoring blood pressure regularly.     Symptoms: None    BP Goal:< 140/90 mmHg    BP Assessment:  BP at goal    Potential Reasons for BP too high: NA - Not applicable    BP Follow-Up Plan: Recheck BP in 6 months at pharmacy    Recommendation to Provider: no change    Note completed by: Thank you,  Sandi Archer RPh, Mgr [facility] [place] [phone]

## 2020-03-26 DIAGNOSIS — J30.2 SEASONAL ALLERGIC RHINITIS, UNSPECIFIED TRIGGER: ICD-10-CM

## 2020-03-26 DIAGNOSIS — J45.40 MODERATE PERSISTENT ASTHMA WITHOUT COMPLICATION: ICD-10-CM

## 2020-03-26 RX ORDER — FLUTICASONE PROPIONATE 50 MCG
SPRAY, SUSPENSION (ML) NASAL
Qty: 16 G | Refills: 1 | Status: SHIPPED | OUTPATIENT
Start: 2020-03-26 | End: 2020-07-07

## 2020-03-26 NOTE — TELEPHONE ENCOUNTER
FLUTICASONE NASAL SPRAY   Last Written Prescription Date:  10/17/2019  Last Fill Quantity: 16g,  # refills: 1   Last office visit: 12/6/2019 with prescribing provider:  Patrick Ludwig MD   Future Office Visit:  NA    Requested Prescriptions   Pending Prescriptions Disp Refills     fluticasone (FLONASE) 50 MCG/ACT nasal spray [Pharmacy Med Name: FLUTICASONE NASAL SPRAY] 16 g 1     Sig: USE TWO SPRAYS IN EACH NOSTRIL ONCE DAILY       Nasal Allergy Protocol Passed - 3/26/2020  1:49 PM        Passed - Patient is age 12 or older        Passed - Medication is active on med list

## 2020-04-13 DIAGNOSIS — G43.909 MIGRAINE WITHOUT STATUS MIGRAINOSUS, NOT INTRACTABLE, UNSPECIFIED MIGRAINE TYPE: ICD-10-CM

## 2020-04-15 RX ORDER — SUMATRIPTAN 25 MG/1
TABLET, FILM COATED ORAL
Qty: 8 TABLET | Refills: 1 | Status: SHIPPED | OUTPATIENT
Start: 2020-04-15 | End: 2020-09-16

## 2020-04-15 NOTE — TELEPHONE ENCOUNTER
Problem List Complete:  No     PROVIDER TO CONSIDER COMPLETION OF PROBLEM LIST AND OVERVIEW/CONTROLLED SUBSTANCE AGREEMENT    Future Office visit:     Controlled substance agreement:   Encounter-Level CSA:    There are no encounter-level csa.     Patient-Level CSA:    There are no patient-level csa.         Last Urine Drug Screen: No results found for: CDAUT, No results found for: COMDAT, No results found for: THC13, PCP13, COC13, MAMP13, OPI13, AMP13, BZO13, TCA13, MTD13, BAR13, OXY13, PPX13, BUP13    [email or web address]          Routing refill request to provider for review/approval because:  Drug not on the [facility] refill protocol         Brenna Pickering RN  [facility]

## 2020-04-16 ENCOUNTER — TELEPHONE (OUTPATIENT)
Dept: FAMILY MEDICINE | Facility: CLINIC | Age: 55
End: 2020-04-16

## 2020-04-16 DIAGNOSIS — M54.16 LUMBAR RADICULOPATHY: Primary | ICD-10-CM

## 2020-04-16 DIAGNOSIS — Z87.39 HISTORY OF HERNIATED INTERVERTEBRAL DISC: ICD-10-CM

## 2020-04-16 DIAGNOSIS — M41.9 SCOLIOSIS, UNSPECIFIED SCOLIOSIS TYPE, UNSPECIFIED SPINAL REGION: ICD-10-CM

## 2020-04-16 NOTE — TELEPHONE ENCOUNTER
Brooke is calling to request a walker. Her  said she should call her Dr for order. She has scoliosis that is progressively getting worse. She is unable to walk straight. She veers to the left. She said it helps when she uses a grocery cart.    She does not have a medical supply company in mind. She would like free delivery    Routing to PCP    Jacquelyn Roberson RN- Triage [employer]

## 2020-04-16 NOTE — TELEPHONE ENCOUNTER
Reason for Call:  Other     Detailed comments: Brooke is calling saying she needs a walker. She said she tries to walk straight but keeps going to the left. She is hoping Salome will write her a prescription for this.    Phone Number Patient can be reached at: Home number on file [phone] (home)    Best Time: Anytime    Can we leave a detailed message on this number? YES    Call taken on 4/16/2020 at 11:06 AM by Julia Bates

## 2020-04-17 PROBLEM — M41.9 SCOLIOSIS, UNSPECIFIED SCOLIOSIS TYPE, UNSPECIFIED SPINAL REGION: Status: ACTIVE | Noted: 2020-04-17

## 2020-04-17 NOTE — TELEPHONE ENCOUNTER
Called patient regarding DME order. Will mail to patient because patient would like this delivered to her home.  Patient agrees and ok with DME order being mailed.    HAIDER Wolff, RN  [employer] Triage

## 2020-04-17 NOTE — TELEPHONE ENCOUNTER
DME order pending.      CRUZITO Navarrete, RN, PHN  [facility]  Office: [phone]  Fax: [phone]

## 2020-04-28 DIAGNOSIS — E03.9 HYPOTHYROIDISM, UNSPECIFIED TYPE: ICD-10-CM

## 2020-04-29 RX ORDER — LEVOTHYROXINE SODIUM 75 UG/1
TABLET ORAL
Qty: 90 TABLET | Refills: 1 | Status: SHIPPED | OUTPATIENT
Start: 2020-04-29 | End: 2020-07-06

## 2020-05-08 DIAGNOSIS — I10 BENIGN ESSENTIAL HYPERTENSION: ICD-10-CM

## 2020-05-08 RX ORDER — LOSARTAN POTASSIUM 50 MG/1
TABLET ORAL
Qty: 90 TABLET | Refills: 1 | Status: SHIPPED | OUTPATIENT
Start: 2020-05-08 | End: 2020-07-06

## 2020-05-11 ENCOUNTER — TELEPHONE (OUTPATIENT)
Dept: FAMILY MEDICINE | Facility: CLINIC | Age: 55
End: 2020-05-11

## 2020-05-11 DIAGNOSIS — M54.16 LUMBAR RADICULOPATHY: Primary | ICD-10-CM

## 2020-05-11 NOTE — TELEPHONE ENCOUNTER
Reason for Call:  Other call back    Detailed comments: Pt called this morning and would like Salome Hollis to write an order for her to get another cortisone injection in her left side.  Please place this order and give pt a call back to schedule once it is in the system. Thank you.    Phone Number Patient can be reached at: Home number on file [phone] (home)    Best Time:     Can we leave a detailed message on this number? YES    Call taken on 5/11/2020 at 11:50 AM by Sayra Guadarrama

## 2020-05-11 NOTE — TELEPHONE ENCOUNTER
Called patient @ # below -     Patient stated she has sciatic problems and scoliosis.   Patient had epidural inj last time (unsure of date) - done @ [facility] (SEE 12/6/19 REFERRAL)    Patient noted the injection did help, but has been gradually getting worse    Back Pain     Duration: 2 months        Specific cause: none    Description:   Location of pain: low back left  Character of pain: leg is burning, back is achey  Pain radiation:radiates into the left leg  New numbness or weakness in legs, not attributed to pain:  no     Intensity: Currently 7/10, At its worst 7/10    History:   Pain interferes with job: Not applicable  History of back problems: yes  Any previous MRI or X-rays: Yes- at [place].   Sees a specialist for back pain:  Used to go to [facility] (that provider retired), now just sees PCP  Therapies tried without relief: OTC Pain relievers    Alleviating factors:   Improved by: Bengay Cream      Precipitating factors:  Worsened by: standing on feet too long    DENIES: Fever, Cough, SOB    Routing to PCP for further review/recommendations/orders.  Referral Pended    Brenna Pickering RN  [facility]

## 2020-05-11 NOTE — TELEPHONE ENCOUNTER
I can see pt next week as I am fact to face, or we can have her see the medical spine team.  Let me know if she would like referral to medical spine.      Salome Hollis MBA, MS, PA-C

## 2020-05-11 NOTE — TELEPHONE ENCOUNTER
Called patient @ # below -     Advised of SHAI SANCHEZ recommendation below -   Patient stated she is OK with either one -   Next 5 appointments (look out 90 days)    May 18, 2020 10:00 AM CDT  SHORT with Salome Hollis PA-C  [facility] [address] [place] [address]  [phone]          Brenna Pickering RN  [facility]

## 2020-05-11 NOTE — TELEPHONE ENCOUNTER
Joao is calling with the Pt. Received order for DME in the mail and they are wondering where they can go to  a walker. Wondering if we have recommendations.    Joao will be with the Pt for the next hour or so, please call him back at [phone] to reach the Pt. After 4pm today, please call the Pt directly at [phone]. Thank you!

## 2020-05-18 ENCOUNTER — OFFICE VISIT (OUTPATIENT)
Dept: FAMILY MEDICINE | Facility: CLINIC | Age: 55
End: 2020-05-18
Payer: COMMERCIAL

## 2020-05-18 VITALS
DIASTOLIC BLOOD PRESSURE: 70 MMHG | SYSTOLIC BLOOD PRESSURE: 122 MMHG | OXYGEN SATURATION: 97 % | HEIGHT: 61 IN | TEMPERATURE: 96.6 F | WEIGHT: 200 LBS | HEART RATE: 82 BPM | BODY MASS INDEX: 37.76 KG/M2

## 2020-05-18 DIAGNOSIS — J45.40 MODERATE PERSISTENT ASTHMA WITHOUT COMPLICATION: ICD-10-CM

## 2020-05-18 DIAGNOSIS — K21.9 GASTROESOPHAGEAL REFLUX DISEASE WITHOUT ESOPHAGITIS: ICD-10-CM

## 2020-05-18 DIAGNOSIS — Z12.31 VISIT FOR SCREENING MAMMOGRAM: ICD-10-CM

## 2020-05-18 DIAGNOSIS — K44.9 HIATAL HERNIA: ICD-10-CM

## 2020-05-18 DIAGNOSIS — Z91.81 AT HIGH RISK FOR INJURY RELATED TO FALL: ICD-10-CM

## 2020-05-18 DIAGNOSIS — M54.16 LUMBAR RADICULOPATHY: Primary | ICD-10-CM

## 2020-05-18 PROCEDURE — 99214 OFFICE O/P EST MOD 30 MIN: CPT | Performed by: PHYSICIAN ASSISTANT

## 2020-05-18 RX ORDER — ALBUTEROL SULFATE 90 UG/1
2 AEROSOL, METERED RESPIRATORY (INHALATION) EVERY 4 HOURS PRN
Qty: 1 INHALER | Refills: 5 | Status: SHIPPED | OUTPATIENT
Start: 2020-05-18 | End: 2020-07-07

## 2020-05-18 RX ORDER — MELOXICAM 7.5 MG/1
7.5 TABLET ORAL DAILY
Qty: 30 TABLET | Refills: 0 | Status: SHIPPED | OUTPATIENT
Start: 2020-05-18 | End: 2020-07-07

## 2020-05-18 RX ORDER — PANTOPRAZOLE SODIUM 40 MG/1
40 TABLET, DELAYED RELEASE ORAL DAILY
Qty: 90 TABLET | Refills: 1 | Status: SHIPPED | OUTPATIENT
Start: 2020-05-18 | End: 2020-07-07

## 2020-05-18 RX ORDER — METHOCARBAMOL 500 MG/1
500 TABLET, FILM COATED ORAL 4 TIMES DAILY PRN
Qty: 40 TABLET | Refills: 0 | Status: SHIPPED | OUTPATIENT
Start: 2020-05-18 | End: 2020-11-04

## 2020-05-18 ASSESSMENT — ASTHMA QUESTIONNAIRES
QUESTION_4 LAST FOUR WEEKS HOW OFTEN HAVE YOU USED YOUR RESCUE INHALER OR NEBULIZER MEDICATION (SUCH AS ALBUTEROL): ONE OR TWO TIMES PER DAY
ACT_TOTALSCORE: 20
QUESTION_2 LAST FOUR WEEKS HOW OFTEN HAVE YOU HAD SHORTNESS OF BREATH: ONCE OR TWICE A WEEK
QUESTION_5 LAST FOUR WEEKS HOW WOULD YOU RATE YOUR ASTHMA CONTROL: WELL CONTROLLED
QUESTION_1 LAST FOUR WEEKS HOW MUCH OF THE TIME DID YOUR ASTHMA KEEP YOU FROM GETTING AS MUCH DONE AT WORK, SCHOOL OR AT HOME: NONE OF THE TIME
QUESTION_3 LAST FOUR WEEKS HOW OFTEN DID YOUR ASTHMA SYMPTOMS (WHEEZING, COUGHING, SHORTNESS OF BREATH, CHEST TIGHTNESS OR PAIN) WAKE YOU UP AT NIGHT OR EARLIER THAN USUAL IN THE MORNING: NOT AT ALL

## 2020-05-18 ASSESSMENT — MIFFLIN-ST. JEOR: SCORE: 1434.57

## 2020-05-18 NOTE — PROGRESS NOTES
SUBJECTIVE:   Brooke John is a 54 year old female who presents to clinic today for the following health issues:    Asthma Follow-Up  Taking Advair twice daily.  Using albuterol more lately due to smokers in new apartment and pollens.  Is using Zyrtec daily for allergies as well  Was ACT completed today?    Yes    ACT Total Scores 5/18/2020   ACT TOTAL SCORE (Goal Greater than or Equal to 20) 20   In the past 12 months, how many times did you visit the emergency room for your asthma without being admitted to the hospital? 0   In the past 12 months, how many times were you hospitalized overnight because of your asthma? 0       How many days per week do you miss taking your asthma controller medication?  0    Please describe any recent triggers for your asthma: smoke and pollens    Have you had any Emergency Room Visits, Urgent Care Visits, or Hospital Admissions since your last office visit?  No      How many servings of fruits and vegetables do you eat daily?  2-3    On average, how many sweetened beverages do you drink each day (Examples: soda, juice, sweet tea, etc.  Do NOT count diet or artificially sweetened beverages)?   1    How many days per week do you exercise enough to make your heart beat faster? 3 or less    How many minutes a day do you exercise enough to make your heart beat faster? 20 - 29    How many days per week do you miss taking your medication? 0    GERD  Changed to pantoprazole 40 mg once daily for heartburn 10/17/2019 from omeprazole.  No longer needing reglan (was using every other day for GI upset). An upper GI EGD in 2017 showed a hiatal hernia and a tortuous esophagus.       Back Pain   Duration: 2 months- Patient states she has sciatic problems and scoliosis.     Patient had epidural inj last time (unsure of date) - done @  [facility] (SEE 12/6/19 REFERRAL)        Specific cause: none    Description:   Location of pain: low back left  Character of pain: leg is burning, back  is achey  Pain radiation:radiates into the left leg  New numbness or weakness in legs, not attributed to pain:  no     Intensity: Currently 7/10, At its worst 7/10    History:   Pain interferes with job: Not applicable  History of back problems: yes  Any previous MRI or X-rays: Yes- at [facility].   Sees a specialist for back pain:  Used to go to [facility] (that provider retired), now just sees PCP  Therapies tried without relief: OTC Pain relievers    Alleviating factors:   Improved by: Bengay Cream      Precipitating factors:  Worsened by: standing on feet too long     DENIES: Fever, Cough, SOB    Scoliosis - last Left L4-5 transforaminal epidural steroid injection with fluoroscopic guidance injection 012/11/2019 at specialty clinic in [place] - relief lasted 2-3 months.  Left lower back pain that radiates down left leg. Weakness in left leg. Water aerobics historically. Last MRI 2017.  No new trauma/falls.  Does use walker for ambulation due to underlying scoliosis.     Has had injections previously on 3/7/2018, 8/22/2018, 5/1/2019.    Problem list and histories reviewed & adjusted, as indicated.  Additional history: as documented    Patient Active Problem List   Diagnosis     Arthritis     Hypothyroidism, unspecified type     History of herniated intervertebral disc     Pulmonary nodules     Moderate persistent asthma     Lung nodule     Morbid obesity (H)     Constipation, unspecified constipation type     Gastroesophageal reflux disease without esophagitis     Use of cane as ambulatory aid     Lumbar radiculopathy- left - uses a cane - born with the scoliosis - sees [facility]      Primary osteoarthritis of both knees     Xanthelasma of eyelid, bilateral- medial upper lids - causing some medial ptosis and decreased visual fields      Hypertriglyceridemia     Anxiety     Seasonal allergic rhinitis, unspecified trigger     Migraine without status migrainosus, not intractable, unspecified migraine  type     Hiatal hernia     Scoliosis- congenital - saw [facility]as as a child - now [facility] - has signif lumbar radiculopathy - uses a cane      Past Surgical History:   Procedure Laterality Date     ARTHROSCOPY KNEE  2007    left      BACK SURGERY  2003    lumbar discectomy      SECTION       COLONOSCOPY N/A 2017    no polyps./ return in ten years     COSMETIC SURGERY       ESOPHAGOSCOPY, GASTROSCOPY, DUODENOSCOPY (EGD), COMBINED N/A 2017    Procedure: COMBINED ESOPHAGOSCOPY, GASTROSCOPY, DUODENOSCOPY (EGD);  EGD w MAC:DX:Esophageal dysphagia,Hiatal hernia/prep & pre-op info mailed;  Surgeon: Moshe Caro MD;  Location:  GI     EYE SURGERY         Social History     Tobacco Use     Smoking status: Passive Smoke Exposure - Never Smoker     Smokeless tobacco: Never Used   Substance Use Topics     Alcohol use: No     Alcohol/week: 0.0 standard drinks     Family History   Problem Relation Age of Onset     Scoliosis Son      Learning Disorder Son         lives in a protected place     Heart Disease Father         triple bypass     Unknown/Adopted Father      Unknown/Adopted Mother      Diabetes Type 2  Maternal Grandfather      Other - See Comments Brother         5 brothers, 1 sister.      No Known Problems Paternal Grandmother      No Known Problems Paternal Grandfather      Colon Cancer No family hx of          Current Outpatient Medications   Medication Sig Dispense Refill     albuterol (PROAIR HFA/PROVENTIL HFA/VENTOLIN HFA) 108 (90 Base) MCG/ACT inhaler Inhale 2 puffs into the lungs every 4 hours as needed for shortness of breath / dyspnea or wheezing Cough 1 Inhaler 5     cetirizine (ZYRTEC) 10 MG tablet TAKE 1 TABLET BY MOUTH EVERY MORNING. 90 tablet 1     fluticasone (FLONASE) 50 MCG/ACT nasal spray USE TWO SPRAYS IN EACH NOSTRIL ONCE DAILY 16 g 1     fluticasone-salmeterol (ADVAIR) 250-50 MCG/DOSE inhaler Inhale 1 puff into the lungs 2 times daily 1 Inhaler 5      levothyroxine (SYNTHROID/LEVOTHROID) 75 MCG tablet TAKE ONE TABLET BY MOUTH ONCE DAILY 90 tablet 1     losartan (COZAAR) 50 MG tablet TAKE ONE TABLET BY MOUTH ONCE DAILY 90 tablet 1     meloxicam (MOBIC) 7.5 MG tablet Take 1 tablet (7.5 mg) by mouth daily After a meal For back pain 30 tablet 0     methocarbamol (ROBAXIN) 500 MG tablet Take 1 tablet (500 mg) by mouth 4 times daily as needed for muscle spasms 40 tablet 0     multivitamin, therapeutic with minerals (MULTI-VITAMIN) TABS Take 1 tablet by mouth daily       order for DME Equipment being ordered: Handheld shower 1 each 0     order for DME Equipment being ordered: Grab bars (3 for shower, one for wall near toilet) 4 each 0     order for DME Equipment being ordered: seated walker 1 Device 0     order for DME Incontinence items, disposable underwear, absorbency pads, liners. 80 Units 11     pantoprazole (PROTONIX) 40 MG EC tablet Take 1 tablet (40 mg) by mouth daily For reflux/heartburn 90 tablet 1     polyethylene glycol (MIRALAX) powder Take 17 g (1 capful) by mouth daily as needed for constipation 510 g 11     rosuvastatin (CRESTOR) 10 MG tablet TAKE 1 TABLET EVERY NIGHT AT BEDTIME 90 tablet 1     SUMAtriptan (IMITREX) 25 MG tablet TAKE ONE TABLET BY MOUTH ONCE MAY REPEAT WITH 1 TABLET EVERY 4 HOURS UP TO A MAXIMUM OF 4 TABLETS (100MG) IN 24 HOURS 8 tablet 1     triamcinolone (KENALOG) 0.1 % external cream Apply topically 2 times daily as needed for irritation (eczema) 30 g 1     Allergies   Allergen Reactions     Amoxicillin      Lip swelling and hives     Aspirin      GI upset     Milk Protein Extract      Upset stomach and dry cough     Zithromax [Azithromycin]      RASH       Reviewed and updated as needed this visit by clinical staff  Tobacco  Allergies  Meds  Problems  Med Hx  Surg Hx  Fam Hx       Reviewed and updated as needed this visit by Provider  Tobacco  Allergies  Meds  Problems  Med Hx  Surg Hx  Fam Hx      "    ROS:  Constitutional, HEENT, cardiovascular, pulmonary, GI, , musculoskeletal, neuro, skin, endocrine and psych systems are negative, except as otherwise noted.    OBJECTIVE:   /70 (BP Location: Right arm, Patient Position: Chair, Cuff Size: Adult Large)   Pulse 82   Temp 96.6  F (35.9  C) (Tympanic)   Ht 1.549 m (5' 1\")   Wt 90.7 kg (200 lb)   SpO2 97%   Breastfeeding No   BMI 37.79 kg/m   Body mass index is 37.79 kg/m .      GENERAL: healthy, alert and no distress  EYES: Eyes grossly normal to inspection and conjunctivae and sclerae normal  RESP: lungs clear to auscultation - no rales, rhonchi or wheezes  CV: regular rate and rhythm, normal S1 S2, no S3 or S4, no murmur, click or rub, no peripheral edema and peripheral pulses strong  MS: antalgic gait favoring RLE, rolling walker used to assist with ambulation, tenderness to Left sciatic notch and left SI joint.  Palpable spasm of left perilumbar musculature.  SKIN: no suspicious lesions or rashes  NEURO: Normal strength and tone, mentation intact and speech normal  PSYCH: mentation appears normal, affect normal/bright    Diagnostic Test Results:  MR LUMBAR SPINE WITHOUT CONTRAST  5/15/2017  6:28 PM      HISTORY: Low back pain. Spinal stenosis, cervical region. Scoliosis,  unspecified. Left leg pain.     COMPARISON: Compare 9/25/2014.     TECHNIQUE: Sagittal T1 and STIR. Sagittal T2 and axial dual-echo T2.      FINDINGS: Five lumbar vertebrae are assumed. Levoscoliosis. Prominent  degenerative disc disease at L5-S1. Bilateral renal fluid intensity  structures again seen; please see ultrasound report from April 2016.     Findings by specific level:     There is minimal to mild multilevel facet degenerative change.     T12-L1, L1-L2: No disc herniation or stenosis.     L2-L3: Mild left foraminal disc protrusion. No central or right  foraminal stenosis. Mild left foraminal stenosis.     L3-L4: No disc herniation or stenosis.     L4-L5: There is a " mild broad-based disc protrusion extending from  central to left posterolateral. This abuts the left L5 nerve root.  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 There is mild-moderate left  foraminal stenosis and no right foraminal stenosis.     L5-S1: Changes of left laminectomy. Congenital tapering of the thecal  sac. Disc bulging without central or right foraminal stenosis. The  left neural foramen appears adequate.     Compare with the previous exam, the left L2-L3 lateral disc protrusion  is mildly increased. Overall, the L4-L5 disc protrusion is mildly  decreased.                                                                       IMPRESSION:  1. Multilevel degenerative disc and facet disease.  2. L2-L3: Mild left foraminal disc protrusion.  3. L4-L5: Mild left asymmetric disc protrusion. Mild-moderate left  foraminal stenosis.  ASSESSMENT/PLAN:   Brooke was seen today for back pain.    Diagnoses and all orders for this visit:    Lumbar radiculopathy- left - uses a cane - born with the scoliosis - sees [facility]  At high risk for injury related to fall   Recommend new MRI due to time that has elapsed.  Once results received will determine if repeat injection advised.  Meloxicam and methocarbamol PRN given to pt for symptomatic relief.  -     MR Lumbar Spine w/o Contrast; Future  -     meloxicam (MOBIC) 7.5 MG tablet; Take 1 tablet (7.5 mg) by mouth daily After a meal For back pain  -     methocarbamol (ROBAXIN) 500 MG tablet; Take 1 tablet (500 mg) by mouth 4 times daily as needed for muscle spasms  -     order for DME; Equipment being ordered: Handheld shower  -     order for DME; Equipment being ordered: Grab bars (3 for shower, one for wall near toilet)    Gastroesophageal reflux disease without esophagitis  Hiatal hernia  Well controlled.  Continue current regimen.  -     pantoprazole (PROTONIX) 40 MG EC tablet; Take 1 tablet (40 mg) by  mouth daily For reflux/heartburn    Moderate persistent asthma without complication  Well controlled.  Continue current regimen.  -     fluticasone-salmeterol (ADVAIR) 250-50 MCG/DOSE inhaler; Inhale 1 puff into the lungs 2 times daily  -     albuterol (PROAIR HFA/PROVENTIL HFA/VENTOLIN HFA) 108 (90 Base) MCG/ACT inhaler; Inhale 2 puffs into the lungs every 4 hours as needed for shortness of breath / dyspnea or wheezing Cough    Visit for screening mammogram  Routine screening.  -     MA Screening Digital Bilateral; Future        Return in about 3 months (around 8/18/2020) for Physical Exam, Lab Work.     [address] [address]  [email or web address]   Office: [phone]           Salome Hollis MS, PA-C

## 2020-05-19 ASSESSMENT — ASTHMA QUESTIONNAIRES: ACT_TOTALSCORE: 20

## 2020-05-26 ENCOUNTER — HOSPITAL ENCOUNTER (OUTPATIENT)
Dept: MRI IMAGING | Facility: CLINIC | Age: 55
Discharge: HOME | End: 2020-05-26
Attending: PHYSICIAN ASSISTANT | Admitting: PHYSICIAN ASSISTANT
Payer: COMMERCIAL

## 2020-05-26 DIAGNOSIS — M54.16 LUMBAR RADICULOPATHY: ICD-10-CM

## 2020-05-26 PROCEDURE — 72148 MRI LUMBAR SPINE W/O DYE: CPT

## 2020-05-27 ENCOUNTER — TELEPHONE (OUTPATIENT)
Dept: FAMILY MEDICINE | Facility: CLINIC | Age: 55
End: 2020-05-27

## 2020-05-27 NOTE — TELEPHONE ENCOUNTER
Reason for Call:  Other call back    Detailed comments: Pt returned phone call for MRI results    Phone Number Patient can be reached at: Home number on file [phone] (home)    Best Time:     Can we leave a detailed message on this number? YES    Call taken on 5/27/2020 at 4:11 PM by Debbie Davila

## 2020-05-27 NOTE — RESULT ENCOUNTER NOTE
Pt needs new referral to medical spine clinic as her previous spine specialist retired.  Please place referral for ortho /lumbar spine/medical spine specialist    Salome Hollis MBA, MS, PA-C  [facility]

## 2020-05-28 NOTE — TELEPHONE ENCOUNTER
5/26/20 MRI Result Note:   MRI notes:     IMPRESSION:   1. Diffuse degenerative changes of the lumbar spine as detailed above.   Interval decrease in size of the left-sided disc   herniations/asymmetric bulges at the L2-L3 and L4-L5 levels since the   comparison study. Otherwise, no appreciable change from the comparison   exam.   2. Moderate left convex curvature of the lumbar spine centered at the   L3-L4 level again noted without change.   3. Left L5 laminectomy again noted.   4. No significant spinal canal or neural foraminal stenosis at any   level of the lumbar spine.     We advise:     Advise follow up with her spine clinic.       Called patient @ # below -   Advised of results - Patient stated an understanding and agreed with plan.    Brenna Pickering RN  [facility]

## 2020-07-06 DIAGNOSIS — K21.9 GASTROESOPHAGEAL REFLUX DISEASE WITHOUT ESOPHAGITIS: ICD-10-CM

## 2020-07-06 DIAGNOSIS — E78.1 HYPERTRIGLYCERIDEMIA: ICD-10-CM

## 2020-07-06 DIAGNOSIS — K44.9 HIATAL HERNIA: ICD-10-CM

## 2020-07-06 DIAGNOSIS — J45.40 MODERATE PERSISTENT ASTHMA WITHOUT COMPLICATION: ICD-10-CM

## 2020-07-06 DIAGNOSIS — I10 BENIGN ESSENTIAL HYPERTENSION: ICD-10-CM

## 2020-07-06 DIAGNOSIS — J30.2 SEASONAL ALLERGIC RHINITIS, UNSPECIFIED TRIGGER: ICD-10-CM

## 2020-07-06 DIAGNOSIS — M54.16 LUMBAR RADICULOPATHY: ICD-10-CM

## 2020-07-06 DIAGNOSIS — H02.63 XANTHELASMA OF EYELID, BILATERAL: ICD-10-CM

## 2020-07-06 DIAGNOSIS — H02.66 XANTHELASMA OF EYELID, BILATERAL: ICD-10-CM

## 2020-07-06 DIAGNOSIS — E03.9 HYPOTHYROIDISM, UNSPECIFIED TYPE: ICD-10-CM

## 2020-07-06 NOTE — TELEPHONE ENCOUNTER
[facility] calling on behalf of patient. Patient is switching to their mail order pharmacy and is in need of new prescriptions. Rx's pended and routed to refill pool.  CRUZITO DowningN, RN  [facility]

## 2020-07-07 RX ORDER — FLUTICASONE PROPIONATE 50 MCG
SPRAY, SUSPENSION (ML) NASAL
Qty: 16 G | Refills: 1 | Status: SHIPPED | OUTPATIENT
Start: 2020-07-07 | End: 2021-02-17

## 2020-07-07 RX ORDER — LEVOTHYROXINE SODIUM 75 UG/1
75 TABLET ORAL DAILY
Qty: 90 TABLET | Refills: 1 | Status: SHIPPED | OUTPATIENT
Start: 2020-07-07 | End: 2020-11-04

## 2020-07-07 RX ORDER — MELOXICAM 7.5 MG/1
7.5 TABLET ORAL DAILY
Qty: 30 TABLET | Refills: 0 | Status: SHIPPED | OUTPATIENT
Start: 2020-07-07 | End: 2020-11-04

## 2020-07-07 RX ORDER — ROSUVASTATIN CALCIUM 10 MG/1
TABLET, COATED ORAL
Qty: 90 TABLET | Refills: 1 | Status: SHIPPED | OUTPATIENT
Start: 2020-07-07 | End: 2020-10-06

## 2020-07-07 RX ORDER — PANTOPRAZOLE SODIUM 40 MG/1
40 TABLET, DELAYED RELEASE ORAL DAILY
Qty: 90 TABLET | Refills: 1 | Status: SHIPPED | OUTPATIENT
Start: 2020-07-07 | End: 2020-11-04

## 2020-07-07 RX ORDER — ALBUTEROL SULFATE 90 UG/1
2 AEROSOL, METERED RESPIRATORY (INHALATION) EVERY 4 HOURS PRN
Qty: 1 INHALER | Refills: 5 | Status: SHIPPED | OUTPATIENT
Start: 2020-07-07 | End: 2020-11-04

## 2020-07-07 RX ORDER — LOSARTAN POTASSIUM 50 MG/1
50 TABLET ORAL DAILY
Qty: 90 TABLET | Refills: 1 | Status: SHIPPED | OUTPATIENT
Start: 2020-07-07 | End: 2020-11-04

## 2020-07-07 NOTE — TELEPHONE ENCOUNTER
Prescription approved per [facility] Refill Protocol.  Pt is changing pharmacies.    Sherman Chowdary RN   [facility]

## 2020-07-27 ENCOUNTER — HOSPITAL ENCOUNTER (OUTPATIENT)
Dept: MAMMOGRAPHY | Facility: CLINIC | Age: 55
Discharge: HOME | End: 2020-07-27
Attending: PHYSICIAN ASSISTANT | Admitting: PHYSICIAN ASSISTANT
Payer: COMMERCIAL

## 2020-07-27 DIAGNOSIS — Z12.31 VISIT FOR SCREENING MAMMOGRAM: ICD-10-CM

## 2020-07-27 PROCEDURE — 77063 BREAST TOMOSYNTHESIS BI: CPT

## 2020-07-27 NOTE — RESULT ENCOUNTER NOTE
Note to Staff: please send a result letter    -Mammogram was normal.  ADVISE: rechecking in 1 year.       Sandi LLAMAS (covering for Salome Hollis)    For additional lab test information, [email or web address] is an excellent reference.

## 2020-07-27 NOTE — LETTER
[facility]  [address] [place] [address]  [phone]                    July 28, 2020    Brooke John  [address]  [place] [address]      Dear Brooke,    Here is a summary of your recent test results:      -Mammogram was normal.  ADVISE: rechecking in 1 year.     Your test results are enclosed.      Please contact me if you have any questions.    In addition, here is a list of due or overdue Health Maintenance reminders.    Health Maintenance Due   Topic Date Due     PHQ-2  01/01/2020     Asthma Action Plan - yearly  04/15/2020     Kidney Microalbumin Urine Test  07/17/2020       Please call us at [phone] (or use [employer]) to address the above recommendations.            Thank you very much for trusting [facility]     Healthy regards,      Sandi Spencer, FNP-BC       Results for orders placed or performed during the hospital encounter of 07/27/20   MA Screen Bilateral w/Jamal     Status: None    Narrative    Examination: Bilateral digital screening mammography with computer  aided detection including digital breast tomosynthesis, 7/27/2020 8:08  AM.    Comparison: 07/26/2019, 08/01/2016    History: No current breast concerns.    BREAST DENSITY: Scattered fibroglandular densities.    COMMENTS:  No suspicious finding.      Impression    IMPRESSION: BI-RADS CATEGORY: 1 -  NEGATIVE.    RECOMMENDED FOLLOW-UP: Annual Mammography.      The patient will be notified of the results.     BIJU LEWIS MD

## 2020-08-06 ENCOUNTER — TRANSFERRED RECORDS (OUTPATIENT)
Dept: HEALTH INFORMATION MANAGEMENT | Facility: CLINIC | Age: 55
End: 2020-08-06

## 2020-09-14 DIAGNOSIS — G43.909 MIGRAINE WITHOUT STATUS MIGRAINOSUS, NOT INTRACTABLE, UNSPECIFIED MIGRAINE TYPE: ICD-10-CM

## 2020-09-16 ENCOUNTER — TELEPHONE (OUTPATIENT)
Dept: FAMILY MEDICINE | Facility: CLINIC | Age: 55
End: 2020-09-16

## 2020-09-16 RX ORDER — SUMATRIPTAN 25 MG/1
TABLET, FILM COATED ORAL
Qty: 8 TABLET | Refills: 1 | Status: SHIPPED | OUTPATIENT
Start: 2020-09-16 | End: 2020-11-04

## 2020-09-16 NOTE — TELEPHONE ENCOUNTER
General Call:     Who is calling:  Pt     Reason for Call:  Pt wanting a script for lift chair,  Pt states she can  or have mailed    What are your questions or concerns:  na    Date of last appointment with provider: na    Okay to leave a detailed message:Yes at Home number on file [phone] (home)

## 2020-09-16 NOTE — TELEPHONE ENCOUNTER
Refill authorized.  Advise due for annual fasting physical w/pap in Oct/Nov.      Salome Hollis MBA, MS, PA-C

## 2020-09-18 NOTE — TELEPHONE ENCOUNTER
Routing to [facility] - please schedule phone/OV below      Brenna Pickering RN  [facility]

## 2020-09-21 NOTE — TELEPHONE ENCOUNTER
Phone appointment made for 9/25.  Does not have technology to do a video appt.       Latoya Barrow

## 2020-09-24 NOTE — PROGRESS NOTES
"Brooke John is a 55 year old female who is being evaluated via a billable video visit.      The patient has been notified of following:     \"This video visit will be conducted via a call between you and your physician/provider. We have found that certain health care needs can be provided without the need for an in-person physical exam.  This service lets us provide the care you need with a video conversation.  If a prescription is necessary we can send it directly to your pharmacy.  If lab work is needed we can place an order for that and you can then stop by our lab to have the test done at a later time.    Video visits are billed at different rates depending on your insurance coverage.  Please reach out to your insurance provider with any questions.    If during the course of the call the physician/provider feels a video visit is not appropriate, you will not be charged for this service.\"    Patient has given verbal consent for Video visit? Yes  How would you like to obtain your AVS? MyChart  If you are dropped from the video visit, the video invite should be resent to: Send to e-mail at: [email or web address]  Will anyone else be joining your video visit? No    Subjective     Brooke John is a 55 year old female who presents today via video visit for the following health issues:    HPI    Patient wants to discuss getting a lift chair.  [employer].  Is very sedentary right now.  Had MRI 5/2020 & returned to  Spine shortly therafter (no notes available).  They advised home exercises and if she desired, they could possibly do an epidural injection.  Having difficulty getting from sitting/standing.  Interested in PHYSICAL THERAPY.       Video Start Time: 9:43 AM      Review of Systems   Constitutional, HEENT, cardiovascular, pulmonary, GI, , musculoskeletal, neuro, skin, endocrine and psych systems are negative, except as otherwise noted.      Objective           Vitals:  No vitals were " "obtained today due to virtual visit.    Physical Exam     GENERAL: Healthy, alert and no distress  EYES: Eyes grossly normal to inspection.  No discharge or erythema, or obvious scleral/conjunctival abnormalities.  RESP: No audible wheeze, cough, or visible cyanosis.  No visible retractions or increased work of breathing.    SKIN: Visible skin clear. No significant rash, abnormal pigmentation or lesions.  NEURO: Cranial nerves grossly intact.  Mentation and speech appropriate for age.  PSYCH: Mentation appears normal, affect normal/bright, judgement and insight intact, normal speech and appearance well-groomed.          Assessment & Plan     Brooke was seen today for discuss getting a lift chair.    Diagnoses and all orders for this visit:    Lumbar radiculopathy- left - uses a cane - born with the scoliosis - sees [facility]   -     Lift Chair W Seat Lift Mechanism Order for DME - ONLY FOR DME  -     [facility] PT, HAND, AND CHIROPRACTIC REFERRAL; Future    Morbid obesity (H)  -     Lift Chair W Seat Lift Mechanism Order for DME - ONLY FOR DME    Scoliosis- congenital - saw [facility] as a child - now [facility] - has signif lumbar radiculopathy - uses a cane   -     Lift Chair W Seat Lift Mechanism Order for DME - ONLY FOR DME  -     [facility] PT, HAND, AND CHIROPRACTIC REFERRAL; Future    Use of cane as ambulatory aid  -     Lift Chair W Seat Lift Mechanism Order for DME - ONLY FOR DME  -     [facility] PT, HAND, AND CHIROPRACTIC REFERRAL; Future         BMI:   Estimated body mass index is 37.79 kg/m  as calculated from the following:    Height as of 5/18/20: 1.549 m (5' 1\").    Weight as of 5/18/20: 90.7 kg (200 lb).   Weight management plan: Discussed healthy diet and exercise guidelines      No follow-ups on file.    Salome Hollis PA-C  [facility]      Video-Visit Details    Type of service:  Telephone visit    Telephone End Time: 9:45 AM (19 minutes)    Originating Location (pt. Location): " Home    Distant Location (provider location):  [facility]     Platform used for Video Visit: Unable to complete video visit - technical issues - completed phone visit

## 2020-09-25 ENCOUNTER — VIRTUAL VISIT (OUTPATIENT)
Dept: FAMILY MEDICINE | Facility: CLINIC | Age: 55
End: 2020-09-25
Payer: COMMERCIAL

## 2020-09-25 DIAGNOSIS — M54.16 LUMBAR RADICULOPATHY: Primary | ICD-10-CM

## 2020-09-25 DIAGNOSIS — Z99.89 USE OF CANE AS AMBULATORY AID: ICD-10-CM

## 2020-09-25 DIAGNOSIS — M41.9 SCOLIOSIS, UNSPECIFIED SCOLIOSIS TYPE, UNSPECIFIED SPINAL REGION: ICD-10-CM

## 2020-09-25 DIAGNOSIS — E66.01 MORBID OBESITY (H): ICD-10-CM

## 2020-09-25 PROCEDURE — 99213 OFFICE O/P EST LOW 20 MIN: CPT | Mod: 95 | Performed by: PHYSICIAN ASSISTANT

## 2020-10-02 ENCOUNTER — THERAPY VISIT (OUTPATIENT)
Dept: PHYSICAL THERAPY | Facility: CLINIC | Age: 55
End: 2020-10-02
Attending: PHYSICIAN ASSISTANT
Payer: COMMERCIAL

## 2020-10-02 DIAGNOSIS — M41.9 SCOLIOSIS, UNSPECIFIED SCOLIOSIS TYPE, UNSPECIFIED SPINAL REGION: ICD-10-CM

## 2020-10-02 DIAGNOSIS — M54.16 LUMBAR RADICULOPATHY: ICD-10-CM

## 2020-10-02 DIAGNOSIS — Z99.89 USE OF CANE AS AMBULATORY AID: ICD-10-CM

## 2020-10-02 DIAGNOSIS — M54.42 LEFT-SIDED LOW BACK PAIN WITH LEFT-SIDED SCIATICA: ICD-10-CM

## 2020-10-02 PROCEDURE — 97110 THERAPEUTIC EXERCISES: CPT | Mod: GP | Performed by: PHYSICAL THERAPIST

## 2020-10-02 PROCEDURE — 97161 PT EVAL LOW COMPLEX 20 MIN: CPT | Mod: GP | Performed by: PHYSICAL THERAPIST

## 2020-10-02 NOTE — PROGRESS NOTES
[facility] Initial Evaluation  Subjective:  Physical Therapy Initial Evaluation   10/2/2020   Precautions/Restrictions/MD instructions: PT eval and treat   Therapist Impression:   Pt is a 56 y/o female, with chronic history of left LBP and leg pain. Pt presents with pain, decreased ROM/mobility, poor posture and decreased strength consistent with generalized weakness and posture dysfunction. These impairments limit their ability to walk, and sit prolonged periods . Skilled PT services necessary in order to reduce impairments and improve independent function.    Subjective:   Chief Complaint: left sided LBP and left leg pain  DOI/onset: 3/1/2020  DOS: laminectomy (17 years ago)   Location: L low back and into leg Quality: sharp   Frequency: intermittent  Radiates: shoots down the left leg to calf   Pain scale: Rest 8/10 Activity 4/10    Sleeping: restless legs    Exacerbated by: sitting prolonged periods  Relieved by: movement  Progression: worse   Previous Treatment: none Effect of prior treatment: n/a    PMH and/or surgical history: csection   Imaging: Xrays     Occupation: retired  Job duties: n/a   Current HEP/exercise regimen: none   Patient's goals: walk painfree   Medications: please refer to chart    General health as reported by patient: fair/good   Return to MD: as needed  Red Flags: second hand smoke; asthma       HPI                    Objective:  LUMBAR:    Posture: hunched over posture, rounded low back    Gait:  Slow gait, lurch to right      Functional:  - sit to stand: needs to use arms to boost up    Neurological:    Test next session due to time restraints today    Dural Signs:   L R   Slump + -   SLR + -   Other:    - TA activation:poor    AROM: (Major, Moderate, Minimal or Nil loss)  Movement Loss Gordon Mod Min Nil Pain   Flexion   X  No pain   Extension  X   No pain   Side Bend L  X   Left pain   Side Bend R  X   Left pain     Repeated movement testing:   Worse with  sideglides  Better with FORD        System    Physical Exam    General     ROS    Assessment/Plan:    Patient is a 55 year old female with lumbar complaints.    Patient has the following significant findings with corresponding treatment plan.                Diagnosis 1:  Left LBP w/sciatica  Pain -  hot/cold therapy, manual therapy, self management, education, directional preference exercise and home program  Decreased ROM/flexibility - manual therapy and therapeutic exercise  Decreased joint mobility - manual therapy and therapeutic exercise  Decreased strength - therapeutic exercise and therapeutic activities  Impaired balance - neuro re-education and therapeutic activities  Decreased proprioception - neuro re-education and therapeutic activities  Inflammation - cold therapy and self management/home program  Impaired gait - gait training  Impaired muscle performance - neuro re-education  Decreased function - therapeutic activities  Impaired posture - neuro re-education  Instability -  Therapeutic Activity  Therapeutic Exercise    Therapy Evaluation Codes:     Cumulative Therapy Evaluation is: Low complexity.    Previous and current functional limitations:  (See Goal Flow Sheet for this information)    Short term and Long term goals: (See Goal Flow Sheet for this information)     Communication ability:  Patient appears to be able to clearly communicate and understand verbal and written communication and follow directions correctly.  Treatment Explanation - The following has been discussed with the patient:   RX ordered/plan of care  Anticipated outcomes  Possible risks and side effects  This patient would benefit from PT intervention to resume normal activities.   Rehab potential is good.    Frequency:  1 X week, once daily  Duration:  for 6 weeks  Discharge Plan:  Achieve all LTG.  Independent in home treatment program.  Reach maximal therapeutic benefit.    Please refer to the daily flowsheet for treatment today,  total treatment time and time spent performing 1:1 timed codes.

## 2020-10-05 DIAGNOSIS — E78.1 HYPERTRIGLYCERIDEMIA: ICD-10-CM

## 2020-10-05 DIAGNOSIS — H02.63 XANTHELASMA OF EYELID, BILATERAL: ICD-10-CM

## 2020-10-05 DIAGNOSIS — H02.66 XANTHELASMA OF EYELID, BILATERAL: ICD-10-CM

## 2020-10-06 RX ORDER — ROSUVASTATIN CALCIUM 10 MG/1
TABLET, COATED ORAL
Qty: 90 TABLET | Refills: 2 | Status: SHIPPED | OUTPATIENT
Start: 2020-10-06 | End: 2020-11-04

## 2020-10-16 ENCOUNTER — THERAPY VISIT (OUTPATIENT)
Dept: PHYSICAL THERAPY | Facility: CLINIC | Age: 55
End: 2020-10-16
Payer: COMMERCIAL

## 2020-10-16 DIAGNOSIS — M54.42 LEFT-SIDED LOW BACK PAIN WITH LEFT-SIDED SCIATICA: ICD-10-CM

## 2020-10-16 PROCEDURE — 97112 NEUROMUSCULAR REEDUCATION: CPT | Mod: GP | Performed by: PHYSICAL THERAPIST

## 2020-10-16 PROCEDURE — 97110 THERAPEUTIC EXERCISES: CPT | Mod: GP | Performed by: PHYSICAL THERAPIST

## 2020-10-22 DIAGNOSIS — E03.9 HYPOTHYROIDISM, UNSPECIFIED TYPE: ICD-10-CM

## 2020-10-22 RX ORDER — LEVOTHYROXINE SODIUM 75 UG/1
TABLET ORAL
Qty: 90 TABLET | Refills: 1 | OUTPATIENT
Start: 2020-10-22

## 2020-10-23 ENCOUNTER — THERAPY VISIT (OUTPATIENT)
Dept: PHYSICAL THERAPY | Facility: CLINIC | Age: 55
End: 2020-10-23
Payer: COMMERCIAL

## 2020-10-23 DIAGNOSIS — M54.42 LEFT-SIDED LOW BACK PAIN WITH LEFT-SIDED SCIATICA: ICD-10-CM

## 2020-10-23 PROCEDURE — 97112 NEUROMUSCULAR REEDUCATION: CPT | Mod: GP | Performed by: PHYSICAL THERAPIST

## 2020-10-23 PROCEDURE — 97110 THERAPEUTIC EXERCISES: CPT | Mod: GP | Performed by: PHYSICAL THERAPIST

## 2020-10-23 NOTE — PROGRESS NOTES
Subjective:  HPI  Physical Exam  Oswestry Score: 33.33 %                 Objective:  System    Physical Exam    General     ROS    Assessment/Plan:    DISCHARGE REPORT    Progress reporting period is from 10/2/2020  to 10/23/2020.       SUBJECTIVE    Subjective: Pt notes was sore after last session; a few slower days, but today painfree. Knees are feeling better this week (less pain, SLR flexion helping).     Current Pain level: 3/10.      Initial Pain level: 8/10.   Changes in function:  Yes, will continue to progress with exercises  Adverse reaction to treatment or activity: None    OBJECTIVE  Changes noted in objective findings:  Yes,   Objective: Improved Kiara and YVONNE today. AROM: lumbar flexion=to ankles (pull in low back/slight ache); ext=limited and slight ache noted.  SB bilaterally= slight limit and pain only on left. Good review of HEP..     ASSESSMENT/PLAN  Updated problem list and treatment plan: Diagnosis 1:  LBP  Pain -  home program  Decreased ROM/flexibility - home program  Decreased strength - home program  STG/LTGs have been met or progress has been made towards goals:  Yes,   Assessment of Progress: The patient's condition is improving.  Self Management Plans:  Patient has been instructed in a home treatment program.  I have re-evaluated this patient and find that the nature, scope, duration and intensity of the therapy is appropriate for the medical condition of the patient.  Brooke continues to require the following intervention to meet STG and LTG's:  PT intervention is no longer required to meet STG/LTG.    Recommendations:  This patient is ready to be discharged from therapy and continue their home treatment program.    Please refer to the daily flowsheet for treatment today, total treatment time and time spent performing 1:1 timed codes.
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
## 2020-11-04 ENCOUNTER — OFFICE VISIT (OUTPATIENT)
Dept: FAMILY MEDICINE | Facility: CLINIC | Age: 55
End: 2020-11-04
Payer: COMMERCIAL

## 2020-11-04 VITALS
HEIGHT: 61 IN | BODY MASS INDEX: 39.27 KG/M2 | SYSTOLIC BLOOD PRESSURE: 120 MMHG | TEMPERATURE: 96.7 F | WEIGHT: 208 LBS | DIASTOLIC BLOOD PRESSURE: 86 MMHG | HEART RATE: 88 BPM | OXYGEN SATURATION: 97 %

## 2020-11-04 DIAGNOSIS — E03.9 HYPOTHYROIDISM, UNSPECIFIED TYPE: ICD-10-CM

## 2020-11-04 DIAGNOSIS — M54.16 LUMBAR RADICULOPATHY: ICD-10-CM

## 2020-11-04 DIAGNOSIS — E78.1 HYPERTRIGLYCERIDEMIA: ICD-10-CM

## 2020-11-04 DIAGNOSIS — Z12.4 SCREENING FOR MALIGNANT NEOPLASM OF CERVIX: ICD-10-CM

## 2020-11-04 DIAGNOSIS — M41.9 SCOLIOSIS, UNSPECIFIED SCOLIOSIS TYPE, UNSPECIFIED SPINAL REGION: ICD-10-CM

## 2020-11-04 DIAGNOSIS — K21.9 GASTROESOPHAGEAL REFLUX DISEASE WITHOUT ESOPHAGITIS: ICD-10-CM

## 2020-11-04 DIAGNOSIS — Z23 NEEDS FLU SHOT: ICD-10-CM

## 2020-11-04 DIAGNOSIS — H02.63 XANTHELASMA OF EYELID, BILATERAL: ICD-10-CM

## 2020-11-04 DIAGNOSIS — L98.9 NON-HEALING SKIN LESION: ICD-10-CM

## 2020-11-04 DIAGNOSIS — K44.9 HIATAL HERNIA: ICD-10-CM

## 2020-11-04 DIAGNOSIS — Z13.0 SCREENING FOR DEFICIENCY ANEMIA: ICD-10-CM

## 2020-11-04 DIAGNOSIS — K59.00 CONSTIPATION, UNSPECIFIED CONSTIPATION TYPE: ICD-10-CM

## 2020-11-04 DIAGNOSIS — H02.66 XANTHELASMA OF EYELID, BILATERAL: ICD-10-CM

## 2020-11-04 DIAGNOSIS — G43.909 MIGRAINE WITHOUT STATUS MIGRAINOSUS, NOT INTRACTABLE, UNSPECIFIED MIGRAINE TYPE: ICD-10-CM

## 2020-11-04 DIAGNOSIS — Z23 NEED FOR PNEUMOCOCCAL VACCINE: ICD-10-CM

## 2020-11-04 DIAGNOSIS — Z00.00 ROUTINE GENERAL MEDICAL EXAMINATION AT A HEALTH CARE FACILITY: Primary | ICD-10-CM

## 2020-11-04 DIAGNOSIS — E66.01 MORBID OBESITY (H): ICD-10-CM

## 2020-11-04 DIAGNOSIS — I10 BENIGN ESSENTIAL HYPERTENSION: ICD-10-CM

## 2020-11-04 DIAGNOSIS — J45.40 MODERATE PERSISTENT ASTHMA WITHOUT COMPLICATION: ICD-10-CM

## 2020-11-04 LAB
ERYTHROCYTE [DISTWIDTH] IN BLOOD BY AUTOMATED COUNT: 13.6 % (ref 10–15)
HCT VFR BLD AUTO: 42.2 % (ref 35–47)
HGB BLD-MCNC: 13.6 G/DL (ref 11.7–15.7)
MCH RBC QN AUTO: 28.4 PG (ref 26.5–33)
MCHC RBC AUTO-ENTMCNC: 32.2 G/DL (ref 31.5–36.5)
MCV RBC AUTO: 88 FL (ref 78–100)
PLATELET # BLD AUTO: 230 10E9/L (ref 150–450)
RBC # BLD AUTO: 4.79 10E12/L (ref 3.8–5.2)
WBC # BLD AUTO: 7.3 10E9/L (ref 4–11)

## 2020-11-04 PROCEDURE — 80061 LIPID PANEL: CPT | Performed by: PHYSICIAN ASSISTANT

## 2020-11-04 PROCEDURE — 99396 PREV VISIT EST AGE 40-64: CPT | Mod: 25 | Performed by: PHYSICIAN ASSISTANT

## 2020-11-04 PROCEDURE — G0008 ADMIN INFLUENZA VIRUS VAC: HCPCS | Performed by: PHYSICIAN ASSISTANT

## 2020-11-04 PROCEDURE — 99214 OFFICE O/P EST MOD 30 MIN: CPT | Mod: 25 | Performed by: PHYSICIAN ASSISTANT

## 2020-11-04 PROCEDURE — 85027 COMPLETE CBC AUTOMATED: CPT | Performed by: PHYSICIAN ASSISTANT

## 2020-11-04 PROCEDURE — 90732 PPSV23 VACC 2 YRS+ SUBQ/IM: CPT | Performed by: PHYSICIAN ASSISTANT

## 2020-11-04 PROCEDURE — 82043 UR ALBUMIN QUANTITATIVE: CPT | Performed by: PHYSICIAN ASSISTANT

## 2020-11-04 PROCEDURE — 36415 COLL VENOUS BLD VENIPUNCTURE: CPT | Performed by: PHYSICIAN ASSISTANT

## 2020-11-04 PROCEDURE — 84439 ASSAY OF FREE THYROXINE: CPT | Performed by: PHYSICIAN ASSISTANT

## 2020-11-04 PROCEDURE — 84443 ASSAY THYROID STIM HORMONE: CPT | Performed by: PHYSICIAN ASSISTANT

## 2020-11-04 PROCEDURE — 90682 RIV4 VACC RECOMBINANT DNA IM: CPT | Performed by: PHYSICIAN ASSISTANT

## 2020-11-04 PROCEDURE — 80053 COMPREHEN METABOLIC PANEL: CPT | Performed by: PHYSICIAN ASSISTANT

## 2020-11-04 PROCEDURE — G0009 ADMIN PNEUMOCOCCAL VACCINE: HCPCS | Performed by: PHYSICIAN ASSISTANT

## 2020-11-04 PROCEDURE — 87624 HPV HI-RISK TYP POOLED RSLT: CPT | Performed by: PHYSICIAN ASSISTANT

## 2020-11-04 PROCEDURE — G0145 SCR C/V CYTO,THINLAYER,RESCR: HCPCS | Performed by: PHYSICIAN ASSISTANT

## 2020-11-04 RX ORDER — PANTOPRAZOLE SODIUM 40 MG/1
40 TABLET, DELAYED RELEASE ORAL DAILY
Qty: 90 TABLET | Refills: 1 | Status: SHIPPED | OUTPATIENT
Start: 2020-11-04 | End: 2021-05-07

## 2020-11-04 RX ORDER — LEVOTHYROXINE SODIUM 75 UG/1
75 TABLET ORAL DAILY
Qty: 90 TABLET | Refills: 1 | Status: SHIPPED | OUTPATIENT
Start: 2020-11-04 | End: 2020-11-06

## 2020-11-04 RX ORDER — CETIRIZINE HYDROCHLORIDE 10 MG/1
10 TABLET ORAL EVERY MORNING
Qty: 90 TABLET | Refills: 1
Start: 2020-11-04 | End: 2021-01-20

## 2020-11-04 RX ORDER — CALCIUM CARBONATE 500 MG/1
1 TABLET, CHEWABLE ORAL 2 TIMES DAILY PRN
Start: 2020-11-04

## 2020-11-04 RX ORDER — ALBUTEROL SULFATE 90 UG/1
2 AEROSOL, METERED RESPIRATORY (INHALATION) EVERY 4 HOURS PRN
Qty: 1 INHALER | Refills: 5 | Status: SHIPPED | OUTPATIENT
Start: 2020-11-04 | End: 2022-04-28

## 2020-11-04 RX ORDER — POLYETHYLENE GLYCOL 3350 17 G/17G
1 POWDER, FOR SOLUTION ORAL DAILY PRN
Qty: 510 G | Refills: 11 | Status: SHIPPED | OUTPATIENT
Start: 2020-11-04 | End: 2021-12-07

## 2020-11-04 RX ORDER — SUMATRIPTAN 25 MG/1
TABLET, FILM COATED ORAL
Qty: 8 TABLET | Refills: 1 | Status: SHIPPED | OUTPATIENT
Start: 2020-11-04 | End: 2021-04-21

## 2020-11-04 RX ORDER — ROSUVASTATIN CALCIUM 10 MG/1
TABLET, COATED ORAL
Qty: 90 TABLET | Refills: 1 | Status: SHIPPED | OUTPATIENT
Start: 2020-11-04 | End: 2021-05-28

## 2020-11-04 RX ORDER — LOSARTAN POTASSIUM 50 MG/1
50 TABLET ORAL DAILY
Qty: 90 TABLET | Refills: 1 | Status: SHIPPED | OUTPATIENT
Start: 2020-11-04 | End: 2021-05-28

## 2020-11-04 ASSESSMENT — ACTIVITIES OF DAILY LIVING (ADL): CURRENT_FUNCTION: NO ASSISTANCE NEEDED

## 2020-11-04 ASSESSMENT — ANXIETY QUESTIONNAIRES
IF YOU CHECKED OFF ANY PROBLEMS ON THIS QUESTIONNAIRE, HOW DIFFICULT HAVE THESE PROBLEMS MADE IT FOR YOU TO DO YOUR WORK, TAKE CARE OF THINGS AT HOME, OR GET ALONG WITH OTHER PEOPLE: NOT DIFFICULT AT ALL
5. BEING SO RESTLESS THAT IT IS HARD TO SIT STILL: NOT AT ALL
1. FEELING NERVOUS, ANXIOUS, OR ON EDGE: SEVERAL DAYS
7. FEELING AFRAID AS IF SOMETHING AWFUL MIGHT HAPPEN: NOT AT ALL
6. BECOMING EASILY ANNOYED OR IRRITABLE: NOT AT ALL
3. WORRYING TOO MUCH ABOUT DIFFERENT THINGS: NOT AT ALL
GAD7 TOTAL SCORE: 1
2. NOT BEING ABLE TO STOP OR CONTROL WORRYING: NOT AT ALL

## 2020-11-04 ASSESSMENT — MIFFLIN-ST. JEOR: SCORE: 1465.86

## 2020-11-04 ASSESSMENT — PATIENT HEALTH QUESTIONNAIRE - PHQ9
5. POOR APPETITE OR OVEREATING: NOT AT ALL
SUM OF ALL RESPONSES TO PHQ QUESTIONS 1-9: 3

## 2020-11-05 LAB
ALBUMIN SERPL-MCNC: 3.6 G/DL (ref 3.4–5)
ALP SERPL-CCNC: 101 U/L (ref 40–150)
ALT SERPL W P-5'-P-CCNC: 38 U/L (ref 0–50)
ANION GAP SERPL CALCULATED.3IONS-SCNC: 4 MMOL/L (ref 3–14)
AST SERPL W P-5'-P-CCNC: 28 U/L (ref 0–45)
BILIRUB SERPL-MCNC: 1.2 MG/DL (ref 0.2–1.3)
BUN SERPL-MCNC: 15 MG/DL (ref 7–30)
CALCIUM SERPL-MCNC: 9.2 MG/DL (ref 8.5–10.1)
CHLORIDE SERPL-SCNC: 109 MMOL/L (ref 94–109)
CHOLEST SERPL-MCNC: 203 MG/DL
CO2 SERPL-SCNC: 28 MMOL/L (ref 20–32)
CREAT SERPL-MCNC: 0.88 MG/DL (ref 0.52–1.04)
CREAT UR-MCNC: 182 MG/DL
GFR SERPL CREATININE-BSD FRML MDRD: 72 ML/MIN/{1.73_M2}
GLUCOSE SERPL-MCNC: 84 MG/DL (ref 70–99)
HDLC SERPL-MCNC: 57 MG/DL
LDLC SERPL CALC-MCNC: 103 MG/DL
MICROALBUMIN UR-MCNC: 13 MG/L
MICROALBUMIN/CREAT UR: 6.92 MG/G CR (ref 0–25)
NONHDLC SERPL-MCNC: 146 MG/DL
POTASSIUM SERPL-SCNC: 4.2 MMOL/L (ref 3.4–5.3)
PROT SERPL-MCNC: 7.6 G/DL (ref 6.8–8.8)
SODIUM SERPL-SCNC: 141 MMOL/L (ref 133–144)
T4 FREE SERPL-MCNC: 0.75 NG/DL (ref 0.76–1.46)
TRIGL SERPL-MCNC: 216 MG/DL
TSH SERPL DL<=0.005 MIU/L-ACNC: 11.2 MU/L (ref 0.4–4)

## 2020-11-05 ASSESSMENT — ANXIETY QUESTIONNAIRES: GAD7 TOTAL SCORE: 1

## 2020-11-05 ASSESSMENT — ASTHMA QUESTIONNAIRES: ACT_TOTALSCORE: 20

## 2020-11-06 RX ORDER — LEVOTHYROXINE SODIUM 88 UG/1
88 TABLET ORAL DAILY
Qty: 90 TABLET | Refills: 3 | Status: SHIPPED | OUTPATIENT
Start: 2020-11-06 | End: 2022-01-25

## 2020-11-07 NOTE — RESULT ENCOUNTER NOTE
Triage: please advise of results/recommendations and dose change for levothyroxine.      Brooke  I have reviewed your recent labs. Here are the results:    -Normal red blood cell (hgb) levels, normal white blood cell count and normal platelet levels.  -Cholesterol levels are at your goal levels.  ADVISE: continuing your medication, a regular exercise program with at least 150 minutes of aerobic exercise per week, and eating a low saturated fat/low carbohydrate diet.  Also, you should recheck this fasting cholesterol panel in 12 months.  -Liver and gallbladder tests are normal (ALT,AST, Alk phos, bilirubin), kidney function is normal (Cr, GFR), sodium is normal, potassium is normal, calcium is normal, glucose is normal.  -TSH (thyroid stimulating hormone) is abnormal suggesting you are currently underreplaced.  ADVISE: changing your medication dose to 100 micrograms/day (a prescription has been sent to your pharmacy) and rechecking your TSH with a lab appointment in 6-8 weeks.  -Microalbumin (urine protein) test is normal.  ADVISE: rechecking this annually.     If you have any questions please do not hesitate to contact our office via phone ([phone]) or MyChart.    Salome Hollis, MS, PA-C  [facility]

## 2020-11-09 LAB
COPATH REPORT: NORMAL
PAP: NORMAL

## 2020-11-10 LAB
FINAL DIAGNOSIS: NORMAL
HPV HR 12 DNA CVX QL NAA+PROBE: NEGATIVE
HPV16 DNA SPEC QL NAA+PROBE: NEGATIVE
HPV18 DNA SPEC QL NAA+PROBE: NEGATIVE
SPECIMEN DESCRIPTION: NORMAL
SPECIMEN SOURCE CVX/VAG CYTO: NORMAL

## 2020-11-11 NOTE — PROGRESS NOTES
[facility] - PRIMARY CARE SKIN    CC: Lesion(s)  SUBJECTIVE:   Brooke John is a(n) 55 year old female who presents to clinic today for :     Issue One: 1-2 years duration, itches at times.No bleeding. Location above the left mid eyebrow.  Issue two : lesion on the left lateral abdomen , present for years, she picks at when it gets crusty      Personal Medical History  Skin cancer: NO  Eczema Psoriasis Lupus   yes NO NO   Other:   .    Family Medical History  Skin cancer: NO  Eczema Psoriasis Lupus   NO NO NO   Other:   .    Occupation: retired    Refer to electronic medical record (EMR) for past medical history and medications.      ROS: 14 point review of systems was negative except the symptoms listed above in the HPI.        OBJECTIVE:   GENERAL: healthy, alert and no distress.  HEENT: PERRL. Conjunctiva, sclera clear.  SKIN: Jefferson Skin Type - I.  Face examined. The dermatoscope was used to help evaluate pigmented lesions.  Skin Pertinent Findings:  Left mid eyebrow- 4 mm indurated, erythematous lesion ? Sebaceous hyperplasia ? Basal cell carcinoma ? Squamous cell carcinoma ? Other        Left lateral abdomen - 10 mm X 3 mm erythematous, non indurated, smooth lesion most consistent with constant irritation. No suspicious changes    ASSESSMENT:     Encounter Diagnosis   Name Primary?     Neoplasm of uncertain behavior of skin Yes         PLAN:   Patient Instructions   FUTURE APPOINTMENTS  Follow up per pathology report. You will be notified, generally via letter or MyChart, in approximately 10 days. If there is anything we need to discuss or further treatment needed, I will call you to discuss it.    Vaseline and a bandage for 5-7 days.     Can shower normally.     WOUND CARE INSTRUCTIONS  1. Wash hands before every dressing change.  2. Change the dressing after 24 hours and once daily, or earlier if it becomes saturated.  3. Wash the wound area with a mild soap, then rinse.  4. Gently pat dry  "with a sterile gauze or Q-tip.  5. Using a Q-tip, apply Vaseline or Aquaphor only over entire wound. DO NOT use Neosporin - as many people react to neomycin.  6. Finally, cover with a bandage or sterile non-stick gauze with micropore paper tape.  7. Repeat once daily until wound has healed.      Soap, water and shampoo will not hurt this area.    Do not go swimming or take baths, but showering is encouraged.    Limit use of the area where the procedure was done for a few days to allow for optimal healing.    Signs of Infection:  Infection can occur in any area where skin has been disrupted. If you notice persistent redness, swelling, colored drainage, increasing pain, fever or other signs of infection, please call us at: [phone] and ask to have me or my colleague paged. We will call you back to discuss.    If you experience bleeding:  Wash hands and hold firm pressure on the area for 10 minutes without checking to see if the bleeding has stopped. \"Checking\" pulls off the protective wound clot and restarts the bleeding all over again. Re-apply pressure for 10 minutes if necessary to stop bleeding.  Use additional sterile gauze and tape to maintain pressure once bleeding has stopped.  If bleeding continues, then call back to clinic at [phone].    PATIENT INFORMATION : WOUNDS  During the healing process you will notice a number of changes.     All wounds normally drain.  The larger the wound the more drainage there will be.  After 7-10 days, you will notice the wound beginning to shrink and new skin will begin to grow.  The wound is healed when you can see that skin has formed over the entire area.  A healed wound has a healthy, shiny look to the surface and is red to dark pink in color to normalize.  Wounds may take approximately 4-6 weeks to heal.  Larger wounds may take 6-8 weeks. After the wound is healed you may discontinue dressing changes.    All wounds develop a small halo of redness surrounding " the wound which means that healing is occurring. Severe itching with extensive redness usually indicates sensitivity to the ointment or bandage tape used to dress the wound.  You should call our office if severe itching with extensive redness develops.    Swelling  and/or discoloration around your surgical site is common, particularly when performed around the eye.  You may experience a sensation of tightness as your wound heals. This is normal and will gradually subside.  Your healed wound may be sensitive to temperature changes. This sensitivity improves with time, but if you re having a lot of discomfort, try to avoid temperature extremes.  Patients frequently experience itching after their wound appears to have healed because of the continue healing under the skin.  Plain Vaseline will help relieve the itching.          TT: 20 minutes.

## 2020-11-12 ENCOUNTER — OFFICE VISIT (OUTPATIENT)
Dept: FAMILY MEDICINE | Facility: CLINIC | Age: 55
End: 2020-11-12
Payer: COMMERCIAL

## 2020-11-12 VITALS — DIASTOLIC BLOOD PRESSURE: 68 MMHG | SYSTOLIC BLOOD PRESSURE: 126 MMHG

## 2020-11-12 DIAGNOSIS — D48.5 NEOPLASM OF UNCERTAIN BEHAVIOR OF SKIN: Primary | ICD-10-CM

## 2020-11-12 DIAGNOSIS — L81.9 BENIGN PIGMENTED SKIN LESION: ICD-10-CM

## 2020-11-12 PROCEDURE — 11310 SHAVE SKIN LESION 0.5 CM/<: CPT | Performed by: FAMILY MEDICINE

## 2020-11-12 PROCEDURE — 99213 OFFICE O/P EST LOW 20 MIN: CPT | Mod: 25 | Performed by: FAMILY MEDICINE

## 2020-11-12 PROCEDURE — 88305 TISSUE EXAM BY PATHOLOGIST: CPT | Performed by: DERMATOLOGY

## 2020-11-12 NOTE — PATIENT INSTRUCTIONS
"FUTURE APPOINTMENTS  Follow up per pathology report. You will be notified, generally via letter or MyChart, in approximately 10 days. If there is anything we need to discuss or further treatment needed, I will call you to discuss it.    Vaseline and a bandage for 5-7 days.     Can shower normally.     WOUND CARE INSTRUCTIONS  1. Wash hands before every dressing change.  2. Change the dressing after 24 hours and once daily, or earlier if it becomes saturated.  3. Wash the wound area with a mild soap, then rinse.  4. Gently pat dry with a sterile gauze or Q-tip.  5. Using a Q-tip, apply Vaseline or Aquaphor only over entire wound. DO NOT use Neosporin - as many people react to neomycin.  6. Finally, cover with a bandage or sterile non-stick gauze with micropore paper tape.  7. Repeat once daily until wound has healed.      Soap, water and shampoo will not hurt this area.    Do not go swimming or take baths, but showering is encouraged.    Limit use of the area where the procedure was done for a few days to allow for optimal healing.    Signs of Infection:  Infection can occur in any area where skin has been disrupted. If you notice persistent redness, swelling, colored drainage, increasing pain, fever or other signs of infection, please call us at: [phone] and ask to have me or my colleague paged. We will call you back to discuss.    If you experience bleeding:  Wash hands and hold firm pressure on the area for 10 minutes without checking to see if the bleeding has stopped. \"Checking\" pulls off the protective wound clot and restarts the bleeding all over again. Re-apply pressure for 10 minutes if necessary to stop bleeding.  Use additional sterile gauze and tape to maintain pressure once bleeding has stopped.  If bleeding continues, then call back to clinic at [phone].    PATIENT INFORMATION : WOUNDS  During the healing process you will notice a number of changes.     All wounds normally drain.  The " larger the wound the more drainage there will be.  After 7-10 days, you will notice the wound beginning to shrink and new skin will begin to grow.  The wound is healed when you can see that skin has formed over the entire area.  A healed wound has a healthy, shiny look to the surface and is red to dark pink in color to normalize.  Wounds may take approximately 4-6 weeks to heal.  Larger wounds may take 6-8 weeks. After the wound is healed you may discontinue dressing changes.    All wounds develop a small halo of redness surrounding the wound which means that healing is occurring. Severe itching with extensive redness usually indicates sensitivity to the ointment or bandage tape used to dress the wound.  You should call our office if severe itching with extensive redness develops.    Swelling  and/or discoloration around your surgical site is common, particularly when performed around the eye.  You may experience a sensation of tightness as your wound heals. This is normal and will gradually subside.  Your healed wound may be sensitive to temperature changes. This sensitivity improves with time, but if you re having a lot of discomfort, try to avoid temperature extremes.  Patients frequently experience itching after their wound appears to have healed because of the continue healing under the skin.  Plain Vaseline will help relieve the itching.

## 2020-11-12 NOTE — LETTER
11/12/2020         RE: Brooke John  [address] [place] [address]        Dear Colleague,    Thank you for referring your patient, Brooke John, to the [facility]. Please see a copy of my visit note below.    [facility] - PRIMARY CARE SKIN    CC: Lesion(s)  SUBJECTIVE:   Brooke John is a(n) 55 year old female who presents to clinic today for :     Issue One: 1-2 years duration, itches at times.No bleeding. Location above the left mid eyebrow.  Issue two : lesion on the left lateral abdomen , present for years, she picks at when it gets crusty      Personal Medical History  Skin cancer: NO  Eczema Psoriasis Lupus   yes NO NO   Other:   .    Family Medical History  Skin cancer: NO  Eczema Psoriasis Lupus   NO NO NO   Other:   .    Occupation: retired    Refer to electronic medical record (EMR) for past medical history and medications.      ROS: 14 point review of systems was negative except the symptoms listed above in the HPI.        OBJECTIVE:   GENERAL: healthy, alert and no distress.  HEENT: PERRL. Conjunctiva, sclera clear.  SKIN: Jefferson Skin Type - I.  Face examined. The dermatoscope was used to help evaluate pigmented lesions.  Skin Pertinent Findings:  Left mid eyebrow- 4 mm indurated, erythematous lesion ? Sebaceous hyperplasia ? Basal cell carcinoma ? Squamous cell carcinoma ? Other        Left lateral abdomen - 10 mm X 3 mm erythematous, non indurated, smooth lesion most consistent with constant irritation. No suspicious changes    ASSESSMENT:     Encounter Diagnosis   Name Primary?     Neoplasm of uncertain behavior of skin Yes         PLAN:   Patient Instructions   FUTURE APPOINTMENTS  Follow up per pathology report. You will be notified, generally via letter or MyChart, in approximately 10 days. If there is anything we need to discuss or further treatment needed, I will call you to discuss it.    Vaseline and a bandage for 5-7  "days.     Can shower normally.     WOUND CARE INSTRUCTIONS  1. Wash hands before every dressing change.  2. Change the dressing after 24 hours and once daily, or earlier if it becomes saturated.  3. Wash the wound area with a mild soap, then rinse.  4. Gently pat dry with a sterile gauze or Q-tip.  5. Using a Q-tip, apply Vaseline or Aquaphor only over entire wound. DO NOT use Neosporin - as many people react to neomycin.  6. Finally, cover with a bandage or sterile non-stick gauze with micropore paper tape.  7. Repeat once daily until wound has healed.      Soap, water and shampoo will not hurt this area.    Do not go swimming or take baths, but showering is encouraged.    Limit use of the area where the procedure was done for a few days to allow for optimal healing.    Signs of Infection:  Infection can occur in any area where skin has been disrupted. If you notice persistent redness, swelling, colored drainage, increasing pain, fever or other signs of infection, please call us at: [phone] and ask to have me or my colleague paged. We will call you back to discuss.    If you experience bleeding:  Wash hands and hold firm pressure on the area for 10 minutes without checking to see if the bleeding has stopped. \"Checking\" pulls off the protective wound clot and restarts the bleeding all over again. Re-apply pressure for 10 minutes if necessary to stop bleeding.  Use additional sterile gauze and tape to maintain pressure once bleeding has stopped.  If bleeding continues, then call back to clinic at [phone].    PATIENT INFORMATION : WOUNDS  During the healing process you will notice a number of changes.     All wounds normally drain.  The larger the wound the more drainage there will be.  After 7-10 days, you will notice the wound beginning to shrink and new skin will begin to grow.  The wound is healed when you can see that skin has formed over the entire area.  A healed wound has a healthy, shiny look to " the surface and is red to dark pink in color to normalize.  Wounds may take approximately 4-6 weeks to heal.  Larger wounds may take 6-8 weeks. After the wound is healed you may discontinue dressing changes.    All wounds develop a small halo of redness surrounding the wound which means that healing is occurring. Severe itching with extensive redness usually indicates sensitivity to the ointment or bandage tape used to dress the wound.  You should call our office if severe itching with extensive redness develops.    Swelling  and/or discoloration around your surgical site is common, particularly when performed around the eye.  You may experience a sensation of tightness as your wound heals. This is normal and will gradually subside.  Your healed wound may be sensitive to temperature changes. This sensitivity improves with time, but if you re having a lot of discomfort, try to avoid temperature extremes.  Patients frequently experience itching after their wound appears to have healed because of the continue healing under the skin.  Plain Vaseline will help relieve the itching.          TT: 20 minutes.        Again, thank you for allowing me to participate in the care of your patient.        Sincerely,        Nereyda Tate MD

## 2020-11-12 NOTE — PROCEDURES
Name : Shave Excision  Indication : Excision of tissue for pathology evaluation.  Location(s) : Left mid eyebrow- 4 mm indurated, erythematous lesion ? Sebaceous hyperplasia ? Basal cell carcinoma ? Squamous cell carcinoma ? other  Completed by : Niki Tate MD  Photo Taken : yes.  Anesthesia : Patient was anesthetized by infiltrating the area surrounding the lesion with 1% lidocaine.   epinephrine [id number] : Yes.  Note : Discussed the risk of pain, infection, scarring, hypo- or hyperpigmentation and recurrence or need for re-treatment. The benefits of treatment and alternative treatments were also discussed.    During this procedure, the universal protocol was utilized. The patient's identity was confirmed by no less than two patient identifiers, correct procedure was verified, correct site was verified and marked as applicable and a final pause was completed.    Sterile technique was used throughout the procedure. The skin was cleaned and prepped with surgical cleanser. Once adequate anesthesia was obtained, the lesion was removed with a deep scallop shave procedure. The specimen was sent to pathology.    Direct pressure and aluminum chloride and monopolar cautery was applied for hemostasis. No bleeding was present upon the completion of the procedure. The wound was coated with antibacterial ointment. A dry sterile dressing was applied. Patient tolerated the procedure well and left in satisfactory condition.    Primary provider and referring provider will be informed regarding the tissue report when it returns.

## 2020-11-15 LAB — COPATH REPORT: NORMAL

## 2020-11-19 DIAGNOSIS — I10 BENIGN ESSENTIAL HYPERTENSION: ICD-10-CM

## 2020-11-19 RX ORDER — LOSARTAN POTASSIUM 50 MG/1
TABLET ORAL
Qty: 90 TABLET | Refills: 1 | OUTPATIENT
Start: 2020-11-19

## 2020-12-16 DIAGNOSIS — G47.33 OSA ON CPAP: Primary | ICD-10-CM

## 2021-01-18 DIAGNOSIS — J45.40 MODERATE PERSISTENT ASTHMA WITHOUT COMPLICATION: ICD-10-CM

## 2021-01-20 RX ORDER — CETIRIZINE HYDROCHLORIDE 10 MG/1
10 TABLET ORAL EVERY MORNING
Qty: 90 TABLET | Refills: 1 | Status: SHIPPED | OUTPATIENT
Start: 2021-01-20 | End: 2021-06-30

## 2021-02-16 DIAGNOSIS — J30.2 SEASONAL ALLERGIC RHINITIS, UNSPECIFIED TRIGGER: ICD-10-CM

## 2021-02-16 DIAGNOSIS — J45.40 MODERATE PERSISTENT ASTHMA WITHOUT COMPLICATION: ICD-10-CM

## 2021-02-17 RX ORDER — FLUTICASONE PROPIONATE 50 MCG
SPRAY, SUSPENSION (ML) NASAL
Qty: 16 G | Refills: 1 | Status: SHIPPED | OUTPATIENT
Start: 2021-02-17 | End: 2021-11-22

## 2021-03-03 ENCOUNTER — TELEPHONE (OUTPATIENT)
Dept: FAMILY MEDICINE | Facility: CLINIC | Age: 56
End: 2021-03-03

## 2021-03-03 DIAGNOSIS — Z91.81 RISK FOR FALLS: ICD-10-CM

## 2021-03-03 DIAGNOSIS — N39.46 MIXED INCONTINENCE: Primary | ICD-10-CM

## 2021-03-03 DIAGNOSIS — R26.89 BALANCE PROBLEMS: ICD-10-CM

## 2021-03-03 NOTE — TELEPHONE ENCOUNTER
Reason for Call:  Medication or medication refill:    Do you use a [facility]?  Name of the pharmacy and phone number for the current request:  [facility] (ships to her house)    Name of the medication requested: Incontinent supplies per usual, is wanting to switch from pads to pull ups.     Other request: Switching from pads to pull ups.    Can we leave a detailed message on this number? YES    Phone number patient can be reached at: Cell number on file:    Telephone Information:   Mobile [phone]       Best Time: Anytime    Call taken on 3/3/2021 at 9:26 AM by Sheri Prajapati

## 2021-03-03 NOTE — TELEPHONE ENCOUNTER
Routing to PCP.  Please generate DME order that we can fax to [facility].  Sariah Dolan

## 2021-03-04 ENCOUNTER — TELEPHONE (OUTPATIENT)
Dept: FAMILY MEDICINE | Facility: CLINIC | Age: 56
End: 2021-03-04

## 2021-03-04 DIAGNOSIS — N39.46 MIXED INCONTINENCE: ICD-10-CM

## 2021-03-04 DIAGNOSIS — R26.89 BALANCE PROBLEMS: ICD-10-CM

## 2021-03-04 DIAGNOSIS — R39.81 URINARY INCONTINENCE DUE TO IMMOBILITY: Primary | ICD-10-CM

## 2021-03-04 NOTE — TELEPHONE ENCOUNTER
Reason for Call:  Form, our goal is to have forms completed with 72 hours, however, some forms may require a visit or additional information.    Type of letter, form or note:  medical    Who is the form from?: Home care    Where did the form come from: form was faxed in    What clinic location was the form placed at?: [place]    Where the form was placed: [facility] Box/Folder    What number is listed as a contact on the form?: [phone]         Call taken on 3/4/2021 at 10:50 AM by Elsa Elizabeth

## 2021-03-04 NOTE — TELEPHONE ENCOUNTER
Completed forms faxed back to [facility]  at [phone].   Originals sent to be scanned.       Latoya Barrow

## 2021-04-21 DIAGNOSIS — G43.909 MIGRAINE WITHOUT STATUS MIGRAINOSUS, NOT INTRACTABLE, UNSPECIFIED MIGRAINE TYPE: ICD-10-CM

## 2021-04-21 RX ORDER — SUMATRIPTAN 25 MG/1
TABLET, FILM COATED ORAL
Qty: 8 TABLET | Refills: 1 | Status: SHIPPED | OUTPATIENT
Start: 2021-04-21 | End: 2021-05-28

## 2021-05-06 DIAGNOSIS — K21.9 GASTROESOPHAGEAL REFLUX DISEASE WITHOUT ESOPHAGITIS: ICD-10-CM

## 2021-05-06 DIAGNOSIS — K44.9 HIATAL HERNIA: ICD-10-CM

## 2021-05-07 RX ORDER — PANTOPRAZOLE SODIUM 40 MG/1
40 TABLET, DELAYED RELEASE ORAL DAILY
Qty: 90 TABLET | Refills: 1 | Status: SHIPPED | OUTPATIENT
Start: 2021-05-07 | End: 2021-11-06

## 2021-05-12 ENCOUNTER — TRANSFERRED RECORDS (OUTPATIENT)
Dept: HEALTH INFORMATION MANAGEMENT | Facility: CLINIC | Age: 56
End: 2021-05-12

## 2021-05-28 ENCOUNTER — OFFICE VISIT (OUTPATIENT)
Dept: FAMILY MEDICINE | Facility: CLINIC | Age: 56
End: 2021-05-28
Payer: COMMERCIAL

## 2021-05-28 VITALS
BODY MASS INDEX: 39.84 KG/M2 | WEIGHT: 211 LBS | DIASTOLIC BLOOD PRESSURE: 82 MMHG | OXYGEN SATURATION: 97 % | HEART RATE: 76 BPM | SYSTOLIC BLOOD PRESSURE: 134 MMHG | HEIGHT: 61 IN | TEMPERATURE: 97.8 F | RESPIRATION RATE: 14 BRPM

## 2021-05-28 DIAGNOSIS — I10 BENIGN ESSENTIAL HYPERTENSION: ICD-10-CM

## 2021-05-28 DIAGNOSIS — G43.909 MIGRAINE WITHOUT STATUS MIGRAINOSUS, NOT INTRACTABLE, UNSPECIFIED MIGRAINE TYPE: ICD-10-CM

## 2021-05-28 DIAGNOSIS — J45.40 MODERATE PERSISTENT ASTHMA WITHOUT COMPLICATION: ICD-10-CM

## 2021-05-28 DIAGNOSIS — E78.1 HYPERTRIGLYCERIDEMIA: ICD-10-CM

## 2021-05-28 PROCEDURE — 99214 OFFICE O/P EST MOD 30 MIN: CPT | Performed by: NURSE PRACTITIONER

## 2021-05-28 RX ORDER — LOSARTAN POTASSIUM 50 MG/1
50 TABLET ORAL DAILY
Qty: 90 TABLET | Refills: 1 | Status: SHIPPED | OUTPATIENT
Start: 2021-05-28 | End: 2021-11-22

## 2021-05-28 RX ORDER — SUMATRIPTAN 25 MG/1
TABLET, FILM COATED ORAL
Qty: 8 TABLET | Refills: 1 | Status: SHIPPED | OUTPATIENT
Start: 2021-05-28 | End: 2021-11-08

## 2021-05-28 RX ORDER — ROSUVASTATIN CALCIUM 10 MG/1
TABLET, COATED ORAL
Qty: 90 TABLET | Refills: 1 | Status: SHIPPED | OUTPATIENT
Start: 2021-05-28 | End: 2021-11-22

## 2021-05-28 ASSESSMENT — ASTHMA QUESTIONNAIRES
QUESTION_5 LAST FOUR WEEKS HOW WOULD YOU RATE YOUR ASTHMA CONTROL: COMPLETELY CONTROLLED
ACT_TOTALSCORE: 22
QUESTION_2 LAST FOUR WEEKS HOW OFTEN HAVE YOU HAD SHORTNESS OF BREATH: ONCE OR TWICE A WEEK
QUESTION_1 LAST FOUR WEEKS HOW MUCH OF THE TIME DID YOUR ASTHMA KEEP YOU FROM GETTING AS MUCH DONE AT WORK, SCHOOL OR AT HOME: NONE OF THE TIME
QUESTION_3 LAST FOUR WEEKS HOW OFTEN DID YOUR ASTHMA SYMPTOMS (WHEEZING, COUGHING, SHORTNESS OF BREATH, CHEST TIGHTNESS OR PAIN) WAKE YOU UP AT NIGHT OR EARLIER THAN USUAL IN THE MORNING: NOT AT ALL
QUESTION_4 LAST FOUR WEEKS HOW OFTEN HAVE YOU USED YOUR RESCUE INHALER OR NEBULIZER MEDICATION (SUCH AS ALBUTEROL): TWO OR THREE TIMES PER WEEK

## 2021-05-28 ASSESSMENT — ANXIETY QUESTIONNAIRES
5. BEING SO RESTLESS THAT IT IS HARD TO SIT STILL: NOT AT ALL
7. FEELING AFRAID AS IF SOMETHING AWFUL MIGHT HAPPEN: NOT AT ALL
6. BECOMING EASILY ANNOYED OR IRRITABLE: NOT AT ALL
2. NOT BEING ABLE TO STOP OR CONTROL WORRYING: NOT AT ALL
3. WORRYING TOO MUCH ABOUT DIFFERENT THINGS: NOT AT ALL
IF YOU CHECKED OFF ANY PROBLEMS ON THIS QUESTIONNAIRE, HOW DIFFICULT HAVE THESE PROBLEMS MADE IT FOR YOU TO DO YOUR WORK, TAKE CARE OF THINGS AT HOME, OR GET ALONG WITH OTHER PEOPLE: NOT DIFFICULT AT ALL
GAD7 TOTAL SCORE: 0
1. FEELING NERVOUS, ANXIOUS, OR ON EDGE: NOT AT ALL

## 2021-05-28 ASSESSMENT — PATIENT HEALTH QUESTIONNAIRE - PHQ9
SUM OF ALL RESPONSES TO PHQ QUESTIONS 1-9: 2
5. POOR APPETITE OR OVEREATING: NOT AT ALL

## 2021-05-28 ASSESSMENT — PAIN SCALES - GENERAL: PAINLEVEL: NO PAIN (0)

## 2021-05-28 ASSESSMENT — MIFFLIN-ST. JEOR: SCORE: 1479.47

## 2021-05-28 NOTE — PROGRESS NOTES
"    Assessment & Plan     Benign essential hypertension  - losartan (COZAAR) 50 MG tablet  Dispense: 90 tablet; Refill: 1    Hypertriglyceridemia  - rosuvastatin (CRESTOR) 10 MG tablet  Dispense: 90 tablet; Refill: 1    Moderate persistent asthma without complication  - fluticasone-salmeterol (ADVAIR) 250-50 MCG/DOSE inhaler  Dispense: 1 each; Refill: 5    Migraine without status migrainosus, not intractable, unspecified migraine type  - SUMAtriptan (IMITREX) 25 MG tablet  Dispense: 8 tablet; Refill: 1      Prescription drug management  10 minutes spent on the date of the encounter doing chart review, history and exam, documentation and further activities per the note       BMI:   Estimated body mass index is 39.87 kg/m  as calculated from the following:    Height as of this encounter: 1.549 m (5' 1\").    Weight as of this encounter: 95.7 kg (211 lb).   Weight management plan: Discussed healthy diet and exercise guidelines    See Patient Instructions    No follow-ups on file.    Sandi Watkins [facility]   Brooke is a 55 year old who presents for the following health issues     HPI     Hyperlipidemia Follow-Up      Are you regularly taking any medication or supplement to lower your cholesterol?   Yes- statin    Are you having muscle aches or other side effects that you think could be caused by your cholesterol lowering medication?  No    Hypertension Follow-up      Do you check your blood pressure regularly outside of the clinic? Yes     Are you following a low salt diet? Yes    Are your blood pressures ever more than 140 on the top number (systolic) OR more   than 90 on the bottom number (diastolic), for example 140/90? Yes  BP Readings from Last 6 Encounters:   11/12/20 126/68   11/04/20 120/86   05/18/20 122/70   02/27/20 120/73   02/02/20 (!) 160/98   12/11/19 (!) 156/93       Asthma Follow-Up    Was ACT completed today?    Yes    ACT Total Scores 5/28/2021   ACT TOTAL " "SCORE (Goal Greater than or Equal to 20) 22   In the past 12 months, how many times did you visit the emergency room for your asthma without being admitted to the hospital? 0   In the past 12 months, how many times were you hospitalized overnight because of your asthma? 0          How many days per week do you miss taking your asthma controller medication?  0    Please describe any recent triggers for your asthma: None    Have you had any Emergency Room Visits, Urgent Care Visits, or Hospital Admissions since your last office visit?  No    Hypothyroidism Follow-up      Since last visit, patient describes the following symptoms: Weight stable, no hair loss, no skin changes, no constipation, no loose stools            Review of Systems   Constitutional, HEENT, cardiovascular, pulmonary, GI, , musculoskeletal, neuro, skin, endocrine and psych systems are negative, except as otherwise noted.      Objective    Ht 1.549 m (5' 1\")   Wt 95.7 kg (211 lb)   BMI 39.87 kg/m    Body mass index is 39.87 kg/m .  Physical Exam   GENERAL: healthy, alert and no distress  NECK: no adenopathy, no asymmetry, masses, or scars and thyroid normal to palpation  RESP: lungs clear to auscultation - no rales, rhonchi or wheezes  CV: regular rate and rhythm, normal S1 S2, no S3 or S4, no murmur, click or rub, no peripheral edema and peripheral pulses strong  ABDOMEN: soft, nontender, no hepatosplenomegaly, no masses and bowel sounds normal  MS: no gross musculoskeletal defects noted, no edema              SONY Funez     [address] [address]  [email or web address]   Office: [phone]                 "

## 2021-05-29 ASSESSMENT — ANXIETY QUESTIONNAIRES: GAD7 TOTAL SCORE: 0

## 2021-05-29 ASSESSMENT — ASTHMA QUESTIONNAIRES: ACT_TOTALSCORE: 22

## 2021-06-28 ENCOUNTER — TELEPHONE (OUTPATIENT)
Dept: FAMILY MEDICINE | Facility: CLINIC | Age: 56
End: 2021-06-28
Payer: COMMERCIAL

## 2021-06-28 NOTE — TELEPHONE ENCOUNTER
Reason for Call:  Form, our goal is to have forms completed with 72 hours, however, some forms may require a visit or additional information.    Type of letter, form or note:  medical    Who is the form from?: [facility] (if other please explain)    Where did the form come from: Patient or family brought in       What clinic location was the form placed at?: [facility]    Where the form was placed: [facility] Box/Folder    What number is listed as a contact on the form?: [phone]           Call taken on 6/28/2021 at 3:39 PM by Elsa Elizabeth

## 2021-06-29 NOTE — TELEPHONE ENCOUNTER
Form completed and placed in [facility] inbox.     Of note, this needs to be signed by the patient before we can send to [facility].      Salome Hollis MBA, MS, PA-C

## 2021-06-29 NOTE — TELEPHONE ENCOUNTER
Called patient to inform her form has been completed and is ready to be p/u. Will be upfront with the .     Original sent to be scanned.     Steve Monroy

## 2021-10-03 ENCOUNTER — HEALTH MAINTENANCE LETTER (OUTPATIENT)
Age: 56
End: 2021-10-03

## 2021-10-21 ENCOUNTER — IMMUNIZATION (OUTPATIENT)
Dept: FAMILY MEDICINE | Facility: CLINIC | Age: 56
End: 2021-10-21
Payer: COMMERCIAL

## 2021-10-21 DIAGNOSIS — Z23 NEED FOR PROPHYLACTIC VACCINATION AND INOCULATION AGAINST INFLUENZA: Primary | ICD-10-CM

## 2021-10-21 PROCEDURE — G0008 ADMIN INFLUENZA VIRUS VAC: HCPCS

## 2021-10-21 PROCEDURE — 90682 RIV4 VACC RECOMBINANT DNA IM: CPT

## 2021-10-21 PROCEDURE — 99207 PR NO CHARGE NURSE ONLY: CPT

## 2021-11-02 DIAGNOSIS — G43.909 MIGRAINE WITHOUT STATUS MIGRAINOSUS, NOT INTRACTABLE, UNSPECIFIED MIGRAINE TYPE: ICD-10-CM

## 2021-11-06 NOTE — TELEPHONE ENCOUNTER
LOV: 10/21/2021      Routing refill request to provider for review/approval because:  Serotonin Agonists Sfauli4811/05/2021 10:25 AM   Serotonin Agonist request needs review     Alesia Flores RN, BSN  [facility]

## 2021-11-08 RX ORDER — SUMATRIPTAN 25 MG/1
TABLET, FILM COATED ORAL
Qty: 8 TABLET | Refills: 1 | Status: SHIPPED | OUTPATIENT
Start: 2021-11-08 | End: 2022-04-14

## 2021-11-12 DIAGNOSIS — I10 BENIGN ESSENTIAL HYPERTENSION: Primary | ICD-10-CM

## 2021-11-16 RX ORDER — LOSARTAN POTASSIUM 50 MG/1
TABLET ORAL
Qty: 90 TABLET | Refills: 1 | Status: SHIPPED | OUTPATIENT
Start: 2021-11-16 | End: 2022-04-26

## 2021-11-16 NOTE — TELEPHONE ENCOUNTER
Failed protocol.  please route to  team if patient needs an appointment     Latoya LOWRYRN BSN  [facility]  [phone]

## 2021-11-22 ENCOUNTER — OFFICE VISIT (OUTPATIENT)
Dept: FAMILY MEDICINE | Facility: CLINIC | Age: 56
End: 2021-11-22
Payer: COMMERCIAL

## 2021-11-22 VITALS
DIASTOLIC BLOOD PRESSURE: 78 MMHG | SYSTOLIC BLOOD PRESSURE: 122 MMHG | BODY MASS INDEX: 40.78 KG/M2 | HEIGHT: 61 IN | HEART RATE: 91 BPM | OXYGEN SATURATION: 94 % | WEIGHT: 216 LBS | TEMPERATURE: 97.5 F

## 2021-11-22 DIAGNOSIS — Z12.31 VISIT FOR SCREENING MAMMOGRAM: ICD-10-CM

## 2021-11-22 DIAGNOSIS — E78.1 HYPERTRIGLYCERIDEMIA: ICD-10-CM

## 2021-11-22 DIAGNOSIS — E03.9 HYPOTHYROIDISM, UNSPECIFIED TYPE: Primary | ICD-10-CM

## 2021-11-22 DIAGNOSIS — I10 BENIGN ESSENTIAL HYPERTENSION: ICD-10-CM

## 2021-11-22 DIAGNOSIS — J30.2 SEASONAL ALLERGIC RHINITIS, UNSPECIFIED TRIGGER: ICD-10-CM

## 2021-11-22 DIAGNOSIS — Z13.0 SCREENING FOR DEFICIENCY ANEMIA: ICD-10-CM

## 2021-11-22 DIAGNOSIS — J45.40 MODERATE PERSISTENT ASTHMA WITHOUT COMPLICATION: ICD-10-CM

## 2021-11-22 PROCEDURE — 99214 OFFICE O/P EST MOD 30 MIN: CPT | Performed by: NURSE PRACTITIONER

## 2021-11-22 RX ORDER — ROSUVASTATIN CALCIUM 10 MG/1
TABLET, COATED ORAL
Qty: 90 TABLET | Refills: 1 | Status: SHIPPED | OUTPATIENT
Start: 2021-11-22 | End: 2022-04-28

## 2021-11-22 RX ORDER — CETIRIZINE HYDROCHLORIDE 10 MG/1
10 TABLET ORAL EVERY MORNING
Qty: 90 TABLET | Refills: 1 | Status: SHIPPED | OUTPATIENT
Start: 2021-11-22 | End: 2022-07-22

## 2021-11-22 RX ORDER — FLUTICASONE PROPIONATE 50 MCG
SPRAY, SUSPENSION (ML) NASAL
Qty: 16 G | Refills: 1 | Status: SHIPPED | OUTPATIENT
Start: 2021-11-22 | End: 2022-01-25

## 2021-11-22 ASSESSMENT — ANXIETY QUESTIONNAIRES
2. NOT BEING ABLE TO STOP OR CONTROL WORRYING: NOT AT ALL
8. IF YOU CHECKED OFF ANY PROBLEMS, HOW DIFFICULT HAVE THESE MADE IT FOR YOU TO DO YOUR WORK, TAKE CARE OF THINGS AT HOME, OR GET ALONG WITH OTHER PEOPLE?: SOMEWHAT DIFFICULT
1. FEELING NERVOUS, ANXIOUS, OR ON EDGE: NOT AT ALL
GAD7 TOTAL SCORE: 0
5. BEING SO RESTLESS THAT IT IS HARD TO SIT STILL: NOT AT ALL
3. WORRYING TOO MUCH ABOUT DIFFERENT THINGS: NOT AT ALL
GAD7 TOTAL SCORE: 0
7. FEELING AFRAID AS IF SOMETHING AWFUL MIGHT HAPPEN: NOT AT ALL
4. TROUBLE RELAXING: NOT AT ALL
6. BECOMING EASILY ANNOYED OR IRRITABLE: NOT AT ALL
GAD7 TOTAL SCORE: 0
7. FEELING AFRAID AS IF SOMETHING AWFUL MIGHT HAPPEN: NOT AT ALL

## 2021-11-22 ASSESSMENT — MIFFLIN-ST. JEOR: SCORE: 1497.15

## 2021-11-22 ASSESSMENT — PATIENT HEALTH QUESTIONNAIRE - PHQ9
SUM OF ALL RESPONSES TO PHQ QUESTIONS 1-9: 3
SUM OF ALL RESPONSES TO PHQ QUESTIONS 1-9: 3
10. IF YOU CHECKED OFF ANY PROBLEMS, HOW DIFFICULT HAVE THESE PROBLEMS MADE IT FOR YOU TO DO YOUR WORK, TAKE CARE OF THINGS AT HOME, OR GET ALONG WITH OTHER PEOPLE: SOMEWHAT DIFFICULT

## 2021-11-22 NOTE — PROGRESS NOTES
Answers for HPI/ROS submitted by the patient on 11/22/2021  Do you check your blood pressure regularly outside of the clinic?: No  Are your blood pressures ever more than 140 on the top number (systolic) OR more than 90 on the bottom number (diastolic)? (For example, greater than 140/90): No  Are you following a low salt diet?: Yes  How many servings of fruits and vegetables do you eat daily?: 2-3  On average, how many sweetened beverages do you drink each day (Examples: soda, juice, sweet tea, etc.  Do NOT count diet or artificially sweetened beverages)?: 0  How many minutes a day do you exercise enough to make your heart beat faster?: 30 to 60  How many days a week do you exercise enough to make your heart beat faster?: 7  How many days per week do you miss taking your medication?: 0      Assessment & Plan     Hypothyroidism, unspecified type  Update labs, refill appropriate dose when back  - TSH with free T4 reflex    Hypertriglyceridemia  Refill provided, update labs.   - Lipid panel reflex to direct LDL Fasting  - rosuvastatin (CRESTOR) 10 MG tablet  Dispense: 90 tablet; Refill: 1    Seasonal allergic rhinitis, unspecified trigger  Refill flonase  - fluticasone (FLONASE) 50 MCG/ACT nasal spray  Dispense: 16 g; Refill: 1    Moderate persistent asthma without complication  Under good control with medications.   - fluticasone (FLONASE) 50 MCG/ACT nasal spray  Dispense: 16 g; Refill: 1  - cetirizine (ZYRTEC) 10 MG tablet  Dispense: 90 tablet; Refill: 1    Visit for screening mammogram  - MA Screen Bilateral w/Jamal    Benign essential hypertension  - Albumin Random Urine Quantitative with Creat Ratio  - Comprehensive metabolic panel (BMP + Alb, Alk Phos, ALT, AST, Total. Bili, TP)    Screening for deficiency anemia  - CBC with platelets      Review of the result(s) of each unique test - labs  Ordering of each unique test  Prescription drug management  No LOS data to display   Time spent doing chart review, history  "and exam, documentation and further activities per the note       BMI:   Estimated body mass index is 40.81 kg/m  as calculated from the following:    Height as of this encounter: 1.549 m (5' 1\").    Weight as of this encounter: 98 kg (216 lb).   Weight management plan: Discussed healthy diet and exercise guidelines    See Patient Instructions    Return in about 6 months (around 5/22/2022) for Routine preventive.    Sandi Watkins [facility]    Matias Cox is a 56 year old who presents for the following health issues     History of Present Illness       Hypertension: She presents for follow up of hypertension.  She does not check blood pressure  regularly outside of the clinic. Outpatient blood pressures have not been over 140/90. She follows a low salt diet.     She eats 2-3 servings of fruits and vegetables daily.She consumes 0 sweetened beverage(s) daily.She exercises with enough effort to increase her heart rate 30 to 60 minutes per day.  She exercises with enough effort to increase her heart rate 7 days per week.   She is taking medications regularly.     Medications have not changed, doing well on current regimen. Normal on check today. Needs labs.    Hyperlipidemia Follow-Up      Are you regularly taking any medication or supplement to lower your cholesterol?   Yes- Crestor    Are you having muscle aches or other side effects that you think could be caused by your cholesterol lowering medication?  No    Will update labs today, follow results.     Hypothyroidism Follow-up      Since last visit, patient describes the following symptoms: Weight stable, no hair loss, no skin changes, no constipation, no loose stools    Needs recheck thyroid.   Patient overall doing well. Feels healthy, feels like conditions are controlled with medications.       Asthma-doing well on current medications.     Review of Systems     Constitutional, HEENT, cardiovascular, pulmonary, GI, , " "musculoskeletal, neuro, skin, endocrine and psych systems are negative, except as otherwise noted.      Objective    /78 (BP Location: Left arm, Cuff Size: Adult Regular)   Pulse 91   Temp 97.5  F (36.4  C) (Tympanic)   Ht 1.549 m (5' 1\")   Wt 98 kg (216 lb)   LMP 05/15/2018 (Within Days)   SpO2 94%   BMI 40.81 kg/m    Body mass index is 40.81 kg/m .  Physical Exam   GENERAL: healthy, alert and no distress  EYES: Eyes grossly normal to inspection, PERRL and conjunctivae and sclerae normal  HENT: ear canals and TM's normal, nose and mouth without ulcers or lesions  NECK: no adenopathy, no asymmetry, masses, or scars and thyroid normal to palpation  RESP: lungs clear to auscultation - no rales, rhonchi or wheezes  BREAST: normal without masses, tenderness or nipple discharge and no palpable axillary masses or adenopathy  CV: regular rate and rhythm, normal S1 S2, no S3 or S4, no murmur, click or rub, no peripheral edema and peripheral pulses strong  ABDOMEN: soft, nontender, no hepatosplenomegaly, no masses and bowel sounds normal  MS: no gross musculoskeletal defects noted, no edema  SKIN: no suspicious lesions or rashes  NEURO: Normal strength and tone, mentation intact and speech normal  PSYCH: mentation appears normal, affect normal/bright    No results found for any visits on 11/22/21.          SONY Funez     [address] [address]  [email or web address]   Office: [phone]                 "

## 2021-11-22 NOTE — PATIENT INSTRUCTIONS
Patient Education     Discharge Instructions: Eating a Low-Salt Diet   Your healthcare provider has prescribed a low-salt diet for you. Most people with heart problems need to eat less salt, which is full of sodium. Too much sodium is linked to high blood pressure, which is linked to a greater risk of heart disease, stroke, blindness, and kidney problems.   Home care  Learn ways to cut back on salt (sodium):     Eat fewer frozen, canned, dried, packaged, and fast foods. These often contain high amounts of sodium.    Season foods with herbs instead of salt when you cook.    Season with flavorings such as pepper, lemon, kiersten, garlic, and onion.    Don t add salt to your food at the table.    Sprinkle salt-free herbal blends on meats and vegetables.  Learn to read food labels carefully:     Look for the total amount of sodium per serving.    Look for foods labeled low sodium, reduced sodium, or no added salt.    Beware: Salt goes by many names. Cut down on foods with these words (all forms of salt) listed as ingredients:  ? Salt  ? Sodium  ? Soy sauce  ? Baking soda  ? Baking powder  ? MSG (monosodium glutamate)  ? Monosodium  ? Na (the chemical symbol for sodium)  Other ideas:    Use more fresh food. Buy more fruits and vegetables.    Select lean meats, fish, and poultry.    Find a cookbook with low-salt recipes. You ll find ideas for tasty meals that are healthy for your heart.    When eating out, ask questions about the menu. Tell the  you're on a low-salt diet.  ? If you order fish, chicken, beef, or pork, ask to have it broiled, baked, poached, or grilled without salt, butter, or breading.  ? Choose plain steamed rice, boiled noodles, and baked or boiled potatoes. Top potatoes with chives and a little sour cream instead of butter.    Don't take antacids that are high in salt. Check the label before you buy.  Follow-up  Make a follow-up appointment with your healthcare provider, or as advised. Your  provider may refer you to a dietitian.    [employer] last reviewed this educational content on 5/1/2020 2000-2021 [employer]. All rights reserved. This information is not intended as a substitute for professional medical care. Always follow your healthcare professional's instructions.           Patient Education     Eating Heart-Healthy Food: Using the DASH Plan    Eating for your heart doesn t have to be hard or boring. You just need to know how to make healthier choices. The DASH eating plan has been developed to help you do just that. DASH stands for Dietary Approaches to Stop Hypertension. It is a plan that has been proven to be healthier for your heart and to lower your risk for high blood pressure. It can also help lower your risk for cancer, heart disease, osteoporosis, and diabetes.  Choosing from each food group  Choose foods from each of the food groups below each day. Try to get the recommended number of servings for each food group. The serving numbers are based on a diet of 2,000 calories a day. Talk with your healthcare provider if you re not sure about your calorie needs. Along with getting the correct servings, the DASH plan also advises less than 2,300 mg of salt (sodium) per day. Lowering sodium intake to 1,500 mg per day lowers blood pressure even more. (There's about 2,300 mg of sodium in 1 teaspoon of salt.)      Grains  Servings: 6 to 8 a day  A serving is:    1 slice bread    1 ounce dry cereal    Half a cup cooked rice, pasta or cereal  Best choices: Whole grains and any grains high in fiber. Vegetables  Servings: 4 to 5 a day  A serving is:    1 cup raw leafy vegetable    Half a cup cut-up raw or cooked vegetable    Half a cup vegetable juice  Best choices: Fresh or frozen vegetables prepared without added salt or fat.   Fruits  Servings: 4 to 5 a day  A serving is:    1 medium fruit    One-quarter cup dried fruit    Half a cup fresh, frozen, or canned fruit    Half a cup of  100% fruit juices  Best choices: A variety of fresh fruits of different colors. Whole fruits are a better choice than fruit juices. Low-fat or fat-free dairy  Servings: 2 to 3 a day  A serving is:    1 cup milk    1 cup yogurt    One and a half ounces cheese  Best choices: Skim or 1% milk, low-fat or fat-free yogurt or buttermilk, and low-fat cheeses.         Lean meats, poultry, fish  Servings: 6 or fewer a day  A serving is:    1 ounce cooked meats, poultry, or fish    1 egg  Best choices: Lean poultry and fish. Trim away visible fat. Broil, grill, roast, or boil instead of frying. Remove skin from poultry before eating. Limit how much red meat you eat.  Nuts, seeds, beans  Servings: 4 to 5 a week  A serving is:    One-third cup nuts (one and a half ounces)    2 tablespoons nut butter or seeds    Half a cup cooked dry beans or legumes  Best choices: Dry roasted nuts with no salt added, lentils, kidney beans, garbanzo beans, and whole lazaro beans.   Fats and oils  Servings: 2 to 3 a day  A serving is:    1 teaspoon vegetable oil    1 teaspoon soft margarine    1 tablespoon mayonnaise    2 tablespoons salad dressing  Best choices: Nut and vegetable oils (nontropical vegetable oils), such as olive and canola oil. Sweets  Servings: 5 a week or fewer  A serving is:    1 tablespoon sugar, maple syrup, or honey    1 tablespoon jam or jelly    1 half-ounce jelly beans (about 15)    1 cup lemonade  Best choices: Dried fruit can be a satisfying sweet. Choose low-fat sweets. And watch your serving sizes!      For more on the DASH eating plan, visit:  www.nhlbi.nih.gov/health/health-topics/topics/dash   [employer] last reviewed this educational content on 7/1/2019 2000-2021 [employer]. All rights reserved. This information is not intended as a substitute for professional medical care. Always follow your healthcare professional's instructions.

## 2021-11-22 NOTE — LETTER
My Asthma Action Plan    Name: Brooke John   YOB: 1965  Date: 12/13/2021   My doctor: Sandi Watkins CNP   My clinic: [facility]        My Rescue Medicine:   Albuterol inhaler (Proair/Ventolin/Proventil HFA)  2-4 puffs EVERY 4 HOURS as needed. Use a spacer if recommended by your provider.   My Asthma Severity:   Intermittent / Exercise Induced  Know your asthma triggers: upper respiratory infections  None          GREEN ZONE   Good Control    I feel good    No cough or wheeze    Can work, sleep and play without asthma symptoms       Take your asthma control medicine every day.     1. If exercise triggers your asthma, take your rescue medication    15 minutes before exercise or sports, and    During exercise if you have asthma symptoms  2. Spacer to use with inhaler: If you have a spacer, make sure to use it with your inhaler             YELLOW ZONE Getting Worse  I have ANY of these:    I do not feel good    Cough or wheeze    Chest feels tight    Wake up at night   1. Keep taking your Green Zone medications  2. Start taking your rescue medicine:    every 20 minutes for up to 1 hour. Then every 4 hours for 24-48 hours.  3. If you stay in the Yellow Zone for more than 12-24 hours, contact your doctor.  4. If you do not return to the Green Zone in 12-24 hours or you get worse, start taking your oral steroid medicine if prescribed by your provider.           RED ZONE Medical Alert - Get Help  I have ANY of these:    I feel awful    Medicine is not helping    Breathing getting harder    Trouble walking or talking    Nose opens wide to breathe       1. Take your rescue medicine NOW  2. If your provider has prescribed an oral steroid medicine, start taking it NOW  3. Call your doctor NOW  4. If you are still in the Red Zone after 20 minutes and you have not reached your doctor:    Take your rescue medicine again and    Call 911 or go to the emergency room right away    See your  regular doctor within 2 weeks of an Emergency Room or Urgent Care visit for follow-up treatment.          Annual Reminders:  Meet with Asthma Educator,  Flu Shot in the Fall, consider Pneumonia Vaccination for patients with asthma (aged 19 and older).    Pharmacy:    [facility] - [place], [place] - [address] [facility] [place] - [place], [place] - [address] [facility]    Electronically signed by Sandi Watkins CNP   Date: 12/13/21                    Asthma Triggers  How To Control Things That Make Your Asthma Worse    Triggers are things that make your asthma worse.  Look at the list below to help you find your triggers and   what you can do about them. You can help prevent asthma flare-ups by staying away from your triggers.      Trigger                                                          What you can do   Cigarette Smoke  Tobacco smoke can make asthma worse. Do not allow smoking in your home, car or around you.  Be sure no one smokes at a child s day care or school.  If you smoke, ask your health care provider for ways to help you quit.  Ask family members to quit too.  Ask your health care provider for a referral to Quit Plan to help you quit smoking, or call 0-572-716-PLAN.     Colds, Flu, Bronchitis  These are common triggers of asthma. Wash your hands often.  Don t touch your eyes, nose or mouth.  Get a flu shot every year.     Dust Mites  These are tiny bugs that live in cloth or carpet. They are too small to see. Wash sheets and blankets in hot water every week.   Encase pillows and mattress in dust mite proof covers.  Avoid having carpet if you can. If you have carpet, vacuum weekly.   Use a dust mask and HEPA vacuum.   Pollen and Outdoor Mold  Some people are allergic to trees, grass, or weed pollen, or molds. Try to keep your windows closed.  Limit time out doors when pollen count is high.   Ask you health care  provider about taking medicine during allergy season.     Animal Dander  Some people are allergic to skin flakes, urine or saliva from pets with fur or feathers. Keep pets with fur or feathers out of your home.    If you can t keep the pet outdoors, then keep the pet out of your bedroom.  Keep the bedroom door closed.  Keep pets off cloth furniture and away from stuffed toys.     Mice, Rats, and Cockroaches  Some people are allergic to the waste from these pests.   Cover food and garbage.  Clean up spills and food crumbs.  Store grease in the refrigerator.   Keep food out of the bedroom.   Indoor Mold  This can be a trigger if your home has high moisture. Fix leaking faucets, pipes, or other sources of water.   Clean moldy surfaces.  Dehumidify basement if it is damp and smelly.   Smoke, Strong Odors, and Sprays  These can reduce air quality. Stay away from strong odors and sprays, such as perfume, powder, hair spray, paints, smoke incense, paint, cleaning products, candles and new carpet.   Exercise or Sports  Some people with asthma have this trigger. Be active!  Ask your doctor about taking medicine before sports or exercise to prevent symptoms.    Warm up for 5-10 minutes before and after sports or exercise.     Other Triggers of Asthma  Cold air:  Cover your nose and mouth with a scarf.  Sometimes laughing or crying can be a trigger.  Some medicines and food can trigger asthma.

## 2021-11-23 ASSESSMENT — PATIENT HEALTH QUESTIONNAIRE - PHQ9: SUM OF ALL RESPONSES TO PHQ QUESTIONS 1-9: 3

## 2021-11-23 ASSESSMENT — ANXIETY QUESTIONNAIRES: GAD7 TOTAL SCORE: 0

## 2021-11-30 NOTE — NURSING NOTE
"Chief Complaint   Patient presents with     Recheck Medication     levothyroxine. wants pnemonia shot        Initial /84  Pulse 94  Temp 97.5  F (36.4  C) (Tympanic)  Ht 5' 1\" (1.549 m)  Wt 207 lb 2 oz (94 kg)  LMP 07/19/2017  SpO2 97%  BMI 39.14 kg/m2 Estimated body mass index is 39.14 kg/(m^2) as calculated from the following:    Height as of this encounter: 5' 1\" (1.549 m).    Weight as of this encounter: 207 lb 2 oz (94 kg).  Medication Reconciliation: complete   Zhane Contreras, KHADRA      " Caller would like to discuss an/a Order for manual wheel chair repair. Writer advised caller of callback within 24-72 hours.     Patient Name: Blanche Palomino  Caller Name: Germaine Bansal  Name of Facility: [facility]  Callback Number: [phone]  Best Availability: anytime before 3:30pm  Can A Detailed Message Be left? yes  Fax Number: [phone]  Additional Info: the request was faxed on 11/24 and 11/30  Did you confirm the message with the caller?: yes    Thank you,  Quentin Park

## 2021-12-02 ENCOUNTER — LAB (OUTPATIENT)
Dept: LAB | Facility: CLINIC | Age: 56
End: 2021-12-02
Payer: COMMERCIAL

## 2021-12-02 DIAGNOSIS — I10 BENIGN ESSENTIAL HYPERTENSION: ICD-10-CM

## 2021-12-02 DIAGNOSIS — E03.9 HYPOTHYROIDISM, UNSPECIFIED TYPE: ICD-10-CM

## 2021-12-02 DIAGNOSIS — Z13.0 SCREENING FOR DEFICIENCY ANEMIA: ICD-10-CM

## 2021-12-02 DIAGNOSIS — E78.1 HYPERTRIGLYCERIDEMIA: ICD-10-CM

## 2021-12-02 LAB
ERYTHROCYTE [DISTWIDTH] IN BLOOD BY AUTOMATED COUNT: 12.9 % (ref 10–15)
HCT VFR BLD AUTO: 40.6 % (ref 35–47)
HGB BLD-MCNC: 13.8 G/DL (ref 11.7–15.7)
MCH RBC QN AUTO: 29.9 PG (ref 26.5–33)
MCHC RBC AUTO-ENTMCNC: 34 G/DL (ref 31.5–36.5)
MCV RBC AUTO: 88 FL (ref 78–100)
PLATELET # BLD AUTO: 223 10E3/UL (ref 150–450)
RBC # BLD AUTO: 4.62 10E6/UL (ref 3.8–5.2)
WBC # BLD AUTO: 4.8 10E3/UL (ref 4–11)

## 2021-12-02 PROCEDURE — 36415 COLL VENOUS BLD VENIPUNCTURE: CPT

## 2021-12-02 PROCEDURE — 80061 LIPID PANEL: CPT

## 2021-12-02 PROCEDURE — 84443 ASSAY THYROID STIM HORMONE: CPT

## 2021-12-02 PROCEDURE — 85027 COMPLETE CBC AUTOMATED: CPT

## 2021-12-02 PROCEDURE — 82043 UR ALBUMIN QUANTITATIVE: CPT

## 2021-12-02 PROCEDURE — 80053 COMPREHEN METABOLIC PANEL: CPT

## 2021-12-03 LAB
ALBUMIN SERPL-MCNC: 3.6 G/DL (ref 3.4–5)
ALP SERPL-CCNC: 91 U/L (ref 40–150)
ALT SERPL W P-5'-P-CCNC: 26 U/L (ref 0–50)
ANION GAP SERPL CALCULATED.3IONS-SCNC: 5 MMOL/L (ref 3–14)
AST SERPL W P-5'-P-CCNC: 18 U/L (ref 0–45)
BILIRUB SERPL-MCNC: 1.3 MG/DL (ref 0.2–1.3)
BUN SERPL-MCNC: 14 MG/DL (ref 7–30)
CALCIUM SERPL-MCNC: 8.9 MG/DL (ref 8.5–10.1)
CHLORIDE BLD-SCNC: 110 MMOL/L (ref 94–109)
CHOLEST SERPL-MCNC: 146 MG/DL
CO2 SERPL-SCNC: 27 MMOL/L (ref 20–32)
CREAT SERPL-MCNC: 0.88 MG/DL (ref 0.52–1.04)
FASTING STATUS PATIENT QL REPORTED: YES
GFR SERPL CREATININE-BSD FRML MDRD: 73 ML/MIN/1.73M2
GLUCOSE BLD-MCNC: 92 MG/DL (ref 70–99)
HDLC SERPL-MCNC: 62 MG/DL
LDLC SERPL CALC-MCNC: 50 MG/DL
NONHDLC SERPL-MCNC: 84 MG/DL
POTASSIUM BLD-SCNC: 4.7 MMOL/L (ref 3.4–5.3)
PROT SERPL-MCNC: 7.7 G/DL (ref 6.8–8.8)
SODIUM SERPL-SCNC: 142 MMOL/L (ref 133–144)
TRIGL SERPL-MCNC: 171 MG/DL
TSH SERPL DL<=0.005 MIU/L-ACNC: 2.96 MU/L (ref 0.4–4)

## 2021-12-04 LAB
CREAT UR-MCNC: 141 MG/DL
MICROALBUMIN UR-MCNC: 8 MG/L
MICROALBUMIN/CREAT UR: 5.67 MG/G CR (ref 0–25)

## 2021-12-06 DIAGNOSIS — K59.00 CONSTIPATION, UNSPECIFIED CONSTIPATION TYPE: ICD-10-CM

## 2021-12-06 ASSESSMENT — ASTHMA QUESTIONNAIRES
ACT_TOTALSCORE: 22
QUESTION_4 LAST FOUR WEEKS HOW OFTEN HAVE YOU USED YOUR RESCUE INHALER OR NEBULIZER MEDICATION (SUCH AS ALBUTEROL): ONCE A WEEK OR LESS
QUESTION_5 LAST FOUR WEEKS HOW WOULD YOU RATE YOUR ASTHMA CONTROL: WELL CONTROLLED
QUESTION_2 LAST FOUR WEEKS HOW OFTEN HAVE YOU HAD SHORTNESS OF BREATH: ONCE OR TWICE A WEEK
QUESTION_1 LAST FOUR WEEKS HOW MUCH OF THE TIME DID YOUR ASTHMA KEEP YOU FROM GETTING AS MUCH DONE AT WORK, SCHOOL OR AT HOME: NONE OF THE TIME
QUESTION_3 LAST FOUR WEEKS HOW OFTEN DID YOUR ASTHMA SYMPTOMS (WHEEZING, COUGHING, SHORTNESS OF BREATH, CHEST TIGHTNESS OR PAIN) WAKE YOU UP AT NIGHT OR EARLIER THAN USUAL IN THE MORNING: NOT AT ALL

## 2021-12-06 NOTE — LETTER
[facility]  [address] [facility] [place] [address]  [phone]                    December 15, 2021    Brooke John  [address] [facility] [address]      Dear Brooke,    Here is a summary of your recent test results:    Here are your recent results which are within the expected range. Please continue with your current plan of care and let us know if you have any questions or concerns.    Your test results are enclosed.      Please contact me if you have any questions.    In addition, here is a list of due or overdue Health Maintenance reminders.    Health Maintenance Due   Topic Date Due     Mammogram  07/27/2021     Diptheria Tetanus Pertussis (DTAP/TDAP/TD) Vaccine (3 - Td or Tdap) 09/19/2021     Annual Wellness Visit  11/04/2021       Please call us at [phone] (or use [employer]) to address the above recommendations.            Thank you very much for trusting [facility].     Healthy regards,      Sandi Watkins, FARHAD-BC / ray,rn

## 2021-12-07 RX ORDER — POLYETHYLENE GLYCOL 3350 17 G/17G
POWDER, FOR SOLUTION ORAL
Qty: 510 G | Refills: 11 | Status: SHIPPED | OUTPATIENT
Start: 2021-12-07

## 2021-12-07 ASSESSMENT — ASTHMA QUESTIONNAIRES: ACT_TOTALSCORE: 22

## 2021-12-15 NOTE — TELEPHONE ENCOUNTER
Pt wanted lab letter from 12/2/2021    Alesia Flores RN, BSN  [facility]

## 2022-01-04 ENCOUNTER — HOSPITAL ENCOUNTER (OUTPATIENT)
Dept: MAMMOGRAPHY | Facility: CLINIC | Age: 57
Discharge: HOME | End: 2022-01-04
Attending: NURSE PRACTITIONER | Admitting: NURSE PRACTITIONER
Payer: COMMERCIAL

## 2022-01-04 DIAGNOSIS — Z12.31 VISIT FOR SCREENING MAMMOGRAM: ICD-10-CM

## 2022-01-04 PROCEDURE — 77067 SCR MAMMO BI INCL CAD: CPT

## 2022-01-23 ENCOUNTER — HEALTH MAINTENANCE LETTER (OUTPATIENT)
Age: 57
End: 2022-01-23

## 2022-01-24 DIAGNOSIS — E03.9 HYPOTHYROIDISM, UNSPECIFIED TYPE: ICD-10-CM

## 2022-01-25 DIAGNOSIS — J45.40 MODERATE PERSISTENT ASTHMA WITHOUT COMPLICATION: ICD-10-CM

## 2022-01-25 DIAGNOSIS — J30.2 SEASONAL ALLERGIC RHINITIS, UNSPECIFIED TRIGGER: ICD-10-CM

## 2022-01-25 RX ORDER — FLUTICASONE PROPIONATE 50 MCG
SPRAY, SUSPENSION (ML) NASAL
Qty: 16 G | Refills: 3 | Status: SHIPPED | OUTPATIENT
Start: 2022-01-25 | End: 2022-06-13

## 2022-01-25 RX ORDER — LEVOTHYROXINE SODIUM 88 UG/1
88 TABLET ORAL DAILY
Qty: 90 TABLET | Refills: 2 | Status: SHIPPED | OUTPATIENT
Start: 2022-01-25 | End: 2022-04-28

## 2022-04-07 DIAGNOSIS — G47.33 OSA ON CPAP: Primary | ICD-10-CM

## 2022-04-26 RX ORDER — DONEPEZIL HYDROCHLORIDE 10 MG/1
10 TABLET, FILM COATED ORAL EVERY MORNING
Status: CANCELLED | OUTPATIENT
Start: 2022-04-26

## 2022-04-26 RX ORDER — DONEPEZIL HYDROCHLORIDE 10 MG/1
10 TABLET, FILM COATED ORAL EVERY MORNING
COMMUNITY
Start: 2022-01-25 | End: 2022-04-28

## 2022-04-26 RX ORDER — DONEPEZIL HYDROCHLORIDE 10 MG/1
TABLET, FILM COATED ORAL
Qty: 90 TABLET | OUTPATIENT
Start: 2022-04-26

## 2022-04-26 NOTE — TELEPHONE ENCOUNTER
I have never prescribed this medication.  Please call pharmacy to find out who the original prescriber is  as they should be refilling this.      Salome Hollis MBA, MS, PA-C  [facility]

## 2022-04-26 NOTE — TELEPHONE ENCOUNTER
Duplicate encounter.      Salome Hollis MBA, MS, PA-C  [facility]

## 2022-04-26 NOTE — TELEPHONE ENCOUNTER
Reason for Call:  Other prescription    Detailed comments: donepezil 10 mg Pt needs refill to Pharmacy [facility] [address] [place]     Phone Number Patient can be reached at: Cell number on file:    Telephone Information:   Mobile [phone]       Best Time: Anytime    Can we leave a detailed message on this number? YES    Call taken on 4/26/2022 at 11:27 AM by Gisell Nath

## 2022-04-26 NOTE — TELEPHONE ENCOUNTER
Called #   Telephone Information:   Mobile [phone]     Pt stated that she has been on this Aricept for awhile - she cannot recall time frame because she switched pharmacies.   Pt stated that she gets this med from any provider at her PCPs office     RN advised pt that pt should be seen soon for this     Patient stated an understanding and agreed with plan.    Made OV for this         Alesia Flores RN, BSN  [facility]

## 2022-04-26 NOTE — TELEPHONE ENCOUNTER
Spoke with pharmacy they stated that Sandi Watkins was the last provider to prescribe it even though that doesn't look like that on our end. She said the script that they have on file was dated back to 11/22/21.     Steve Monroy

## 2022-04-26 NOTE — TELEPHONE ENCOUNTER
Please call patient - it appears this was in our chart as historical in January 25, 2022.  How long has she been on this medication?  Does she follow with neurology?  If wanting us to take this over recommend video or in person visit to discuss/prescribe otherwise she should have filled by originating provider.      Salome Hollis MBA, MS, PA-C  [facility]

## 2022-04-26 NOTE — TELEPHONE ENCOUNTER
Routing refill request to provider for review/approval because:  Drug not active on patient's medication list

## 2022-04-28 ENCOUNTER — OFFICE VISIT (OUTPATIENT)
Dept: FAMILY MEDICINE | Facility: CLINIC | Age: 57
End: 2022-04-28
Payer: COMMERCIAL

## 2022-04-28 ENCOUNTER — TELEPHONE (OUTPATIENT)
Dept: FAMILY MEDICINE | Facility: CLINIC | Age: 57
End: 2022-04-28

## 2022-04-28 VITALS
BODY MASS INDEX: 39.03 KG/M2 | OXYGEN SATURATION: 97 % | HEIGHT: 61 IN | DIASTOLIC BLOOD PRESSURE: 74 MMHG | HEART RATE: 75 BPM | SYSTOLIC BLOOD PRESSURE: 124 MMHG | WEIGHT: 206.7 LBS | RESPIRATION RATE: 16 BRPM | TEMPERATURE: 96.6 F

## 2022-04-28 DIAGNOSIS — Z23 NEED FOR TDAP VACCINATION: ICD-10-CM

## 2022-04-28 DIAGNOSIS — E78.1 HYPERTRIGLYCERIDEMIA: ICD-10-CM

## 2022-04-28 DIAGNOSIS — J45.40 MODERATE PERSISTENT ASTHMA WITHOUT COMPLICATION: ICD-10-CM

## 2022-04-28 DIAGNOSIS — T50.901A ACCIDENTAL MEDICATION ERROR, INITIAL ENCOUNTER: ICD-10-CM

## 2022-04-28 DIAGNOSIS — E03.9 HYPOTHYROIDISM, UNSPECIFIED TYPE: Primary | ICD-10-CM

## 2022-04-28 PROCEDURE — 84443 ASSAY THYROID STIM HORMONE: CPT | Performed by: PHYSICIAN ASSISTANT

## 2022-04-28 PROCEDURE — 84439 ASSAY OF FREE THYROXINE: CPT | Performed by: PHYSICIAN ASSISTANT

## 2022-04-28 PROCEDURE — 99215 OFFICE O/P EST HI 40 MIN: CPT | Mod: 25 | Performed by: PHYSICIAN ASSISTANT

## 2022-04-28 PROCEDURE — 90715 TDAP VACCINE 7 YRS/> IM: CPT | Performed by: PHYSICIAN ASSISTANT

## 2022-04-28 PROCEDURE — 90471 IMMUNIZATION ADMIN: CPT | Performed by: PHYSICIAN ASSISTANT

## 2022-04-28 PROCEDURE — 36415 COLL VENOUS BLD VENIPUNCTURE: CPT | Performed by: PHYSICIAN ASSISTANT

## 2022-04-28 RX ORDER — FLUTICASONE PROPIONATE AND SALMETEROL 250; 50 UG/1; UG/1
1 POWDER RESPIRATORY (INHALATION) EVERY 12 HOURS
Qty: 60 EACH | Refills: 5 | Status: SHIPPED | OUTPATIENT
Start: 2022-04-28 | End: 2022-04-29

## 2022-04-28 RX ORDER — ALBUTEROL SULFATE 90 UG/1
2 AEROSOL, METERED RESPIRATORY (INHALATION) EVERY 4 HOURS PRN
Qty: 18 G | Refills: 3 | Status: SHIPPED | OUTPATIENT
Start: 2022-04-28 | End: 2024-08-02

## 2022-04-28 RX ORDER — ROSUVASTATIN CALCIUM 10 MG/1
TABLET, COATED ORAL
Qty: 90 TABLET | Refills: 3 | Status: SHIPPED | OUTPATIENT
Start: 2022-04-28 | End: 2022-11-16

## 2022-04-28 RX ORDER — LEVOTHYROXINE SODIUM 88 UG/1
88 TABLET ORAL DAILY
Qty: 90 TABLET | Refills: 1 | Status: SHIPPED | OUTPATIENT
Start: 2022-04-28 | End: 2022-10-13

## 2022-04-28 ASSESSMENT — ASTHMA QUESTIONNAIRES: ACT_TOTALSCORE: 20

## 2022-04-28 NOTE — TELEPHONE ENCOUNTER
Prior Authorization Retail Medication Request    Medication/Dose: Fluticasone/salm disk 250/50 mcg 60 s    ICD code (if different than what is on RX):    Previously Tried and Failed:    Rationale:      Insurance Name:  In chart  Insurance ID:        Pharmacy Information (if different than what is on RX)  Name:  [facility]  Phone:  [phone]    Plan does not cover, alternatives are: advair Diskus, symbicort, breo ellipta, dulera     Please change RX or advise to start a SHAI Barrow

## 2022-04-28 NOTE — PROGRESS NOTES
Assessment & Plan     Hypothyroidism, unspecified type  Off of medication for 3 months.  Restart levothyroxine 88 mcg daily.  Recheck levels in 4 to 6 weeks with nonfasting lab only appointment.  - levothyroxine (SYNTHROID/LEVOTHROID) 88 MCG tablet  Dispense: 90 tablet; Refill: 1  - TSH with free T4 reflex  - T4 free    Accidental medication error, initial encounter  Discussed with patient that she never should have been started on donepezil.  Discontinued medication at the pharmacy and in her record.  Restarted on levothyroxine per note above.    Hypertriglyceridemia  Continue current medication regimen unchanged  - rosuvastatin (CRESTOR) 10 MG tablet  Dispense: 90 tablet; Refill: 3    Moderate persistent asthma without complication  Stable.  Continue current regimen  - albuterol (PROAIR HFA/PROVENTIL HFA/VENTOLIN HFA) 108 (90 Base) MCG/ACT inhaler  Dispense: 18 g; Refill: 3    Need for Tdap vaccination  Vaccinated today  - TDAP VACCINE (Adacel, Boostrix)      66 minutes spent on the date of the encounter doing chart review, history and exam, documentation and further activities per the note    Return in about 6 months (around 11/1/2022) for Physical Exam, Medication recheck, Fasting labs.    Salome Hollis PA-C  [facility]    Matias Cox is a 56 year old who presents for the following health issues     HPI     Medication error  Patient comes into the office today for discussion of a medication refill.  She brings with her today an empty bottle of donepezil 10 mg in addition to her 3 other prescription bottles.  These include losartan 50 mg, pantoprazole 40 mg and rosuvastatin 10 mg.  This was authorized by Sandi Watkins CNP with our clinic.  Upon review of the chart no previous prescription of donepezil was noted and no electronic prescription was ever sent.  Upon further investigation all of when he does medications were transferred from the [facility] to the  [facility] pharmacy on the date that this was filled and these were done verbally.  Ultimately, it was determined that the error was that the receiving pharmacist at [facility] heard Aricept 10 mg instead of Zyrtec 10 mg.  The patient has been taking the donepezil 10 mg for the last 90 days without adverse effects noted.  Of note, the patient did not have her levothyroxine 88 mcg prescription transferred (unknowingly to her) and has been off of this medication for 3 months.    Asthma Follow-Up  Advair 250/50 1 puff twice daily and albuterol as needed  Was ACT completed today?    Yes    ACT Total Scores 4/28/2022   ACT TOTAL SCORE (Goal Greater than or Equal to 20) 20   In the past 12 months, how many times did you visit the emergency room for your asthma without being admitted to the hospital? 0   In the past 12 months, how many times were you hospitalized overnight because of your asthma? 0          How many days per week do you miss taking your asthma controller medication?  0    Please describe any recent triggers for your asthma: Patient is unaware of triggers    Have you had any Emergency Room Visits, Urgent Care Visits, or Hospital Admissions since your last office visit?  No      How many servings of fruits and vegetables do you eat daily?  0-1    On average, how many sweetened beverages do you drink each day (Examples: soda, juice, sweet tea, etc.  Do NOT count diet or artificially sweetened beverages)?   0    How many days per week do you exercise enough to make your heart beat faster? 3 or less    How many minutes a day do you exercise enough to make your heart beat faster? 10 - 19    How many days per week do you miss taking your medication? 0    Hypothyroidism Follow-up  Off of her levothyroxine 88 mcg medication for at least the last 3 months due to the aforementioned pharmacy error    Since last visit, patient describes the following symptoms: Weight stable, no hair loss, no skin changes, no  "constipation, no loose stools    Hyperlipidemia Follow-Up  Rosuvastatin 10 mg    Watching cholesterol in diet?: Yes    Any muscle aches?: No  Recent Labs   Lab Test 12/02/21  0951 11/04/20  1107   CHOL 146 203*   HDL 62 57   LDL 50 103*   TRIG 171* 216*          Hypertension Follow-up  Losartan 50 mg    Outpatient blood pressures are not being checked.    Diet: no added salt       Review of Systems   Constitutional, HEENT, cardiovascular, pulmonary, GI, , musculoskeletal, neuro, skin, endocrine and psych systems are negative, except as otherwise noted.      Objective    /74   Pulse 75   Temp (!) 96.6  F (35.9  C) (Tympanic)   Resp 16   Ht 1.549 m (5' 1\")   Wt 93.8 kg (206 lb 11.2 oz)   LMP 05/15/2018 (Within Days)   SpO2 97%   BMI 39.06 kg/m    Body mass index is 39.06 kg/m .  Physical Exam   GENERAL: healthy, alert and no distress  EYES: Eyes grossly normal to inspection  RESP: lungs clear to auscultation - no rales, rhonchi or wheezes  CV: regular rate and rhythm, normal S1 S2, no S3 or S4, no murmur, click or rub, no peripheral edema and peripheral pulses strong  MS: no gross musculoskeletal defects noted, no edema  SKIN: no suspicious lesions or rashes  NEURO: Normal strength and tone, mentation intact and speech normal  PSYCH: mentation appears normal, affect normal/bright    Results for orders placed or performed in visit on 04/28/22   TSH with free T4 reflex     Status: Abnormal   Result Value Ref Range    TSH 21.07 (H) 0.40 - 4.00 mU/L   T4 free     Status: Abnormal   Result Value Ref Range    Free T4 0.72 (L) 0.76 - 1.46 ng/dL                 "

## 2022-04-28 NOTE — TELEPHONE ENCOUNTER
Reason for Call:  Form, our goal is to have forms completed with 72 hours, however, some forms may require a visit or additional information.    Type of letter, form or note:  RX Change    Who is the form from?: [facility] (if other please explain)    Where did the form come from: form was faxed in    What clinic location was the form placed at?: [facility]    Where the form was placed: [facility]/Folder    What number is listed as a contact on the form?: [phone]           Call taken on 4/28/2022 at 11:43 AM by Elsa Elizabeth

## 2022-04-28 NOTE — LETTER
[facility]  [address] [place] [address]  [phone]                    May 3, 2022    Brooke John  [address]  [address] [place] [facility] [address]      Dear Brooke,    Here is a summary of your recent test results:  -TSH (thyroid stimulating hormone) is abnormal suggesting you are currently underreplaced.  This is not suprising as you were off of the medication for 3 months due to the pharmacy error.  ADVISE: restarting  your medication levothyroxine 88 mcg  and rechecking your TSH with a lab appointment in 6-8 weeks.      If you have any questions please do not hesitate to contact our office via phone ([phone]) or [employer].    Your test results are enclosed.    Please contact me if you have any questions. In addition, here is a list of due or overdue Health Maintenance reminders.    Health Maintenance Due   Topic Date Due     Annual Wellness Visit  11/04/2021     PHQ-2 (once per calendar year)  01/01/2022     Diptheria Tetanus Pertussis (DTAP/TDAP/TD) Vaccine (1 - Tdap) 04/28/2022     ANNUAL REVIEW OF HM ORDERS  05/28/2022   Please call us at [phone] (or use [employer]) to address the above recommendations.            Thank you very much for trusting [facility].   Healthy regards,      Salome Hollis PA-C         Results for orders placed or performed in visit on 04/28/22   TSH with free T4 reflex     Status: Abnormal   Result Value Ref Range    TSH 21.07 (H) 0.40 - 4.00 mU/L   T4 free     Status: Abnormal   Result Value Ref Range    Free T4 0.72 (L) 0.76 - 1.46 ng/dL

## 2022-04-29 LAB
T4 FREE SERPL-MCNC: 0.72 NG/DL (ref 0.76–1.46)
TSH SERPL DL<=0.005 MIU/L-ACNC: 21.07 MU/L (ref 0.4–4)

## 2022-04-29 RX ORDER — FLUTICASONE PROPIONATE AND SALMETEROL 250; 50 UG/1; UG/1
1 POWDER RESPIRATORY (INHALATION) EVERY 12 HOURS
Qty: 60 EACH | Refills: 5 | Status: SHIPPED | OUTPATIENT
Start: 2022-04-29 | End: 2022-06-07

## 2022-04-29 NOTE — RESULT ENCOUNTER NOTE
Team: please mail result letter:     Brooke  I have reviewed your recent labs. Here are the results:    -TSH (thyroid stimulating hormone) is abnormal suggesting you are currently underreplaced.  This is not suprising as you were off of the medication for 3 months due to the pharmacy error.  ADVISE: restarting  your medication levothyroxine 88 mcg  and rechecking your TSH with a lab appointment in 6-8 weeks.       If you have any questions please do not hesitate to contact our office via phone ([phone]) or MyChart.    Salome Hollis MBA, MS, PA-C  [facility]

## 2022-04-29 NOTE — TELEPHONE ENCOUNTER
Sent over Advair Diskus RX to pharmacy.      Salome Hollis MBA, MS, PA-C  [facility]

## 2022-06-02 ENCOUNTER — TELEPHONE (OUTPATIENT)
Dept: FAMILY MEDICINE | Facility: CLINIC | Age: 57
End: 2022-06-02
Payer: COMMERCIAL

## 2022-06-02 NOTE — TELEPHONE ENCOUNTER
Reason for Call:  Form, our goal is to have forms completed with 72 hours, however, some forms may require a visit or additional information.    Type of letter, form or note:  Special Diet Request    Who is the form from?: Patient    Where did the form come from: Patient or family brought in       What clinic location was the form placed at?: [facility]    Where the form was placed: [facility] Box/Folder    What number is listed as a contact on the form?: [phone]           Call taken on 6/2/2022 at 6:16 PM by Elsa Elizabeth

## 2022-06-06 ENCOUNTER — TELEPHONE (OUTPATIENT)
Dept: FAMILY MEDICINE | Facility: CLINIC | Age: 57
End: 2022-06-06
Payer: COMMERCIAL

## 2022-06-06 DIAGNOSIS — J45.40 MODERATE PERSISTENT ASTHMA WITHOUT COMPLICATION: Primary | ICD-10-CM

## 2022-06-06 NOTE — TELEPHONE ENCOUNTER
Reason for Call:  Form, our goal is to have forms completed with 72 hours, however, some forms may require a visit or additional information.    Type of letter, form or note:  medical    Who is the form from?: [facility] (if other please explain)    Where did the form come from: form was faxed in    What clinic location was the form placed at?: [facility]    Where the form was placed: [facility] Holl[facility]/Folder    What number is listed as a contact on the form?: [phone]       Additional comments: Fluticasone    Call taken on 6/6/2022 at 12:50 PM by Debbie Davila

## 2022-06-06 NOTE — TELEPHONE ENCOUNTER
Prior Authorization Retail Medication Request    Medication/Dose: Flutiasone  ICD code (if different than what is on RX):    Previously Tried and Failed:    Rationale:      Insurance Name:  In chart  Insurance ID:        Pharmacy Information (if different than what is on RX)  Name:  [facility]     Phone:  [phone]    Plan does not cover alternatives are:   Advair diskus, symbicort, breo ellipta, dulera   Please change RX or advise to start a SHAI Barrow

## 2022-06-07 RX ORDER — FLUTICASONE PROPIONATE AND SALMETEROL 250; 50 UG/1; UG/1
1 POWDER RESPIRATORY (INHALATION) EVERY 12 HOURS
Qty: 60 EACH | Refills: 5 | Status: SHIPPED | OUTPATIENT
Start: 2022-06-07 | End: 2022-11-16

## 2022-06-07 NOTE — TELEPHONE ENCOUNTER
Form completed and placed in [facility] inbox.  The patient does need to sign this form before we can fax it.      Salome Hollis MBA, MS, PA-C  [facility]

## 2022-06-07 NOTE — TELEPHONE ENCOUNTER
Patient has been advised that it's missing a signature from her. Will place form upfront for patient to sign and then once that is completed will fax.     Steve Monroy

## 2022-06-08 NOTE — TELEPHONE ENCOUNTER
Completed forms faxed back to [facility]  at [phone].   Originals sent to be scanned.       Latoya Barrow

## 2022-06-09 DIAGNOSIS — J30.2 SEASONAL ALLERGIC RHINITIS, UNSPECIFIED TRIGGER: ICD-10-CM

## 2022-06-09 DIAGNOSIS — J45.40 MODERATE PERSISTENT ASTHMA WITHOUT COMPLICATION: ICD-10-CM

## 2022-06-13 RX ORDER — FLUTICASONE PROPIONATE 50 MCG
SPRAY, SUSPENSION (ML) NASAL
Qty: 16 G | Refills: 3 | Status: SHIPPED | OUTPATIENT
Start: 2022-06-13 | End: 2024-08-02

## 2022-06-13 NOTE — TELEPHONE ENCOUNTER
Prescription approved per [facility] Refill Protocol.    Sabrina Srinivasan RN  [facility]

## 2022-07-22 ENCOUNTER — ALLIED HEALTH/NURSE VISIT (OUTPATIENT)
Dept: NURSING | Facility: CLINIC | Age: 57
End: 2022-07-22
Payer: COMMERCIAL

## 2022-07-22 DIAGNOSIS — R21 RASH: Primary | ICD-10-CM

## 2022-07-22 PROCEDURE — 99207 PR NO CHARGE NURSE ONLY: CPT

## 2022-07-22 NOTE — PROGRESS NOTES
Walk in for rash, patient and friend are concerned about monkey pox    Assessment   No recent travel, no known contact with anyone that has the visus.     No fever   No headache   No fever  No muscle aches  No nausea   No swollen lymph nodes    Onset of rash yesterday,    Location of the rash: left posterior forearm and one spot above elbow.  7 pencil eraser size light pink spots, barley raised. Skin is all intact, spots are not open  Denied anywhere else on body    Associated symptoms include: mild itching, no scaling, no pain, no draining,no fever   Symptoms appear to be be improving since last night after she put Cortizone cream on x1 size of spots are 1/2 the size they were yesterday.     Look like mosquito bites to writer, patient is not sure.  Therapies tried to improve the rash: OTC Topical Steroids just x1 yesterday   Previous history of a similar rash? No  Recent exposure history: none known     Denied any new medications, lotions, soaps, clothing, sheets, exposure to chemicals/weeds in the yard/garden.     Recommendations  cortizine cream to arm as directed daily along with  calamine lotion in between for itching   Zyrtec if needed for persistent itching despite calamine and Cortizone  Monitor for s/s of infection (symptoms reviewed  with patient and friend)   Monitor for other symptoms  fever, muscles aches, headache.     informed to call or message back early next week if symptoms persist or worsen.    Pat MASTERS RN   [facility]

## 2022-09-04 ENCOUNTER — HEALTH MAINTENANCE LETTER (OUTPATIENT)
Age: 57
End: 2022-09-04

## 2022-10-11 DIAGNOSIS — E03.9 HYPOTHYROIDISM, UNSPECIFIED TYPE: ICD-10-CM

## 2022-10-11 DIAGNOSIS — K44.9 HIATAL HERNIA: ICD-10-CM

## 2022-10-11 DIAGNOSIS — K21.9 GASTROESOPHAGEAL REFLUX DISEASE WITHOUT ESOPHAGITIS: ICD-10-CM

## 2022-10-11 RX ORDER — PANTOPRAZOLE SODIUM 40 MG/1
TABLET, DELAYED RELEASE ORAL
Qty: 90 TABLET | Refills: 1 | Status: SHIPPED | OUTPATIENT
Start: 2022-10-11 | End: 2022-11-16

## 2022-10-11 NOTE — TELEPHONE ENCOUNTER
Routing refill request to provider for review/approval because:  Labs out of range:    TSH   Date Value Ref Range Status   04/28/2022 21.07 (H) 0.40 - 4.00 mU/L Final   11/04/2020 11.20 (H) 0.40 - 4.00 mU/L Edis ELLIS RN

## 2022-10-13 RX ORDER — LEVOTHYROXINE SODIUM 88 UG/1
TABLET ORAL
Qty: 90 TABLET | Refills: 0 | Status: SHIPPED | OUTPATIENT
Start: 2022-10-13 | End: 2022-11-17

## 2022-10-13 NOTE — TELEPHONE ENCOUNTER
90 days sent to pharmacy.  Please advise patient that she is due for annual fasting physical for further refills.

## 2022-10-19 NOTE — TELEPHONE ENCOUNTER
I have been unable to reach this patient by phone.  A letter is being sent to the last known home address.      Latoya Barrow

## 2022-11-16 ENCOUNTER — OFFICE VISIT (OUTPATIENT)
Dept: FAMILY MEDICINE | Facility: CLINIC | Age: 57
End: 2022-11-16
Payer: COMMERCIAL

## 2022-11-16 ENCOUNTER — TELEPHONE (OUTPATIENT)
Dept: FAMILY MEDICINE | Facility: CLINIC | Age: 57
End: 2022-11-16

## 2022-11-16 VITALS
HEIGHT: 61 IN | TEMPERATURE: 97.8 F | DIASTOLIC BLOOD PRESSURE: 82 MMHG | OXYGEN SATURATION: 97 % | RESPIRATION RATE: 16 BRPM | HEART RATE: 82 BPM | WEIGHT: 198 LBS | BODY MASS INDEX: 37.38 KG/M2 | SYSTOLIC BLOOD PRESSURE: 124 MMHG

## 2022-11-16 DIAGNOSIS — Z23 ENCOUNTER FOR IMMUNIZATION: ICD-10-CM

## 2022-11-16 DIAGNOSIS — Z12.31 ENCOUNTER FOR SCREENING MAMMOGRAM FOR BREAST CANCER: ICD-10-CM

## 2022-11-16 DIAGNOSIS — Z23 HIGH PRIORITY FOR 2019-NCOV VACCINE: ICD-10-CM

## 2022-11-16 DIAGNOSIS — E03.9 HYPOTHYROIDISM, UNSPECIFIED TYPE: ICD-10-CM

## 2022-11-16 DIAGNOSIS — J45.40 MODERATE PERSISTENT ASTHMA WITHOUT COMPLICATION: ICD-10-CM

## 2022-11-16 DIAGNOSIS — K44.9 HIATAL HERNIA: ICD-10-CM

## 2022-11-16 DIAGNOSIS — J45.40 MODERATE PERSISTENT ASTHMA WITHOUT COMPLICATION: Primary | ICD-10-CM

## 2022-11-16 DIAGNOSIS — E78.1 HYPERTRIGLYCERIDEMIA: ICD-10-CM

## 2022-11-16 DIAGNOSIS — Z00.00 ROUTINE GENERAL MEDICAL EXAMINATION AT A HEALTH CARE FACILITY: Primary | ICD-10-CM

## 2022-11-16 DIAGNOSIS — I10 BENIGN ESSENTIAL HYPERTENSION: ICD-10-CM

## 2022-11-16 DIAGNOSIS — G43.909 MIGRAINE WITHOUT STATUS MIGRAINOSUS, NOT INTRACTABLE, UNSPECIFIED MIGRAINE TYPE: ICD-10-CM

## 2022-11-16 DIAGNOSIS — K21.9 GASTROESOPHAGEAL REFLUX DISEASE WITHOUT ESOPHAGITIS: ICD-10-CM

## 2022-11-16 LAB
ALBUMIN SERPL BCG-MCNC: 4.5 G/DL (ref 3.5–5.2)
ALP SERPL-CCNC: 101 U/L (ref 35–104)
ALT SERPL W P-5'-P-CCNC: 43 U/L (ref 10–35)
ANION GAP SERPL CALCULATED.3IONS-SCNC: 12 MMOL/L (ref 7–15)
AST SERPL W P-5'-P-CCNC: 37 U/L (ref 10–35)
BILIRUB SERPL-MCNC: 2.1 MG/DL
BUN SERPL-MCNC: 14.1 MG/DL (ref 8–23)
CALCIUM SERPL-MCNC: 9.3 MG/DL (ref 8.6–10)
CHLORIDE SERPL-SCNC: 101 MMOL/L (ref 98–107)
CHOLEST SERPL-MCNC: 170 MG/DL
CREAT SERPL-MCNC: 0.8 MG/DL (ref 0.51–0.95)
CREAT UR-MCNC: 61.1 MG/DL
DEPRECATED HCO3 PLAS-SCNC: 24 MMOL/L (ref 22–29)
ERYTHROCYTE [DISTWIDTH] IN BLOOD BY AUTOMATED COUNT: 13 % (ref 10–15)
GFR SERPL CREATININE-BSD FRML MDRD: 84 ML/MIN/1.73M2
GLUCOSE SERPL-MCNC: 89 MG/DL (ref 70–99)
HCT VFR BLD AUTO: 40 % (ref 35–47)
HDLC SERPL-MCNC: 62 MG/DL
HGB BLD-MCNC: 13.7 G/DL (ref 11.7–15.7)
LDLC SERPL CALC-MCNC: 79 MG/DL
MCH RBC QN AUTO: 30 PG (ref 26.5–33)
MCHC RBC AUTO-ENTMCNC: 34.3 G/DL (ref 31.5–36.5)
MCV RBC AUTO: 88 FL (ref 78–100)
MICROALBUMIN UR-MCNC: <12 MG/L
MICROALBUMIN/CREAT UR: NORMAL MG/G{CREAT}
NONHDLC SERPL-MCNC: 108 MG/DL
PLATELET # BLD AUTO: 215 10E3/UL (ref 150–450)
POTASSIUM SERPL-SCNC: 4.1 MMOL/L (ref 3.4–5.3)
PROT SERPL-MCNC: 7.4 G/DL (ref 6.4–8.3)
RBC # BLD AUTO: 4.57 10E6/UL (ref 3.8–5.2)
SODIUM SERPL-SCNC: 137 MMOL/L (ref 136–145)
TRIGL SERPL-MCNC: 143 MG/DL
TSH SERPL DL<=0.005 MIU/L-ACNC: 1.29 UIU/ML (ref 0.3–4.2)
WBC # BLD AUTO: 5.5 10E3/UL (ref 4–11)

## 2022-11-16 PROCEDURE — 91313 COVID-19,PF,MODERNA BIVALENT: CPT | Performed by: NURSE PRACTITIONER

## 2022-11-16 PROCEDURE — 84443 ASSAY THYROID STIM HORMONE: CPT | Performed by: NURSE PRACTITIONER

## 2022-11-16 PROCEDURE — 0134A COVID-19,PF,MODERNA BIVALENT: CPT | Performed by: NURSE PRACTITIONER

## 2022-11-16 PROCEDURE — 85027 COMPLETE CBC AUTOMATED: CPT | Performed by: NURSE PRACTITIONER

## 2022-11-16 PROCEDURE — 80053 COMPREHEN METABOLIC PANEL: CPT | Performed by: NURSE PRACTITIONER

## 2022-11-16 PROCEDURE — 36415 COLL VENOUS BLD VENIPUNCTURE: CPT | Performed by: NURSE PRACTITIONER

## 2022-11-16 PROCEDURE — 90682 RIV4 VACC RECOMBINANT DNA IM: CPT | Performed by: NURSE PRACTITIONER

## 2022-11-16 PROCEDURE — 82043 UR ALBUMIN QUANTITATIVE: CPT | Performed by: NURSE PRACTITIONER

## 2022-11-16 PROCEDURE — 99396 PREV VISIT EST AGE 40-64: CPT | Mod: 25 | Performed by: NURSE PRACTITIONER

## 2022-11-16 PROCEDURE — 99214 OFFICE O/P EST MOD 30 MIN: CPT | Mod: 25 | Performed by: NURSE PRACTITIONER

## 2022-11-16 PROCEDURE — G0008 ADMIN INFLUENZA VIRUS VAC: HCPCS | Mod: 59 | Performed by: NURSE PRACTITIONER

## 2022-11-16 PROCEDURE — 80061 LIPID PANEL: CPT | Performed by: NURSE PRACTITIONER

## 2022-11-16 RX ORDER — SUMATRIPTAN 25 MG/1
TABLET, FILM COATED ORAL
Qty: 8 TABLET | Refills: 1 | Status: SHIPPED | OUTPATIENT
Start: 2022-11-16 | End: 2023-04-20

## 2022-11-16 RX ORDER — FLUTICASONE PROPIONATE AND SALMETEROL 250; 50 UG/1; UG/1
1 POWDER RESPIRATORY (INHALATION) EVERY 12 HOURS
Qty: 60 EACH | Refills: 11 | Status: SHIPPED | OUTPATIENT
Start: 2022-11-16 | End: 2023-10-26

## 2022-11-16 RX ORDER — CETIRIZINE HYDROCHLORIDE 10 MG/1
10 TABLET ORAL EVERY MORNING
Qty: 90 TABLET | Refills: 3 | Status: SHIPPED | OUTPATIENT
Start: 2022-11-16 | End: 2024-01-17

## 2022-11-16 RX ORDER — FLUTICASONE PROPIONATE AND SALMETEROL 250; 50 UG/1; UG/1
1 POWDER RESPIRATORY (INHALATION) EVERY 12 HOURS
Qty: 60 EACH | Refills: 5 | Status: SHIPPED | OUTPATIENT
Start: 2022-11-16 | End: 2022-11-16

## 2022-11-16 RX ORDER — LOSARTAN POTASSIUM 50 MG/1
50 TABLET ORAL DAILY
Qty: 90 TABLET | Refills: 3 | Status: SHIPPED | OUTPATIENT
Start: 2022-11-16 | End: 2024-01-23

## 2022-11-16 RX ORDER — PANTOPRAZOLE SODIUM 40 MG/1
40 TABLET, DELAYED RELEASE ORAL DAILY
Qty: 90 TABLET | Refills: 3 | Status: SHIPPED | OUTPATIENT
Start: 2022-11-16 | End: 2024-01-10

## 2022-11-16 RX ORDER — ROSUVASTATIN CALCIUM 10 MG/1
TABLET, COATED ORAL
Qty: 90 TABLET | Refills: 3 | Status: SHIPPED | OUTPATIENT
Start: 2022-11-16 | End: 2023-07-05

## 2022-11-16 ASSESSMENT — ENCOUNTER SYMPTOMS
HEMATURIA: 0
HEADACHES: 1
BREAST MASS: 0
JOINT SWELLING: 1
NAUSEA: 1
DYSURIA: 0
FREQUENCY: 1

## 2022-11-16 ASSESSMENT — ASTHMA QUESTIONNAIRES
QUESTION_3 LAST FOUR WEEKS HOW OFTEN DID YOUR ASTHMA SYMPTOMS (WHEEZING, COUGHING, SHORTNESS OF BREATH, CHEST TIGHTNESS OR PAIN) WAKE YOU UP AT NIGHT OR EARLIER THAN USUAL IN THE MORNING: NOT AT ALL
ACT_TOTALSCORE: 20
ACT_TOTALSCORE: 20
QUESTION_2 LAST FOUR WEEKS HOW OFTEN HAVE YOU HAD SHORTNESS OF BREATH: NOT AT ALL
QUESTION_4 LAST FOUR WEEKS HOW OFTEN HAVE YOU USED YOUR RESCUE INHALER OR NEBULIZER MEDICATION (SUCH AS ALBUTEROL): ONE OR TWO TIMES PER DAY
QUESTION_1 LAST FOUR WEEKS HOW MUCH OF THE TIME DID YOUR ASTHMA KEEP YOU FROM GETTING AS MUCH DONE AT WORK, SCHOOL OR AT HOME: A LITTLE OF THE TIME
QUESTION_5 LAST FOUR WEEKS HOW WOULD YOU RATE YOUR ASTHMA CONTROL: WELL CONTROLLED

## 2022-11-16 ASSESSMENT — ACTIVITIES OF DAILY LIVING (ADL): CURRENT_FUNCTION: NO ASSISTANCE NEEDED

## 2022-11-16 NOTE — LETTER
November 16, 2022      Brooke John  [address]  [address] [place] [address]        To Whom It May Concern,     Brooke John is an established patient of our clinic.  She has a history of depression and anxiety and benefits from the support of an emotional support animal (cat).  Please help facilitate allowing her cat in her living environment.        Sincerely,        BRIAN Marinelli CNP

## 2022-11-16 NOTE — TELEPHONE ENCOUNTER
Prior Authorization Retail Medication Request    Medication/Dose: Fluticasone/SALM Disk   ICD code (if different than what is on RX):    Previously Tried and Failed:    Rationale:      Insurance Name:  In chart  Insurance ID:        Pharmacy Information (if different than what is on RX)  Name:  [facility]   Phone:  [phone]    Plan does not cover, alternatives are Advair diskus, symbicort, breo elllipta, dulera      Please change RX or advise to start a SHAI Barrow

## 2022-11-16 NOTE — PROGRESS NOTES
"   SUBJECTIVE:   CC: Brooke is an 57 year old who presents for preventive health visit.   Patient has been advised of split billing requirements and indicates understanding: Yes  Healthy Habits:     In general, how would you rate your overall health?  Fair    Frequency of exercise:  2-3 days/week    Duration of exercise:  Greater than 60 minutes    Do you usually eat at least 4 servings of fruit and vegetables a day, include whole grains    & fiber and avoid regularly eating high fat or \"junk\" foods?  Yes    Taking medications regularly:  Yes    Ability to successfully perform activities of daily living:  No assistance needed    Home Safety:  No safety concerns identified    Hearing Impairment:  Find that men's voices are easier to understand than woman's, difficulty understanding soft or whispered speech and difficulty understanding speech on the telephone    In the past 6 months, have you been bothered by leaking of urine? Yes    In general, how would you rate your overall mental or emotional health?  Fair      PHQ-2 Total Score: 0      Hyperlipidemia Follow-Up  Recent Labs   Lab Test 12/02/21  0951 11/04/20  1107   CHOL 146 203*   HDL 62 57   LDL 50 103*   TRIG 171* 216*       Are you regularly taking any medication or supplement to lower your cholesterol?   Yes- rouvastatin    Are you having muscle aches or other side effects that you think could be caused by your cholesterol lowering medication?  No    Hypertension Follow-up      Do you check your blood pressure regularly outside of the clinic? No     Are you following a low salt diet? No    Are your blood pressures ever more than 140 on the top number (systolic) OR more   than 90 on the bottom number (diastolic), for example 140/90? No    Hypothyroidism    Wasn't previously taking thyroid medication, has now been consistent with medication.    TSH   Date Value Ref Range Status   04/28/2022 21.07 (H) 0.40 - 4.00 mU/L Final   12/02/2021 2.96 0.40 - 4.00 mU/L " Final   11/04/2020 11.20 (H) 0.40 - 4.00 mU/L Final   10/17/2019 1.36 0.40 - 4.00 mU/L Final   07/17/2019 1.67 0.40 - 4.00 mU/L Final   06/11/2018 2.34 0.40 - 4.00 mU/L Final   01/15/2018 3.33 0.40 - 4.00 mU/L Final     Breathing is stable.      ACT Total Scores 12/6/2021 4/28/2022 11/16/2022   ACT TOTAL SCORE (Goal Greater than or Equal to 20) 22 20 20   In the past 12 months, how many times did you visit the emergency room for your asthma without being admitted to the hospital? 0 0 0   In the past 12 months, how many times were you hospitalized overnight because of your asthma? 0 0 0     Today's PHQ-2 Score:   PHQ-2 ( 1999 [employer]) 11/16/2022   Q1: Little interest or pleasure in doing things 0   Q2: Feeling down, depressed or hopeless 0   PHQ-2 Score 0   PHQ-2 Total Score (12-17 Years)- Positive if 3 or more points; Administer PHQ-A if positive -   Q1: Little interest or pleasure in doing things Not at all   Q2: Feeling down, depressed or hopeless Not at all   PHQ-2 Score 0           Social History     Tobacco Use     Smoking status: Passive Smoke Exposure - Never Smoker     Smokeless tobacco: Never   Substance Use Topics     Alcohol use: No     Alcohol/week: 0.0 standard drinks     If you drink alcohol do you typically have >3 drinks per day or >7 drinks per week? No    Alcohol Use 11/16/2022   Prescreen: >3 drinks/day or >7 drinks/week? No   Prescreen: >3 drinks/day or >7 drinks/week? -       Reviewed orders with patient.  Reviewed health maintenance and updated orders accordingly - Yes  BP Readings from Last 3 Encounters:   11/16/22 124/82   04/28/22 124/74   11/22/21 122/78    Wt Readings from Last 3 Encounters:   11/16/22 89.8 kg (198 lb)   04/28/22 93.8 kg (206 lb 11.2 oz)   11/22/21 98 kg (216 lb)                  Patient Active Problem List   Diagnosis     Hypothyroidism, unspecified type     History of herniated intervertebral disc     Pulmonary nodules     Moderate persistent asthma     Lung nodule -last  imaging 2016 -no follow-up needed     Morbid obesity (H)     Constipation, unspecified constipation type     Gastroesophageal reflux disease without esophagitis     Use of cane as ambulatory aid     Lumbar radiculopathy- left - uses a cane - born with the scoliosis - sees [facility]      Primary osteoarthritis of both knees     Xanthelasma of eyelid, bilateral- medial upper lids - causing some medial ptosis and decreased visual fields      Hypertriglyceridemia     Anxiety     Seasonal allergic rhinitis, unspecified trigger     Migraine without status migrainosus, not intractable, unspecified migraine type     Hiatal hernia     Scoliosis- congenital - saw [facility] as a child - now [facility] - has signif lumbar radiculopathy - uses a cane      Urinary incontinence due to immobility     Mixed incontinence     Balance problems     Past Surgical History:   Procedure Laterality Date     ARTHROSCOPY KNEE  2007    left      BACK SURGERY      lumbar discectomy      SECTION       COLONOSCOPY N/A 2017    no polyps./ return in ten years     COSMETIC SURGERY       ESOPHAGOSCOPY, GASTROSCOPY, DUODENOSCOPY (EGD), COMBINED N/A 2017    Procedure: COMBINED ESOPHAGOSCOPY, GASTROSCOPY, DUODENOSCOPY (EGD);  EGD w MAC:DX:Esophageal dysphagia,Hiatal hernia/prep & pre-op info mailed;  Surgeon: Moshe Caro MD;  Location:  GI     EYE SURGERY         Social History     Tobacco Use     Smoking status: Passive Smoke Exposure - Never Smoker     Smokeless tobacco: Never   Substance Use Topics     Alcohol use: No     Alcohol/week: 0.0 standard drinks     Family History   Problem Relation Age of Onset     Scoliosis Son      Learning Disorder Son         lives in a protected place     Heart Disease Father         triple bypass     Unknown/Adopted Father      Unknown/Adopted Mother      Diabetes Type 2  Maternal Grandfather      Other - See Comments Brother         5 brothers, 1 sister.      No Known  Problems Paternal Grandmother      No Known Problems Paternal Grandfather      Colon Cancer No family hx of          Current Outpatient Medications   Medication Sig Dispense Refill     albuterol (PROAIR HFA/PROVENTIL HFA/VENTOLIN HFA) 108 (90 Base) MCG/ACT inhaler Inhale 2 puffs into the lungs every 4 hours as needed for shortness of breath / dyspnea or wheezing Cough 18 g 3     calcium carbonate (TUMS) 500 MG chewable tablet Take 1 tablet (500 mg) by mouth 2 times daily as needed for heartburn       cetirizine (ZYRTEC) 10 MG tablet Take 1 tablet (10 mg) by mouth every morning 90 tablet 3     fluticasone (FLONASE) 50 MCG/ACT nasal spray SHAKE LIQUID AND USE 2 SPRAYS IN EACH NOSTRIL DAILY 16 g 3     fluticasone-salmeterol (WIXELA INHUB) 250-50 MCG/ACT inhaler Inhale 1 puff into the lungs every 12 hours 60 each 5     GOODSENSE CLEARLAX 17 GM/SCOOP powder STIR 17 GM OF POWDER (SEE CARI INSIDE CAP) IN 8-OZ OF LIQUID UNTIL COMPLETELY DISSOLVED. DRINK THE SOLUTION DAILY AS NEEDED FOR CONSTIPATION 510 g 11     levothyroxine (SYNTHROID/LEVOTHROID) 88 MCG tablet TAKE 1 TABLET(88 MCG) BY MOUTH DAILY 90 tablet 0     losartan (COZAAR) 50 MG tablet Take 1 tablet (50 mg) by mouth daily 90 tablet 3     multivitamin w/minerals (THERA-VIT-M) tablet Take 1 tablet by mouth daily       order for DME Equipment being ordered: Handheld shower 1 each 0     order for DME Equipment being ordered: Grab bars (3 for shower, one for wall near toilet) 4 each 0     order for DME Equipment being ordered: seated walker 1 Device 0     pantoprazole (PROTONIX) 40 MG EC tablet Take 1 tablet (40 mg) by mouth daily 90 tablet 3     rosuvastatin (CRESTOR) 10 MG tablet TAKE 1 TABLET EVERY NIGHT AT BEDTIME 90 tablet 3     SUMAtriptan (IMITREX) 25 MG tablet TAKE 1 TABLET BY MOUTH AT ONSET OF HEADACHE. MAY REPEAT IN 2 HOURS IF NEEDED. MAX OF 200MG/24 HOURS. 8 tablet 1     triamcinolone (KENALOG) 0.1 % external cream Apply topically 2 times daily as needed for  irritation (eczema) 30 g 1       Breast Cancer Screening:    FHS-7:   Breast CA Risk Assessment (FHS-7) 2022   Did any of your first-degree relatives have breast or ovarian cancer? No   Did any of your relatives have bilateral breast cancer? No   Did any man in your family have breast cancer? No   Did any woman in your family have breast and ovarian cancer? No   Did any woman in your family have breast cancer before age 50 y? No   Do you have 2 or more relatives with breast and/or ovarian cancer? No   Do you have 2 or more relatives with breast and/or bowel cancer? No       Mammogram Screening: Recommended mammography every 1-2 years with patient discussion and risk factor consideration  Pertinent mammograms are reviewed under the imaging tab.    History of abnormal Pap smear: NO - age 30-65 PAP every 5 years with negative HPV co-testing recommended  PAP / HPV Latest Ref Rng & Units 2020 3/14/2016   PAP (Historical) - NIL NIL   HPV16 NEG:Negative Negative Negative   HPV18 NEG:Negative Negative Negative   HRHPV NEG:Negative Negative Negative     Reviewed and updated as needed this visit by clinical staff     Meds              Reviewed and updated as needed this visit by Provider                 Past Medical History:   Diagnosis Date     Allergic reaction to drug - amoxicillin - Hives - proven 2016     Arthritis      Depressive disorder      Hypertriglyceridemia      Lung nodule     w/u neg - Dr Boyer     Moderate persistent asthma      Other specified hypothyroidism 3/14/2016     Scoliosis       Past Surgical History:   Procedure Laterality Date     ARTHROSCOPY KNEE  2007    left      BACK SURGERY      lumbar discectomy      SECTION       COLONOSCOPY N/A 2017    no polyps./ return in ten years     COSMETIC SURGERY       ESOPHAGOSCOPY, GASTROSCOPY, DUODENOSCOPY (EGD), COMBINED N/A 2017    Procedure: COMBINED ESOPHAGOSCOPY, GASTROSCOPY, DUODENOSCOPY (EGD);  EGD w  "MAC:DX:Esophageal dysphagia,Hiatal hernia/prep & pre-op info mailed;  Surgeon: Moshe Caro MD;  Location:  GI     EYE SURGERY         Review of Systems      ROS: 10 point ROS neg other than the symptoms noted above in the HPI.     OBJECTIVE:   /82 (BP Location: Right arm, Patient Position: Chair, Cuff Size: Adult Large)   Pulse 82   Temp 97.8  F (36.6  C) (Temporal)   Resp 16   Ht 1.549 m (5' 1\")   Wt 89.8 kg (198 lb)   LMP 05/15/2018 (Within Days)   SpO2 97%   BMI 37.41 kg/m       Physical Exam     GENERAL: healthy, alert and no distress  EYES: Eyes grossly normal to inspection, PERRL and conjunctivae and sclerae normal  HENT: ear canals and TM's normal, nose and mouth without ulcers or lesions  NECK: no adenopathy, no asymmetry, masses, or scars and thyroid normal to palpation  RESP: lungs clear to auscultation - no rales, rhonchi or wheezes  CV: regular rate and rhythm, normal S1 S2, no S3 or S4, no murmur, click or rub, no peripheral edema  ABDOMEN: soft, nontender, no hepatosplenomegaly, no masses and bowel sounds normal  MS: no gross musculoskeletal defects noted, no edema  SKIN: no suspicious lesions or rashes  NEURO: Normal strength and tone, mentation intact and speech normal  PSYCH: mentation appears normal, affect normal/bright    ASSESSMENT/PLAN:     Brooke was seen today for physical and imm/inj.    Diagnoses and all orders for this visit:    Routine general medical examination at a health care facility  -     INFLUENZA QUAD, RECOMBINANT, P-FREE (RIV4) (FLUBLOK) AGE 50-64 [RAU093]  -     CBC with Platelets    Encounter for immunization  -     INFLUENZA QUAD, RECOMBINANT, P-FREE (RIV4) (FLUBLOK) AGE 50-64 [CNS325]    High priority for 2019-nCoV vaccine  -     COVID-19,PF,MODERNA BIVALENT 18+Yrs    Encounter for screening mammogram for breast cancer  -     *MA Screening Digital Bilateral; Future    Benign essential hypertension  Currently well controlled blood pressure, on " Losartan 50 mg daily.      -     losartan (COZAAR) 50 MG tablet; Take 1 tablet (50 mg) by mouth daily  -     COMPREHENSIVE METABOLIC PANEL  -     Albumin Random Urine Quantitative with Creat Ratio    Hypertriglyceridemia  Recent Labs   Lab Test 12/02/21  0951 11/04/20  1107   CHOL 146 203*   HDL 62 57   LDL 50 103*   TRIG 171* 216*   Well controlled on Rosuvastatin 10 mg daily.     -     rosuvastatin (CRESTOR) 10 MG tablet; TAKE 1 TABLET EVERY NIGHT AT BEDTIME  -     Lipid panel reflex to direct LDL Non-fasting    Hypothyroidism, unspecified type  TSH   Date Value Ref Range Status   04/28/2022 21.07 (H) 0.40 - 4.00 mU/L Final   12/02/2021 2.96 0.40 - 4.00 mU/L Final   11/04/2020 11.20 (H) 0.40 - 4.00 mU/L Final   10/17/2019 1.36 0.40 - 4.00 mU/L Final   07/17/2019 1.67 0.40 - 4.00 mU/L Final   06/11/2018 2.34 0.40 - 4.00 mU/L Final   01/15/2018 3.33 0.40 - 4.00 mU/L Final   Due for recheck of TSH, currently on levothyroxine 88 mcg daily.      -     TSH with free T4 reflex    Moderate persistent asthma without complication  Currently with adequate control, stable.    -     fluticasone-salmeterol (WIXELA INHUB) 250-50 MCG/ACT inhaler; Inhale 1 puff into the lungs every 12 hours  -     cetirizine (ZYRTEC) 10 MG tablet; Take 1 tablet (10 mg) by mouth every morning    Gastroesophageal reflux disease without esophagitis  Hiatal hernia  Stable on PPI.    -     pantoprazole (PROTONIX) 40 MG EC tablet; Take 1 tablet (40 mg) by mouth daily    Migraine without status migrainosus, not intractable, unspecified migraine type  -     SUMAtriptan (IMITREX) 25 MG tablet; TAKE 1 TABLET BY MOUTH AT ONSET OF HEADACHE. MAY REPEAT IN 2 HOURS IF NEEDED. MAX OF 200MG/24 HOURS.    Other orders  -     REVIEW OF HEALTH MAINTENANCE PROTOCOL ORDERS      COUNSELING:  Reviewed preventive health counseling, as reflected in patient instructions      BMI:   Estimated body mass index is 37.41 kg/m  as calculated from the following:    Height as of  "this encounter: 1.549 m (5' 1\").    Weight as of this encounter: 89.8 kg (198 lb).       She reports that she is a non-smoker but has been exposed to tobacco smoke. She has never used smokeless tobacco.      Mary Hines, BRIAN [facility] [place]  "

## 2022-11-16 NOTE — TELEPHONE ENCOUNTER
Advair prescription sent to pharmacy.  Please inform patient regarding change in inhaler therapy  - ESTELLA Booth

## 2022-11-16 NOTE — TELEPHONE ENCOUNTER
Reason for Call:  Form, our goal is to have forms completed with 72 hours, however, some forms may require a visit or additional information.    Type of letter, form or note:  Drug Change Request    Who is the form from?: [facility] in [place] (if other please explain)    Where did the form come from: form was faxed in    What clinic location was the form placed at?: [facility]    Where the form was placed: [employer] Box/Folder    What number is listed as a contact on the form?: [phone]        Call taken on 11/16/2022 at 8:35 AM by Bernadine Garcia

## 2022-11-17 DIAGNOSIS — R17 ELEVATED BILIRUBIN: Primary | ICD-10-CM

## 2022-11-17 DIAGNOSIS — E03.9 HYPOTHYROIDISM, UNSPECIFIED TYPE: ICD-10-CM

## 2022-11-17 RX ORDER — LEVOTHYROXINE SODIUM 88 UG/1
88 TABLET ORAL DAILY
Qty: 90 TABLET | Refills: 3 | Status: SHIPPED | OUTPATIENT
Start: 2022-11-17 | End: 2024-01-10

## 2022-11-17 NOTE — RESULT ENCOUNTER NOTE
Dear Brooke,     -Cholesterol levels are at your goal levels.  ADVISE: continuing your medication, a regular exercise program with at least 150 minutes of aerobic exercise per week, and eating a low saturated fat/low carbohydrate diet.  Also, you should recheck this fasting cholesterol panel in 12 months.  -Liver and gallbladder tests (ALT,AST, bilirubin) are slightly elevated. ADVISE: repeat testing in 2 months, may schedule a lab only appointment.    -Kidney function (GFR) is normal.  -Sodium is normal.  -Potassium is normal.  -Calcium is normal.  -Glucose (diabetic screening test) is normal.  -TSH (thyroid stimulating hormone) level is normal which indicates normal thyroid function.  -Microalbumin (urine protein) test is normal.  ADVISE: rechecking this annually.      Please send a Moozey message or call [phone]  if you have any questions.      BRIAN Marinelli, CNP  [facility] [place]    If you have further questions about the interpretation of your labs, [email or web address] is a good website to check out for further information.

## 2023-01-05 ENCOUNTER — HOSPITAL ENCOUNTER (OUTPATIENT)
Dept: MAMMOGRAPHY | Facility: CLINIC | Age: 58
Discharge: HOME | End: 2023-01-05
Attending: NURSE PRACTITIONER | Admitting: NURSE PRACTITIONER
Payer: COMMERCIAL

## 2023-01-05 DIAGNOSIS — Z12.31 ENCOUNTER FOR SCREENING MAMMOGRAM FOR BREAST CANCER: ICD-10-CM

## 2023-01-05 PROCEDURE — 77067 SCR MAMMO BI INCL CAD: CPT

## 2023-01-05 NOTE — RESULT ENCOUNTER NOTE
Dear Brooke,     -Mammogram was normal.  ADVISE: rechecking in 1 year.      Please send a Appiphany message or call [phone]  if you have any questions.      BRIAN Marinelli, CNP  [facility] [place]    If you have further questions about the interpretation of your labs, [email or web address] is a good website to check out for further information.

## 2023-04-18 ENCOUNTER — TELEPHONE (OUTPATIENT)
Dept: FAMILY MEDICINE | Facility: CLINIC | Age: 58
End: 2023-04-18
Payer: COMMERCIAL

## 2023-04-18 NOTE — TELEPHONE ENCOUNTER
Forms/Letter Request    Type of form/letter: [facility]    Have you been seen for this request: N/A    Do we have the form/letter: Yes: Placed in [employer]'s bin    Who is the form from? Gloria Franco from [facility] for emotional support cat (if other please explain)    Where did/will the form come from? form was faxed in    This might be a duplicate form, but I don't know for sure

## 2023-04-18 NOTE — TELEPHONE ENCOUNTER
Forms/Letter Request    Type of form/letter: Gloria Franco from [facility] reasonable accomadation/modification request and release of information    Have you been seen for this request: N/A    Do we have the form/letter: Yes: Placed in [employer] bin    Who is the form from? Gloria Franco from [facility] reasonable accomadation/modification request and release of information (if other please explain)    Where did/will the form come from? form was faxed in

## 2023-04-18 NOTE — TELEPHONE ENCOUNTER
Form completed by RAFAL Hines and faxed to [phone].  Sent to [facility]  And filed in the drawer.`1

## 2023-04-19 DIAGNOSIS — G43.909 MIGRAINE WITHOUT STATUS MIGRAINOSUS, NOT INTRACTABLE, UNSPECIFIED MIGRAINE TYPE: ICD-10-CM

## 2023-04-20 RX ORDER — SUMATRIPTAN 25 MG/1
TABLET, FILM COATED ORAL
Qty: 8 TABLET | Refills: 5 | Status: SHIPPED | OUTPATIENT
Start: 2023-04-20 | End: 2024-08-02

## 2023-04-20 NOTE — TELEPHONE ENCOUNTER
Routing refill request to provider for review/approval because:  Drug does not pass the [facility] refill protocol     LOV: 5 months ago( physical)    Pat MASTERS RN   [facility] Triage

## 2023-10-03 ENCOUNTER — APPOINTMENT (OUTPATIENT)
Dept: MRI IMAGING | Facility: CLINIC | Age: 58
End: 2023-10-03
Attending: EMERGENCY MEDICINE
Payer: COMMERCIAL

## 2023-10-03 ENCOUNTER — APPOINTMENT (OUTPATIENT)
Dept: CT IMAGING | Facility: CLINIC | Age: 58
End: 2023-10-03
Attending: EMERGENCY MEDICINE
Payer: COMMERCIAL

## 2023-10-03 ENCOUNTER — HOSPITAL ENCOUNTER (EMERGENCY)
Facility: CLINIC | Age: 58
Discharge: HOME | End: 2023-10-03
Attending: EMERGENCY MEDICINE | Admitting: EMERGENCY MEDICINE
Payer: COMMERCIAL

## 2023-10-03 VITALS
SYSTOLIC BLOOD PRESSURE: 141 MMHG | OXYGEN SATURATION: 99 % | RESPIRATION RATE: 18 BRPM | TEMPERATURE: 97.5 F | HEART RATE: 64 BPM | DIASTOLIC BLOOD PRESSURE: 81 MMHG

## 2023-10-03 DIAGNOSIS — R51.9 ACUTE NONINTRACTABLE HEADACHE, UNSPECIFIED HEADACHE TYPE: ICD-10-CM

## 2023-10-03 LAB
ANION GAP SERPL CALCULATED.3IONS-SCNC: 11 MMOL/L (ref 7–15)
ATRIAL RATE - MUSE: 67 BPM
BASO+EOS+MONOS # BLD AUTO: NORMAL 10*3/UL
BASO+EOS+MONOS NFR BLD AUTO: NORMAL %
BASOPHILS # BLD AUTO: 0 10E3/UL (ref 0–0.2)
BASOPHILS NFR BLD AUTO: 1 %
BUN SERPL-MCNC: 20 MG/DL (ref 8–23)
CALCIUM SERPL-MCNC: 8.8 MG/DL (ref 8.8–10.2)
CHLORIDE SERPL-SCNC: 107 MMOL/L (ref 98–107)
CREAT SERPL-MCNC: 0.82 MG/DL (ref 0.51–0.95)
DEPRECATED HCO3 PLAS-SCNC: 25 MMOL/L (ref 22–29)
DIASTOLIC BLOOD PRESSURE - MUSE: NORMAL MMHG
EGFRCR SERPLBLD CKD-EPI 2021: 81 ML/MIN/1.73M2
EOSINOPHIL # BLD AUTO: 0.1 10E3/UL (ref 0–0.7)
EOSINOPHIL NFR BLD AUTO: 2 %
ERYTHROCYTE [DISTWIDTH] IN BLOOD BY AUTOMATED COUNT: 12.9 % (ref 10–15)
GLUCOSE SERPL-MCNC: 99 MG/DL (ref 70–99)
HCT VFR BLD AUTO: 39.5 % (ref 35–47)
HGB BLD-MCNC: 12.9 G/DL (ref 11.7–15.7)
IMM GRANULOCYTES # BLD: 0 10E3/UL
IMM GRANULOCYTES NFR BLD: 0 %
INTERPRETATION ECG - MUSE: NORMAL
LYMPHOCYTES # BLD AUTO: 2.1 10E3/UL (ref 0.8–5.3)
LYMPHOCYTES NFR BLD AUTO: 35 %
MCH RBC QN AUTO: 30.4 PG (ref 26.5–33)
MCHC RBC AUTO-ENTMCNC: 32.7 G/DL (ref 31.5–36.5)
MCV RBC AUTO: 93 FL (ref 78–100)
MONOCYTES # BLD AUTO: 0.6 10E3/UL (ref 0–1.3)
MONOCYTES NFR BLD AUTO: 10 %
NEUTROPHILS # BLD AUTO: 3.1 10E3/UL (ref 1.6–8.3)
NEUTROPHILS NFR BLD AUTO: 52 %
NRBC # BLD AUTO: 0 10E3/UL
NRBC BLD AUTO-RTO: 0 /100
P AXIS - MUSE: 11 DEGREES
PLATELET # BLD AUTO: 196 10E3/UL (ref 150–450)
POTASSIUM SERPL-SCNC: 3.9 MMOL/L (ref 3.4–5.3)
PR INTERVAL - MUSE: 148 MS
QRS DURATION - MUSE: 114 MS
QT - MUSE: 400 MS
QTC - MUSE: 422 MS
R AXIS - MUSE: -11 DEGREES
RBC # BLD AUTO: 4.24 10E6/UL (ref 3.8–5.2)
SODIUM SERPL-SCNC: 143 MMOL/L (ref 135–145)
SYSTOLIC BLOOD PRESSURE - MUSE: NORMAL MMHG
T AXIS - MUSE: 19 DEGREES
TROPONIN T SERPL HS-MCNC: <6 NG/L
VENTRICULAR RATE- MUSE: 67 BPM
WBC # BLD AUTO: 6 10E3/UL (ref 4–11)

## 2023-10-03 PROCEDURE — 250N000013 HC RX MED GY IP 250 OP 250 PS 637: Performed by: EMERGENCY MEDICINE

## 2023-10-03 PROCEDURE — 85025 COMPLETE CBC W/AUTO DIFF WBC: CPT | Performed by: EMERGENCY MEDICINE

## 2023-10-03 PROCEDURE — 250N000011 HC RX IP 250 OP 636: Performed by: EMERGENCY MEDICINE

## 2023-10-03 PROCEDURE — 93005 ELECTROCARDIOGRAM TRACING: CPT

## 2023-10-03 PROCEDURE — A9585 GADOBUTROL INJECTION: HCPCS | Performed by: EMERGENCY MEDICINE

## 2023-10-03 PROCEDURE — 96374 THER/PROPH/DIAG INJ IV PUSH: CPT | Mod: 59

## 2023-10-03 PROCEDURE — 70553 MRI BRAIN STEM W/O & W/DYE: CPT

## 2023-10-03 PROCEDURE — 84484 ASSAY OF TROPONIN QUANT: CPT | Performed by: EMERGENCY MEDICINE

## 2023-10-03 PROCEDURE — 96375 TX/PRO/DX INJ NEW DRUG ADDON: CPT

## 2023-10-03 PROCEDURE — 96361 HYDRATE IV INFUSION ADD-ON: CPT

## 2023-10-03 PROCEDURE — 70450 CT HEAD/BRAIN W/O DYE: CPT

## 2023-10-03 PROCEDURE — 255N000002 HC RX 255 OP 636: Performed by: EMERGENCY MEDICINE

## 2023-10-03 PROCEDURE — 70496 CT ANGIOGRAPHY HEAD: CPT

## 2023-10-03 PROCEDURE — 99285 EMERGENCY DEPT VISIT HI MDM: CPT | Mod: 25

## 2023-10-03 PROCEDURE — 36415 COLL VENOUS BLD VENIPUNCTURE: CPT | Performed by: EMERGENCY MEDICINE

## 2023-10-03 PROCEDURE — 80048 BASIC METABOLIC PNL TOTAL CA: CPT | Performed by: EMERGENCY MEDICINE

## 2023-10-03 PROCEDURE — 258N000003 HC RX IP 258 OP 636: Performed by: EMERGENCY MEDICINE

## 2023-10-03 PROCEDURE — 70498 CT ANGIOGRAPHY NECK: CPT

## 2023-10-03 RX ORDER — METOCLOPRAMIDE HYDROCHLORIDE 5 MG/ML
10 INJECTION INTRAMUSCULAR; INTRAVENOUS ONCE
Status: COMPLETED | OUTPATIENT
Start: 2023-10-03 | End: 2023-10-03

## 2023-10-03 RX ORDER — KETOROLAC TROMETHAMINE 15 MG/ML
15 INJECTION, SOLUTION INTRAMUSCULAR; INTRAVENOUS ONCE
Status: COMPLETED | OUTPATIENT
Start: 2023-10-03 | End: 2023-10-03

## 2023-10-03 RX ORDER — IOPAMIDOL 755 MG/ML
500 INJECTION, SOLUTION INTRAVASCULAR ONCE
Status: COMPLETED | OUTPATIENT
Start: 2023-10-03 | End: 2023-10-03

## 2023-10-03 RX ORDER — GADOBUTROL 604.72 MG/ML
9 INJECTION INTRAVENOUS ONCE
Status: COMPLETED | OUTPATIENT
Start: 2023-10-03 | End: 2023-10-03

## 2023-10-03 RX ORDER — ACETAMINOPHEN 325 MG/1
975 TABLET ORAL ONCE
Status: COMPLETED | OUTPATIENT
Start: 2023-10-03 | End: 2023-10-03

## 2023-10-03 RX ADMIN — SODIUM CHLORIDE 1000 ML: 9 INJECTION, SOLUTION INTRAVENOUS at 03:08

## 2023-10-03 RX ADMIN — GADOBUTROL 9 ML: 604.72 INJECTION INTRAVENOUS at 03:58

## 2023-10-03 RX ADMIN — KETOROLAC TROMETHAMINE 15 MG: 15 INJECTION INTRAMUSCULAR; INTRAVENOUS at 05:23

## 2023-10-03 RX ADMIN — IOPAMIDOL 67 ML: 755 INJECTION, SOLUTION INTRAVENOUS at 04:43

## 2023-10-03 RX ADMIN — METOCLOPRAMIDE HYDROCHLORIDE 10 MG: 5 INJECTION INTRAMUSCULAR; INTRAVENOUS at 03:11

## 2023-10-03 RX ADMIN — ACETAMINOPHEN 975 MG: 325 TABLET, FILM COATED ORAL at 03:12

## 2023-10-03 ASSESSMENT — ACTIVITIES OF DAILY LIVING (ADL)
ADLS_ACUITY_SCORE: 35

## 2023-10-03 NOTE — ED PROVIDER NOTES
History     Chief Complaint:  Headache       HPI   Brooke John is a 58 year old female who presents with left sided headache.  Onset of headache at midnight.  She was up when this happened.  Abrupt onset similar to prior migraines.  She did not have anything for her migraines therefore did not take anything at home.  She states the last week she has had intermittent left arm and leg numbness.  She has just tried to shake it off but the numbness seems to wax and wane.  No numbness in the face.  She states that some of this is related to her scoliosis but some this appears to be different than baseline.  No chest pain, shortness breath, etc.        Independent Historian:    none    Review of External Notes:  Reviewed office note from 11/16/22 regarding management of chronic medical conditions     Medications:    albuterol (PROAIR HFA/PROVENTIL HFA/VENTOLIN HFA) 108 (90 Base) MCG/ACT inhaler  calcium carbonate (TUMS) 500 MG chewable tablet  cetirizine (ZYRTEC) 10 MG tablet  fluticasone (FLONASE) 50 MCG/ACT nasal spray  fluticasone-salmeterol (ADVAIR) 250-50 MCG/ACT inhaler  GOODSENSE CLEARLAX 17 GM/SCOOP powder  levothyroxine (SYNTHROID/LEVOTHROID) 88 MCG tablet  losartan (COZAAR) 50 MG tablet  multivitamin w/minerals (THERA-VIT-M) tablet  order for DME  order for DME  order for DME  pantoprazole (PROTONIX) 40 MG EC tablet  rosuvastatin (CRESTOR) 10 MG tablet  SUMAtriptan (IMITREX) 25 MG tablet  triamcinolone (KENALOG) 0.1 % external cream        Past Medical History:    Past Medical History:   Diagnosis Date    Allergic reaction to drug - amoxicillin - Hives - proven 8/27/2016 8/29/2016    Arthritis     Depressive disorder     Hypertriglyceridemia     Lung nodule     Moderate persistent asthma     Other specified hypothyroidism 3/14/2016    Scoliosis        Past Surgical History:    Past Surgical History:   Procedure Laterality Date    ARTHROSCOPY KNEE  2007    left     BACK SURGERY  2003    lumbar  discectomy     SECTION      COLONOSCOPY N/A 2017    no polyps./ return in ten years    COSMETIC SURGERY      ESOPHAGOSCOPY, GASTROSCOPY, DUODENOSCOPY (EGD), COMBINED N/A 2017    Procedure: COMBINED ESOPHAGOSCOPY, GASTROSCOPY, DUODENOSCOPY (EGD);  EGD w MAC:DX:Esophageal dysphagia,Hiatal hernia/prep & pre-op info mailed;  Surgeon: Moshe Caro MD;  Location:  GI    EYE SURGERY            Physical Exam   Patient Vitals for the past 24 hrs:   BP Temp Temp src Pulse Resp SpO2   10/03/23 0516 (!) 141/81 -- -- 64 -- 99 %   10/03/23 0042 (!) 158/99 97.5  F (36.4  C) Tympanic 83 18 96 %        Physical Exam  General: Resting on the bed.  Head: No obvious trauma to head.  Ears, Nose, Throat:  External ears normal.  Nose normal.  No pharyngeal erythema, swelling or exudate.  Midline uvula.    Eyes:  Conjunctivae clear.  Pupils are equal, round, and reactive.   Neck: Normal range of motion.  Neck supple.   CV: Regular rate and rhythm.  No murmurs.      Respiratory: Effort normal and breath sounds normal.  No wheezing or crackles.   Gastrointestinal: Soft.  No distension. There is no tenderness.    Neuro: Alert. Moving all extremities appropriately.  Normal speech.  CN II-XII grossly intact, no pronator drift, normal finger-nose-finger, visual fields intact.  Gross muscle strength intact of the proximal and distal bilateral upper and lower extremities.  Sensation intact to light touch in all 4 extremities.  Normal gait.    Skin: Skin is warm and dry.  No rash noted.     Emergency Department Course   ECG  ECG taken at 0046  Normal sinus rhythm.  Incomplete right bundle branch block.  Minimal voltage criteria for LVH.  No changes from 2017  Rate 67 bpm. KS interval 148 ms. QRS duration 114 ms. QT/QTc 400/422 ms. P-R-T axes 11 - 19.    Imaging:  CTA Head Neck with Contrast   Final Result   IMPRESSION:    HEAD CT:   No acute intracranial process.      HEAD CTA:   No stenosis/occlusion, aneurysm,  or high flow vascular malformation.      NECK CTA:   No measurable stenosis or dissection.         CT Head w/o Contrast   Final Result   IMPRESSION:    HEAD CT:   No acute intracranial process.      HEAD CTA:   No stenosis/occlusion, aneurysm, or high flow vascular malformation.      NECK CTA:   No measurable stenosis or dissection.         MR Brain w/o & w Contrast   Final Result   IMPRESSION:   1.  No acute intracranial process. Specifically, no acute/subacute infarct, mass, mass effect, hemorrhage, or abnormal extra-axial fluid collection.   2.  Mildly enlarged, partially empty sella turcica. This finding is nonspecific, but can be seen in the setting of idiopathic intracranial hypertension. Correlation for chronic headaches and papilledema is recommended.   3.  Additional findings above.                             Report per radiology    Laboratory:  Labs Ordered and Resulted from Time of ED Arrival to Time of ED Departure   BASIC METABOLIC PANEL - Normal       Result Value    Sodium 143      Potassium 3.9      Chloride 107      Carbon Dioxide (CO2) 25      Anion Gap 11      Urea Nitrogen 20.0      Creatinine 0.82      GFR Estimate 81      Calcium 8.8      Glucose 99     TROPONIN T, HIGH SENSITIVITY - Normal    Troponin T, High Sensitivity <6     CBC WITH PLATELETS AND DIFFERENTIAL    WBC Count 6.0      RBC Count 4.24      Hemoglobin 12.9      Hematocrit 39.5      MCV 93      MCH 30.4      MCHC 32.7      RDW 12.9      Platelet Count 196      % Neutrophils 52      % Lymphocytes 35      % Monocytes 10      Mids % (Monos, Eos, Basos)        % Eosinophils 2      % Basophils 1      % Immature Granulocytes 0      NRBCs per 100 WBC 0      Absolute Neutrophils 3.1      Absolute Lymphocytes 2.1      Absolute Monocytes 0.6      Mids Abs (Monos, Eos, Basos)        Absolute Eosinophils 0.1      Absolute Basophils 0.0      Absolute Immature Granulocytes 0.0      Absolute NRBCs 0.0          Procedures       Emergency  Department Course & Assessments:             Interventions:  Medications   sodium chloride 0.9% BOLUS 1,000 mL (0 mLs Intravenous Stopped 10/3/23 0518)   acetaminophen (TYLENOL) tablet 975 mg (975 mg Oral $Given 10/3/23 0312)   metoclopramide (REGLAN) injection 10 mg (10 mg Intravenous $Given 10/3/23 0311)   gadobutrol (GADAVIST) injection 9 mL (9 mLs Intravenous $Given 10/3/23 0358)   iopamidol (ISOVUE-370) solution 500 mL (67 mLs Intravenous $Given 10/3/23 0443)   sodium chloride (PF) 0.9% PF flush 100 mL (80 mLs Intravenous $Given 10/3/23 0443)   ketorolac (TORADOL) injection 15 mg (15 mg Intravenous $Given 10/3/23 0523)        Assessments:  0200 I met with patient, obtained history and performed examination.      Independent Interpretation (X-rays, CTs, rhythm strip):  No intracranial bleed on CTH    Consultations/Discussion of Management or Tests:  none       Social Determinants of Health affecting care:  none     Disposition:  The patient was discharged to home.     Impression & Plan    CMS Diagnoses: None      Medical Decision Makin-year-old female with a history of migraines presents to the ER with headache.  Vitals are reassuring.  Broad differential was pursued including but not limited to migraine, stroke, subarachnoid, intracranial hemorrhage, tumor, mass, meningitis or encephalitis, dehydration, etc.  CBC without leukocytosis or anemia.  BMP without acute electrolyte, metabolic or renal dysfunction.  EKG is reassuring, no acute ischemic changes.  No signs of arrhythmia.  Troponin undetectable.  CT head without evidence of acute intracranial hemorrhage, mass, infarct.CTA without acute stenosis, occlusion, aneurysm or dissection.  MRI was obtained showing no evidence of acute cranial hemorrhage, mass, infarct etc.  Suggestion of idiopathic intracranial hypertension but patient has no other signs or symptoms of this other than the MRI finding.  She has known history of migraines.  Her exam and  history seem most consistent with a migraine.  No thunderclap headache to indicate subarachnoid.  I do not think the LP is warranted given the patient is afebrile without thunderclap headache, I do not suspect meningitis, encephalitis or subarachnoid.  Patient was given above interventions and felt improved.  She did not try any medications at home.  She is advised to follow-up with her primary care doctor for medication refills and ongoing management.  Advised for more chronic symptoms to follow up with her PCP.  She felt well enough for discharge home.  Close follow-up was advised.  Patient was discharged.    Diagnosis:    ICD-10-CM    1. Acute nonintractable headache, unspecified headache type  R51.9            Discharge Medications:  Discharge Medication List as of 10/3/2023  5:18 AM               10/3/2023   Brenna Blackburn MD Bennett, Jennifer L, MD  10/03/23 1006

## 2023-10-03 NOTE — ED TRIAGE NOTES
Pt brought in by EMS from home. Pt called EMS for left sided headache that is worse than her normal migraines that started around 0000. Pt hasn't tried any thing for it. Pt was initially hypertensive for /90 but pressures came down into the 180's upon arrival.      Triage Assessment       Row Name 10/03/23 0044       Triage Assessment (Adult)    Airway WDL WDL       Respiratory WDL    Respiratory WDL WDL       Skin Circulation/Temperature WDL    Skin Circulation/Temperature WDL WDL       Cardiac WDL    Cardiac WDL WDL       Peripheral/Neurovascular WDL    Peripheral Neurovascular WDL WDL       Cognitive/Neuro/Behavioral WDL    Cognitive/Neuro/Behavioral WDL WDL

## 2023-10-12 DIAGNOSIS — E78.1 HYPERTRIGLYCERIDEMIA: ICD-10-CM

## 2023-10-12 RX ORDER — ROSUVASTATIN CALCIUM 10 MG/1
TABLET, COATED ORAL
Qty: 90 TABLET | Refills: 0 | Status: SHIPPED | OUTPATIENT
Start: 2023-10-12 | End: 2023-12-28

## 2023-10-16 ENCOUNTER — IMMUNIZATION (OUTPATIENT)
Dept: FAMILY MEDICINE | Facility: CLINIC | Age: 58
End: 2023-10-16
Payer: COMMERCIAL

## 2023-10-16 DIAGNOSIS — Z23 NEEDS FLU SHOT: Primary | ICD-10-CM

## 2023-10-16 PROCEDURE — G0008 ADMIN INFLUENZA VIRUS VAC: HCPCS

## 2023-10-16 PROCEDURE — 90682 RIV4 VACC RECOMBINANT DNA IM: CPT

## 2023-10-16 NOTE — PROGRESS NOTES
Prior to immunization administration, verified patients identity using patient s name and date of birth. Please see Immunization Activity for additional information.     Screening Questionnaire for Adult Immunization    Are you sick today?   No   Do you have allergies to medications, food, a vaccine component or latex?   No   Have you ever had a serious reaction after receiving a vaccination?   No   Do you have a long-term health problem with heart, lung, kidney, or metabolic disease (e.g., diabetes), asthma, a blood disorder, no spleen, complement component deficiency, a cochlear implant, or a spinal fluid leak?  Are you on long-term aspirin therapy?   No   Do you have cancer, leukemia, HIV/AIDS, or any other immune system problem?   No   Do you have a parent, brother, or sister with an immune system problem?   No   In the past 3 months, have you taken medications that affect  your immune system, such as prednisone, other steroids, or anticancer drugs; drugs for the treatment of rheumatoid arthritis, Crohn s disease, or psoriasis; or have you had radiation treatments?   No   Have you had a seizure, or a brain or other nervous system problem?   No   During the past year, have you received a transfusion of blood or blood    products, or been given immune (gamma) globulin or antiviral drug?   No   For women: Are you pregnant or is there a chance you could become       pregnant during the next month?   No   Have you received any vaccinations in the past 4 weeks?   No     Immunization questionnaire answers were all negative.    I have reviewed the following standing orders:   This patient is due and qualifies for the Influenza vaccine.    Click here for Influenza Vaccine Standing Order    I have reviewed the vaccines inclusion and exclusion criteria; No concerns regarding eligibility.     Patient instructed to remain in clinic for 15 minutes afterwards, and to report any adverse reactions.     Screening performed by  Flor Chapman, KHADRA on 10/16/2023 at 9:19 AM.

## 2023-10-26 ENCOUNTER — OFFICE VISIT (OUTPATIENT)
Dept: FAMILY MEDICINE | Facility: CLINIC | Age: 58
End: 2023-10-26
Payer: COMMERCIAL

## 2023-10-26 VITALS
RESPIRATION RATE: 16 BRPM | WEIGHT: 197.8 LBS | BODY MASS INDEX: 37.34 KG/M2 | SYSTOLIC BLOOD PRESSURE: 135 MMHG | HEIGHT: 61 IN | HEART RATE: 76 BPM | OXYGEN SATURATION: 97 % | TEMPERATURE: 98.2 F | DIASTOLIC BLOOD PRESSURE: 81 MMHG

## 2023-10-26 DIAGNOSIS — Z09 FOLLOW-UP EXAM: Primary | ICD-10-CM

## 2023-10-26 DIAGNOSIS — I83.812 VARICOSE VEINS OF LEFT LOWER EXTREMITY WITH PAIN: ICD-10-CM

## 2023-10-26 DIAGNOSIS — G43.909 MIGRAINE WITHOUT STATUS MIGRAINOSUS, NOT INTRACTABLE, UNSPECIFIED MIGRAINE TYPE: ICD-10-CM

## 2023-10-26 PROCEDURE — 99213 OFFICE O/P EST LOW 20 MIN: CPT | Performed by: FAMILY MEDICINE

## 2023-10-26 RX ORDER — RESPIRATORY SYNCYTIAL VIRUS VACCINE 120MCG/0.5
0.5 KIT INTRAMUSCULAR ONCE
Qty: 1 EACH | Refills: 0 | Status: CANCELLED | OUTPATIENT
Start: 2023-10-26 | End: 2023-10-26

## 2023-10-26 ASSESSMENT — ASTHMA QUESTIONNAIRES: ACT_TOTALSCORE: 19

## 2023-10-26 NOTE — PROGRESS NOTES
"  Assessment & Plan     Follow-up exam  ED follow up, no medication reconciliation needed.  Improved from visit.    Migraine without status migrainosus, not intractable, unspecified migraine type  Resolved.  She has Sumatriptan now, which she did not have previously.    Varicose veins of left lower extremity with pain  Orders for compression stockings provided today, she will consider follow up with vascular.  - Compression Sleeve/Stocking Order for DME - ONLY FOR DME    25 minutes spent by me on the date of the encounter doing chart review, history and exam, documentation and further activities per the note       BMI:   Estimated body mass index is 37.37 kg/m  as calculated from the following:    Height as of this encounter: 1.549 m (5' 1\").    Weight as of this encounter: 89.7 kg (197 lb 12.8 oz).     See Patient Instructions    Sarah Larsen MD  [facility] JULIA Cox is a 58 year old, presenting for the following health issues:  Er F/u      10/26/2023     4:02 PM   Additional Questions   Roomed by Ricardo DEVINE       [facility]       ED/UC Followup:    Facility:  [facility] Emergency Dept  Date of visit: 10/3/2023  Reason for visit: HEADACHE  Current Status: pt is doing better, still experiences mild headaches and states veins are \"touchy\" especially on the L side of her body.       She had a very bad migraine.  Sumatriptan PRN, uses for abortive therapy, no preventative therapy.  Doing better now that she has    \"Touchy\" veins are painful, she has a history of varicose veins.  Using a tennis ball to help with pain, squeezes it. Veins on ankle bone are painful also, compression stockings are not helpful because of pain. She does have varicose veins in the area, which become painful when swollen.    Review of Systems   Constitutional, HEENT, cardiovascular, pulmonary, gi and gu systems are negative, except as otherwise noted.      Objective    /81 (BP Location: " "Right arm, Patient Position: Sitting, Cuff Size: Adult Large)   Pulse 76   Temp 98.2  F (36.8  C) (Oral)   Resp 16   Ht 1.549 m (5' 1\")   Wt 89.7 kg (197 lb 12.8 oz)   LMP 05/15/2018 (Within Days)   SpO2 97%   BMI 37.37 kg/m    Body mass index is 37.37 kg/m .  Physical Exam   GENERAL: healthy, alert and no distress  CV: no peripheral edema and varicosities LLE  MS: no gross musculoskeletal defects noted, no edema  SKIN: no suspicious lesions or rashes    Admission on 10/03/2023, Discharged on 10/03/2023   Component Date Value Ref Range Status    Ventricular Rate 10/03/2023 67  BPM Final    Atrial Rate 10/03/2023 67  BPM Final    MT Interval 10/03/2023 148  ms Final    QRS Duration 10/03/2023 114  ms Final    QT 10/03/2023 400  ms Final    QTc 10/03/2023 422  ms Final    P Axis 10/03/2023 11  degrees Final    R AXIS 10/03/2023 -11  degrees Final    T Axis 10/03/2023 19  degrees Final    Interpretation ECG 10/03/2023    Final                    Value:Sinus rhythm  Incomplete right bundle branch block  Minimal voltage criteria for LVH, may be normal variant ( [place] product )  Borderline ECG  No previous ECGs available  Confirmed by - EMERGENCY ROOM, PHYSICIAN ([id number]),  BIJU SURESH ([id number]) on 10/3/2023 11:44:27 AM      Sodium 10/03/2023 143  135 - 145 mmol/L Final    Reference intervals for this test were updated on 09/26/2023 to more accurately reflect our healthy population. There may be differences in the flagging of prior results with similar values performed with this method. Interpretation of those prior results can be made in the context of the updated reference intervals.     Potassium 10/03/2023 3.9  3.4 - 5.3 mmol/L Final    Chloride 10/03/2023 107  98 - 107 mmol/L Final    Carbon Dioxide (CO2) 10/03/2023 25  22 - 29 mmol/L Final    Anion Gap 10/03/2023 11  7 - 15 mmol/L Final    Urea Nitrogen 10/03/2023 20.0  8.0 - 23.0 mg/dL Final    Creatinine 10/03/2023 0.82  0.51 - 0.95 mg/dL Final    " GFR Estimate 10/03/2023 81  >60 mL/min/1.73m2 Final    Calcium 10/03/2023 8.8  8.8 - 10.2 mg/dL Final    Glucose 10/03/2023 99  70 - 99 mg/dL Final    Troponin T, High Sensitivity 10/03/2023 <6  <=14 ng/L Final    Either a High Sensitivity Troponin T baseline (0 hours) value = 100 ng/L, or an increase in High Sensitivity Troponin T = 7 ng/L at 2 hours compared to 0 hours (2-0 hours), suggests myocardial injury, and urgent clinical attention is required.    If the 2-0 hours increase is <7 ng/L, a High Sensitivity Troponin T result above gender-specific reference ranges warrants further evaluation.   Recommendations for further evaluation include correlation with clinical decision-making tool (e.g., HEART), a 3rd High Sensitivity Troponin T test 2 hours after the 2nd (a 20% change from baseline would represent concern), admission for observation, close PCC/cardiology follow-up, or urgent outpatient provocative testing.    WBC Count 10/03/2023 6.0  4.0 - 11.0 10e3/uL Final    RBC Count 10/03/2023 4.24  3.80 - 5.20 10e6/uL Final    Hemoglobin 10/03/2023 12.9  11.7 - 15.7 g/dL Final    Hematocrit 10/03/2023 39.5  35.0 - 47.0 % Final    MCV 10/03/2023 93  78 - 100 fL Final    MCH 10/03/2023 30.4  26.5 - 33.0 pg Final    MCHC 10/03/2023 32.7  31.5 - 36.5 g/dL Final    RDW 10/03/2023 12.9  10.0 - 15.0 % Final    Platelet Count 10/03/2023 196  150 - 450 10e3/uL Final    % Neutrophils 10/03/2023 52  % Final    % Lymphocytes 10/03/2023 35  % Final    % Monocytes 10/03/2023 10  % Final    % Eosinophils 10/03/2023 2  % Final    % Basophils 10/03/2023 1  % Final    % Immature Granulocytes 10/03/2023 0  % Final    NRBCs per 100 WBC 10/03/2023 0  <1 /100 Final    Absolute Neutrophils 10/03/2023 3.1  1.6 - 8.3 10e3/uL Final    Absolute Lymphocytes 10/03/2023 2.1  0.8 - 5.3 10e3/uL Final    Absolute Monocytes 10/03/2023 0.6  0.0 - 1.3 10e3/uL Final    Absolute Eosinophils 10/03/2023 0.1  0.0 - 0.7 10e3/uL Final    Absolute Basophils  10/03/2023 0.0  0.0 - 0.2 10e3/uL Final    Absolute Immature Granulocytes 10/03/2023 0.0  <=0.4 10e3/uL Final    Absolute NRBCs 10/03/2023 0.0  10e3/uL Final                      Patient

## 2023-10-26 NOTE — COMMUNITY RESOURCES LIST (ENGLISH)
10/26/2023   [facility]  N/A  For questions about this resource list or additional care needs, please contact your primary care clinic or care manager.  Phone: [phone]   Email: N/A   Address: [address] [place] [id number]   Hours: N/A        Transportation       Free or low-cost transportation  1  JetSuite. Distance: 7.89 miles      In-Person   [address] [facility] [address] [place] [address]  Language: English  Hours: Mon - Fri 7:00 AM - 5:00 PM  Fees: Free   Phone: (573) [phone] Email: [email or web address] Website: [email or web address]://[email or web address]/     2  [employer] Distance: 15.25 miles      [address] [place] [address]  Language: English, Cook Islander, Hmong, Cymro, Yi  Hours: Mon - Sun 5:00 AM - 10:00 PM  Fees: Self Pay   Phone: (711) 726-5993 Email: [email or web address]@[email or web address] Website: https://www.[facility]otaAmerican Addiction CentersiProcureinfo/Providers/Maximal_Care_Mobility_LLC/Transportation/1?pos=9     Transportation to medical appointments  3  [employer] Mob[employer] Distance: 11.76 miles      In-Person   [address] [place] [address]  Language: Turkish, English, Oromo, Cymro  Hours: Mon - Sat 5:00 AM - 9:00 PM  Fees: Insurance, Self Pay   Phone: (771) [phone] Email: info@Zavedenia.com Website: http://www.Zavedenia.com/     4  [employer] Mob[employer] Distance: 15.25 miles      [address] [place] [address]  Language: English, Cook Islander, Hmong, Cymro, Yi  Hours: Mon - Sun 5:00 AM - 10:00 PM  Fees: Insurance, Self Pay   Phone: (487) [phone] Email: [email or web address]@[email or web address] Website: https://www.[facility].info/Providers/Maximal_Care_Mobility_LLC/Transportation/1?pos=9          Important Numbers & Websites       Emergency Services   911  [facility] Services   311  Poison Control   (221) 751-2707  Suicide Prevention Lifeline   (431) 453-5465 (TALK)  Child Abuse Hotline   (264) 627-9138  (4-A-Child)  Sexual Assault Hotline   (479) 428-9756 (HOPE)  National Runaway Safeline   (365) 835-3500 (RUNAWAY)  All-Options Talkline   (751) 976-4765  Substance Abuse Referral   (897) 243-1713 (HELP)

## 2023-10-26 NOTE — COMMUNITY RESOURCES LIST (ENGLISH)
10/26/2023   [facility]  N/A  For questions about this resource list or additional care needs, please contact your primary care clinic or care manager.  Phone: [phone]   Email: N/A   Address: [address] [place] [id number]   Hours: N/A        Transportation       Free or low-cost transportation  1  LocalGuiding. Distance: 7.89 miles      In-Person   [address] [facility] [address] [place] [address]  Language: English  Hours: Mon - Fri 7:00 AM - 5:00 PM  Fees: Free   Phone: (279) 842-8894 Email: [email or web address] Website: https://Rocketick/     2  [employer] Distance: 15.25 miles      [address] [place] [address]  Language: English, Swedish, Hmong, Malawian, Divehi  Hours: Mon - Sun 5:00 AM - 10:00 PM  Fees: Self Pay   Phone: (498) 287-1472 Email: [email or web address] Website: https://www.[place]otaCocodrilo DogCYA Technologiesinfo/Providers/Maximal_Care_Mobility_LLC/Transportation/1?pos=9     Transportation to medical appointments  3  [place] Mobility Distance: 11.76 miles      In-Person   [address] [place] [address]  Language: Croatian, English, Oromo, Malawian  Hours: Mon - Sat 5:00 AM - 9:00 PM  Fees: Insurance, Self Pay   Phone: (713) [phone] Email: info@Sproom Website: http://www.Sproom/     4  [employer] Mob[employer] Distance: 15.25 miles      [address] [place] [address]  Language: English, Swedish, Hmong, Malawian, Divehi  Hours: Mon - Sun 5:00 AM - 10:00 PM  Fees: Insurance, Self Pay   Phone: (824) 513-6677 Email: [email or web address] Website: https://www.[place]Cocodrilo Dog.info/Providers/Maximal_Care_Mobility_LLC/Transportation/1?pos=9          Important Numbers & Websites       Emergency Services   911  [facility]   311  Poison Control   (435) 768-2145  Suicide Prevention Lifeline   (396) 357-4795 (TALK)  Child Abuse Hotline   (490) 733-9696  (4-A-Child)  Sexual Assault Hotline   (261) 639-2776 (HOPE)  National Runaway Safeline   (659) 241-1325 (RUNAWAY)  All-Options Talkline   (118) 700-6144  Substance Abuse Referral   (447) 404-9950 (HELP)

## 2023-12-21 ENCOUNTER — APPOINTMENT (OUTPATIENT)
Dept: CT IMAGING | Facility: CLINIC | Age: 58
End: 2023-12-21
Attending: STUDENT IN AN ORGANIZED HEALTH CARE EDUCATION/TRAINING PROGRAM
Payer: COMMERCIAL

## 2023-12-21 ENCOUNTER — HOSPITAL ENCOUNTER (EMERGENCY)
Facility: CLINIC | Age: 58
Discharge: ANOTHER HOSPITAL | End: 2023-12-21
Attending: EMERGENCY MEDICINE | Admitting: EMERGENCY MEDICINE
Payer: COMMERCIAL

## 2023-12-21 ENCOUNTER — APPOINTMENT (OUTPATIENT)
Dept: GENERAL RADIOLOGY | Facility: CLINIC | Age: 58
End: 2023-12-21
Attending: STUDENT IN AN ORGANIZED HEALTH CARE EDUCATION/TRAINING PROGRAM
Payer: COMMERCIAL

## 2023-12-21 ENCOUNTER — HOSPITAL ENCOUNTER (OUTPATIENT)
Facility: CLINIC | Age: 58
Setting detail: OBSERVATION
Discharge: HOME | End: 2023-12-23
Attending: EMERGENCY MEDICINE | Admitting: SURGERY
Payer: COMMERCIAL

## 2023-12-21 ENCOUNTER — APPOINTMENT (OUTPATIENT)
Dept: GENERAL RADIOLOGY | Facility: CLINIC | Age: 58
End: 2023-12-21
Attending: EMERGENCY MEDICINE
Payer: COMMERCIAL

## 2023-12-21 ENCOUNTER — APPOINTMENT (OUTPATIENT)
Dept: GENERAL RADIOLOGY | Facility: CLINIC | Age: 58
End: 2023-12-21
Attending: PHYSICIAN ASSISTANT
Payer: COMMERCIAL

## 2023-12-21 VITALS
RESPIRATION RATE: 18 BRPM | SYSTOLIC BLOOD PRESSURE: 134 MMHG | HEART RATE: 82 BPM | DIASTOLIC BLOOD PRESSURE: 77 MMHG | BODY MASS INDEX: 37.38 KG/M2 | OXYGEN SATURATION: 95 % | WEIGHT: 198 LBS | HEIGHT: 61 IN | TEMPERATURE: 97.1 F
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
## 2023-12-21 DIAGNOSIS — S36.039A LACERATION OF SPLEEN, INITIAL ENCOUNTER: ICD-10-CM

## 2023-12-21 DIAGNOSIS — S42.321A CLOSED DISPLACED TRANSVERSE FRACTURE OF SHAFT OF RIGHT HUMERUS, INITIAL ENCOUNTER: ICD-10-CM
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
## 2023-12-21 DIAGNOSIS — R23.9 ALTERATION IN SKIN INTEGRITY: ICD-10-CM

## 2023-12-21 DIAGNOSIS — S42.301A CLOSED FRACTURE OF SHAFT OF RIGHT HUMERUS, UNSPECIFIED FRACTURE MORPHOLOGY, INITIAL ENCOUNTER: ICD-10-CM

## 2023-12-21 DIAGNOSIS — S36.031A MODERATE LACERATION OF SPLEEN, INITIAL ENCOUNTER: Primary | ICD-10-CM

## 2023-12-21 LAB
ABO/RH(D): NORMAL
ANION GAP SERPL CALCULATED.3IONS-SCNC: 11 MMOL/L (ref 7–15)
ANTIBODY SCREEN: NEGATIVE
APTT PPP: 24 SECONDS (ref 22–38)
BASOPHILS # BLD AUTO: 0 10E3/UL (ref 0–0.2)
BASOPHILS NFR BLD AUTO: 0 %
BUN SERPL-MCNC: 15.6 MG/DL (ref 8–23)
CALCIUM SERPL-MCNC: 9 MG/DL (ref 8.8–10.2)
CHLORIDE SERPL-SCNC: 107 MMOL/L (ref 98–107)
CREAT SERPL-MCNC: 0.83 MG/DL (ref 0.51–0.95)
DEPRECATED HCO3 PLAS-SCNC: 24 MMOL/L (ref 22–29)
EGFRCR SERPLBLD CKD-EPI 2021: 80 ML/MIN/1.73M2
EOSINOPHIL # BLD AUTO: 0.1 10E3/UL (ref 0–0.7)
EOSINOPHIL NFR BLD AUTO: 1 %
ERYTHROCYTE [DISTWIDTH] IN BLOOD BY AUTOMATED COUNT: 13.3 % (ref 10–15)
GLUCOSE SERPL-MCNC: 106 MG/DL (ref 70–99)
HCT VFR BLD AUTO: 43 % (ref 35–47)
HGB BLD-MCNC: 14.2 G/DL (ref 11.7–15.7)
HOLD SPECIMEN: NORMAL
IMM GRANULOCYTES # BLD: 0.1 10E3/UL
IMM GRANULOCYTES NFR BLD: 1 %
INR PPP: 0.96 (ref 0.85–1.15)
LIPASE SERPL-CCNC: 34 U/L (ref 13–60)
LYMPHOCYTES # BLD AUTO: 1.2 10E3/UL (ref 0.8–5.3)
LYMPHOCYTES NFR BLD AUTO: 12 %
MCH RBC QN AUTO: 30.3 PG (ref 26.5–33)
MCHC RBC AUTO-ENTMCNC: 33 G/DL (ref 31.5–36.5)
MCV RBC AUTO: 92 FL (ref 78–100)
MONOCYTES # BLD AUTO: 0.8 10E3/UL (ref 0–1.3)
MONOCYTES NFR BLD AUTO: 8 %
NEUTROPHILS # BLD AUTO: 8.1 10E3/UL (ref 1.6–8.3)
NEUTROPHILS NFR BLD AUTO: 78 %
NRBC # BLD AUTO: 0 10E3/UL
NRBC BLD AUTO-RTO: 0 /100
PLATELET # BLD AUTO: 235 10E3/UL (ref 150–450)
POTASSIUM SERPL-SCNC: 4.3 MMOL/L (ref 3.4–5.3)
RBC # BLD AUTO: 4.68 10E6/UL (ref 3.8–5.2)
SODIUM SERPL-SCNC: 142 MMOL/L (ref 135–145)
SPECIMEN EXPIRATION DATE: NORMAL
WBC # BLD AUTO: 10.3 10E3/UL (ref 4–11)

## 2023-12-21 PROCEDURE — 250N000011 HC RX IP 250 OP 636: Performed by: STUDENT IN AN ORGANIZED HEALTH CARE EDUCATION/TRAINING PROGRAM

## 2023-12-21 PROCEDURE — 73502 X-RAY EXAM HIP UNI 2-3 VIEWS: CPT

## 2023-12-21 PROCEDURE — 86900 BLOOD TYPING SEROLOGIC ABO: CPT | Performed by: EMERGENCY MEDICINE

## 2023-12-21 PROCEDURE — 85025 COMPLETE CBC W/AUTO DIFF WBC: CPT | Performed by: STUDENT IN AN ORGANIZED HEALTH CARE EDUCATION/TRAINING PROGRAM

## 2023-12-21 PROCEDURE — 73030 X-RAY EXAM OF SHOULDER: CPT | Mod: 26 | Performed by: RADIOLOGY

## 2023-12-21 PROCEDURE — 683N000003 HC TRAUMA EVALUATION W/O CC LEVEL III W/NOTIFICATION: Performed by: EMERGENCY MEDICINE

## 2023-12-21 PROCEDURE — 85730 THROMBOPLASTIN TIME PARTIAL: CPT | Performed by: EMERGENCY MEDICINE

## 2023-12-21 PROCEDURE — 99285 EMERGENCY DEPT VISIT HI MDM: CPT | Mod: 25 | Performed by: EMERGENCY MEDICINE

## 2023-12-21 PROCEDURE — 73060 X-RAY EXAM OF HUMERUS: CPT | Mod: RT

## 2023-12-21 PROCEDURE — 72190 X-RAY EXAM OF PELVIS: CPT

## 2023-12-21 PROCEDURE — 250N000013 HC RX MED GY IP 250 OP 250 PS 637: Performed by: PHYSICIAN ASSISTANT

## 2023-12-21 PROCEDURE — 93308 TTE F-UP OR LMTD: CPT | Mod: 26 | Performed by: EMERGENCY MEDICINE

## 2023-12-21 PROCEDURE — 73030 X-RAY EXAM OF SHOULDER: CPT | Mod: LT

## 2023-12-21 PROCEDURE — 96374 THER/PROPH/DIAG INJ IV PUSH: CPT | Mod: 59

## 2023-12-21 PROCEDURE — 99222 1ST HOSP IP/OBS MODERATE 55: CPT | Performed by: PHYSICIAN ASSISTANT

## 2023-12-21 PROCEDURE — 36415 COLL VENOUS BLD VENIPUNCTURE: CPT | Performed by: EMERGENCY MEDICINE

## 2023-12-21 PROCEDURE — 76705 ECHO EXAM OF ABDOMEN: CPT | Mod: 26 | Performed by: EMERGENCY MEDICINE

## 2023-12-21 PROCEDURE — 99285 EMERGENCY DEPT VISIT HI MDM: CPT | Mod: 25

## 2023-12-21 PROCEDURE — 76705 ECHO EXAM OF ABDOMEN: CPT | Performed by: EMERGENCY MEDICINE

## 2023-12-21 PROCEDURE — 29125 APPL SHORT ARM SPLINT STATIC: CPT | Mod: RT

## 2023-12-21 PROCEDURE — 76604 US EXAM CHEST: CPT | Mod: 26 | Performed by: EMERGENCY MEDICINE

## 2023-12-21 PROCEDURE — 72131 CT LUMBAR SPINE W/O DYE: CPT

## 2023-12-21 PROCEDURE — 72125 CT NECK SPINE W/O DYE: CPT

## 2023-12-21 PROCEDURE — 120N000002 HC R&B MED SURG/OB UMMC

## 2023-12-21 PROCEDURE — 250N000013 HC RX MED GY IP 250 OP 250 PS 637: Performed by: STUDENT IN AN ORGANIZED HEALTH CARE EDUCATION/TRAINING PROGRAM

## 2023-12-21 PROCEDURE — 83690 ASSAY OF LIPASE: CPT | Performed by: EMERGENCY MEDICINE

## 2023-12-21 PROCEDURE — 73060 X-RAY EXAM OF HUMERUS: CPT | Mod: 26 | Performed by: RADIOLOGY

## 2023-12-21 PROCEDURE — 85610 PROTHROMBIN TIME: CPT | Performed by: STUDENT IN AN ORGANIZED HEALTH CARE EDUCATION/TRAINING PROGRAM

## 2023-12-21 PROCEDURE — 80048 BASIC METABOLIC PNL TOTAL CA: CPT | Performed by: STUDENT IN AN ORGANIZED HEALTH CARE EDUCATION/TRAINING PROGRAM

## 2023-12-21 PROCEDURE — 72128 CT CHEST SPINE W/O DYE: CPT

## 2023-12-21 PROCEDURE — 73110 X-RAY EXAM OF WRIST: CPT | Mod: 26 | Performed by: RADIOLOGY

## 2023-12-21 PROCEDURE — 76604 US EXAM CHEST: CPT | Performed by: EMERGENCY MEDICINE

## 2023-12-21 PROCEDURE — 93308 TTE F-UP OR LMTD: CPT | Performed by: EMERGENCY MEDICINE

## 2023-12-21 PROCEDURE — 71260 CT THORAX DX C+: CPT

## 2023-12-21 PROCEDURE — 70450 CT HEAD/BRAIN W/O DYE: CPT

## 2023-12-21 PROCEDURE — 73080 X-RAY EXAM OF ELBOW: CPT | Mod: RT

## 2023-12-21 PROCEDURE — 72190 X-RAY EXAM OF PELVIS: CPT | Mod: 26 | Performed by: RADIOLOGY

## 2023-12-21 PROCEDURE — 999N000065 XR HUMERUS RIGHT G/E 2 VIEWS

## 2023-12-21 PROCEDURE — 250N000011 HC RX IP 250 OP 636: Performed by: EMERGENCY MEDICINE

## 2023-12-21 PROCEDURE — 36415 COLL VENOUS BLD VENIPUNCTURE: CPT | Performed by: STUDENT IN AN ORGANIZED HEALTH CARE EDUCATION/TRAINING PROGRAM

## 2023-12-21 PROCEDURE — 250N000009 HC RX 250: Performed by: EMERGENCY MEDICINE

## 2023-12-21 PROCEDURE — 73080 X-RAY EXAM OF ELBOW: CPT | Mod: 26 | Performed by: RADIOLOGY

## 2023-12-21 PROCEDURE — 73110 X-RAY EXAM OF WRIST: CPT | Mod: RT

## 2023-12-21 RX ORDER — AMOXICILLIN 250 MG
1-2 CAPSULE ORAL 2 TIMES DAILY
Status: DISCONTINUED | OUTPATIENT
Start: 2023-12-21 | End: 2023-12-23 | Stop reason: HOSPADM

## 2023-12-21 RX ORDER — POLYETHYLENE GLYCOL 3350 17 G/17G
17 POWDER, FOR SOLUTION ORAL DAILY PRN
Status: DISCONTINUED | OUTPATIENT
Start: 2023-12-21 | End: 2023-12-23 | Stop reason: HOSPADM

## 2023-12-21 RX ORDER — MORPHINE SULFATE 4 MG/ML
4 INJECTION, SOLUTION INTRAMUSCULAR; INTRAVENOUS ONCE
Status: COMPLETED | OUTPATIENT
Start: 2023-12-21 | End: 2023-12-21

## 2023-12-21 RX ORDER — PROCHLORPERAZINE MALEATE 5 MG
10 TABLET ORAL EVERY 6 HOURS PRN
Status: DISCONTINUED | OUTPATIENT
Start: 2023-12-21 | End: 2023-12-23 | Stop reason: HOSPADM

## 2023-12-21 RX ORDER — IOPAMIDOL 755 MG/ML
500 INJECTION, SOLUTION INTRAVASCULAR ONCE
Status: COMPLETED | OUTPATIENT
Start: 2023-12-21 | End: 2023-12-21

## 2023-12-21 RX ORDER — ACETAMINOPHEN 500 MG
1000 TABLET ORAL ONCE
Status: DISCONTINUED | OUTPATIENT
Start: 2023-12-21 | End: 2023-12-21

## 2023-12-21 RX ORDER — FLUTICASONE PROPIONATE 50 MCG
2 SPRAY, SUSPENSION (ML) NASAL DAILY
Status: DISCONTINUED | OUTPATIENT
Start: 2023-12-22 | End: 2023-12-23 | Stop reason: HOSPADM

## 2023-12-21 RX ORDER — PROCHLORPERAZINE 25 MG
25 SUPPOSITORY, RECTAL RECTAL EVERY 12 HOURS PRN
Status: DISCONTINUED | OUTPATIENT
Start: 2023-12-21 | End: 2023-12-23 | Stop reason: HOSPADM

## 2023-12-21 RX ORDER — HYDROMORPHONE HCL IN WATER/PF 6 MG/30 ML
.2-.3 PATIENT CONTROLLED ANALGESIA SYRINGE INTRAVENOUS
Status: DISCONTINUED | OUTPATIENT
Start: 2023-12-21 | End: 2023-12-23 | Stop reason: HOSPADM

## 2023-12-21 RX ORDER — PANTOPRAZOLE SODIUM 40 MG/1
40 TABLET, DELAYED RELEASE ORAL DAILY
Status: DISCONTINUED | OUTPATIENT
Start: 2023-12-22 | End: 2023-12-23 | Stop reason: HOSPADM

## 2023-12-21 RX ORDER — ALBUTEROL SULFATE 90 UG/1
2 AEROSOL, METERED RESPIRATORY (INHALATION) EVERY 4 HOURS PRN
Status: DISCONTINUED | OUTPATIENT
Start: 2023-12-21 | End: 2023-12-23 | Stop reason: HOSPADM

## 2023-12-21 RX ORDER — CALCIUM CARBONATE 500(1250)
500 TABLET ORAL 2 TIMES DAILY WITH MEALS
Status: DISCONTINUED | OUTPATIENT
Start: 2023-12-21 | End: 2023-12-23 | Stop reason: HOSPADM

## 2023-12-21 RX ORDER — ROSUVASTATIN CALCIUM 10 MG/1
10 TABLET, COATED ORAL AT BEDTIME
Status: DISCONTINUED | OUTPATIENT
Start: 2023-12-21 | End: 2023-12-23 | Stop reason: HOSPADM

## 2023-12-21 RX ORDER — ACETAMINOPHEN 500 MG
1000 TABLET ORAL ONCE
Status: COMPLETED | OUTPATIENT
Start: 2023-12-21 | End: 2023-12-21

## 2023-12-21 RX ORDER — ACETAMINOPHEN 325 MG/1
975 TABLET ORAL EVERY 8 HOURS
Status: DISCONTINUED | OUTPATIENT
Start: 2023-12-21 | End: 2023-12-23 | Stop reason: HOSPADM

## 2023-12-21 RX ORDER — LEVOTHYROXINE SODIUM 88 UG/1
88 TABLET ORAL DAILY
Status: DISCONTINUED | OUTPATIENT
Start: 2023-12-22 | End: 2023-12-23 | Stop reason: HOSPADM

## 2023-12-21 RX ORDER — VITAMIN B COMPLEX
50 TABLET ORAL DAILY
Status: DISCONTINUED | OUTPATIENT
Start: 2023-12-21 | End: 2023-12-23 | Stop reason: HOSPADM

## 2023-12-21 RX ORDER — ONDANSETRON 2 MG/ML
4 INJECTION INTRAMUSCULAR; INTRAVENOUS EVERY 6 HOURS PRN
Status: DISCONTINUED | OUTPATIENT
Start: 2023-12-21 | End: 2023-12-23 | Stop reason: HOSPADM

## 2023-12-21 RX ORDER — LIDOCAINE 4 G/G
1-3 PATCH TOPICAL
Status: DISCONTINUED | OUTPATIENT
Start: 2023-12-21 | End: 2023-12-23 | Stop reason: HOSPADM

## 2023-12-21 RX ORDER — LOSARTAN POTASSIUM 50 MG/1
50 TABLET ORAL DAILY
Status: DISCONTINUED | OUTPATIENT
Start: 2023-12-22 | End: 2023-12-23 | Stop reason: HOSPADM

## 2023-12-21 RX ORDER — HYDROMORPHONE HYDROCHLORIDE 1 MG/ML
0.5 INJECTION, SOLUTION INTRAMUSCULAR; INTRAVENOUS; SUBCUTANEOUS
Status: DISCONTINUED | OUTPATIENT
Start: 2023-12-21 | End: 2023-12-23 | Stop reason: HOSPADM

## 2023-12-21 RX ORDER — METHOCARBAMOL 750 MG/1
750 TABLET, FILM COATED ORAL EVERY 6 HOURS
Status: DISCONTINUED | OUTPATIENT
Start: 2023-12-21 | End: 2023-12-23 | Stop reason: HOSPADM

## 2023-12-21 RX ORDER — CALCIUM CARBONATE 500 MG/1
500 TABLET, CHEWABLE ORAL 2 TIMES DAILY PRN
Status: DISCONTINUED | OUTPATIENT
Start: 2023-12-21 | End: 2023-12-23 | Stop reason: HOSPADM

## 2023-12-21 RX ORDER — CETIRIZINE HYDROCHLORIDE 10 MG/1
10 TABLET ORAL EVERY MORNING
Status: DISCONTINUED | OUTPATIENT
Start: 2023-12-22 | End: 2023-12-23 | Stop reason: HOSPADM

## 2023-12-21 RX ORDER — ONDANSETRON 4 MG/1
4 TABLET, ORALLY DISINTEGRATING ORAL EVERY 6 HOURS PRN
Status: DISCONTINUED | OUTPATIENT
Start: 2023-12-21 | End: 2023-12-23 | Stop reason: HOSPADM

## 2023-12-21 RX ADMIN — CALCIUM 500 MG: 500 TABLET ORAL at 18:53

## 2023-12-21 RX ADMIN — Medication 50 MCG: at 18:54

## 2023-12-21 RX ADMIN — OXYCODONE HYDROCHLORIDE 5 MG: 5 TABLET ORAL at 18:52

## 2023-12-21 RX ADMIN — METHOCARBAMOL 750 MG: 750 TABLET ORAL at 18:53

## 2023-12-21 RX ADMIN — ACETAMINOPHEN 975 MG: 325 TABLET, FILM COATED ORAL at 18:52

## 2023-12-21 RX ADMIN — SODIUM CHLORIDE 65 ML: 9 INJECTION, SOLUTION INTRAVENOUS at 13:31

## 2023-12-21 RX ADMIN — OXYCODONE HYDROCHLORIDE 5 MG: 5 TABLET ORAL at 22:58

## 2023-12-21 RX ADMIN — LIDOCAINE 1 PATCH: 4 PATCH TOPICAL at 22:14

## 2023-12-21 RX ADMIN — METHOCARBAMOL 750 MG: 750 TABLET ORAL at 23:47

## 2023-12-21 RX ADMIN — IOPAMIDOL 100 ML: 755 INJECTION, SOLUTION INTRAVENOUS at 13:31

## 2023-12-21 RX ADMIN — ACETAMINOPHEN 1000 MG: 500 TABLET, FILM COATED ORAL at 12:30

## 2023-12-21 RX ADMIN — ROSUVASTATIN CALCIUM 10 MG: 10 TABLET, FILM COATED ORAL at 22:13

## 2023-12-21 RX ADMIN — SENNOSIDES AND DOCUSATE SODIUM 2 TABLET: 8.6; 5 TABLET ORAL at 22:14

## 2023-12-21 RX ADMIN — MORPHINE SULFATE 4 MG: 4 INJECTION, SOLUTION INTRAMUSCULAR; INTRAVENOUS at 15:15

## 2023-12-21 ASSESSMENT — ACTIVITIES OF DAILY LIVING (ADL)
ADLS_ACUITY_SCORE: 35
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
## 2023-12-22 ENCOUNTER — TELEPHONE (OUTPATIENT)
Dept: ORTHOPEDICS | Facility: CLINIC | Age: 58
End: 2023-12-22
Payer: COMMERCIAL

## 2023-12-22 ENCOUNTER — APPOINTMENT (OUTPATIENT)
Dept: PHYSICAL THERAPY | Facility: CLINIC | Age: 58
End: 2023-12-22
Attending: PHYSICIAN ASSISTANT
Payer: COMMERCIAL

## 2023-12-22 ENCOUNTER — APPOINTMENT (OUTPATIENT)
Dept: OCCUPATIONAL THERAPY | Facility: CLINIC | Age: 58
End: 2023-12-22
Attending: PHYSICIAN ASSISTANT
Payer: COMMERCIAL

## 2023-12-22 DIAGNOSIS — S42.309A: Primary | ICD-10-CM

## 2023-12-22 LAB
ANION GAP SERPL CALCULATED.3IONS-SCNC: 9 MMOL/L (ref 7–15)
BUN SERPL-MCNC: 15.2 MG/DL (ref 8–23)
CALCIUM SERPL-MCNC: 9 MG/DL (ref 8.8–10.2)
CHLORIDE SERPL-SCNC: 105 MMOL/L (ref 98–107)
CREAT SERPL-MCNC: 0.8 MG/DL (ref 0.51–0.95)
DEPRECATED HCO3 PLAS-SCNC: 23 MMOL/L (ref 22–29)
EGFRCR SERPLBLD CKD-EPI 2021: 84 ML/MIN/1.73M2
ERYTHROCYTE [DISTWIDTH] IN BLOOD BY AUTOMATED COUNT: 13.3 % (ref 10–15)
ERYTHROCYTE [DISTWIDTH] IN BLOOD BY AUTOMATED COUNT: 13.4 % (ref 10–15)
ERYTHROCYTE [DISTWIDTH] IN BLOOD BY AUTOMATED COUNT: 13.6 % (ref 10–15)
GLUCOSE SERPL-MCNC: 111 MG/DL (ref 70–99)
HCT VFR BLD AUTO: 35.7 % (ref 35–47)
HCT VFR BLD AUTO: 35.9 % (ref 35–47)
HCT VFR BLD AUTO: 38.7 % (ref 35–47)
HGB BLD-MCNC: 11.8 G/DL (ref 11.7–15.7)
HGB BLD-MCNC: 11.8 G/DL (ref 11.7–15.7)
HGB BLD-MCNC: 12.4 G/DL (ref 11.7–15.7)
HGB BLD-MCNC: 12.7 G/DL (ref 11.7–15.7)
MAGNESIUM SERPL-MCNC: 2 MG/DL (ref 1.7–2.3)
MCH RBC QN AUTO: 30 PG (ref 26.5–33)
MCH RBC QN AUTO: 30.2 PG (ref 26.5–33)
MCH RBC QN AUTO: 30.4 PG (ref 26.5–33)
MCHC RBC AUTO-ENTMCNC: 32 G/DL (ref 31.5–36.5)
MCHC RBC AUTO-ENTMCNC: 32.9 G/DL (ref 31.5–36.5)
MCHC RBC AUTO-ENTMCNC: 33.1 G/DL (ref 31.5–36.5)
MCV RBC AUTO: 92 FL (ref 78–100)
MCV RBC AUTO: 92 FL (ref 78–100)
MCV RBC AUTO: 94 FL (ref 78–100)
PHOSPHATE SERPL-MCNC: 4.1 MG/DL (ref 2.5–4.5)
PLATELET # BLD AUTO: 204 10E3/UL (ref 150–450)
PLATELET # BLD AUTO: 210 10E3/UL (ref 150–450)
PLATELET # BLD AUTO: 224 10E3/UL (ref 150–450)
POTASSIUM SERPL-SCNC: 4.2 MMOL/L (ref 3.4–5.3)
RBC # BLD AUTO: 3.88 10E6/UL (ref 3.8–5.2)
RBC # BLD AUTO: 3.91 10E6/UL (ref 3.8–5.2)
RBC # BLD AUTO: 4.13 10E6/UL (ref 3.8–5.2)
SODIUM SERPL-SCNC: 137 MMOL/L (ref 135–145)
WBC # BLD AUTO: 6 10E3/UL (ref 4–11)
WBC # BLD AUTO: 6.2 10E3/UL (ref 4–11)
WBC # BLD AUTO: 6.6 10E3/UL (ref 4–11)

## 2023-12-22 PROCEDURE — 97161 PT EVAL LOW COMPLEX 20 MIN: CPT | Mod: GP

## 2023-12-22 PROCEDURE — 85027 COMPLETE CBC AUTOMATED: CPT | Performed by: PHYSICIAN ASSISTANT

## 2023-12-22 PROCEDURE — 97116 GAIT TRAINING THERAPY: CPT | Mod: GP

## 2023-12-22 PROCEDURE — 36415 COLL VENOUS BLD VENIPUNCTURE: CPT | Performed by: PHYSICIAN ASSISTANT

## 2023-12-22 PROCEDURE — 80048 BASIC METABOLIC PNL TOTAL CA: CPT | Performed by: PHYSICIAN ASSISTANT

## 2023-12-22 PROCEDURE — 97535 SELF CARE MNGMENT TRAINING: CPT | Mod: GO

## 2023-12-22 PROCEDURE — 97165 OT EVAL LOW COMPLEX 30 MIN: CPT | Mod: GO

## 2023-12-22 PROCEDURE — 250N000013 HC RX MED GY IP 250 OP 250 PS 637: Performed by: PHYSICIAN ASSISTANT

## 2023-12-22 PROCEDURE — 120N000002 HC R&B MED SURG/OB UMMC

## 2023-12-22 PROCEDURE — G0463 HOSPITAL OUTPT CLINIC VISIT: HCPCS

## 2023-12-22 PROCEDURE — 99232 SBSQ HOSP IP/OBS MODERATE 35: CPT | Performed by: PHYSICIAN ASSISTANT

## 2023-12-22 PROCEDURE — 97530 THERAPEUTIC ACTIVITIES: CPT | Mod: GO

## 2023-12-22 PROCEDURE — 97530 THERAPEUTIC ACTIVITIES: CPT | Mod: GP

## 2023-12-22 PROCEDURE — 83735 ASSAY OF MAGNESIUM: CPT | Performed by: PHYSICIAN ASSISTANT

## 2023-12-22 PROCEDURE — 84100 ASSAY OF PHOSPHORUS: CPT | Performed by: PHYSICIAN ASSISTANT

## 2023-12-22 PROCEDURE — 85018 HEMOGLOBIN: CPT | Performed by: PHYSICIAN ASSISTANT

## 2023-12-22 RX ADMIN — METHOCARBAMOL 750 MG: 750 TABLET ORAL at 06:52

## 2023-12-22 RX ADMIN — OXYCODONE HYDROCHLORIDE 5 MG: 5 TABLET ORAL at 22:55

## 2023-12-22 RX ADMIN — METHOCARBAMOL 750 MG: 750 TABLET ORAL at 12:05

## 2023-12-22 RX ADMIN — CALCIUM 500 MG: 500 TABLET ORAL at 08:44

## 2023-12-22 RX ADMIN — METHOCARBAMOL 750 MG: 750 TABLET ORAL at 18:31

## 2023-12-22 RX ADMIN — ACETAMINOPHEN 975 MG: 325 TABLET, FILM COATED ORAL at 02:19

## 2023-12-22 RX ADMIN — CETIRIZINE HYDROCHLORIDE 10 MG: 10 TABLET, FILM COATED ORAL at 08:44

## 2023-12-22 RX ADMIN — LEVOTHYROXINE SODIUM 88 MCG: 88 TABLET ORAL at 08:44

## 2023-12-22 RX ADMIN — CALCIUM 500 MG: 500 TABLET ORAL at 18:31

## 2023-12-22 RX ADMIN — OXYCODONE HYDROCHLORIDE 2.5 MG: 5 TABLET ORAL at 04:42

## 2023-12-22 RX ADMIN — ACETAMINOPHEN 975 MG: 325 TABLET, FILM COATED ORAL at 12:05

## 2023-12-22 RX ADMIN — SENNOSIDES AND DOCUSATE SODIUM 2 TABLET: 8.6; 5 TABLET ORAL at 08:44

## 2023-12-22 RX ADMIN — LOSARTAN POTASSIUM 50 MG: 50 TABLET, FILM COATED ORAL at 08:44

## 2023-12-22 RX ADMIN — ROSUVASTATIN CALCIUM 10 MG: 10 TABLET, FILM COATED ORAL at 22:52

## 2023-12-22 RX ADMIN — ACETAMINOPHEN 975 MG: 325 TABLET, FILM COATED ORAL at 18:30

## 2023-12-22 RX ADMIN — Medication 50 MCG: at 08:44

## 2023-12-22 RX ADMIN — PANTOPRAZOLE SODIUM 40 MG: 40 TABLET, DELAYED RELEASE ORAL at 08:44

## 2023-12-22 ASSESSMENT — ACTIVITIES OF DAILY LIVING (ADL)
ADLS_ACUITY_SCORE: 18
ADLS_ACUITY_SCORE: 35
ADLS_ACUITY_SCORE: 18
ADLS_ACUITY_SCORE: 35
DEPENDENT_IADLS:: MEAL PREPARATION;TRANSPORTATION
ADLS_ACUITY_SCORE: 35
ADLS_ACUITY_SCORE: 18
ADLS_ACUITY_SCORE: 35
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
## 2023-12-22 NOTE — TELEPHONE ENCOUNTER
Called patient to coordinate appointment, currently inpatient and did not have access to her schedule due to limited mobility. We decided to schedule her for 12/28 at 1:00pm with Brenna Almodovar PA-C per staff message. Patient has to double check her schedule to see that it will work and could not write down the phone number, writer offered to send her a [facility] message, an email, and leave a voicemail but the patient stated that none of these would be useful... since she is currently inpatient I encouraged her to confer with her nursing staff to contact us again if the time does not work for her.       Andre Weir, EMT on 12/22/2023 at 8:18 AM
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
## 2023-12-22 NOTE — TELEPHONE ENCOUNTER
DIAGNOSIS: Closed fracture of humerus, shaft    Appt Date: 12/28/2023    NOTES STATUS DETAILS   OFFICE NOTE from referring provider Internal Brenna Almodovar PA-C    DISCHARGE SUMMARY from hospital Internal    DISCHARGE REPORT from the ER Internal    MEDICATION LIST Internal    LABS     CBC/DIFF Internal    MRI Internal MRI Lumbar 5/26/2023 more in PACS   XRAYS (IMAGES & REPORTS) Internal Xray Humerus 12/28/2023, 12/21/2023     More in PACS

## 2023-12-22 NOTE — PHARMACY-ADMISSION MEDICATION HISTORY
Pharmacist Admission Medication History    Admission medication history is complete. The information provided in this note is only as accurate as the sources available at the time of the update.    Information Source(s): Patient via in-person    Pertinent Information: Pt has no outstanding questions, comments or concerns    Changes made to PTA medication list:  Added: None  Deleted: None  Changed: None    Medication Affordability: No concerns    Allergies reviewed with patient and updates made in EHR: yes    Medication History Completed By: Luisito Ennis [facility] 12/21/2023 6:44 PM    PTA Med List   Medication Sig Last Dose    albuterol (PROAIR HFA/PROVENTIL HFA/VENTOLIN HFA) 108 (90 Base) MCG/ACT inhaler Inhale 2 puffs into the lungs every 4 hours as needed for shortness of breath / dyspnea or wheezing Cough Past Week    calcium carbonate (TUMS) 500 MG chewable tablet Take 1 tablet (500 mg) by mouth 2 times daily as needed for heartburn Past Week    cetirizine (ZYRTEC) 10 MG tablet Take 1 tablet (10 mg) by mouth every morning 12/21/2023 at AM    fluticasone (FLONASE) 50 MCG/ACT nasal spray SHAKE LIQUID AND USE 2 SPRAYS IN EACH NOSTRIL DAILY 12/21/2023 at AM    GOODSENSE CLEARLAX 17 GM/SCOOP powder STIR 17 GM OF POWDER (SEE CARI INSIDE CAP) IN 8-OZ OF LIQUID UNTIL COMPLETELY DISSOLVED. DRINK THE SOLUTION DAILY AS NEEDED FOR CONSTIPATION 12/21/2023 at AM    levothyroxine (SYNTHROID/LEVOTHROID) 88 MCG tablet Take 1 tablet (88 mcg) by mouth daily 12/21/2023 at AM    losartan (COZAAR) 50 MG tablet Take 1 tablet (50 mg) by mouth daily 12/21/2023 at AM    multivitamin w/minerals (THERA-VIT-M) tablet Take 1 tablet by mouth daily 12/21/2023 at AM    pantoprazole (PROTONIX) 40 MG EC tablet Take 1 tablet (40 mg) by mouth daily 12/21/2023 at AM    rosuvastatin (CRESTOR) 10 MG tablet TAKE 1 TABLET BY MOUTH EVERY NIGHT AT BEDTIME 12/20/2023 at PM    SUMAtriptan (IMITREX) 25 MG tablet TAKE 1 TABLET BY MOUTH AT ONSET OF HEADACHE. MAY  REPEAT IN 2 HOURS AS NEEDED. MAX  MG/ 24 HOURS Past Week    triamcinolone (KENALOG) 0.1 % external cream Apply topically 2 times daily as needed for irritation (eczema) Past Week

## 2023-12-22 NOTE — PLAN OF CARE
Goal Outcome Evaluation:      Plan of Care Reviewed With: patient    Overall Patient Progress: improvingOverall Patient Progress: improving    Outcome Evaluation: Anticipate discharge to home with home care    ERVIN Mae  Phone: [phone]  Pager: [phone]    SEARCHABLE in [facility] - search CARE COORDINATOR     [place] [employer] [place] ([phone]) Saturday & Sunday; ([phone]) [facility] Recognized Holidays     Units: 5A, 5B & 5C  Pager: [phone]    Units: 6B, 6C & 6D    Pager: [phone]    Units: 7A, 7B, 7C & 7D    Pager: [phone]    Units: 6A & ICU   Pager: [phone]    Units: 5 Ortho, 5MS & WB ED Pager: [phone]    Units: 6MS, 8A & 10 ICU  Pager [phone]
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
## 2023-12-23 ENCOUNTER — APPOINTMENT (OUTPATIENT)
Dept: OCCUPATIONAL THERAPY | Facility: CLINIC | Age: 58
End: 2023-12-23
Payer: COMMERCIAL

## 2023-12-23 VITALS
BODY MASS INDEX: 31.82 KG/M2 | OXYGEN SATURATION: 95 % | SYSTOLIC BLOOD PRESSURE: 138 MMHG | RESPIRATION RATE: 20 BRPM | HEIGHT: 66 IN | DIASTOLIC BLOOD PRESSURE: 59 MMHG | TEMPERATURE: 97.8 F | HEART RATE: 80 BPM | WEIGHT: 198 LBS

## 2023-12-23 PROBLEM — S42.301A CLOSED FRACTURE OF SHAFT OF RIGHT HUMERUS, UNSPECIFIED FRACTURE MORPHOLOGY, INITIAL ENCOUNTER: Status: ACTIVE | Noted: 2023-12-23
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
## 2023-12-23 PROBLEM — R23.9 ALTERATION IN SKIN INTEGRITY: Status: ACTIVE | Noted: 2023-12-23

## 2023-12-23 PROBLEM — S36.031A: Status: ACTIVE | Noted: 2023-12-23

## 2023-12-23 LAB
ANION GAP SERPL CALCULATED.3IONS-SCNC: 10 MMOL/L (ref 7–15)
BUN SERPL-MCNC: 14.5 MG/DL (ref 8–23)
CALCIUM SERPL-MCNC: 8.6 MG/DL (ref 8.8–10.2)
CHLORIDE SERPL-SCNC: 106 MMOL/L (ref 98–107)
CREAT SERPL-MCNC: 0.71 MG/DL (ref 0.51–0.95)
DEPRECATED HCO3 PLAS-SCNC: 22 MMOL/L (ref 22–29)
EGFRCR SERPLBLD CKD-EPI 2021: >90 ML/MIN/1.73M2
ERYTHROCYTE [DISTWIDTH] IN BLOOD BY AUTOMATED COUNT: 13.2 % (ref 10–15)
ERYTHROCYTE [DISTWIDTH] IN BLOOD BY AUTOMATED COUNT: 13.4 % (ref 10–15)
GLUCOSE SERPL-MCNC: 101 MG/DL (ref 70–99)
HCT VFR BLD AUTO: 35 % (ref 35–47)
HCT VFR BLD AUTO: 36.5 % (ref 35–47)
HGB BLD-MCNC: 11.5 G/DL (ref 11.7–15.7)
HGB BLD-MCNC: 11.7 G/DL (ref 11.7–15.7)
HGB BLD-MCNC: 11.7 G/DL (ref 11.7–15.7)
MAGNESIUM SERPL-MCNC: 2 MG/DL (ref 1.7–2.3)
MCH RBC QN AUTO: 30.2 PG (ref 26.5–33)
MCH RBC QN AUTO: 30.5 PG (ref 26.5–33)
MCHC RBC AUTO-ENTMCNC: 32.1 G/DL (ref 31.5–36.5)
MCHC RBC AUTO-ENTMCNC: 32.9 G/DL (ref 31.5–36.5)
MCV RBC AUTO: 92 FL (ref 78–100)
MCV RBC AUTO: 95 FL (ref 78–100)
PHOSPHATE SERPL-MCNC: 3.8 MG/DL (ref 2.5–4.5)
PLATELET # BLD AUTO: 190 10E3/UL (ref 150–450)
PLATELET # BLD AUTO: 204 10E3/UL (ref 150–450)
POTASSIUM SERPL-SCNC: 4.1 MMOL/L (ref 3.4–5.3)
RBC # BLD AUTO: 3.81 10E6/UL (ref 3.8–5.2)
RBC # BLD AUTO: 3.84 10E6/UL (ref 3.8–5.2)
SODIUM SERPL-SCNC: 138 MMOL/L (ref 135–145)
WBC # BLD AUTO: 6 10E3/UL (ref 4–11)
WBC # BLD AUTO: 6.2 10E3/UL (ref 4–11)

## 2023-12-23 PROCEDURE — 85014 HEMATOCRIT: CPT | Performed by: PHYSICIAN ASSISTANT

## 2023-12-23 PROCEDURE — 250N000011 HC RX IP 250 OP 636: Performed by: PHYSICIAN ASSISTANT

## 2023-12-23 PROCEDURE — 80048 BASIC METABOLIC PNL TOTAL CA: CPT | Performed by: PHYSICIAN ASSISTANT

## 2023-12-23 PROCEDURE — 84100 ASSAY OF PHOSPHORUS: CPT | Performed by: PHYSICIAN ASSISTANT

## 2023-12-23 PROCEDURE — G0378 HOSPITAL OBSERVATION PER HR: HCPCS

## 2023-12-23 PROCEDURE — 83735 ASSAY OF MAGNESIUM: CPT | Performed by: PHYSICIAN ASSISTANT

## 2023-12-23 PROCEDURE — 97535 SELF CARE MNGMENT TRAINING: CPT | Mod: GO

## 2023-12-23 PROCEDURE — 97530 THERAPEUTIC ACTIVITIES: CPT | Mod: GO

## 2023-12-23 PROCEDURE — 36415 COLL VENOUS BLD VENIPUNCTURE: CPT | Performed by: PHYSICIAN ASSISTANT

## 2023-12-23 PROCEDURE — 250N000013 HC RX MED GY IP 250 OP 250 PS 637: Performed by: PHYSICIAN ASSISTANT

## 2023-12-23 RX ORDER — OXYCODONE HYDROCHLORIDE 5 MG/1
5 TABLET ORAL EVERY 6 HOURS PRN
Qty: 10 TABLET | Refills: 0 | Status: SHIPPED | OUTPATIENT
Start: 2023-12-23 | End: 2024-08-02

## 2023-12-23 RX ORDER — ACETAMINOPHEN 325 MG/1
975 TABLET ORAL EVERY 8 HOURS
Qty: 42 TABLET | Refills: 0 | Status: SHIPPED | OUTPATIENT
Start: 2023-12-23 | End: 2023-12-23

## 2023-12-23 RX ORDER — METHOCARBAMOL 750 MG/1
750 TABLET, FILM COATED ORAL EVERY 6 HOURS
Qty: 42 TABLET | Refills: 1 | Status: SHIPPED | OUTPATIENT
Start: 2023-12-23 | End: 2023-12-23

## 2023-12-23 RX ORDER — METHOCARBAMOL 750 MG/1
750 TABLET, FILM COATED ORAL EVERY 6 HOURS
Qty: 42 TABLET | Refills: 1 | Status: SHIPPED | OUTPATIENT
Start: 2023-12-23 | End: 2024-08-02

## 2023-12-23 RX ORDER — OXYCODONE HYDROCHLORIDE 5 MG/1
5 TABLET ORAL EVERY 6 HOURS PRN
Qty: 10 TABLET | Refills: 0 | Status: SHIPPED | OUTPATIENT
Start: 2023-12-23 | End: 2023-12-23

## 2023-12-23 RX ORDER — ACETAMINOPHEN 325 MG/1
975 TABLET ORAL EVERY 8 HOURS
Qty: 42 TABLET | Refills: 0 | Status: SHIPPED | OUTPATIENT
Start: 2023-12-23

## 2023-12-23 RX ADMIN — METHOCARBAMOL 750 MG: 750 TABLET ORAL at 08:44

## 2023-12-23 RX ADMIN — PANTOPRAZOLE SODIUM 40 MG: 40 TABLET, DELAYED RELEASE ORAL at 08:35

## 2023-12-23 RX ADMIN — CETIRIZINE HYDROCHLORIDE 10 MG: 10 TABLET, FILM COATED ORAL at 08:34

## 2023-12-23 RX ADMIN — OXYCODONE HYDROCHLORIDE 5 MG: 5 TABLET ORAL at 08:33

## 2023-12-23 RX ADMIN — OXYCODONE HYDROCHLORIDE 5 MG: 5 TABLET ORAL at 03:05

## 2023-12-23 RX ADMIN — LEVOTHYROXINE SODIUM 88 MCG: 88 TABLET ORAL at 08:35

## 2023-12-23 RX ADMIN — Medication 50 MCG: at 08:34

## 2023-12-23 RX ADMIN — LOSARTAN POTASSIUM 50 MG: 50 TABLET, FILM COATED ORAL at 08:35

## 2023-12-23 RX ADMIN — ONDANSETRON 4 MG: 4 TABLET, ORALLY DISINTEGRATING ORAL at 09:35

## 2023-12-23 RX ADMIN — ACETAMINOPHEN 975 MG: 325 TABLET, FILM COATED ORAL at 09:35

## 2023-12-23 RX ADMIN — CALCIUM 500 MG: 500 TABLET ORAL at 08:34

## 2023-12-23 RX ADMIN — ACETAMINOPHEN 975 MG: 325 TABLET, FILM COATED ORAL at 03:05

## 2023-12-23 RX ADMIN — METHOCARBAMOL 750 MG: 750 TABLET ORAL at 12:20

## 2023-12-23 RX ADMIN — SENNOSIDES AND DOCUSATE SODIUM 2 TABLET: 8.6; 5 TABLET ORAL at 08:34

## 2023-12-23 ASSESSMENT — ACTIVITIES OF DAILY LIVING (ADL)
ADLS_ACUITY_SCORE: 20
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
## 2023-12-23 NOTE — DISCHARGE SUMMARY
Pt. discharged at 1400 to home, was accompanied by friend, and left with personal belongings. Pt. received complete discharge paperwork and  medications as filled by discharge pharmacy. Pt. was given times of last dose for all discharge medications in writing on discharge medication sheets. Discharge teaching included medication, pain management, activity restrictions and signs and symptoms of infection. Pt. to follow up with primary provider in one week. Pt. had no further questions at the time of discharge and no unmet needs were identified.
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
## 2023-12-23 NOTE — PLAN OF CARE
"/70 (BP Location: Left arm)   Pulse 74   Temp 98.4  F (36.9  C) (Oral)   Resp 20   Ht 1.676 m (5' 6\")   Wt 89.8 kg (198 lb)   LMP 05/15/2018 (Within Days)   SpO2 95%   BMI 31.96 kg/m      Activity: SBA  Neuros: A&Ox4, no deficits noted, CMS intact.  Cardiac: WDL, denies chest pain.  Respiratory: WDL on RA, denies SOB.  GI/: +BS, LBM 12/21, voids spont with AUOP.  Diet: Tolerating regular diet.  Skin/Incisions: WDL ex RUE sling/brace in use at all times.  Lines/Drains: L PIV SL.  Pain/Nausea: Denies.  New Changes: No acute changes this shift.  Plan: discharge today.                 "

## 2023-12-26 ENCOUNTER — PATIENT OUTREACH (OUTPATIENT)
Dept: CARE COORDINATION | Facility: CLINIC | Age: 58
End: 2023-12-26
Payer: COMMERCIAL

## 2023-12-26 ENCOUNTER — TELEPHONE (OUTPATIENT)
Dept: FAMILY MEDICINE | Facility: CLINIC | Age: 58
End: 2023-12-26
Payer: COMMERCIAL

## 2023-12-26 ENCOUNTER — PATIENT OUTREACH (OUTPATIENT)
Dept: SURGERY | Facility: CLINIC | Age: 58
End: 2023-12-26
Payer: COMMERCIAL
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
## 2023-12-26 NOTE — TELEPHONE ENCOUNTER
Order/Referral Request    Who is requesting: [facility]    Orders being requested: Orders already for Home Health, a Care Giver Home Health aid added on?  The first eval and care plan    Reason service is needed/diagnosis: just hospitalizedfor laceration of spleen    When are orders needed by: asap    Has this been discussed with Provider: No    Does patient have a preference on a Group/Provider/Facility? above    Does patient have an appointment scheduled?: No    Where to send orders:  phone     Could we send this information to you in [facility] or would you prefer to receive a phone call?:   na    Desi K - private line.

## 2023-12-26 NOTE — PROGRESS NOTES
Clinic Care Coordination Contact  Care Coordination Clinician Chart Review    Situation: Patient chart reviewed by care coordinator.    Background: Clinic Care Coordination Referral received from inpatient care team for transition handoff communication following hospital admission.    Assessment: Upon chart review, patient is not a candidate for Primary Care Clinic Care Coordination enrollment due to reason stated below: Patient has active communication with a nurse, provider, or care team for reason of post-hospital follow up plan. Outreach call by CC team not indicated to minimize duplicative efforts.    Plan/Recommendations: Clinic Care Coordination Referral/order cancelled.  CC will perform no further monitoring/outreaches at this time and will remain available as needed. If new needs arise, a new Care Coordination Referral may be placed.    SIL Frazier/LICSW  Social Work Care Coordinator  [facility] [place]  Phone: [phone]

## 2023-12-27 DIAGNOSIS — E78.1 HYPERTRIGLYCERIDEMIA: ICD-10-CM

## 2023-12-28 ENCOUNTER — OFFICE VISIT (OUTPATIENT)
Dept: ORTHOPEDICS | Facility: CLINIC | Age: 58
End: 2023-12-28
Payer: COMMERCIAL

## 2023-12-28 ENCOUNTER — PRE VISIT (OUTPATIENT)
Dept: ORTHOPEDICS | Facility: CLINIC | Age: 58
End: 2023-12-28

## 2023-12-28 ENCOUNTER — ANCILLARY PROCEDURE (OUTPATIENT)
Dept: GENERAL RADIOLOGY | Facility: CLINIC | Age: 58
End: 2023-12-28
Attending: PHYSICIAN ASSISTANT
Payer: COMMERCIAL

## 2023-12-28 DIAGNOSIS — S42.341D CLOSED DISPLACED SPIRAL FRACTURE OF SHAFT OF RIGHT HUMERUS WITH ROUTINE HEALING, SUBSEQUENT ENCOUNTER: Primary | ICD-10-CM

## 2023-12-28 DIAGNOSIS — S42.309A: ICD-10-CM

## 2023-12-28 DIAGNOSIS — S36.039D LACERATION OF SPLEEN, SUBSEQUENT ENCOUNTER: ICD-10-CM

## 2023-12-28 PROCEDURE — 99203 OFFICE O/P NEW LOW 30 MIN: CPT | Performed by: PHYSICIAN ASSISTANT

## 2023-12-28 PROCEDURE — 73060 X-RAY EXAM OF HUMERUS: CPT | Mod: RT | Performed by: RADIOLOGY

## 2023-12-28 RX ORDER — ROSUVASTATIN CALCIUM 10 MG/1
TABLET, COATED ORAL
Qty: 90 TABLET | Refills: 0 | Status: SHIPPED | OUTPATIENT
Start: 2023-12-28 | End: 2024-01-24
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
## 2023-12-28 NOTE — PATIENT INSTRUCTIONS
Continue use of sling and arm brace at all times.  Come out of sling to straighten elbow 3x/day for about 5 minutes.  Work on wiggling wrist and fingers on the right hand  Ice to right arm as needed for pain and swelling.   Follow-up in 1 week for another check up and xray of the right arm.

## 2023-12-29 NOTE — TELEPHONE ENCOUNTER
Please clarify requested orders.   Appears that home care orders were already placed?  Ok to add on orders for health care aide if this is what was requested.     - JMeixl, CNP

## 2023-12-29 NOTE — TELEPHONE ENCOUNTER
Left message for Sariah to call back and speak to a nurse to clarify requested orders.     DAWN MIXON RN on 12/29/2023 at 5:14 PM   [facility]

## 2024-01-02 DIAGNOSIS — S42.341D CLOSED DISPLACED SPIRAL FRACTURE OF SHAFT OF RIGHT HUMERUS WITH ROUTINE HEALING, SUBSEQUENT ENCOUNTER: Primary | ICD-10-CM

## 2024-01-04 ENCOUNTER — ANCILLARY PROCEDURE (OUTPATIENT)
Dept: GENERAL RADIOLOGY | Facility: CLINIC | Age: 59
End: 2024-01-04
Attending: PHYSICIAN ASSISTANT
Payer: COMMERCIAL

## 2024-01-04 ENCOUNTER — OFFICE VISIT (OUTPATIENT)
Dept: ORTHOPEDICS | Facility: CLINIC | Age: 59
End: 2024-01-04
Payer: COMMERCIAL

## 2024-01-04 DIAGNOSIS — S42.341D CLOSED DISPLACED SPIRAL FRACTURE OF SHAFT OF RIGHT HUMERUS WITH ROUTINE HEALING, SUBSEQUENT ENCOUNTER: ICD-10-CM

## 2024-01-04 DIAGNOSIS — S42.341D CLOSED DISPLACED SPIRAL FRACTURE OF SHAFT OF RIGHT HUMERUS WITH ROUTINE HEALING, SUBSEQUENT ENCOUNTER: Primary | ICD-10-CM

## 2024-01-04 PROCEDURE — 73060 X-RAY EXAM OF HUMERUS: CPT | Mod: RT | Performed by: RADIOLOGY

## 2024-01-04 PROCEDURE — 99213 OFFICE O/P EST LOW 20 MIN: CPT | Performed by: PHYSICIAN ASSISTANT

## 2024-01-04 NOTE — NURSING NOTE
Chief Complaint   Patient presents with    RECHECK     Follow up right humerus fracture       58 year old  1965    Primary MD: Mary Hines            Pain Assessment  Patient Currently in Pain: Yes  0-10 Pain Scale: 5  Primary Pain Location:  (right humerus)              [facility] - [facility], [place] - [address] [facility] - [place], [place] - [address] [facility]  [id number] - [place], [place] - [address] & [facility] #[id number] - [place], [place] - [address] AT [facility] [address]  [facility] #[id number] - [facility] [address] AT [facility][id number] - [place], [place] - [address] AT [facility] [address]         Allergies   Allergen Reactions    Amoxicillin      Lip swelling and hives    Aspirin      GI upset    Dairy Products [Milk Protein]      Upset stomach and dry cough    Zithromax [Azithromycin]      RASH           Current Outpatient Medications   Medication    acetaminophen (TYLENOL) 325 MG tablet    albuterol (PROAIR HFA/PROVENTIL HFA/VENTOLIN HFA) 108 (90 Base) MCG/ACT inhaler    calcium carbonate (TUMS) 500 MG chewable tablet    cetirizine (ZYRTEC) 10 MG tablet    fluticasone (FLONASE) 50 MCG/ACT nasal spray    GOODSENSE CLEARLAX 17 GM/SCOOP powder    levothyroxine (SYNTHROID/LEVOTHROID) 88 MCG tablet    losartan (COZAAR) 50 MG tablet    methocarbamol (ROBAXIN) 750 MG tablet    multivitamin w/minerals (THERA-VIT-M) tablet    order for DME    order for DME    order for DME    oxyCODONE (ROXICODONE) 5 MG tablet    pantoprazole (PROTONIX) 40 MG EC tablet    rosuvastatin (CRESTOR) 10 MG tablet    SUMAtriptan (IMITREX) 25 MG tablet    triamcinolone (KENALOG) 0.1 % external cream     No current facility-administered medications for this visit.
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
## 2024-01-04 NOTE — PATIENT INSTRUCTIONS
Follow-up in 7-10 days for another xray of right humerus to assess alignment with Dr. Cash.  We will discuss if there is a need for surgery at this time.    If surgery is not needed, you will need 8-12 weeks for this bone to heal.  Continue Sling and brace until then.

## 2024-01-04 NOTE — TELEPHONE ENCOUNTER
"Called and spoke with Jacinda at [facility].     Patient declining services as she is \"moving around well\"    DAWN MIXON RN on 1/4/2024 at 10:22 AM   [facility]    "

## 2024-01-05 DIAGNOSIS — S42.341D CLOSED DISPLACED SPIRAL FRACTURE OF SHAFT OF RIGHT HUMERUS WITH ROUTINE HEALING, SUBSEQUENT ENCOUNTER: Primary | ICD-10-CM
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
## 2024-01-07 NOTE — TELEPHONE ENCOUNTER
DIAGNOSIS: RIGHT HUMERUS   APPOINTMENT DATE: 01/12/24   NOTES STATUS DETAILS   OFFICE NOTE from referring provider Internal 01/04/24: Brenna Almodovar PA-C   DISCHARGE SUMMARY from hospital Internal 12/21/23: [facility]   DISCHARGE REPORT from the ER Internal 12/21/23: [facility] ED   MEDICATION LIST Internal    LABS     CBC/DIFF Internal Most recent 12/23/23   XRAYS (IMAGES & REPORTS) PACS 01/04/24-12/21/23: XR Humerus RT  12/21/23: XR Shoulder RT  12/21/23: XR Elbow RT

## 2024-01-10 DIAGNOSIS — K21.9 GASTROESOPHAGEAL REFLUX DISEASE WITHOUT ESOPHAGITIS: ICD-10-CM

## 2024-01-10 DIAGNOSIS — K44.9 HIATAL HERNIA: ICD-10-CM

## 2024-01-10 DIAGNOSIS — E03.9 HYPOTHYROIDISM, UNSPECIFIED TYPE: ICD-10-CM

## 2024-01-10 RX ORDER — LEVOTHYROXINE SODIUM 88 UG/1
88 TABLET ORAL DAILY
Qty: 90 TABLET | Refills: 0 | Status: SHIPPED | OUTPATIENT
Start: 2024-01-10 | End: 2024-01-17

## 2024-01-10 RX ORDER — PANTOPRAZOLE SODIUM 40 MG/1
40 TABLET, DELAYED RELEASE ORAL DAILY
Qty: 90 TABLET | Refills: 1 | Status: SHIPPED | OUTPATIENT
Start: 2024-01-10 | End: 2024-01-23

## 2024-01-10 NOTE — TELEPHONE ENCOUNTER
TSH   Date Value Ref Range Status   11/16/2022 1.29 0.30 - 4.20 uIU/mL Final   04/28/2022 21.07 (H) 0.40 - 4.00 mU/L Final   12/02/2021 2.96 0.40 - 4.00 mU/L Final   11/04/2020 11.20 (H) 0.40 - 4.00 mU/L Final   10/17/2019 1.36 0.40 - 4.00 mU/L Final   07/17/2019 1.67 0.40 - 4.00 mU/L Final   06/11/2018 2.34 0.40 - 4.00 mU/L Final   01/15/2018 3.33 0.40 - 4.00 mU/L Final     Due for preventative visit + med check.     Short term refill.     - JMeixl, CNP

## 2024-01-10 NOTE — TELEPHONE ENCOUNTER
Pt informed to schedule appt. Arm is currently not doing well, she will make an appointment within the next 3 months.     Brenna Danielson, CMA

## 2024-01-12 ENCOUNTER — OFFICE VISIT (OUTPATIENT)
Dept: ORTHOPEDICS | Facility: CLINIC | Age: 59
End: 2024-01-12
Payer: COMMERCIAL

## 2024-01-12 ENCOUNTER — ANCILLARY PROCEDURE (OUTPATIENT)
Dept: GENERAL RADIOLOGY | Facility: CLINIC | Age: 59
End: 2024-01-12
Attending: ORTHOPAEDIC SURGERY
Payer: COMMERCIAL

## 2024-01-12 ENCOUNTER — PRE VISIT (OUTPATIENT)
Dept: ORTHOPEDICS | Facility: CLINIC | Age: 59
End: 2024-01-12

## 2024-01-12 VITALS — HEIGHT: 60 IN | BODY MASS INDEX: 39.07 KG/M2 | WEIGHT: 199 LBS

## 2024-01-12 DIAGNOSIS — S42.341D CLOSED DISPLACED SPIRAL FRACTURE OF SHAFT OF RIGHT HUMERUS WITH ROUTINE HEALING, SUBSEQUENT ENCOUNTER: Primary | ICD-10-CM

## 2024-01-12 DIAGNOSIS — S42.341D CLOSED DISPLACED SPIRAL FRACTURE OF SHAFT OF RIGHT HUMERUS WITH ROUTINE HEALING, SUBSEQUENT ENCOUNTER: ICD-10-CM

## 2024-01-12 PROCEDURE — 73060 X-RAY EXAM OF HUMERUS: CPT | Mod: RT | Performed by: RADIOLOGY

## 2024-01-12 PROCEDURE — 99213 OFFICE O/P EST LOW 20 MIN: CPT | Mod: GC | Performed by: ORTHOPAEDIC SURGERY

## 2024-01-12 NOTE — NURSING NOTE
Reason For Visit:   Chief Complaint   Patient presents with    Consult     Right humerus fracture // has been seeing Melanie        Ht 1.524 m (5')   Wt 90.3 kg (199 lb)   LMP 05/15/2018 (Within Days)   BMI 38.86 kg/m      Pain Assessment  Patient Currently in Pain: Yes  0-10 Pain Scale: 3  Primary Pain Location:  (right upper arm)      Dimitri Faust, ATC
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
## 2024-01-16 DIAGNOSIS — J45.40 MODERATE PERSISTENT ASTHMA WITHOUT COMPLICATION: ICD-10-CM

## 2024-01-16 DIAGNOSIS — E03.9 HYPOTHYROIDISM, UNSPECIFIED TYPE: ICD-10-CM

## 2024-01-17 RX ORDER — LEVOTHYROXINE SODIUM 88 UG/1
88 TABLET ORAL DAILY
Qty: 90 TABLET | Refills: 0 | Status: SHIPPED | OUTPATIENT
Start: 2024-01-17 | End: 2024-01-23

## 2024-01-17 RX ORDER — CETIRIZINE HYDROCHLORIDE 10 MG/1
10 TABLET ORAL EVERY MORNING
Qty: 90 TABLET | Refills: 0 | Status: SHIPPED | OUTPATIENT
Start: 2024-01-17 | End: 2024-04-22

## 2024-01-19 DIAGNOSIS — J45.40 MODERATE PERSISTENT ASTHMA WITHOUT COMPLICATION: ICD-10-CM

## 2024-01-19 DIAGNOSIS — S42.341D CLOSED DISPLACED SPIRAL FRACTURE OF SHAFT OF RIGHT HUMERUS WITH ROUTINE HEALING, SUBSEQUENT ENCOUNTER: Primary | ICD-10-CM

## 2024-01-22 RX ORDER — CETIRIZINE HYDROCHLORIDE 10 MG/1
10 TABLET ORAL EVERY MORNING
Qty: 90 TABLET | Refills: 0 | OUTPATIENT
Start: 2024-01-22

## 2024-01-23 ENCOUNTER — VIRTUAL VISIT (OUTPATIENT)
Dept: FAMILY MEDICINE | Facility: CLINIC | Age: 59
End: 2024-01-23
Payer: COMMERCIAL

## 2024-01-23 DIAGNOSIS — I10 BENIGN ESSENTIAL HYPERTENSION: ICD-10-CM

## 2024-01-23 DIAGNOSIS — K21.9 GASTROESOPHAGEAL REFLUX DISEASE WITHOUT ESOPHAGITIS: ICD-10-CM

## 2024-01-23 DIAGNOSIS — S42.341D CLOSED DISPLACED SPIRAL FRACTURE OF SHAFT OF RIGHT HUMERUS WITH ROUTINE HEALING: Primary | ICD-10-CM

## 2024-01-23 DIAGNOSIS — E03.9 HYPOTHYROIDISM, UNSPECIFIED TYPE: ICD-10-CM

## 2024-01-23 DIAGNOSIS — Z12.31 VISIT FOR SCREENING MAMMOGRAM: ICD-10-CM

## 2024-01-23 DIAGNOSIS — K44.9 HIATAL HERNIA: ICD-10-CM

## 2024-01-23 PROCEDURE — 99443 PR PHYSICIAN TELEPHONE EVALUATION 21-30 MIN: CPT | Mod: 93 | Performed by: PHYSICIAN ASSISTANT

## 2024-01-23 RX ORDER — LEVOTHYROXINE SODIUM 88 UG/1
88 TABLET ORAL DAILY
Qty: 30 TABLET | Refills: 1 | Status: SHIPPED | OUTPATIENT
Start: 2024-01-23 | End: 2024-01-24

## 2024-01-23 RX ORDER — PANTOPRAZOLE SODIUM 40 MG/1
40 TABLET, DELAYED RELEASE ORAL DAILY
Qty: 90 TABLET | Refills: 1 | Status: SHIPPED | OUTPATIENT
Start: 2024-01-23 | End: 2024-07-05

## 2024-01-23 RX ORDER — LOSARTAN POTASSIUM 50 MG/1
50 TABLET ORAL DAILY
Qty: 30 TABLET | Refills: 1 | Status: SHIPPED | OUTPATIENT
Start: 2024-01-23 | End: 2024-01-24

## 2024-01-23 ASSESSMENT — ASTHMA QUESTIONNAIRES
ACT_TOTALSCORE: 22
ACT_TOTALSCORE: 22
QUESTION_3 LAST FOUR WEEKS HOW OFTEN DID YOUR ASTHMA SYMPTOMS (WHEEZING, COUGHING, SHORTNESS OF BREATH, CHEST TIGHTNESS OR PAIN) WAKE YOU UP AT NIGHT OR EARLIER THAN USUAL IN THE MORNING: NOT AT ALL
QUESTION_1 LAST FOUR WEEKS HOW MUCH OF THE TIME DID YOUR ASTHMA KEEP YOU FROM GETTING AS MUCH DONE AT WORK, SCHOOL OR AT HOME: NONE OF THE TIME
QUESTION_4 LAST FOUR WEEKS HOW OFTEN HAVE YOU USED YOUR RESCUE INHALER OR NEBULIZER MEDICATION (SUCH AS ALBUTEROL): ONCE A WEEK OR LESS
QUESTION_5 LAST FOUR WEEKS HOW WOULD YOU RATE YOUR ASTHMA CONTROL: WELL CONTROLLED
QUESTION_2 LAST FOUR WEEKS HOW OFTEN HAVE YOU HAD SHORTNESS OF BREATH: ONCE OR TWICE A WEEK
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
## 2024-01-24 ENCOUNTER — OFFICE VISIT (OUTPATIENT)
Dept: FAMILY MEDICINE | Facility: CLINIC | Age: 59
End: 2024-01-24
Payer: COMMERCIAL

## 2024-01-24 VITALS
HEART RATE: 71 BPM | HEIGHT: 61 IN | TEMPERATURE: 97.8 F | DIASTOLIC BLOOD PRESSURE: 92 MMHG | BODY MASS INDEX: 37.95 KG/M2 | OXYGEN SATURATION: 95 % | RESPIRATION RATE: 16 BRPM | WEIGHT: 201 LBS | SYSTOLIC BLOOD PRESSURE: 159 MMHG

## 2024-01-24 DIAGNOSIS — K21.9 GASTROESOPHAGEAL REFLUX DISEASE WITHOUT ESOPHAGITIS: ICD-10-CM

## 2024-01-24 DIAGNOSIS — E03.9 HYPOTHYROIDISM, UNSPECIFIED TYPE: ICD-10-CM

## 2024-01-24 DIAGNOSIS — K44.9 HIATAL HERNIA: ICD-10-CM

## 2024-01-24 DIAGNOSIS — I10 BENIGN ESSENTIAL HYPERTENSION: Primary | ICD-10-CM

## 2024-01-24 DIAGNOSIS — E78.1 HYPERTRIGLYCERIDEMIA: ICD-10-CM

## 2024-01-24 LAB
ALBUMIN SERPL BCG-MCNC: 4.2 G/DL (ref 3.5–5.2)
ALP SERPL-CCNC: 108 U/L (ref 40–150)
ALT SERPL W P-5'-P-CCNC: 21 U/L (ref 0–50)
ANION GAP SERPL CALCULATED.3IONS-SCNC: 13 MMOL/L (ref 7–15)
AST SERPL W P-5'-P-CCNC: 26 U/L (ref 0–45)
BILIRUB SERPL-MCNC: 1 MG/DL
BUN SERPL-MCNC: 13.3 MG/DL (ref 8–23)
CALCIUM SERPL-MCNC: 9.1 MG/DL (ref 8.8–10.2)
CHLORIDE SERPL-SCNC: 106 MMOL/L (ref 98–107)
CHOLEST SERPL-MCNC: 167 MG/DL
CREAT SERPL-MCNC: 0.69 MG/DL (ref 0.51–0.95)
CREAT UR-MCNC: 174 MG/DL
DEPRECATED HCO3 PLAS-SCNC: 22 MMOL/L (ref 22–29)
EGFRCR SERPLBLD CKD-EPI 2021: >90 ML/MIN/1.73M2
FASTING STATUS PATIENT QL REPORTED: YES
GLUCOSE SERPL-MCNC: 89 MG/DL (ref 70–99)
HDLC SERPL-MCNC: 47 MG/DL
LDLC SERPL CALC-MCNC: 61 MG/DL
MICROALBUMIN UR-MCNC: 17 MG/L
MICROALBUMIN/CREAT UR: 9.77 MG/G CR (ref 0–25)
NONHDLC SERPL-MCNC: 120 MG/DL
POTASSIUM SERPL-SCNC: 3.7 MMOL/L (ref 3.4–5.3)
PROT SERPL-MCNC: 7.2 G/DL (ref 6.4–8.3)
SODIUM SERPL-SCNC: 141 MMOL/L (ref 135–145)
T4 FREE SERPL-MCNC: 1.09 NG/DL (ref 0.9–1.7)
TRIGL SERPL-MCNC: 294 MG/DL
TSH SERPL DL<=0.005 MIU/L-ACNC: 5.66 UIU/ML (ref 0.3–4.2)

## 2024-01-24 PROCEDURE — 82043 UR ALBUMIN QUANTITATIVE: CPT | Performed by: PHYSICIAN ASSISTANT

## 2024-01-24 PROCEDURE — 84439 ASSAY OF FREE THYROXINE: CPT | Performed by: PHYSICIAN ASSISTANT

## 2024-01-24 PROCEDURE — 82570 ASSAY OF URINE CREATININE: CPT | Performed by: PHYSICIAN ASSISTANT

## 2024-01-24 PROCEDURE — 80053 COMPREHEN METABOLIC PANEL: CPT | Performed by: PHYSICIAN ASSISTANT

## 2024-01-24 PROCEDURE — 80061 LIPID PANEL: CPT | Performed by: PHYSICIAN ASSISTANT

## 2024-01-24 PROCEDURE — 99214 OFFICE O/P EST MOD 30 MIN: CPT | Performed by: PHYSICIAN ASSISTANT

## 2024-01-24 PROCEDURE — 84443 ASSAY THYROID STIM HORMONE: CPT | Performed by: PHYSICIAN ASSISTANT

## 2024-01-24 PROCEDURE — 36415 COLL VENOUS BLD VENIPUNCTURE: CPT | Performed by: PHYSICIAN ASSISTANT

## 2024-01-24 RX ORDER — LEVOTHYROXINE SODIUM 88 UG/1
88 TABLET ORAL DAILY
Qty: 90 TABLET | Refills: 1 | Status: SHIPPED | OUTPATIENT
Start: 2024-01-24 | End: 2024-07-05

## 2024-01-24 RX ORDER — LOSARTAN POTASSIUM 50 MG/1
50 TABLET ORAL DAILY
Qty: 90 TABLET | Refills: 1 | Status: SHIPPED | OUTPATIENT
Start: 2024-01-24 | End: 2024-07-05

## 2024-01-24 RX ORDER — RESPIRATORY SYNCYTIAL VIRUS VACCINE 120MCG/0.5
0.5 KIT INTRAMUSCULAR ONCE
Qty: 1 EACH | Refills: 0 | Status: CANCELLED | OUTPATIENT
Start: 2024-01-24 | End: 2024-01-24

## 2024-01-24 RX ORDER — ROSUVASTATIN CALCIUM 10 MG/1
TABLET, COATED ORAL
Qty: 90 TABLET | Refills: 0 | Status: SHIPPED | OUTPATIENT
Start: 2024-01-24 | End: 2024-07-05

## 2024-01-24 NOTE — LETTER
January 25, 2024      Brookeshamir Ascenciojackie  [address]   [facility] [address]        Dear ,    We are writing to inform you of your test results.    Triglycerides are a little higher again. Try to avoid fatty foods as much as possible. Thyroid, kidney function, and liver function are all normal. Urine protein is normal as well.     Resulted Orders   COMPREHENSIVE METABOLIC PANEL   Result Value Ref Range    Sodium 141 135 - 145 mmol/L      Comment:      Reference intervals for this test were updated on 09/26/2023 to more accurately reflect our healthy population. There may be differences in the flagging of prior results with similar values performed with this method. Interpretation of those prior results can be made in the context of the updated reference intervals.     Potassium 3.7 3.4 - 5.3 mmol/L    Carbon Dioxide (CO2) 22 22 - 29 mmol/L    Anion Gap 13 7 - 15 mmol/L    Urea Nitrogen 13.3 8.0 - 23.0 mg/dL    Creatinine 0.69 0.51 - 0.95 mg/dL    GFR Estimate >90 >60 mL/min/1.73m2    Calcium 9.1 8.8 - 10.2 mg/dL    Chloride 106 98 - 107 mmol/L    Glucose 89 70 - 99 mg/dL    Alkaline Phosphatase 108 40 - 150 U/L      Comment:      Reference intervals for this test were updated on 11/14/2023 to more accurately reflect our healthy population. There may be differences in the flagging of prior results with similar values performed with this method. Interpretation of those prior results can be made in the context of the updated reference intervals.    AST 26 0 - 45 U/L      Comment:      Reference intervals for this test were updated on 6/12/2023 to more accurately reflect our healthy population. There may be differences in the flagging of prior results with similar values performed with this method. Interpretation of those prior results can be made in the context of the updated reference intervals.    ALT 21 0 - 50 U/L      Comment:      Reference intervals for this test were updated on 6/12/2023 to  more accurately reflect our healthy population. There may be differences in the flagging of prior results with similar values performed with this method. Interpretation of those prior results can be made in the context of the updated reference intervals.      Protein Total 7.2 6.4 - 8.3 g/dL    Albumin 4.2 3.5 - 5.2 g/dL    Bilirubin Total 1.0 <=1.2 mg/dL   Lipid panel reflex to direct LDL Fasting   Result Value Ref Range    Cholesterol 167 <200 mg/dL    Triglycerides 294 (H) <150 mg/dL    Direct Measure HDL 47 (L) >=50 mg/dL    LDL Cholesterol Calculated 61 <=100 mg/dL    Non HDL Cholesterol 120 <130 mg/dL    Patient Fasting > 8hrs? Yes     Narrative    Cholesterol  Desirable:  <200 mg/dL    Triglycerides  Normal:  Less than 150 mg/dL  Borderline High:  150-199 mg/dL  High:  200-499 mg/dL  Very High:  Greater than or equal to 500 mg/dL    Direct Measure HDL  Female:  Greater than or equal to 50 mg/dL   Male:  Greater than or equal to 40 mg/dL    LDL Cholesterol  Desirable:  <100mg/dL  Above Desirable:  100-129 mg/dL   Borderline High:  130-159 mg/dL   High:  160-189 mg/dL   Very High:  >= 190 mg/dL    Non HDL Cholesterol  Desirable:  130 mg/dL  Above Desirable:  130-159 mg/dL  Borderline High:  160-189 mg/dL  High:  190-219 mg/dL  Very High:  Greater than or equal to 220 mg/dL   Albumin Random Urine Quantitative with Creat Ratio   Result Value Ref Range    Creatinine Urine mg/dL 174.0 mg/dL      Comment:      The reference ranges have not been established in urine creatinine. The results should be integrated into the clinical context for interpretation.    Albumin Urine mg/L 17.0 mg/L      Comment:      The reference ranges have not been established in urine albumin. The results should be integrated into the clinical context for interpretation.    Albumin Urine mg/g Cr 9.77 0.00 - 25.00 mg/g Cr      Comment:      Microalbuminuria is defined as an albumin:creatinine ratio of 17 to 299 for males and 25 to 299 for  females. A ratio of albumin:creatinine of 300 or higher is indicative of overt proteinuria.  Due to biologic variability, positive results should be confirmed by a second, first-morning random or 24-hour timed urine specimen. If there is discrepancy, a third specimen is recommended. When 2 out of 3 results are in the microalbuminuria range, this is evidence for incipient nephropathy and warrants increased efforts at glucose control, blood pressure control, and institution of therapy with an angiotensin-converting-enzyme (ACE) inhibitor (if the patient can tolerate it).     TSH WITH FREE T4 REFLEX   Result Value Ref Range    TSH 5.66 (H) 0.30 - 4.20 uIU/mL   T4 free   Result Value Ref Range    Free T4 1.09 0.90 - 1.70 ng/dL       If you have any questions or concerns, please call the clinic at the number listed above.       Sincerely,      Warren Brothers PA-C

## 2024-01-24 NOTE — PROGRESS NOTES
Assessment & Plan     Benign essential hypertension    Elevated today. May be related to pain from fractured arm. Will have her come back for MA BP check in about a month. Can adjust if needed at that time. Update labs today.    - COMPREHENSIVE METABOLIC PANEL; Future  - Albumin Random Urine Quantitative with Creat Ratio; Future  - losartan (COZAAR) 50 MG tablet; Take 1 tablet (50 mg) by mouth daily  - COMPREHENSIVE METABOLIC PANEL  - Albumin Random Urine Quantitative with Creat Ratio      Hypertriglyceridemia    Stable. Refilled. Update labs today.    - Lipid panel reflex to direct LDL Fasting; Future  - rosuvastatin (CRESTOR) 10 MG tablet; TAKE 1 TABLET BY MOUTH EVERY NIGHT AT BEDTIME  - Lipid panel reflex to direct LDL Fasting      Hypothyroidism, unspecified type    Has been stable for years. Recheck labs today and refilled.    - TSH WITH FREE T4 REFLEX; Future  - levothyroxine (SYNTHROID/LEVOTHROID) 88 MCG tablet; Take 1 tablet (88 mcg) by mouth daily  - TSH WITH FREE T4 REFLEX      Gastroesophageal reflux disease without esophagitis    Protonix refilled. Works well.      Hiatal hernia    Protonix refilled. Works well.                Matias Cox is a 58 year old, presenting for the following health issues:  Recheck Medication    History of Present Illness       Reason for visit:  Medication Recheck    She eats 2-3 servings of fruits and vegetables daily.She consumes 1 sweetened beverage(s) daily.She exercises with enough effort to increase her heart rate 9 or less minutes per day.  She exercises with enough effort to increase her heart rate 3 or less days per week.   She is taking medications regularly.         Medication Followup of Pantoprazole, Levothyroxine, losartan  Taking Medication as prescribed: yes  Side Effects:  None  Medication Helping Symptoms:  yes  Hypothyroidism Follow-up    Since last visit, patient describes the following symptoms: Weight stable, no hair loss, no skin changes, no  "constipation, no loose stools      Review of Systems  Constitutional, HEENT, cardiovascular, pulmonary, gi and gu systems are negative, except as otherwise noted.      Objective    BP (!) 159/92 (BP Location: Left arm, Patient Position: Sitting, Cuff Size: Adult Large)   Pulse 71   Temp 97.8  F (36.6  C) (Oral)   Resp 16   Ht 1.549 m (5' 1\")   Wt 91.2 kg (201 lb)   LMP 05/15/2018 (Within Days)   SpO2 95%   BMI 37.98 kg/m    Body mass index is 37.98 kg/m .      Physical Exam   GENERAL: alert and no distress  EYES: Eyes grossly normal to inspection, PERRL and conjunctivae and sclerae normal  RESP: lungs clear to auscultation - no rales, rhonchi or wheezes  CV: regular rate and rhythm, normal S1 S2, no S3 or S4, no murmur, click or rub, no peripheral edema  MS: no gross musculoskeletal defects noted, no edema  SKIN: no suspicious lesions or rashes  NEURO: Normal strength and tone, mentation intact and speech normal  PSYCH: mentation appears normal, affect normal/bright            Signed Electronically by: Warren Brothers PA-C    "

## 2024-02-02 ENCOUNTER — TELEPHONE (OUTPATIENT)
Dept: ORTHOPEDICS | Facility: CLINIC | Age: 59
End: 2024-02-02

## 2024-02-02 ENCOUNTER — OFFICE VISIT (OUTPATIENT)
Dept: ORTHOPEDICS | Facility: CLINIC | Age: 59
End: 2024-02-02
Payer: COMMERCIAL

## 2024-02-02 ENCOUNTER — ANCILLARY PROCEDURE (OUTPATIENT)
Dept: GENERAL RADIOLOGY | Facility: CLINIC | Age: 59
End: 2024-02-02
Attending: ORTHOPAEDIC SURGERY
Payer: COMMERCIAL

## 2024-02-02 DIAGNOSIS — S42.331D CLOSED DISPLACED OBLIQUE FRACTURE OF SHAFT OF RIGHT HUMERUS WITH ROUTINE HEALING, SUBSEQUENT ENCOUNTER: Primary | ICD-10-CM

## 2024-02-02 DIAGNOSIS — S42.341D CLOSED DISPLACED SPIRAL FRACTURE OF SHAFT OF RIGHT HUMERUS WITH ROUTINE HEALING, SUBSEQUENT ENCOUNTER: ICD-10-CM

## 2024-02-02 PROCEDURE — 99213 OFFICE O/P EST LOW 20 MIN: CPT | Performed by: ORTHOPAEDIC SURGERY

## 2024-02-02 PROCEDURE — 73060 X-RAY EXAM OF HUMERUS: CPT | Mod: RT | Performed by: RADIOLOGY

## 2024-02-02 NOTE — NURSING NOTE
Reason For Visit:   Chief Complaint   Patient presents with    RECHECK     Follow up right humerus fracture. Pt feels like it's getting better       58 year old  1965    LMP 05/15/2018 (Within Days)     Pain Assessment  Patient Currently in Pain: Yes  0-10 Pain Scale: 3  Primary Pain Location:  (right humerus)      Dimitri Faust, DAVID

## 2024-02-02 NOTE — PROGRESS NOTES
This patient is about 6 weeks out from right humerus fracture.  She is been wearing a clamshell brace and a sling.  She is been wearing the sling less at home and mainly wears it when she is out.  Her pain is greatly improved.    On examination she is alert oriented has a normal mood and affect and is in no acute distress.  She is able to move her right hand fully.    I reviewed x-rays which show good callus at the fracture site with a stable position from our previous films.    This patient seems to be healing well.  I would like her to continue the clamshell brace for another 2 weeks and then she can  discontinue the brace and begin doing some occupational therapy.  I would like to limit her lifting to 2 pounds until I see her back in 2 months with a repeat x-rays.

## 2024-02-02 NOTE — LETTER
2/2/2024         RE: Brooke John  [address]  [facility] [address]        Dear Colleague,    Thank you for referring your patient, Brooke John, to the [facility] [place]. Please see a copy of my visit note below.    This patient is about 6 weeks out from right humerus fracture.  She is been wearing a clamshell brace and a sling.  She is been wearing the sling less at home and mainly wears it when she is out.  Her pain is greatly improved.    On examination she is alert oriented has a normal mood and affect and is in no acute distress.  She is able to move her right hand fully.    I reviewed x-rays which show good callus at the fracture site with a stable position from our previous films.    This patient seems to be healing well.  I would like her to continue the clamshell brace for another 2 weeks and then she can  discontinue the brace and begin doing some occupational therapy.  I would like to limit her lifting to 2 pounds until I see her back in 2 months with a repeat x-rays.      Sincerely,        Derrick Cash MD

## 2024-02-02 NOTE — TELEPHONE ENCOUNTER
BIANCA Carlos calls stating she received a faxed from RAO Jarrell from [address] [place] [address], for occupational therapy that appears to have been sent in error.   Will have Chrystal get back with her at: [phone].     NILSON Mirza RN

## 2024-02-02 NOTE — TELEPHONE ENCOUNTER
Returned phone call to BIANCA Carlos, [facility]. Confirmed occupational therapy order was sent in error. Order can be shredded. No further action needed.

## 2024-02-08 DIAGNOSIS — S42.341D CLOSED DISPLACED SPIRAL FRACTURE OF SHAFT OF RIGHT HUMERUS WITH ROUTINE HEALING, SUBSEQUENT ENCOUNTER: Primary | ICD-10-CM

## 2024-02-22 ENCOUNTER — THERAPY VISIT (OUTPATIENT)
Dept: PHYSICAL THERAPY | Facility: CLINIC | Age: 59
End: 2024-02-22
Attending: ORTHOPAEDIC SURGERY
Payer: COMMERCIAL

## 2024-02-22 DIAGNOSIS — M79.621 PAIN OF RIGHT UPPER ARM: Primary | ICD-10-CM

## 2024-02-22 DIAGNOSIS — S42.341D CLOSED DISPLACED SPIRAL FRACTURE OF SHAFT OF RIGHT HUMERUS WITH ROUTINE HEALING, SUBSEQUENT ENCOUNTER: ICD-10-CM

## 2024-02-22 PROCEDURE — 97162 PT EVAL MOD COMPLEX 30 MIN: CPT | Mod: GP | Performed by: PHYSICAL THERAPIST

## 2024-02-22 PROCEDURE — 97110 THERAPEUTIC EXERCISES: CPT | Mod: GP | Performed by: PHYSICAL THERAPIST
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
## 2024-02-29 ENCOUNTER — THERAPY VISIT (OUTPATIENT)
Dept: PHYSICAL THERAPY | Facility: CLINIC | Age: 59
End: 2024-02-29
Attending: ORTHOPAEDIC SURGERY
Payer: COMMERCIAL

## 2024-02-29 DIAGNOSIS — S42.341D CLOSED DISPLACED SPIRAL FRACTURE OF SHAFT OF RIGHT HUMERUS WITH ROUTINE HEALING, SUBSEQUENT ENCOUNTER: Primary | ICD-10-CM

## 2024-02-29 DIAGNOSIS — M79.621 PAIN OF RIGHT UPPER ARM: ICD-10-CM

## 2024-02-29 PROCEDURE — 97110 THERAPEUTIC EXERCISES: CPT | Mod: GP | Performed by: PHYSICAL THERAPIST

## 2024-03-07 ENCOUNTER — THERAPY VISIT (OUTPATIENT)
Dept: PHYSICAL THERAPY | Facility: CLINIC | Age: 59
End: 2024-03-07
Attending: ORTHOPAEDIC SURGERY
Payer: COMMERCIAL

## 2024-03-07 DIAGNOSIS — M79.621 PAIN OF RIGHT UPPER ARM: ICD-10-CM

## 2024-03-07 DIAGNOSIS — S42.341D CLOSED DISPLACED SPIRAL FRACTURE OF SHAFT OF RIGHT HUMERUS WITH ROUTINE HEALING, SUBSEQUENT ENCOUNTER: Primary | ICD-10-CM

## 2024-03-07 PROCEDURE — 97110 THERAPEUTIC EXERCISES: CPT | Mod: GP | Performed by: PHYSICAL THERAPIST

## 2024-03-21 DIAGNOSIS — S42.341D CLOSED DISPLACED SPIRAL FRACTURE OF SHAFT OF RIGHT HUMERUS WITH ROUTINE HEALING, SUBSEQUENT ENCOUNTER: Primary | ICD-10-CM

## 2024-03-28 ENCOUNTER — THERAPY VISIT (OUTPATIENT)
Dept: PHYSICAL THERAPY | Facility: CLINIC | Age: 59
End: 2024-03-28
Payer: COMMERCIAL

## 2024-03-28 DIAGNOSIS — S42.341D CLOSED DISPLACED SPIRAL FRACTURE OF SHAFT OF RIGHT HUMERUS WITH ROUTINE HEALING, SUBSEQUENT ENCOUNTER: Primary | ICD-10-CM

## 2024-03-28 DIAGNOSIS — M79.621 PAIN OF RIGHT UPPER ARM: ICD-10-CM

## 2024-03-28 PROCEDURE — 97110 THERAPEUTIC EXERCISES: CPT | Mod: GP | Performed by: PHYSICAL THERAPIST

## 2024-04-05 ENCOUNTER — ANCILLARY PROCEDURE (OUTPATIENT)
Dept: GENERAL RADIOLOGY | Facility: CLINIC | Age: 59
End: 2024-04-05
Attending: ORTHOPAEDIC SURGERY
Payer: COMMERCIAL

## 2024-04-05 ENCOUNTER — OFFICE VISIT (OUTPATIENT)
Dept: ORTHOPEDICS | Facility: CLINIC | Age: 59
End: 2024-04-05
Payer: COMMERCIAL

## 2024-04-05 DIAGNOSIS — S42.341D CLOSED DISPLACED SPIRAL FRACTURE OF SHAFT OF RIGHT HUMERUS WITH ROUTINE HEALING, SUBSEQUENT ENCOUNTER: ICD-10-CM

## 2024-04-05 DIAGNOSIS — S42.341D CLOSED DISPLACED SPIRAL FRACTURE OF SHAFT OF RIGHT HUMERUS WITH ROUTINE HEALING, SUBSEQUENT ENCOUNTER: Primary | ICD-10-CM

## 2024-04-05 PROCEDURE — 99213 OFFICE O/P EST LOW 20 MIN: CPT | Mod: GC | Performed by: ORTHOPAEDIC SURGERY

## 2024-04-05 PROCEDURE — 73060 X-RAY EXAM OF HUMERUS: CPT | Mod: RT | Performed by: RADIOLOGY

## 2024-04-05 NOTE — LETTER
4/5/2024         RE: Brooke John  [address]  [facility] [address]        Dear Colleague,    Thank you for referring your patient, Brooke John, to the [facility] [place]. Please see a copy of my visit note below.    I was present with the resident during the history and exam.  I discussed the case with the resident and agree with the findings as documented in the assessment and plan.    Postoperative follow-up clinic visit    Brooke John right-hand-dominant 58-year-old female who presents now 15 weeks out for follow-up of a right humeral shaft fracture.  She has been undergoing nonoperative treatment.  Her Vinson brace was discontinued at her last appointment approximately 8 weeks ago.  She states she has had ongoing 3/10 pain to the fracture site.  It worsens with movement.  She has been using her arm and attending physical therapy.  She says the heaviest thing she has lifted is approximately 2 pounds.  She denies any new numbness, tingling or weakness.  States she is not needing any pain medications.    The patient is not a smoker.  She has been taking vitamin D and calcium supplements.    EXAM:  General: Well-appearing, awake, alert, no acute distress.  Respiratory: NLB on room air   CV: extremities warm and well perfused.   Right upper extremity: Mild swelling to the mid arm.  Skin intact.  Tenderness to palpation over the fracture site.  Possible motion present, however, this is difficult to tell.  5/5 strength in EPL/FPL/intrinsics.  Able to extend MCP joints and wrist.  Sensation is intact to light touch in the median, radial, ulnar distributions.  Radial pulse 2+.    IMAGING:  Right humerus x-rays were obtained today and demonstrate increased callus formation compared to previous imaging on February 2, 2024.  There appears to be bridging bone along all 4 cortices.  Alignment is stable.  No new fractures present.    IMPRESSION:  58-year-old  right-hand-dominant female 15 weeks out from a right humeral shaft fracture undergoing nonoperative treatment with ongoing mild to moderate pain.    PLAN:  Imaging was reviewed with the patient.  We discussed that her imaging does show increased callus formation and bony bridging today.  She is however having persistent pain at the fracture site.  Given her imaging findings and the fact that it does seem to be slowly improving, we discussed continuing conservative treatment.  She was instructed to use the arm as tolerated.  We will have her follow-up in 6 weeks with new right humerus x-rays.  If she continues to have discomfort at that time and there is concern for nonunion, we will obtain a CT scan and consider bone stimulator if this demonstrates a non union.    The patient was seen discussed with Dr. Cash who agrees with the above assessment and plan.     Mónica Morrell MD, PGY4  Orthopedic Surgery       Derrick Cash MD

## 2024-04-05 NOTE — NURSING NOTE
Reason For Visit:   Chief Complaint   Patient presents with    RECHECK     Follow up right humerus fracture. Still hurts once in a while per pt        58 year old  1965    Primary MD: Mary Hines      [facility] 05/15/2018 (Within Days)     Pain Assessment  Patient Currently in Pain: Yes  0-10 Pain Scale: 3  Primary Pain Location:  (right humerus)      Dimitri Faust, ATC

## 2024-04-05 NOTE — PROGRESS NOTES
Postoperative follow-up clinic visit    Brooke John right-hand-dominant 58-year-old female who presents now 15 weeks out for follow-up of a right humeral shaft fracture.  She has been undergoing nonoperative treatment.  Her Vinson brace was discontinued at her last appointment approximately 8 weeks ago.  She states she has had ongoing 3/10 pain to the fracture site.  It worsens with movement.  She has been using her arm and attending physical therapy.  She says the heaviest thing she has lifted is approximately 2 pounds.  She denies any new numbness, tingling or weakness.  States she is not needing any pain medications.    The patient is not a smoker.  She has been taking vitamin D and calcium supplements.    EXAM:  General: Well-appearing, awake, alert, no acute distress.  Respiratory: NLB on room air   CV: extremities warm and well perfused.   Right upper extremity: Mild swelling to the mid arm.  Skin intact.  Tenderness to palpation over the fracture site.  Possible motion present, however, this is difficult to tell.  5/5 strength in EPL/FPL/intrinsics.  Able to extend MCP joints and wrist.  Sensation is intact to light touch in the median, radial, ulnar distributions.  Radial pulse 2+.    IMAGING:  Right humerus x-rays were obtained today and demonstrate increased callus formation compared to previous imaging on February 2, 2024.  There appears to be bridging bone along all 4 cortices.  Alignment is stable.  No new fractures present.    IMPRESSION:  58-year-old right-hand-dominant female 15 weeks out from a right humeral shaft fracture undergoing nonoperative treatment with ongoing mild to moderate pain.    PLAN:  Imaging was reviewed with the patient.  We discussed that her imaging does show increased callus formation and bony bridging today.  She is however having persistent pain at the fracture site.  Given her imaging findings and the fact that it does seem to be slowly improving, we discussed  continuing conservative treatment.  She was instructed to use the arm as tolerated.  We will have her follow-up in 6 weeks with new right humerus x-rays.  If she continues to have discomfort at that time and there is concern for nonunion, we will obtain a CT scan and consider bone stimulator if this demonstrates a non union.    The patient was seen discussed with Dr. Cash who agrees with the above assessment and plan.     Mónica Morrell MD, PGY4  Orthopedic Surgery

## 2024-04-22 DIAGNOSIS — J45.40 MODERATE PERSISTENT ASTHMA WITHOUT COMPLICATION: ICD-10-CM

## 2024-04-22 RX ORDER — CETIRIZINE HYDROCHLORIDE 10 MG/1
10 TABLET ORAL EVERY MORNING
Qty: 90 TABLET | Refills: 1 | Status: SHIPPED | OUTPATIENT
Start: 2024-04-22 | End: 2024-08-02

## 2024-04-29 ENCOUNTER — HOSPITAL ENCOUNTER (EMERGENCY)
Facility: CLINIC | Age: 59
Discharge: HOME | End: 2024-04-29
Attending: EMERGENCY MEDICINE | Admitting: EMERGENCY MEDICINE
Payer: COMMERCIAL

## 2024-04-29 VITALS
WEIGHT: 204.59 LBS | HEART RATE: 81 BPM | TEMPERATURE: 98.4 F | OXYGEN SATURATION: 96 % | HEIGHT: 61 IN | BODY MASS INDEX: 38.63 KG/M2 | RESPIRATION RATE: 18 BRPM | SYSTOLIC BLOOD PRESSURE: 163 MMHG | DIASTOLIC BLOOD PRESSURE: 102 MMHG

## 2024-04-29 DIAGNOSIS — R22.0 FACIAL SWELLING: ICD-10-CM

## 2024-04-29 DIAGNOSIS — K04.7 DENTAL ABSCESS: ICD-10-CM

## 2024-04-29 PROCEDURE — 99283 EMERGENCY DEPT VISIT LOW MDM: CPT

## 2024-04-29 RX ORDER — CLINDAMYCIN HCL 150 MG
450 CAPSULE ORAL 3 TIMES DAILY
Qty: 63 CAPSULE | Refills: 0 | Status: SHIPPED | OUTPATIENT
Start: 2024-04-29 | End: 2024-05-06

## 2024-04-29 ASSESSMENT — COLUMBIA-SUICIDE SEVERITY RATING SCALE - C-SSRS
1. IN THE PAST MONTH, HAVE YOU WISHED YOU WERE DEAD OR WISHED YOU COULD GO TO SLEEP AND NOT WAKE UP?: NO
2. HAVE YOU ACTUALLY HAD ANY THOUGHTS OF KILLING YOURSELF IN THE PAST MONTH?: NO
6. HAVE YOU EVER DONE ANYTHING, STARTED TO DO ANYTHING, OR PREPARED TO DO ANYTHING TO END YOUR LIFE?: NO

## 2024-04-29 ASSESSMENT — ACTIVITIES OF DAILY LIVING (ADL): ADLS_ACUITY_SCORE: 36
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
## 2024-04-29 NOTE — ED TRIAGE NOTES
Pt presents to ED with left facial swelling. States she lost a filling a while back and has since developed swelling and pressure to the left side of her face. Notes that her face also feels warm to the touch.   Denies known fevers. Unable to get into a dentist that is covered by her insurance and was told to come into the ED until she can be seen.

## 2024-04-29 NOTE — DISCHARGE INSTRUCTIONS
Please follow up with a dentist as soon as possible.  Return to the ED if you notice fevers, increasing swelling or pain, difficulty swallowing or drooling, voice changes or other acute concerns.  May use tylenol and ibuprofen for pain control.  Please complete course of antibiotics for dental infection.      Discharge Instructions  Dental Pain    You have been seen today for a toothache. Your pain may be caused by an exposed nerve, an infection (pulpitis), a root abscess (pocket of pus), or other problems. You will need to see a dentist for a solution to your tooth problem. Emergency Department care is only to help control your problem until you can see a dentist; we cannot provide complete dental care.  Today, we did not find any sign that your toothache was caused by any dangerous or life-threatening condition, but sometimes symptoms develop over time and cannot be found during an emergency visit, so it is very important that you follow up with your dentist.      Generally, every Emergency Department visit should have a follow-up clinic visit with either a primary or a specialty clinic/provider. Please follow-up as instructed by your emergency provider today.    Return to the Emergency Department if:  You develop a new fever over 100.4 F.  You cannot open your mouth normally, cannot move your tongue well, or cannot swallow.  You have new or increased swelling of your face or neck.  You develop drainage of pus or foul smelling material from around your tooth.  What can I do to help myself?  Take any antibiotic the provider may have prescribed for you today.  Avoid very hot or very cold foods as both can cause pain.  Make an appointment to see a dentist as soon as possible. Dentists are generally not  on-staff  at hospitals so we cannot  refer  to you to dentist but we may be able to provide a list of dental clinics to help you.  If you were given a prescription for medicine here today, be sure to read all of the  information (including the package insert) that comes with your prescription.  This will include important information about the medicine, its side effects, and any warnings that you need to know about.  The pharmacist who fills the prescription can provide more information and answer questions you may have about the medicine.  If you have questions or concerns that the pharmacist cannot address, please call or return to the Emergency Department.   Remember that you can always come back to the Emergency Department if you are not able to see your regular provider in the amount of time listed above, if you get any new symptoms, or if there is anything that worries you.    Dental Providers    EMERGENCY DENTAL CARE    [facility]       [phone]  [facility] (Hours 9a-9p)  [phone]  [facility]    [phone]  [facility]   [phone]        REFERRALS    [facility] [facility]    [phone]  [facility]    [phone]  [facility]    [phone]    DENTAL CLINICS    Many of these clinics have reduced cost or payment plans, some take walk-ins. Call the clinic to get this information.      [facility]     [phone]  [facility]      [phone]  [facility]   [phone]   [facility]      [phone]  [facility]   [phone]  [facility]     [phone]  [facility] (appointment only)   [phone]  [facility]      [phone]  [facility]      [phone]  [facility]     [phone]  [facility]    [phone]  [facility]     [phone]  [facility]     [phone]  [facility] (free tooth pulling Monday & Wednesday) [phone]  [facility]:   Adults       [phone]   Children      [phone]   Emergency      [phone]  [facility]     [phone]  [facility]       [phone]  [facility]     [phone]

## 2024-05-03 DIAGNOSIS — S42.341D CLOSED DISPLACED SPIRAL FRACTURE OF SHAFT OF RIGHT HUMERUS WITH ROUTINE HEALING, SUBSEQUENT ENCOUNTER: Primary | ICD-10-CM

## 2024-05-17 ENCOUNTER — OFFICE VISIT (OUTPATIENT)
Dept: ORTHOPEDICS | Facility: CLINIC | Age: 59
End: 2024-05-17
Payer: COMMERCIAL

## 2024-05-17 ENCOUNTER — ANCILLARY PROCEDURE (OUTPATIENT)
Dept: GENERAL RADIOLOGY | Facility: CLINIC | Age: 59
End: 2024-05-17
Attending: ORTHOPAEDIC SURGERY
Payer: COMMERCIAL

## 2024-05-17 DIAGNOSIS — S42.341D CLOSED DISPLACED SPIRAL FRACTURE OF SHAFT OF RIGHT HUMERUS WITH ROUTINE HEALING, SUBSEQUENT ENCOUNTER: ICD-10-CM

## 2024-05-17 DIAGNOSIS — S42.341D CLOSED DISPLACED SPIRAL FRACTURE OF SHAFT OF RIGHT HUMERUS WITH ROUTINE HEALING, SUBSEQUENT ENCOUNTER: Primary | ICD-10-CM

## 2024-05-17 PROCEDURE — 73060 X-RAY EXAM OF HUMERUS: CPT | Mod: RT | Performed by: RADIOLOGY

## 2024-05-17 PROCEDURE — 99213 OFFICE O/P EST LOW 20 MIN: CPT | Performed by: ORTHOPAEDIC SURGERY

## 2024-05-17 NOTE — LETTER
5/17/2024         RE: Brooke John  [address]  [place] [address]        Dear Colleague,    Thank you for referring your patient, Brooke John, to the [facility] [place]. Please see a copy of my visit note below.    This patient is about 5 months out from a right midshaft humerus fracture.  She still has some soreness in the arm doing activities but is doing occasional exercises and has worked with a therapist as well.    Today on examination she is able to get her elbow extended nearly fully and can flex fully.  Motion of the shoulder is incomplete but without significant discomfort.  There is no edema or erythema or swelling in the right upper extremity at all.    I reviewed the x-rays which show maturing callus circumferentially around her fracture site.  This fracture is healed.    I have encouraged the patient to continue to work on her strength and range of motion without any specific restriction.  She will return to see us as needed.  I have answered all her questions today.    Reason For Visit:   Chief Complaint   Patient presents with    RECHECK     Closed displaced spiral fracture of shaft of right humerus with routine healing       Primary MD: Mary Hines  Ref. MD: Est     LMP 05/15/2018 (Within Days)     Pain Assessment  Patient Currently in Pain: Yes  Patient's Stated Pain Goal: 4  0-10 Pain Scale: 4  Primary Pain Location: Elbow (Right)  Pain Descriptors: Tightness, Sore, Aching, Discomfort         KHADRA Collins MD

## 2024-05-17 NOTE — PROGRESS NOTES
Reason For Visit:   Chief Complaint   Patient presents with    RECHECK     Closed displaced spiral fracture of shaft of right humerus with routine healing       Primary MD: Mary Hines  Ref. MD: Est     LMP 05/15/2018 (Within Days)     Pain Assessment  Patient Currently in Pain: Yes  Patient's Stated Pain Goal: 4  0-10 Pain Scale: 4  Primary Pain Location: Elbow (Right)  Pain Descriptors: Tightness, Sore, Aching, Discomfort         Messi Phillips, [facility]

## 2024-05-17 NOTE — PROGRESS NOTES
This patient is about 5 months out from a right midshaft humerus fracture.  She still has some soreness in the arm doing activities but is doing occasional exercises and has worked with a therapist as well.    Today on examination she is able to get her elbow extended nearly fully and can flex fully.  Motion of the shoulder is incomplete but without significant discomfort.  There is no edema or erythema or swelling in the right upper extremity at all.    I reviewed the x-rays which show maturing callus circumferentially around her fracture site.  This fracture is healed.    I have encouraged the patient to continue to work on her strength and range of motion without any specific restriction.  She will return to see us as needed.  I have answered all her questions today.

## 2024-05-24 NOTE — PROGRESS NOTES
DISCHARGE  Reason for Discharge: Patient has failed to schedule further appointments.    Equipment Issued: none    Discharge Plan: Patient to continue home program.    Referring Provider:  Derrick Cash       03/28/24 0500   Appointment Info   Signing clinician's name / credentials Bharathi Guerra, PT   Total/Authorized Visits 10 (E&T)   Visits Used 3   Medical Diagnosis Closed displaced spiral fracture of shaft of right humerus with routine healing, subsequent encounter   PT Tx Diagnosis R humerus fx, R upper arm pain   Other pertinent information MD order: ROM and light strength no more than 2 lbs until she returns back to MD. can dc clamshell brace as of 2/16   Quick Adds Certification   Progress Note/Certification   Start of Care Date 02/22/24   Onset of illness/injury or Date of Surgery 12/21/23   Therapy Frequency 1 x a week   Predicted Duration 10 weeks   Certification date from 02/22/24   Certification date to 05/02/24   Progress Note Completed Date 02/22/24   GOALS   PT Goals 2   PT Goal 1   Goal Identifier Goal 1   Goal Description Patient will be able to reach her arm overhead WFL   Rationale to maximize safety and independence with performance of ADLs and functional tasks;to maximize safety and independence within the home;to maximize safety and independence within the community;to maximize safety and independence with transportation;to maximize safety and independence with self cares   Target Date 05/02/24   PT Goal 2   Goal Identifier Goal 2   Goal Description Patient will be able to lift 10 lbs to counter height   Rationale to maximize safety and independence with performance of ADLs and functional tasks;to maximize safety and independence within the home;to maximize safety and independence within the community;to maximize safety and independence with self cares   Target Date 05/02/24   Subjective Report   Subjective Report May be over usin arm home, noting more soreness past few days.   Fair HEP cordell and compliance. To recheck with MD near future   Objective Measures   Objective Measures Objective Measure 1   Objective Measure 1   Objective Measure AROM shoulder   Details flexion 152 +/- and abd 90 +.  PROM: flex ERT, abd 145, ER 50   Treatment Interventions (PT)   Interventions Therapeutic Procedure/Exercise   Therapeutic Procedure/Exercise   Therapeutic Procedures: strength, endurance, ROM, flexibility minutes (11647) 38   Therapeutic Procedures Ther Proc 2;Ther Proc 3;Ther Proc 4;Ther Proc 5;Ther Proc 6   Ther Proc 1 Education   Ther Proc 1 - Details Discussed importance of not lifting more than 2 lbs at home and discussed weight of different household things   Ther Proc 2 PROM Shoulder flexion, abduction, ER   Ther Proc 2 - Details 8   Ther Proc 6 SL shoulder Abduction   Ther Proc 6 - Details + and poor control not given for home   PTRx Ther Proc 1 Wand Shoulder Flexion Supine   PTRx Ther Proc 1 - Details 10x   PTRx Ther Proc 2 Wand Shoulder Abduction Standing   PTRx Ther Proc 2 - Details 10x advised to slow down and work into stretch but not into pain   PTRx Ther Proc 3 Elbow Active Range of Motion Combined Extension and Flexion   PTRx Ther Proc 3 - Details rev   Skilled Intervention MC and VC slow down and correct hand placment   Patient Response/Progress tolerated well no pain   PTRx Ther Proc 4 Supine Active Shoulder Flexion   PTRx Ther Proc 4 - Details 10x   PTRx Ther Proc 5 Shoulder AROM IR/ER Supine at 30-40 Degrees Abduction   PTRx Ther Proc 5 - Details 10x   PTRx Ther Proc 6 Seated Scapular Retraction AROM    PTRx Ther Proc 6 - Details 10x mod cuing for depression    PTRx Ther Proc 7 Shoulder Extension in Standing   PTRx Ther Proc 7 - Details 10x AG   PTRx Ther Proc 8 Shoulder External Rotation Sidelying   PTRx Ther Proc 8 - Details 10x AG   Education   Learner/Method No Barriers to Learning   Education Comments print outs   Plan   Home program ptrx   Plan for next session cotninue ROM  and light strength   Total Session Time   Timed Code Treatment Minutes 38   Total Treatment Time (sum of timed and untimed services) 38

## 2024-07-03 DIAGNOSIS — E03.9 HYPOTHYROIDISM, UNSPECIFIED TYPE: ICD-10-CM

## 2024-07-03 DIAGNOSIS — K21.9 GASTROESOPHAGEAL REFLUX DISEASE WITHOUT ESOPHAGITIS: ICD-10-CM

## 2024-07-03 DIAGNOSIS — I10 BENIGN ESSENTIAL HYPERTENSION: ICD-10-CM

## 2024-07-03 DIAGNOSIS — E78.1 HYPERTRIGLYCERIDEMIA: ICD-10-CM

## 2024-07-03 DIAGNOSIS — K44.9 HIATAL HERNIA: ICD-10-CM

## 2024-07-03 DIAGNOSIS — J45.40 MODERATE PERSISTENT ASTHMA WITHOUT COMPLICATION: ICD-10-CM

## 2024-07-05 RX ORDER — ROSUVASTATIN CALCIUM 10 MG/1
TABLET, COATED ORAL
Qty: 90 TABLET | Refills: 1 | Status: SHIPPED | OUTPATIENT
Start: 2024-07-05

## 2024-07-05 RX ORDER — LEVOTHYROXINE SODIUM 88 UG/1
88 TABLET ORAL DAILY
Qty: 90 TABLET | Refills: 0 | Status: SHIPPED | OUTPATIENT
Start: 2024-07-05 | End: 2024-08-02

## 2024-07-05 RX ORDER — PANTOPRAZOLE SODIUM 40 MG/1
40 TABLET, DELAYED RELEASE ORAL DAILY
Qty: 90 TABLET | Refills: 1 | Status: SHIPPED | OUTPATIENT
Start: 2024-07-05

## 2024-07-05 RX ORDER — CETIRIZINE HYDROCHLORIDE 10 MG/1
10 TABLET ORAL EVERY MORNING
Qty: 90 TABLET | Refills: 1 | OUTPATIENT
Start: 2024-07-05

## 2024-07-05 RX ORDER — LOSARTAN POTASSIUM 50 MG/1
50 TABLET ORAL DAILY
Qty: 90 TABLET | Refills: 0 | Status: SHIPPED | OUTPATIENT
Start: 2024-07-05 | End: 2024-08-02

## 2024-07-10 NOTE — TELEPHONE ENCOUNTER
Brooke states she is going to see a different provider in [place] for her medication recheck and will get refills from them at her next appointment.    Salome Hi MA

## 2024-07-12 ENCOUNTER — THERAPY VISIT (OUTPATIENT)
Dept: PHYSICAL THERAPY | Facility: CLINIC | Age: 59
End: 2024-07-12
Payer: COMMERCIAL

## 2024-07-12 DIAGNOSIS — G89.29 CHRONIC RIGHT SHOULDER PAIN: ICD-10-CM

## 2024-07-12 DIAGNOSIS — S42.341D CLOSED DISPLACED SPIRAL FRACTURE OF SHAFT OF RIGHT HUMERUS WITH ROUTINE HEALING, SUBSEQUENT ENCOUNTER: Primary | ICD-10-CM

## 2024-07-12 DIAGNOSIS — M25.511 CHRONIC RIGHT SHOULDER PAIN: ICD-10-CM

## 2024-07-12 PROCEDURE — 97110 THERAPEUTIC EXERCISES: CPT | Mod: GP | Performed by: PHYSICAL THERAPIST

## 2024-07-12 PROCEDURE — 97161 PT EVAL LOW COMPLEX 20 MIN: CPT | Mod: GP | Performed by: PHYSICAL THERAPIST

## 2024-07-12 ASSESSMENT — ACTIVITIES OF DAILY LIVING (ADL)
WASHING_YOUR_HAIR?: 9
PLEASE_INDICATE_YOR_PRIMARY_REASON_FOR_REFERRAL_TO_THERAPY:: SHOULDER
WHEN_LYING_ON_THE_INVOLVED_SIDE: 9
WASHING_YOUR_BACK: 9
PUTTING_ON_AN_UNDERSHIRT_OR_A_PULLOVER_SWEATER: 9
PUSHING_WITH_THE_INVOLVED_ARM: 9
PLACING_AN_OBJECT_ON_A_HIGH_SHELF: 9
PUTTING_ON_YOUR_PANTS: 0
REACHING_FOR_SOMETHING_ON_A_HIGH_SHELF: 10
CARRYING_A_HEAVY_OBJECT_OF_10_POUNDS: 9
AT_ITS_WORST?: 5
TOUCHING_THE_BACK_OF_YOUR_NECK: 10
REMOVING_SOMETHING_FROM_YOUR_BACK_POCKET: 9
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
## 2024-08-02 ENCOUNTER — OFFICE VISIT (OUTPATIENT)
Dept: FAMILY MEDICINE | Facility: CLINIC | Age: 59
End: 2024-08-02
Payer: COMMERCIAL

## 2024-08-02 VITALS
WEIGHT: 196 LBS | TEMPERATURE: 97.9 F | BODY MASS INDEX: 37 KG/M2 | OXYGEN SATURATION: 96 % | RESPIRATION RATE: 16 BRPM | DIASTOLIC BLOOD PRESSURE: 92 MMHG | SYSTOLIC BLOOD PRESSURE: 135 MMHG | HEART RATE: 94 BPM | HEIGHT: 61 IN

## 2024-08-02 DIAGNOSIS — J45.40 MODERATE PERSISTENT ASTHMA WITHOUT COMPLICATION: ICD-10-CM

## 2024-08-02 DIAGNOSIS — G43.909 MIGRAINE WITHOUT STATUS MIGRAINOSUS, NOT INTRACTABLE, UNSPECIFIED MIGRAINE TYPE: ICD-10-CM

## 2024-08-02 DIAGNOSIS — I10 BENIGN ESSENTIAL HYPERTENSION: Primary | ICD-10-CM

## 2024-08-02 DIAGNOSIS — J30.2 SEASONAL ALLERGIC RHINITIS, UNSPECIFIED TRIGGER: ICD-10-CM

## 2024-08-02 DIAGNOSIS — E03.9 HYPOTHYROIDISM, UNSPECIFIED TYPE: ICD-10-CM

## 2024-08-02 PROCEDURE — 99214 OFFICE O/P EST MOD 30 MIN: CPT | Performed by: GENERAL PRACTICE

## 2024-08-02 PROCEDURE — G2211 COMPLEX E/M VISIT ADD ON: HCPCS | Performed by: GENERAL PRACTICE

## 2024-08-02 RX ORDER — LOSARTAN POTASSIUM 50 MG/1
50 TABLET ORAL DAILY
Qty: 90 TABLET | Refills: 1 | Status: SHIPPED | OUTPATIENT
Start: 2024-08-02

## 2024-08-02 RX ORDER — FLUTICASONE PROPIONATE 50 MCG
SPRAY, SUSPENSION (ML) NASAL
Qty: 16 G | Refills: 3 | Status: SHIPPED | OUTPATIENT
Start: 2024-08-02

## 2024-08-02 RX ORDER — CETIRIZINE HYDROCHLORIDE 10 MG/1
10 TABLET ORAL EVERY MORNING
Qty: 90 TABLET | Refills: 1 | Status: SHIPPED | OUTPATIENT
Start: 2024-08-02

## 2024-08-02 RX ORDER — LEVOTHYROXINE SODIUM 88 UG/1
88 TABLET ORAL DAILY
Qty: 90 TABLET | Refills: 1 | Status: SHIPPED | OUTPATIENT
Start: 2024-08-02

## 2024-08-02 RX ORDER — ALBUTEROL SULFATE 90 UG/1
2 AEROSOL, METERED RESPIRATORY (INHALATION) EVERY 4 HOURS PRN
Qty: 18 G | Refills: 3 | Status: SHIPPED | OUTPATIENT
Start: 2024-08-02

## 2024-08-02 RX ORDER — SUMATRIPTAN 25 MG/1
TABLET, FILM COATED ORAL
Qty: 8 TABLET | Refills: 3 | Status: SHIPPED | OUTPATIENT
Start: 2024-08-02

## 2024-08-02 ASSESSMENT — PAIN SCALES - GENERAL: PAINLEVEL: NO PAIN (0)

## 2024-08-02 ASSESSMENT — ASTHMA QUESTIONNAIRES
QUESTION_5 LAST FOUR WEEKS HOW WOULD YOU RATE YOUR ASTHMA CONTROL: SOMEWHAT CONTROLLED
ACT_TOTALSCORE: 15
QUESTION_1 LAST FOUR WEEKS HOW MUCH OF THE TIME DID YOUR ASTHMA KEEP YOU FROM GETTING AS MUCH DONE AT WORK, SCHOOL OR AT HOME: ALL OF THE TIME
QUESTION_2 LAST FOUR WEEKS HOW OFTEN HAVE YOU HAD SHORTNESS OF BREATH: ONCE OR TWICE A WEEK
QUESTION_4 LAST FOUR WEEKS HOW OFTEN HAVE YOU USED YOUR RESCUE INHALER OR NEBULIZER MEDICATION (SUCH AS ALBUTEROL): TWO OR THREE TIMES PER WEEK
ACT_TOTALSCORE: 15
QUESTION_3 LAST FOUR WEEKS HOW OFTEN DID YOUR ASTHMA SYMPTOMS (WHEEZING, COUGHING, SHORTNESS OF BREATH, CHEST TIGHTNESS OR PAIN) WAKE YOU UP AT NIGHT OR EARLIER THAN USUAL IN THE MORNING: ONCE OR TWICE
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
## 2024-08-02 NOTE — PROGRESS NOTES
"  Assessment & Plan     Benign essential hypertension  Slightly elevated  Low salt   Weight is trending down  - losartan (COZAAR) 50 MG tablet; Take 1 tablet (50 mg) by mouth daily    Moderate persistent asthma without complication  Uses albuterol 2-3 x per week  - albuterol (PROAIR HFA/PROVENTIL HFA/VENTOLIN HFA) 108 (90 Base) MCG/ACT inhaler; Inhale 2 puffs into the lungs every 4 hours as needed for shortness of breath or wheezing Cough  - cetirizine (ZYRTEC) 10 MG tablet; Take 1 tablet (10 mg) by mouth every morning  - fluticasone (FLONASE) 50 MCG/ACT nasal spray; SHAKE LIQUID AND USE 2 SPRAYS IN EACH NOSTRIL DAILY    Seasonal allergic rhinitis, unspecified trigger  Refill  - fluticasone (FLONASE) 50 MCG/ACT nasal spray; SHAKE LIQUID AND USE 2 SPRAYS IN EACH NOSTRIL DAILY    Hypothyroidism, unspecified type  Stable   - levothyroxine (SYNTHROID/LEVOTHROID) 88 MCG tablet; Take 1 tablet (88 mcg) by mouth daily    Migraine without status migrainosus, not intractable, unspecified migraine type  Stable   - SUMAtriptan (IMITREX) 25 MG tablet; TAKE 1 TABLET BY MOUTH AT ONSET OF HEADACHE. MAY REPEAT IN 2 HOURS AS NEEDED. MAX  MG/ 24 HOURS    Fill out form when available.  Told patient I am out of the office next week.       BMI  Estimated body mass index is 37.65 kg/m  as calculated from the following:    Height as of this encounter: 1.537 m (5' 0.5\").    Weight as of this encounter: 88.9 kg (196 lb).   Weight management plan: Discussed healthy diet and exercise guidelines          Matias Cox is a 58 year old, presenting for the following health issues:  Forms (For Medical Assistance through the [facility] to get non dairy foods due to allergies. Patient states she forgot to bring today, but friend will bring back. ), Recheck Medication, and Establish Care        8/2/2024     9:23 AM   Additional Questions   Roomed by Clara         8/2/2024   Forms   Any forms needing to be completed Yes          8/2/2024     " "9:23 AM   Patient Reported Additional Medications   Patient reports taking the following new medications none     Establish Care    Refill meds    Using albuterol 2-3 x per week  Has migraine 1-2 per month    History of Present Illness       Reason for visit:   check up and fill out a dietary form for more assistance    She eats 4 or more servings of fruits and vegetables daily.She consumes 0 sweetened beverage(s) daily.She exercises with enough effort to increase her heart rate 20 to 29 minutes per day.  She exercises with enough effort to increase her heart rate 7 days per week.   She is taking medications regularly.         Hyperlipidemia Follow-Up    Are you regularly taking any medication or supplement to lower your cholesterol?   Yes- Rosuvastatin  Are you having muscle aches or other side effects that you think could be caused by your cholesterol lowering medication?  No    Hypothyroidism Follow-up    Since last visit, patient describes the following symptoms: dry skin              Review of Systems  Constitutional, HEENT, cardiovascular, pulmonary, gi and gu systems are negative, except as otherwise noted.      Objective    BP (!) 135/92 (BP Location: Right arm, Patient Position: Sitting, Cuff Size: Adult Regular)   Pulse 94   Temp 97.9  F (36.6  C) (Oral)   Resp 16   Ht 1.537 m (5' 0.5\")   Wt 88.9 kg (196 lb)   LMP 05/15/2018 (Within Days)   SpO2 96%   BMI 37.65 kg/m    Body mass index is 37.65 kg/m .  Physical Exam  Constitutional:       Appearance: Normal appearance.   Eyes:      Extraocular Movements: Extraocular movements intact.   Cardiovascular:      Rate and Rhythm: Normal rate and regular rhythm.   Pulmonary:      Effort: Pulmonary effort is normal.      Breath sounds: Normal breath sounds.   Abdominal:      Palpations: Abdomen is soft.   Musculoskeletal:         General: Normal range of motion.      Cervical back: Normal range of motion.      Comments: scoliosis   Skin:     General: Skin " is warm.   Neurological:      General: No focal deficit present.      Mental Status: She is alert and oriented to person, place, and time. Mental status is at baseline.   Psychiatric:         Mood and Affect: Mood normal.         Behavior: Behavior normal.         Thought Content: Thought content normal.         Judgment: Judgment normal.                    Signed Electronically by: Jacqueline Crowder MD

## 2024-08-09 ENCOUNTER — THERAPY VISIT (OUTPATIENT)
Dept: PHYSICAL THERAPY | Facility: CLINIC | Age: 59
End: 2024-08-09
Payer: COMMERCIAL

## 2024-08-09 DIAGNOSIS — G89.29 CHRONIC RIGHT SHOULDER PAIN: ICD-10-CM

## 2024-08-09 DIAGNOSIS — M25.511 CHRONIC RIGHT SHOULDER PAIN: ICD-10-CM

## 2024-08-09 DIAGNOSIS — S42.341D CLOSED DISPLACED SPIRAL FRACTURE OF SHAFT OF RIGHT HUMERUS WITH ROUTINE HEALING, SUBSEQUENT ENCOUNTER: Primary | ICD-10-CM

## 2024-08-09 PROCEDURE — 97110 THERAPEUTIC EXERCISES: CPT | Mod: GP | Performed by: PHYSICAL THERAPIST

## 2024-08-23 ENCOUNTER — THERAPY VISIT (OUTPATIENT)
Dept: PHYSICAL THERAPY | Facility: CLINIC | Age: 59
End: 2024-08-23
Payer: COMMERCIAL

## 2024-08-23 DIAGNOSIS — S42.341D CLOSED DISPLACED SPIRAL FRACTURE OF SHAFT OF RIGHT HUMERUS WITH ROUTINE HEALING, SUBSEQUENT ENCOUNTER: Primary | ICD-10-CM

## 2024-08-23 DIAGNOSIS — M25.511 CHRONIC RIGHT SHOULDER PAIN: ICD-10-CM

## 2024-08-23 DIAGNOSIS — G89.29 CHRONIC RIGHT SHOULDER PAIN: ICD-10-CM

## 2024-08-23 PROCEDURE — 97110 THERAPEUTIC EXERCISES: CPT | Mod: GP | Performed by: PHYSICAL THERAPIST

## 2024-09-09 ENCOUNTER — TELEPHONE (OUTPATIENT)
Dept: FAMILY MEDICINE | Facility: CLINIC | Age: 59
End: 2024-09-09
Payer: COMMERCIAL

## 2024-09-09 NOTE — TELEPHONE ENCOUNTER
Forms/Letter Request    Type of form/letter: [facility] - ICD-10 codes    Do we have the form/letter: Yes:     Who is the form from? above    Where did/will the form come from? form was faxed in    When is form/letter needed by: asap    How would you like the form/letter returned: Fax : [phone]    Patient Notified form requests are processed in 5-7 business days:  NA    Okay to leave a detailed message?:  NA    Put in providers in box    Desi FRANCIS

## 2024-09-10 ENCOUNTER — MEDICAL CORRESPONDENCE (OUTPATIENT)
Dept: HEALTH INFORMATION MANAGEMENT | Facility: CLINIC | Age: 59
End: 2024-09-10
Payer: COMMERCIAL

## 2024-09-11 NOTE — TELEPHONE ENCOUNTER
Form signed   And faxed back to [phone]  Sent to [facility]  And filed in [facility].

## 2024-09-13 ENCOUNTER — THERAPY VISIT (OUTPATIENT)
Dept: PHYSICAL THERAPY | Facility: CLINIC | Age: 59
End: 2024-09-13
Payer: COMMERCIAL

## 2024-09-13 DIAGNOSIS — M25.511 CHRONIC RIGHT SHOULDER PAIN: ICD-10-CM

## 2024-09-13 DIAGNOSIS — G89.29 CHRONIC RIGHT SHOULDER PAIN: ICD-10-CM

## 2024-09-13 DIAGNOSIS — S42.341D CLOSED DISPLACED SPIRAL FRACTURE OF SHAFT OF RIGHT HUMERUS WITH ROUTINE HEALING, SUBSEQUENT ENCOUNTER: Primary | ICD-10-CM

## 2024-09-13 PROCEDURE — 97110 THERAPEUTIC EXERCISES: CPT | Mod: GP | Performed by: PHYSICAL THERAPIST

## 2024-09-27 ENCOUNTER — THERAPY VISIT (OUTPATIENT)
Dept: PHYSICAL THERAPY | Facility: CLINIC | Age: 59
End: 2024-09-27
Payer: COMMERCIAL

## 2024-09-27 DIAGNOSIS — M25.511 CHRONIC RIGHT SHOULDER PAIN: ICD-10-CM

## 2024-09-27 DIAGNOSIS — G89.29 CHRONIC RIGHT SHOULDER PAIN: ICD-10-CM

## 2024-09-27 DIAGNOSIS — S42.341D CLOSED DISPLACED SPIRAL FRACTURE OF SHAFT OF RIGHT HUMERUS WITH ROUTINE HEALING, SUBSEQUENT ENCOUNTER: Primary | ICD-10-CM

## 2024-09-27 PROCEDURE — 97110 THERAPEUTIC EXERCISES: CPT | Mod: GP | Performed by: PHYSICAL THERAPIST

## 2024-09-27 PROCEDURE — 97530 THERAPEUTIC ACTIVITIES: CPT | Mod: GP | Performed by: PHYSICAL THERAPIST

## 2024-09-27 NOTE — PROGRESS NOTES
DISCHARGE  Reason for Discharge: Patient has met some of her goals and making good progress towards her other goals. She will be DC to [facility].    Equipment Issued:     Discharge Plan: Patient to continue home program.    Referring Provider:  Derrick Cash       09/27/24 0500   Appointment Info   Signing clinician's name / credentials Warren Soriano DPT   Total/Authorized Visits 6 (E&T)   Visits Used 5   Medical Diagnosis Closed displaced spiral fracture of shaft of right humerus with routine healing, subsequent encounter   PT Tx Diagnosis Right shoulder pain, Right humeral fracture   Progress Note/Certification   Start of Care Date 07/12/24   Onset of illness/injury or Date of Surgery 12/21/23   Therapy Frequency 1 x every other week   Predicted Duration 12 weeks   Certification date from 07/12/24   Certification date to 10/04/24   Progress Note Completed Date 07/12/24   GOALS   PT Goals 2   PT Goal 1   Goal Identifier Goal 1   Goal Description Patient will be able to reach overhead and behind back WNF ROM pain at worst 2/10   Rationale to maximize safety and independence with performance of ADLs and functional tasks;to maximize safety and independence within the home;to maximize safety and independence within the community;to maximize safety and independence with transportation;to maximize safety and independence with self cares   Target Date 10/04/24   Date Met 09/27/24   PT Goal 2   Goal Identifier Goal 2   Goal Description Patient will be able lift 15 lbs with pain at worst 1/10   Rationale to maximize safety and independence with performance of ADLs and functional tasks;to maximize safety and independence within the home;to maximize safety and independence within the community;to maximize safety and independence with transportation;to maximize safety and independence with self cares   Goal Progress improving can lift 8 lbs   Target Date 10/04/24   Subjective Report   Subjective Report Doing better  having little to no pain. Feels her strength and motion is getting better.   Objective Measures   Objective Measures Objective Measure 1;Objective Measure 2;Objective Measure 3   Objective Measure 1   Objective Measure AROM flex 160, abd 144, ER 60, T9   Details pain free with all active motion   Objective Measure 2   Objective Measure MMT R shoulder flexion +4/5+, abd 5/5, ER +4/5, IR 5/5   Objective Measure 3   Objective Measure pain 0/10   Treatment Interventions (PT)   Interventions Therapeutic Procedure/Exercise;Therapeutic Activity   Therapeutic Procedure/Exercise   Therapeutic Procedures: strength, endurance, ROM, flexibility minutes (09612) 30   Therapeutic Procedures Ther Proc 2;Ther Proc 3   Ther Proc 1 UBE 5' F/B everyother minute   Ther Proc 1 - Details 9 oclock resistance   PTRx Ther Proc 1 Wall Climb   PTRx Ther Proc 1 - Details x 10 reps cues to be gentle    PTRx Ther Proc 2 Four Corner Stretch External Rotation With Abduction   PTRx Ther Proc 2 - Details rev for HEP   PTRx Ther Proc 3 Wand Shoulder Internal Rotation   PTRx Ther Proc 3 - Details 10 times hold 5 seconds    PTRx Ther Proc 4 Shoulder Scaption Full Can   PTRx Ther Proc 4 - Details x10 2# x 10 all the way overhead AG   PTRx Ther Proc 5 Shoulder Scapular Retraction with Tubing   PTRx Ther Proc 5 - Details HEP   PTRx Ther Proc 6 Shoulder Theraband Low Row/Pul[place]own   PTRx Ther Proc 6 - Details x15 RTB min scap stab cuing   PTRx Ther Proc 7 Shoulder Theraband Rows   PTRx Ther Proc 7 - Details x20 RTB min scap stab cuing   PTRx Ther Proc 8 Shoulder Extension in Standing   PTRx Ther Proc 8 - Details 15x 2# 2 sets   PTRx Ther Proc 9 Shoulder Horizontal Abduction Standing   PTRx Ther Proc 9 - Details 10x 2#   PTRx Ther Proc 10 Push-Up Plus At Wall   PTRx Ther Proc 10 - Details 10x   Skilled Intervention lots of verbal cueing and MC   Patient Response/Progress tolerated well   Therapeutic Activity   Therapeutic Activities: dynamic activities to  improve functional performance minutes (38859) 8   Ther Act 1 Worked on correct lifting techniques with different weights   Ther Act 1 - Details 2#, 5#, 8# and 10# for at home   Patient Response/Progress good understanding   Education   Education Comments print outs   Plan   Plan for next session patient is doing well and has improved her ROM, strength and function and will bd DC to [facility]   Total Session Time   Timed Code Treatment Minutes 38   Total Treatment Time (sum of timed and untimed services) 38

## 2024-11-06 NOTE — RESULT ENCOUNTER NOTE
Dear rBooke,     -Normal red blood cell (hgb) levels, normal white blood cell count and normal platelet levels.      Please send a [employer] message or call [phone]  if you have any questions.      BRIAN Marinelli, [facility] [place]    If you have further questions about the interpretation of your labs, [email or web address] is a good website to check out for further information.     Patient Name: Cortez Delgado  : 1946    MRN: [id number]                              Today's Date: 2024       Admit Date: 2024    Visit Dx:     ICD-10-CM ICD-9-CM   1. S/P total knee arthroplasty, right  Z96.651 V43.65   2. Primary osteoarthritis of right knee  M17.11 715.16     Patient Active Problem List   Diagnosis    Encounter for screening for malignant neoplasm of colon    Personal history of colonic polyps    Primary osteoarthritis of right knee     Past Medical History:   Diagnosis Date    Arthritis     Colon polyps     Fracture of wrist     Approx 12 yrs ago    Frozen shoulder     Hyperlipidemia     Hypertension     Knee swelling     Periarthritis of shoulder     Sleep apnea     cpap    Tendinitis of knee      Past Surgical History:   Procedure Laterality Date    COLONOSCOPY      COLONOSCOPY N/A 10/30/2023    Procedure: COLONOSCOPY TO CECUM;  Surgeon: David Ryder MD;  [place] [facility] [place]  Service: Gastroenterology;  Laterality: N/A;  diverticulosis, polyps,hemorrhoids    COLONOSCOPY W/ POLYPECTOMY N/A 2023    Procedure: COLONOSCOPY WITH POLYPECTOMY;  Surgeon: David Ryder MD;  [place] [facility] [place];  Service: Gastroenterology;  Laterality: N/A;  DIVERTICULOSIS, POLYPS X4, HEMORRHOIDS    FRACTURE SURGERY      WRIST SURGERY Left     Approx 12 yrs ago      General Information       Row Name 24 0832          Physical Therapy Time and Intention    Document Type discharge evaluation/summary  -     Mode of Treatment physical therapy  -       Row Name 24 0832          General Information    Patient Profile Reviewed yes  pt s/p right TKA, WBAT  -JW     Prior Level of Function independent:;all household mobility;community mobility;ADL's  -JW     Existing Precautions/Restrictions fall  -JW     Barriers to Rehab none identified  -JW       Row Name 24 0832          Living Environment    People in Home spouse  -JW       Row Name 24 0832           Home Main Entrance    Number of Stairs, Main Entrance five  -     Stair Railings, Main Entrance railing on left side (ascending)  -       Row Name 11/06/24 0832          Stairs Within Home, Primary    Stairs, Within Home, Primary tri level home  -       Row Name 11/06/24 0832          Cognition    Orientation Status (Cognition) oriented x 3  -               User Key  (r) = Recorded By, (t) = Taken By, (c) = Cosigned By      Initials Name Provider Type     Swapna Harp, PT Physical Therapist                   Mobility       Row Name 11/06/24 0832          Bed Mobility    Comment, (Bed Mobility) pt sitting on EOB with RN upon arrival  -       Row Name 11/06/24 0832          Sit-Stand Transfer    Sit-Stand Bent (Transfers) supervision  -     Assistive Device (Sit-Stand Transfers) walker, front-wheeled  -       Row Name 11/06/24 0832          Gait/Stairs (Locomotion)    Bent Level (Gait) supervision  -     Assistive Device (Gait) walker, front-wheeled  -     Distance in Feet (Gait) 200  -     Bilateral Gait Deviations forward flexed posture  -     Bent Level (Stairs) contact guard;verbal cues  -     Handrail Location (Stairs) left side (ascending)  -     Ascending Technique (Stairs) step-to-step  -     Descending Technique (Stairs) step-to-step  -JW     Comment, (Gait/Stairs) pt with no balance loss during gait, manages direction changes without difficulty  -       Row Name 11/06/24 0832          Mobility    Extremity Weight-bearing Status right lower extremity  -     Right Lower Extremity (Weight-bearing Status) weight-bearing as tolerated (WBAT)  -               User Key  (r) = Recorded By, (t) = Taken By, (c) = Cosigned By      Initials Name Provider Type     Swapna Harp, PT Physical Therapist                   Obj/Interventions       Row Name 11/06/24 0832          Range of Motion Comprehensive    Comment, General Range of Motion left LE WFL,  "right LE not formally tested  -[facility] Name 11/06/24 0832          Strength Comprehensive (MMT)    Comment, General Manual Muscle Testing (MMT) Assessment left LE WFL, right LE not formally tested  -[facility] Name 11/06/24 0832          Motor Skills    Therapeutic Exercise --  pt issued written HEP and reviewed  -[facility] Name 11/06/24 0832          Balance    Comment, Balance SBA for standing balance with walker  -[facility] Name 11/06/24 0832          Sensory Assessment (Somatosensory)    Sensory Assessment (Somatosensory) other (see comments)  pt reports no deficits in sensation  -               User Key  (r) = Recorded By, (t) = Taken By, (c) = Cosigned By      Initials Name Provider Type    JW Swapna Harp, PT Physical Therapist                   Goals/Plan    No documentation.                  Clinical Impression       Row Name 11/06/24 0832          Pain    Pretreatment Pain Rating 5/10  -     Posttreatment Pain Rating 5/10  -     Pain Location knee  -     Pain Side/Orientation right;lower  -     Pain Management Interventions cold applied  -     Response to Pain Interventions intervention effective per patient report  -[facility] 11/06/24 0832          Plan of Care Review    Plan of Care Reviewed With patient  -     Outcome Evaluation PT Evaluation complete.  Patient performs supine to sit with SBA and sit to stand with SBA.  patient performs gait with rolling walker x200 feet, SBA.  Patient ascends/descends 5 steps with 1 handrail, CGA.  Patient issued written LE HEP and reviewed.  Patient reports no concerns regarding return home at this time.  Recommend home health PT, pt needs BSC and RW.  No further PT needs in acute care setting.  -[facility] Name 11/06/24 0832          Therapy Assessment/Plan (PT)    Patient/Family Therapy Goals Statement (PT) \"go home\"  -     Criteria for Skilled Interventions Met (PT) no problems identified which require skilled intervention  " -JW     Therapy Frequency (PT) evaluation only  -       Row Name 11/06/24 0832          Positioning and Restraints    Pre-Treatment Position in bed  -JW     Post Treatment Position chair  -JW     In Chair sitting;call light within reach;encouraged to call for assist;with family/caregiver  -               User Key  (r) = Recorded By, (t) = Taken By, (c) = Cosigned By      Initials Name Provider Type    Swapna Fierro PT Physical Therapist                   Outcome Measures       Row Name 11/06/24 0832          How much help from another person do you currently need...    Turning from your back to your side while in flat bed without using bedrails? 4  -JW     Moving from lying on back to sitting on the side of a flat bed without bedrails? 3  -JW     Moving to and from a bed to a chair (including a wheelchair)? 3  -JW     Standing up from a chair using your arms (e.g., wheelchair, bedside chair)? 3  -JW     Climbing 3-5 steps with a railing? 3  -JW     To walk in hospital room? 3  -JW     AM-PAC 6 Clicks Score (PT) 19  -JW     Highest Level of Mobility Goal 6 --> Walk 10 steps or more  -       Row Name 11/06/24 0832          PADD    Diagnosis 1  -JW     Gender 2  -JW     Age Group 0  -JW     Gait Distance 1  -JW     Assist Level 2  -JW     Home Support 3  -JW     PADD Score 9  -JW     Patient Preference home with home health  -JW     Prediction by PADD Score directly home (with home health or out-patient rehab)  -       Row Name 11/06/24 0832          Functional Assessment    Outcome Measure Options PADD;AM-PAC 6 Clicks Basic Mobility (PT)  -               User Key  (r) = Recorded By, (t) = Taken By, (c) = Cosigned By      Initials Name Provider Type    Swapna Fierro PT Physical Therapist                  Physical Therapy Education       Title: PT OT SLP Therapies (Resolved)       Topic: Physical Therapy (Resolved)       Point: Mobility training (Resolved)       Learning Progress Summary             Patient Acceptance, E,TB, VU by MICHAEL at 11/6/2024 0920                      Point: Home exercise program (Resolved)       Learning Progress Summary            Patient Acceptance, E,TB, VU by  at 11/6/2024 0920                                      User Key       Initials Effective Dates Name Provider Type [facility] 06/16/21 -  Swapna Harp, PT Physical Therapist PT                  PT Recommendation and Plan     Outcome Evaluation: PT Evaluation complete.  Patient performs supine to sit with SBA and sit to stand with SBA.  patient performs gait with rolling walker x200 feet, SBA.  Patient ascends/descends 5 steps with 1 handrail, CGA.  Patient issued written LE HEP and reviewed.  Patient reports no concerns regarding return home at this time.  Recommend home health PT, pt needs BSC and RW.  No further PT needs in acute care setting.     Time Calculation:   PT Evaluation Complexity  History, PT Evaluation Complexity: no personal factors and/or comorbidities  Examination of Body Systems (PT Eval Complexity): 1-2 elements  Clinical Presentation (PT Evaluation Complexity): stable  Clinical Decision Making (PT Evaluation Complexity): low complexity  Overall Complexity (PT Evaluation Complexity): low complexity     PT Charges       Row Name 11/06/24 0920             Time Calculation    Start Time 0831  -      Stop Time 0850  -      Time Calculation (min) 19 min  -      PT Received On 11/06/24  -                User Key  (r) = Recorded By, (t) = Taken By, (c) = Cosigned By      Initials Name Provider Type     Swapna Harp, RON Physical Therapist                  Therapy Charges for Today       Code Description Service Date Service Provider Modifiers Qty    99273773815 HC PT EVAL LOW COMPLEXITY 1 11/6/2024 Swapna Harp, PT GP 1            PT G-Codes  Outcome Measure Options: PADD, AM-PAC 6 Clicks Basic Mobility (PT)  AM-PAC 6 Clicks Score (PT): 19    PT Discharge Summary  Anticipated Discharge  Disposition (PT): home with home health    Swapna Harp, PT  11/6/2024

## 2024-11-15 PROBLEM — M25.511 CHRONIC RIGHT SHOULDER PAIN: Status: RESOLVED | Noted: 2024-07-12 | Resolved: 2024-11-15

## 2024-11-15 PROBLEM — G89.29 CHRONIC RIGHT SHOULDER PAIN: Status: RESOLVED | Noted: 2024-07-12 | Resolved: 2024-11-15

## 2024-11-15 PROBLEM — S42.341D CLOSED DISPLACED SPIRAL FRACTURE OF SHAFT OF RIGHT HUMERUS WITH ROUTINE HEALING, SUBSEQUENT ENCOUNTER: Status: RESOLVED | Noted: 2024-02-22 | Resolved: 2024-11-15

## 2024-11-20 DIAGNOSIS — J30.2 SEASONAL ALLERGIC RHINITIS, UNSPECIFIED TRIGGER: ICD-10-CM

## 2024-11-20 DIAGNOSIS — J45.40 MODERATE PERSISTENT ASTHMA WITHOUT COMPLICATION: ICD-10-CM

## 2024-11-20 RX ORDER — FLUTICASONE PROPIONATE 50 MCG
SPRAY, SUSPENSION (ML) NASAL
Qty: 16 G | Refills: 2 | Status: SHIPPED | OUTPATIENT
Start: 2024-11-20

## 2024-12-10 ENCOUNTER — TELEPHONE (OUTPATIENT)
Dept: FAMILY MEDICINE | Facility: CLINIC | Age: 59
End: 2024-12-10
Payer: COMMERCIAL

## 2024-12-10 NOTE — TELEPHONE ENCOUNTER
Forms/Letter Request    Type of form/letter: [facility] - ICD-10 Codes/diagnosis - Care Coordination    Do we have the form/letter: Yes:     Who is the form from? above    Where did/will the form come from? form was faxed in    When is form/letter needed by: SANTIAGO Hollis    How would you like the form/letter returned: Fax : [phone]    Patient Notified form requests are processed in 5-7 business days:No    Okay to leave a detailed message?:  NA    Put in providers in box    Desi K

## 2025-02-17 DIAGNOSIS — J30.2 SEASONAL ALLERGIC RHINITIS, UNSPECIFIED TRIGGER: ICD-10-CM

## 2025-02-17 DIAGNOSIS — J45.40 MODERATE PERSISTENT ASTHMA WITHOUT COMPLICATION: ICD-10-CM

## 2025-02-17 RX ORDER — FLUTICASONE PROPIONATE 50 MCG
SPRAY, SUSPENSION (ML) NASAL
Qty: 16 G | Refills: 0 | Status: SHIPPED | OUTPATIENT
Start: 2025-02-17

## 2025-03-19 DIAGNOSIS — K44.9 HIATAL HERNIA: ICD-10-CM

## 2025-03-19 DIAGNOSIS — K21.9 GASTROESOPHAGEAL REFLUX DISEASE WITHOUT ESOPHAGITIS: ICD-10-CM

## 2025-03-19 RX ORDER — PANTOPRAZOLE SODIUM 40 MG/1
40 TABLET, DELAYED RELEASE ORAL DAILY
Qty: 90 TABLET | Refills: 0 | Status: SHIPPED | OUTPATIENT
Start: 2025-03-19

## 2025-03-26 DIAGNOSIS — J45.40 MODERATE PERSISTENT ASTHMA WITHOUT COMPLICATION: ICD-10-CM

## 2025-03-26 DIAGNOSIS — I10 BENIGN ESSENTIAL HYPERTENSION: ICD-10-CM

## 2025-03-26 DIAGNOSIS — E03.9 HYPOTHYROIDISM, UNSPECIFIED TYPE: ICD-10-CM

## 2025-03-26 RX ORDER — LEVOTHYROXINE SODIUM 88 UG/1
88 TABLET ORAL
Qty: 90 TABLET | Refills: 0 | Status: SHIPPED | OUTPATIENT
Start: 2025-03-26

## 2025-03-26 RX ORDER — CETIRIZINE HYDROCHLORIDE 10 MG/1
10 TABLET ORAL EVERY MORNING
Qty: 90 TABLET | Refills: 1 | Status: SHIPPED | OUTPATIENT
Start: 2025-03-26

## 2025-03-26 RX ORDER — LOSARTAN POTASSIUM 50 MG/1
50 TABLET ORAL
Qty: 90 TABLET | Refills: 0 | Status: SHIPPED | OUTPATIENT
Start: 2025-03-26

## 2025-04-03 ENCOUNTER — OFFICE VISIT (OUTPATIENT)
Dept: FAMILY MEDICINE | Facility: CLINIC | Age: 60
End: 2025-04-03
Payer: COMMERCIAL

## 2025-04-03 VITALS
OXYGEN SATURATION: 97 % | HEART RATE: 78 BPM | HEIGHT: 61 IN | BODY MASS INDEX: 35.3 KG/M2 | DIASTOLIC BLOOD PRESSURE: 96 MMHG | RESPIRATION RATE: 15 BRPM | TEMPERATURE: 97.9 F | SYSTOLIC BLOOD PRESSURE: 156 MMHG | WEIGHT: 187 LBS

## 2025-04-03 DIAGNOSIS — J45.40 MODERATE PERSISTENT ASTHMA WITHOUT COMPLICATION: ICD-10-CM

## 2025-04-03 DIAGNOSIS — E78.1 HYPERTRIGLYCERIDEMIA: ICD-10-CM

## 2025-04-03 DIAGNOSIS — K44.9 HIATAL HERNIA: ICD-10-CM

## 2025-04-03 DIAGNOSIS — E03.9 HYPOTHYROIDISM, UNSPECIFIED TYPE: ICD-10-CM

## 2025-04-03 DIAGNOSIS — K59.00 CONSTIPATION, UNSPECIFIED CONSTIPATION TYPE: ICD-10-CM

## 2025-04-03 DIAGNOSIS — Z12.31 VISIT FOR SCREENING MAMMOGRAM: ICD-10-CM

## 2025-04-03 DIAGNOSIS — D64.9 NORMOCYTIC ANEMIA: ICD-10-CM

## 2025-04-03 DIAGNOSIS — G43.909 MIGRAINE WITHOUT STATUS MIGRAINOSUS, NOT INTRACTABLE, UNSPECIFIED MIGRAINE TYPE: ICD-10-CM

## 2025-04-03 DIAGNOSIS — I10 BENIGN ESSENTIAL HYPERTENSION: Primary | ICD-10-CM

## 2025-04-03 DIAGNOSIS — K21.9 GASTROESOPHAGEAL REFLUX DISEASE WITHOUT ESOPHAGITIS: ICD-10-CM

## 2025-04-03 LAB
ALBUMIN SERPL BCG-MCNC: 4.4 G/DL (ref 3.5–5.2)
ALP SERPL-CCNC: 66 U/L (ref 40–150)
ALT SERPL W P-5'-P-CCNC: 12 U/L (ref 0–50)
ANION GAP SERPL CALCULATED.3IONS-SCNC: 12 MMOL/L (ref 7–15)
AST SERPL W P-5'-P-CCNC: 22 U/L (ref 0–45)
BASOPHILS # BLD AUTO: 0 10E3/UL (ref 0–0.2)
BASOPHILS NFR BLD AUTO: 0 %
BILIRUB SERPL-MCNC: 0.7 MG/DL
BUN SERPL-MCNC: 15.9 MG/DL (ref 8–23)
CALCIUM SERPL-MCNC: 9.4 MG/DL (ref 8.8–10.4)
CHLORIDE SERPL-SCNC: 107 MMOL/L (ref 98–107)
CHOLEST SERPL-MCNC: 169 MG/DL
CREAT SERPL-MCNC: 0.8 MG/DL (ref 0.51–0.95)
CREAT UR-MCNC: 150 MG/DL
EGFRCR SERPLBLD CKD-EPI 2021: 83 ML/MIN/1.73M2
EOSINOPHIL # BLD AUTO: 0.2 10E3/UL (ref 0–0.7)
EOSINOPHIL NFR BLD AUTO: 3 %
ERYTHROCYTE [DISTWIDTH] IN BLOOD BY AUTOMATED COUNT: 14.7 % (ref 10–15)
FASTING STATUS PATIENT QL REPORTED: YES
FASTING STATUS PATIENT QL REPORTED: YES
GLUCOSE SERPL-MCNC: 95 MG/DL (ref 70–99)
HCO3 SERPL-SCNC: 22 MMOL/L (ref 22–29)
HCT VFR BLD AUTO: 33.9 % (ref 35–47)
HDLC SERPL-MCNC: 45 MG/DL
HGB BLD-MCNC: 10.6 G/DL (ref 11.7–15.7)
IMM GRANULOCYTES # BLD: 0 10E3/UL
IMM GRANULOCYTES NFR BLD: 0 %
LDLC SERPL CALC-MCNC: 63 MG/DL
LYMPHOCYTES # BLD AUTO: 1.4 10E3/UL (ref 0.8–5.3)
LYMPHOCYTES NFR BLD AUTO: 28 %
MCH RBC QN AUTO: 25.1 PG (ref 26.5–33)
MCHC RBC AUTO-ENTMCNC: 31.3 G/DL (ref 31.5–36.5)
MCV RBC AUTO: 80 FL (ref 78–100)
MICROALBUMIN UR-MCNC: 14.2 MG/L
MICROALBUMIN/CREAT UR: 9.47 MG/G CR (ref 0–25)
MONOCYTES # BLD AUTO: 0.4 10E3/UL (ref 0–1.3)
MONOCYTES NFR BLD AUTO: 9 %
NEUTROPHILS # BLD AUTO: 3 10E3/UL (ref 1.6–8.3)
NEUTROPHILS NFR BLD AUTO: 60 %
NONHDLC SERPL-MCNC: 124 MG/DL
PLATELET # BLD AUTO: 224 10E3/UL (ref 150–450)
POTASSIUM SERPL-SCNC: 4.2 MMOL/L (ref 3.4–5.3)
PROT SERPL-MCNC: 7.5 G/DL (ref 6.4–8.3)
RBC # BLD AUTO: 4.22 10E6/UL (ref 3.8–5.2)
SODIUM SERPL-SCNC: 141 MMOL/L (ref 135–145)
TRIGL SERPL-MCNC: 307 MG/DL
TSH SERPL DL<=0.005 MIU/L-ACNC: 0.29 UIU/ML (ref 0.3–4.2)
WBC # BLD AUTO: 5.1 10E3/UL (ref 4–11)

## 2025-04-03 PROCEDURE — 3080F DIAST BP >= 90 MM HG: CPT | Performed by: GENERAL PRACTICE

## 2025-04-03 PROCEDURE — 36415 COLL VENOUS BLD VENIPUNCTURE: CPT | Performed by: GENERAL PRACTICE

## 2025-04-03 PROCEDURE — 3077F SYST BP >= 140 MM HG: CPT | Performed by: GENERAL PRACTICE

## 2025-04-03 PROCEDURE — G2211 COMPLEX E/M VISIT ADD ON: HCPCS | Performed by: GENERAL PRACTICE

## 2025-04-03 PROCEDURE — 1126F AMNT PAIN NOTED NONE PRSNT: CPT | Performed by: GENERAL PRACTICE

## 2025-04-03 PROCEDURE — 80053 COMPREHEN METABOLIC PANEL: CPT | Performed by: GENERAL PRACTICE

## 2025-04-03 PROCEDURE — 99214 OFFICE O/P EST MOD 30 MIN: CPT | Performed by: GENERAL PRACTICE

## 2025-04-03 PROCEDURE — 80061 LIPID PANEL: CPT | Performed by: GENERAL PRACTICE

## 2025-04-03 PROCEDURE — 84443 ASSAY THYROID STIM HORMONE: CPT | Performed by: GENERAL PRACTICE

## 2025-04-03 PROCEDURE — 82043 UR ALBUMIN QUANTITATIVE: CPT | Performed by: GENERAL PRACTICE

## 2025-04-03 PROCEDURE — 82570 ASSAY OF URINE CREATININE: CPT | Performed by: GENERAL PRACTICE

## 2025-04-03 PROCEDURE — 85025 COMPLETE CBC W/AUTO DIFF WBC: CPT | Performed by: GENERAL PRACTICE

## 2025-04-03 RX ORDER — ALBUTEROL SULFATE 90 UG/1
2 INHALANT RESPIRATORY (INHALATION) EVERY 6 HOURS PRN
Qty: 18 G | Refills: 3 | Status: SHIPPED | OUTPATIENT
Start: 2025-04-03

## 2025-04-03 RX ORDER — LEVOTHYROXINE SODIUM 88 UG/1
88 TABLET ORAL
Qty: 90 TABLET | Refills: 3 | Status: SHIPPED | OUTPATIENT
Start: 2025-04-03

## 2025-04-03 RX ORDER — POLYETHYLENE GLYCOL 3350 17 G/17G
17 POWDER, FOR SOLUTION ORAL DAILY PRN
Qty: 510 G | Refills: 6 | Status: SHIPPED | OUTPATIENT
Start: 2025-04-03

## 2025-04-03 RX ORDER — ROSUVASTATIN CALCIUM 10 MG/1
TABLET, COATED ORAL
Qty: 90 TABLET | Refills: 3 | Status: SHIPPED | OUTPATIENT
Start: 2025-04-03

## 2025-04-03 RX ORDER — LOSARTAN POTASSIUM 50 MG/1
50 TABLET ORAL
Qty: 90 TABLET | Refills: 1 | Status: SHIPPED | OUTPATIENT
Start: 2025-04-03

## 2025-04-03 RX ORDER — SUMATRIPTAN SUCCINATE 25 MG/1
TABLET ORAL
Qty: 24 TABLET | Refills: 3 | Status: SHIPPED | OUTPATIENT
Start: 2025-04-03

## 2025-04-03 RX ORDER — PANTOPRAZOLE SODIUM 40 MG/1
40 TABLET, DELAYED RELEASE ORAL DAILY
Qty: 90 TABLET | Refills: 3 | Status: SHIPPED | OUTPATIENT
Start: 2025-04-03

## 2025-04-03 ASSESSMENT — ASTHMA QUESTIONNAIRES
QUESTION_1 LAST FOUR WEEKS HOW MUCH OF THE TIME DID YOUR ASTHMA KEEP YOU FROM GETTING AS MUCH DONE AT WORK, SCHOOL OR AT HOME: NONE OF THE TIME
QUESTION_4 LAST FOUR WEEKS HOW OFTEN HAVE YOU USED YOUR RESCUE INHALER OR NEBULIZER MEDICATION (SUCH AS ALBUTEROL): NOT AT ALL
QUESTION_3 LAST FOUR WEEKS HOW OFTEN DID YOUR ASTHMA SYMPTOMS (WHEEZING, COUGHING, SHORTNESS OF BREATH, CHEST TIGHTNESS OR PAIN) WAKE YOU UP AT NIGHT OR EARLIER THAN USUAL IN THE MORNING: NOT AT ALL
QUESTION_2 LAST FOUR WEEKS HOW OFTEN HAVE YOU HAD SHORTNESS OF BREATH: ONCE OR TWICE A WEEK
ACT_TOTALSCORE: 23
QUESTION_5 LAST FOUR WEEKS HOW WOULD YOU RATE YOUR ASTHMA CONTROL: WELL CONTROLLED

## 2025-04-03 ASSESSMENT — PAIN SCALES - GENERAL: PAINLEVEL_OUTOF10: NO PAIN (0)

## 2025-04-03 NOTE — PROGRESS NOTES
Assessment & Plan     Benign essential hypertension  Follow-up in 2 weeks to check blood pressure with nursing.     - Comprehensive metabolic panel (BMP + Alb, Alk Phos, ALT, AST, Total. Bili, TP); Future  - Albumin Random Urine Quantitative with Creat Ratio; Future  - losartan (COZAAR) 50 MG tablet; Take 1 tablet (50 mg) by mouth daily at 2 pm.  - CBC with platelets and differential; Future  - Comprehensive metabolic panel (BMP + Alb, Alk Phos, ALT, AST, Total. Bili, TP)  - Albumin Random Urine Quantitative with Creat Ratio  - CBC with platelets and differential    Hypothyroidism, unspecified type    - TSH; Future  - levothyroxine (SYNTHROID/LEVOTHROID) 88 MCG tablet; Take 1 tablet (88 mcg) by mouth daily at 2 pm.  - TSH    Constipation, unspecified constipation type    - polyethylene glycol (GOODSENSE CLEARLAX) 17 GM/Dose powder; Take 17 g by mouth daily as needed for constipation.    Visit for screening mammogram    - MA Screening Bilateral w/ Jamal; Future    Moderate persistent asthma without complication    - albuterol (PROAIR HFA/PROVENTIL HFA/VENTOLIN HFA) 108 (90 Base) MCG/ACT inhaler; Inhale 2 puffs into the lungs every 6 hours as needed for shortness of breath or wheezing. Cough    Gastroesophageal reflux disease without esophagitis    - pantoprazole (PROTONIX) 40 MG EC tablet; Take 1 tablet (40 mg) by mouth daily.    Hiatal hernia    - pantoprazole (PROTONIX) 40 MG EC tablet; Take 1 tablet (40 mg) by mouth daily.    Hypertriglyceridemia    - Lipid panel reflex to direct LDL Fasting; Future  - rosuvastatin (CRESTOR) 10 MG tablet; TAKE 1 TABLET (10 MG) BY MOUTH EVERY NIGHT AT BEDTIME.  - Lipid panel reflex to direct LDL Fasting    Migraine without status migrainosus, not intractable, unspecified migraine type    - SUMAtriptan (IMITREX) 25 MG tablet; TAKE 1 TABLET BY MOUTH AT ONSET OF HEADACHE. MAY REPEAT IN 2 HOURS AS NEEDED. MAX  MG/ 24 HOURS    Normocytic anemia      Follow-up in 6 months for  "Wellness and med check     The longitudinal plan of care for the diagnosis(es)/condition(s) as documented were addressed during this visit. Due to the added complexity in care, I will continue to support Brooke in the subsequent management and with ongoing continuity of care.  BMI  Estimated body mass index is 35.92 kg/m  as calculated from the following:    Height as of this encounter: 1.537 m (5' 0.5\").    Weight as of this encounter: 84.8 kg (187 lb).   Weight management plan: Discussed healthy diet and exercise guidelines          Subjective   Brooke is a 59 year old, presenting for the following health issues:  Recheck Medication        4/3/2025    10:03 AM   Additional Questions   Roomed by Myra EUGENE       Pt is fasting in case of labs.    Would like to review all medications.    No additional concerns.       Med check    Scoliosis  Risks of falls  Uses can/walker as needed  Parking certificate for going out with friends/[employer]  Uses lift   Does not drive    History of Present Illness       Reason for visit:  Med  check   She is taking medications regularly.        Review of Systems  Constitutional, neuro, ENT, endocrine, pulmonary, cardiac, gastrointestinal, genitourinary, musculoskeletal, integument and psychiatric systems are negative, except as otherwise noted.      Objective    BP (!) 156/96 (BP Location: Left arm, Patient Position: Sitting, Cuff Size: Adult Regular)   Pulse 78   Temp 97.9  F (36.6  C) (Oral)   Resp 15   Ht 1.537 m (5' 0.5\")   Wt 84.8 kg (187 lb)   LMP 05/15/2018 (Within Days)   SpO2 97%   BMI 35.92 kg/m    Body mass index is 35.92 kg/m .  Physical Exam  Constitutional:       Appearance: Normal appearance.   Eyes:      Extraocular Movements: Extraocular movements intact.   Cardiovascular:      Rate and Rhythm: Normal rate and regular rhythm.   Pulmonary:      Effort: Pulmonary effort is normal.      Breath sounds: Normal breath sounds.   Abdominal:      Palpations: Abdomen is " soft.   Musculoskeletal:         General: Normal range of motion.      Cervical back: Normal range of motion.      Comments: Scoliosis   Skin:     General: Skin is warm.   Neurological:      General: No focal deficit present.      Mental Status: She is alert and oriented to person, place, and time. Mental status is at baseline.   Psychiatric:         Mood and Affect: Mood normal.         Behavior: Behavior normal.         Thought Content: Thought content normal.         Judgment: Judgment normal.                    Signed Electronically by: Jacqueline Crowder MD

## 2025-04-03 NOTE — LETTER
April 7, 2025      Brooke John  [address]   [facility] [address]    George Cox,     Cholesterol is stable.  Recheck fasting cholesterol in 1 year.     Normal urine test.     Normal lites and kidney function.     Normal liver enzyme.     Your hemoglobin is slightly lower compare to last year.  Recheck this in 3 months with your thyroid function.     Your thyroid function is slightly too much.  Recheck thyroid function in 3 months to see if an adjustment in your medication is needed.  You need a lab appointment for this.     Jacqueline Crowder MD   Internal Medicine   [facility]     Resulted Orders   TSH   Result Value Ref Range    TSH 0.29 (L) 0.30 - 4.20 uIU/mL   Lipid panel reflex to direct LDL Fasting   Result Value Ref Range    Cholesterol 169 <200 mg/dL    Triglycerides 307 (H) <150 mg/dL    Direct Measure HDL 45 (L) >=50 mg/dL    LDL Cholesterol Calculated 63 <100 mg/dL    Non HDL Cholesterol 124 <130 mg/dL    Patient Fasting > 8hrs? Yes     Narrative    Cholesterol  Desirable: < 200 mg/dL  Borderline High: 200 - 239 mg/dL  High: >= 240 mg/dL    Triglycerides  Normal: < 150 mg/dL  Borderline High: 150 - 199 mg/dL  High: 200-499 mg/dL  Very High: >= 500 mg/dL    Direct Measure HDL  Female: >= 50 mg/dL   Male: >= 40 mg/dL    LDL Cholesterol  Desirable: < 100 mg/dL  Above Desirable: 100 - 129 mg/dL   Borderline High: 130 - 159 mg/dL   High:  160 - 189 mg/dL   Very High: >= 190 mg/dL    Non HDL Cholesterol  Desirable: < 130 mg/dL  Above Desirable: 130 - 159 mg/dL  Borderline High: 160 - 189 mg/dL  High: 190 - 219 mg/dL  Very High: >= 220 mg/dL   Comprehensive metabolic panel (BMP + Alb, Alk Phos, ALT, AST, Total. Bili, TP)   Result Value Ref Range    Sodium 141 135 - 145 mmol/L    Potassium 4.2 3.4 - 5.3 mmol/L    Carbon Dioxide (CO2) 22 22 - 29 mmol/L    Anion Gap 12 7 - 15 mmol/L    Urea Nitrogen 15.9 8.0 - 23.0 mg/dL    Creatinine 0.80 0.51 - 0.95 mg/dL    GFR Estimate 83  >60 mL/min/1.73m2      Comment:      eGFR calculated using 2021 CKD-EPI equation.    Calcium 9.4 8.8 - 10.4 mg/dL    Chloride 107 98 - 107 mmol/L    Glucose 95 70 - 99 mg/dL    Alkaline Phosphatase 66 40 - 150 U/L    AST 22 0 - 45 U/L    ALT 12 0 - 50 U/L    Protein Total 7.5 6.4 - 8.3 g/dL    Albumin 4.4 3.5 - 5.2 g/dL    Bilirubin Total 0.7 <=1.2 mg/dL    Patient Fasting > 8hrs? Yes    Albumin Random Urine Quantitative with Creat Ratio   Result Value Ref Range    Creatinine Urine mg/dL 150.0 mg/dL      Comment:      The reference ranges have not been established in urine creatinine. The results should be integrated into the clinical context for interpretation.    Albumin Urine mg/L 14.2 mg/L      Comment:      The reference ranges have not been established in urine albumin. The results should be integrated into the clinical context for interpretation.    Albumin Urine mg/g Cr 9.47 0.00 - 25.00 mg/g Cr      Comment:      Microalbuminuria is defined as an albumin:creatinine ratio of 17 to 299 for males and 25 to 299 for females. A ratio of albumin:creatinine of 300 or higher is indicative of overt proteinuria.  Due to biologic variability, positive results should be confirmed by a second, first-morning random or 24-hour timed urine specimen. If there is discrepancy, a third specimen is recommended. When 2 out of 3 results are in the microalbuminuria range, this is evidence for incipient nephropathy and warrants increased efforts at glucose control, blood pressure control, and institution of therapy with an angiotensin-converting-enzyme (ACE) inhibitor (if the patient can tolerate it).     CBC with platelets and differential   Result Value Ref Range    WBC Count 5.1 4.0 - 11.0 10e3/uL    RBC Count 4.22 3.80 - 5.20 10e6/uL    Hemoglobin 10.6 (L) 11.7 - 15.7 g/dL    Hematocrit 33.9 (L) 35.0 - 47.0 %    MCV 80 78 - 100 fL    MCH 25.1 (L) 26.5 - 33.0 pg    MCHC 31.3 (L) 31.5 - 36.5 g/dL    RDW 14.7 10.0 - 15.0 %     Platelet Count 224 150 - 450 10e3/uL    % Neutrophils 60 %    % Lymphocytes 28 %    % Monocytes 9 %    % Eosinophils 3 %    % Basophils 0 %    % Immature Granulocytes 0 %    Absolute Neutrophils 3.0 1.6 - 8.3 10e3/uL    Absolute Lymphocytes 1.4 0.8 - 5.3 10e3/uL    Absolute Monocytes 0.4 0.0 - 1.3 10e3/uL    Absolute Eosinophils 0.2 0.0 - 0.7 10e3/uL    Absolute Basophils 0.0 0.0 - 0.2 10e3/uL    Absolute Immature Granulocytes 0.0 <=0.4 10e3/uL    Narrative    Verified by repeat analysis DA         If you have any questions or concerns, please call the clinic at the number listed above.       Sincerely,      Jacqueline Crowder MD    Electronically signed

## 2025-04-03 NOTE — PATIENT INSTRUCTIONS
Follow-up in 2 weeks to check blood pressure with nursing.     Follow-up in 6 months for Wellness and med check     Or get the omron cuff.

## 2025-08-19 ENCOUNTER — OFFICE VISIT (OUTPATIENT)
Dept: FAMILY MEDICINE | Facility: CLINIC | Age: 60
End: 2025-08-19
Payer: COMMERCIAL

## 2025-08-19 VITALS
DIASTOLIC BLOOD PRESSURE: 83 MMHG | TEMPERATURE: 97.9 F | HEART RATE: 76 BPM | SYSTOLIC BLOOD PRESSURE: 130 MMHG | OXYGEN SATURATION: 98 % | BODY MASS INDEX: 36.42 KG/M2 | WEIGHT: 192.9 LBS | RESPIRATION RATE: 20 BRPM | HEIGHT: 61 IN

## 2025-08-19 DIAGNOSIS — E03.9 HYPOTHYROIDISM, UNSPECIFIED TYPE: ICD-10-CM

## 2025-08-19 DIAGNOSIS — D64.9 NORMOCYTIC ANEMIA: ICD-10-CM

## 2025-08-19 DIAGNOSIS — M41.9 SCOLIOSIS, UNSPECIFIED SCOLIOSIS TYPE, UNSPECIFIED SPINAL REGION: Primary | ICD-10-CM

## 2025-08-19 DIAGNOSIS — I10 BENIGN ESSENTIAL HYPERTENSION: ICD-10-CM

## 2025-08-19 PROCEDURE — 3079F DIAST BP 80-89 MM HG: CPT | Performed by: GENERAL PRACTICE

## 2025-08-19 PROCEDURE — 3075F SYST BP GE 130 - 139MM HG: CPT | Performed by: GENERAL PRACTICE

## 2025-08-19 PROCEDURE — 1126F AMNT PAIN NOTED NONE PRSNT: CPT | Performed by: GENERAL PRACTICE

## 2025-08-19 PROCEDURE — 99213 OFFICE O/P EST LOW 20 MIN: CPT | Performed by: GENERAL PRACTICE

## 2025-08-19 RX ORDER — SUMATRIPTAN SUCCINATE 25 MG/1
TABLET ORAL
Qty: 24 TABLET | Refills: 3 | Status: CANCELLED | OUTPATIENT
Start: 2025-08-19

## 2025-08-19 RX ORDER — TRIAMCINOLONE ACETONIDE 1 MG/G
CREAM TOPICAL 2 TIMES DAILY PRN
Qty: 30 G | Refills: 1 | Status: CANCELLED | OUTPATIENT
Start: 2025-08-19

## 2025-08-19 ASSESSMENT — ANXIETY QUESTIONNAIRES
GAD7 TOTAL SCORE: 0
7. FEELING AFRAID AS IF SOMETHING AWFUL MIGHT HAPPEN: NOT AT ALL
3. WORRYING TOO MUCH ABOUT DIFFERENT THINGS: NOT AT ALL
6. BECOMING EASILY ANNOYED OR IRRITABLE: NOT AT ALL
7. FEELING AFRAID AS IF SOMETHING AWFUL MIGHT HAPPEN: NOT AT ALL
4. TROUBLE RELAXING: NOT AT ALL
2. NOT BEING ABLE TO STOP OR CONTROL WORRYING: NOT AT ALL
1. FEELING NERVOUS, ANXIOUS, OR ON EDGE: NOT AT ALL
GAD7 TOTAL SCORE: 0
IF YOU CHECKED OFF ANY PROBLEMS ON THIS QUESTIONNAIRE, HOW DIFFICULT HAVE THESE PROBLEMS MADE IT FOR YOU TO DO YOUR WORK, TAKE CARE OF THINGS AT HOME, OR GET ALONG WITH OTHER PEOPLE: NOT DIFFICULT AT ALL
8. IF YOU CHECKED OFF ANY PROBLEMS, HOW DIFFICULT HAVE THESE MADE IT FOR YOU TO DO YOUR WORK, TAKE CARE OF THINGS AT HOME, OR GET ALONG WITH OTHER PEOPLE?: NOT DIFFICULT AT ALL
GAD7 TOTAL SCORE: 0
5. BEING SO RESTLESS THAT IT IS HARD TO SIT STILL: NOT AT ALL

## 2025-08-19 ASSESSMENT — PAIN SCALES - GENERAL: PAINLEVEL_OUTOF10: NO PAIN (0)

## (undated) DEVICE — KIT ENDO TURNOVER/PROCEDURE W/CLEAN A SCOPE LINERS 103888

## (undated) RX ORDER — FENTANYL CITRATE 50 UG/ML
INJECTION, SOLUTION INTRAMUSCULAR; INTRAVENOUS
Status: DISPENSED
Start: 2017-11-01